# Patient Record
Sex: MALE | Race: WHITE | NOT HISPANIC OR LATINO | Employment: OTHER | URBAN - METROPOLITAN AREA
[De-identification: names, ages, dates, MRNs, and addresses within clinical notes are randomized per-mention and may not be internally consistent; named-entity substitution may affect disease eponyms.]

---

## 2017-01-04 ENCOUNTER — MYC MEDICAL ADVICE (OUTPATIENT)
Dept: FAMILY MEDICINE | Facility: CLINIC | Age: 67
End: 2017-01-04

## 2017-01-04 ENCOUNTER — OFFICE VISIT (OUTPATIENT)
Dept: SLEEP MEDICINE | Facility: CLINIC | Age: 67
End: 2017-01-04
Attending: PSYCHIATRY & NEUROLOGY
Payer: MEDICARE

## 2017-01-04 VITALS
SYSTOLIC BLOOD PRESSURE: 132 MMHG | DIASTOLIC BLOOD PRESSURE: 93 MMHG | OXYGEN SATURATION: 94 % | HEART RATE: 93 BPM | HEIGHT: 68 IN | WEIGHT: 161 LBS | BODY MASS INDEX: 24.4 KG/M2 | TEMPERATURE: 98 F

## 2017-01-04 DIAGNOSIS — R27.0 ATAXIA: ICD-10-CM

## 2017-01-04 PROCEDURE — 99215 OFFICE O/P EST HI 40 MIN: CPT | Performed by: PSYCHIATRY & NEUROLOGY

## 2017-01-04 NOTE — PROGRESS NOTES
The patient is insightful and understands the importance of never driving if tired or sleepy.  I will defer to other medical team about competency driving a motor vehicle besides in the ways related to sleep.

## 2017-01-04 NOTE — PROGRESS NOTES
SLEEP MEDICINE NEW PATIENT VISIT NOTE       Mr. Arnold Lozada is 66 years old.  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  We had a long discussion today regarding the nature and history of his sleepiness and fatigue, its treatment as well as the progression of his motor impairment.      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 as well as by Dr. Tacos Pena at the [facility] here at the [facility].  As far as the patient is aware, at this point there is no clear underlying etiology.  There is not a family history of a cerebellar disease and he has been checked for the common genetic mutations of the autosomal dominant cerebellar ataxias which has apparently come back negative.  I will defer that the ultimate cerebellar diagnosis and its prognosis to Dr. Pena.      In regards to his sleepiness and fatigue, which has progressed in parallel along with his motor impairment, this is commonly seen and some cerebellar nuclei, in particular the vestigial nuclei, are associated with wake promoting neurotransmitters and the reticular activating system and thus it is not surprising that the initial treatment of his sleep disordered breathing first with CPAP and subsequently with a dental appliance for moderate obstructive sleep apnea did not resolve his daytime sleepiness.  He was subsequently placed on methylphenidate and now Adderall, which he takes 30 mg first thing in the morning and has significantly helped out his alertness and wakefulness.      At this point, we discussed other possible interventions or investigations.  After our discussion, it became fairly clear that no further investigation is necessary at this time.  I do not see the relevance of multiple sleep latency test since the patient is already on stimulant medications and he has a  condition which is likely to contribute to sleepiness and fatigue, but also the patient is now aware of the risks of Adderall including cardiac arrhythmias, worsening of tremor, anxiety and insomnia, but overall he states that his alertness and energy levels are substantially improved now that he has been on Adderall for the last 1-2 months.  He does have some degree of breakthrough insomnia which is not surprising.  We discussed strategies of cognitive behavioral therapy, in particular getting out of the bedroom after 15-20 minutes if he is not sleeping and not sleeping outside of the bedroom.      After extensive consideration, it was decided the patient should just go ahead and stay on the current regimen of Adderall and using his dental appliance as is and we will only look at other options if he has troubles.  He is aware of the risks of Adderall and is willing to continue.      The patient will continue to follow up with Dr. Pena for further exploration of his cerebellar diagnosis and prognosis.  I have asked him to think of us as a resource and he can come back and see me at any time, but it sounds like his sleep is under good control.      It is a great privilege being asked to participate in his care.      Forty-five minutes were spent with the patient today, greater than 50% of the time in counseling and coordination of care.        ADDENDUM:  The patient's wife did ask me my opinion as to whether or not hyperbaric oxygen may plausibly be an appropriate therapy in his case.  My understanding of the utility of hyperbaric oxygen is quite limited, but I would be extremely skeptical and I would discuss with Dr. Pena and I also pointed out that since he does have a degenerative neurological condition, he is at risk for explantation and hyperbaric oxygen therapies are often employed for frivolously and people need to pay out of pocket for that, so overall I am quite skeptical but I asked him to talk to   Jean about it.      It is a great prick privilege being asked to participate in his care.         SYLVIA LUX MD             D: 2017 08:52   T: 2017 09:50   MT: MARGARET      Name:     EARL KONG   MRN:      [id number]        Account:      [id number]   :      1950           Visit Date:   2017      Document: [id number]

## 2017-01-05 NOTE — TELEPHONE ENCOUNTER
Routing to Dr. Carpenter to please review [facility] messages and BP readings below    Silverio Paz RN

## 2017-01-08 ENCOUNTER — MYC MEDICAL ADVICE (OUTPATIENT)
Dept: FAMILY MEDICINE | Facility: CLINIC | Age: 67
End: 2017-01-08

## 2017-01-09 ENCOUNTER — OFFICE VISIT (OUTPATIENT)
Dept: BEHAVIORAL HEALTH | Facility: CLINIC | Age: 67
End: 2017-01-09
Payer: MEDICARE

## 2017-01-09 DIAGNOSIS — F43.23 ADJUSTMENT DISORDER WITH MIXED ANXIETY AND DEPRESSED MOOD: Primary | ICD-10-CM

## 2017-01-09 PROCEDURE — 90834 PSYTX W PT 45 MINUTES: CPT | Performed by: SOCIAL WORKER

## 2017-01-09 NOTE — PROGRESS NOTES
"                                           Progress Note    Client Name: Arnold Lozada             Date:   12/15/2016                                           Service Type:Individual                           Session start time:1030                   Session End Time:   1115                            Session Length:        50                Session #:      9                Attendees:     Client     Treatment Plan Last Reviewed: 12/8/2016   PHQ-9 / BRETT-7 :                 DATA                            Progress Since Last Session (Related to Symptoms / Goals / Homework):              Symptoms: Stable    Homework: n/a                            Episode of Care Goals: Satisfactory progress - ACTION (Actively working towards change); Intervened by reinforcing change plan / affirming steps taken                Current / Ongoing Stressors and Concerns:                 Client notes that he has been \"mentally have been doing better.\"  Sounds like walking and coordination are worse.  Leaves for [place] tomorrow and he has already shipped a box of books.  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  Thinks that his mood is improved (\"meds have been tweaked\").  Will be down in [place] for 6 weeks, looking forward to getting out of the cold weather.  Processed some anxiety today about flying in his physical condition.  Client has a service animal that he will be taking with him.         Discussed cognitive restructuring and behavioral activation.  Explored the connection between thoughts, feelings, and actions by using examples relative to client's presenting concerns.  Explained major domains of symptoms (cognitive, emotional, somatic, behavioral, interpersonal) and importance of targeted and specific interventions to reduce symptoms of anxiety and depression.  Discussed role of symptom monitoring in cognitive behavioral treatment.                            Treatment " Objective(s) Addressed in This Session:          Client will use identified behavioral and cognitive skills to challenge negative self talk 90% of the time.                    Intervention:               Cognitive Behavioral Therapy:   Discussed cognitive restructuring and behavioral activation.  Explored the connection between thoughts, feelings, and actions by using examples relative to client's presenting concerns.  Explained major  domains of symptoms (cognitive, emotional, somatic, behavioral, interpersonal) and importance of targeted and specific interventions to reduce symptoms of anxiety and depression.  Discussed role of  symptom monitoring in cognitive behavioral treatment.                                ASSESSMENT: Current Emotional / Mental Status  of significant symptoms):              Risk status (Self / Other harm or suicidal ideation)              Client denies current fears or concerns for personal safety.              Client denies current or recent suicidal ideation or behaviors.              Client denies current or recent homicidal ideation or behaviors.              Client denies current or recent self injurious behavior or ideation.              Client denies other safety concerns.              A safety and risk management plan has not been developed at this time, however client was given the after-hours number / 911 should there be a change in any of these risk factors.                Appearance:                           Appropriate               Eye Contact:                           Good               Psychomotor Behavior:          Normal               Attitude:                                   Cooperative               Orientation:                             All              Speech                          Rate / Production:       Normal                           Volume:                       Normal                Mood:                                      Normal              Affect:                                      Appropriate               Thought Content:                    Clear               Thought Form:                        Coherent  Goal Directed  Logical               Insight:                                    Good                 Medication Review:              No current psychiatric medications prescribed                Medication Compliance:              NA                Changes in Health Issues:              Yes: feels like he notices a decline each week                Chemical Use Review:              Substance Use: Chemical use reviewed, no active concerns identified                 Tobacco Use: No current tobacco use.                  Collateral Reports Completed:              Communicated with: n/a    PLAN: (Client Tasks / Therapist Tasks / Other)  1.  Client will call to schedule next appt  2.  CBT at next session                                                               ________________________________________________________________________    Treatment Plan    Client's Name: Arnold Lozada                      YOB: 1950    Date: 8-31-16    DSM5 Diagnoses: (Sustained by DSM5 Criteria Listed Above)  Diagnoses: F43.23 adjustment disorder with anxiety and depression  Psychosocial & Contextual Factors: health problems decreased functioning  WHODAS 2.0 (12 item)27    Referral / Collaboration:  suggested pursing support group and getting connected with organizations.    Anticipated number of session or this episode of care: 8-12    MeasurableTreatment Goal(s) related to diagnosis / functional impairment(s)  Goal-Depression: Client will decrease depressed mood    I will know I've met my goal when I am less depressed.      Objective #A (Client Action)    Status: New - Date: 12/8/2016    Client will use identified behavioral and cognitive skills to challenge negative  self talk 90% of the time.    Intervention(s)  Therapist will provide psychoeducation, behavioral activation, and cognitive restructuring.      Objective #B  Client will complete at least 10 minutes of self-regulation practice (e.g.: yoga, meditation, relaxation breathing, etc.) per day.    Status: New - Date: 12/8/2016    Intervention(s)  Therapist will provide psychoeducation, behavioral activation, and cognitive restructuring.      Objective #C  Client will exercise 30 minutes 36 times in the next 12 weeks.  Status: New - Date: 12/8/2016    Intervention(s)  Therapist will provide psychoeducation, behavioral activation, and cognitive restructuring.                Goal- Anxiety: Client will decrease anxiety    I will know I've met my goal when I am less anxious.      Objective #A (Client Action)    Status: New - Date: 12/8/2016    Client will use cognitive strategies identified in therapy to challenge anxious thoughts.    Intervention(s)  Therapist will provide psychoeducation, behavioral activation, and cognitive restructuring.    Objective #B  Client will use at least 3 coping skills for anxiety management in the next 12 weeks.    Status: New - Date: 12/8/2016    Intervention(s)  Therapist will provide psychoeducation, behavioral activation, and cognitive restructuring.      Objective #C  Client will identify three distraction and diversion activities and use those activities to decrease level of anxiety.  Status: New - Date: 12/8/2016    Intervention(s)  Therapist will provide psychoeducation, behavioral activation, and cognitive restructuring.                Client has reviewed and agreed to the above plan.      REYES Goodson [facility]               12/8/2016

## 2017-01-15 ENCOUNTER — MYC MEDICAL ADVICE (OUTPATIENT)
Dept: FAMILY MEDICINE | Facility: CLINIC | Age: 67
End: 2017-01-15

## 2017-01-16 NOTE — TELEPHONE ENCOUNTER
I don't know where you would go for tests and this may be difficult to arrange.   Could you     1. List again the tests you need  2. List a facility that would accept laboratory orders from an out of state MD   3. It's not entirely clear to me if these tests would be acceptable to your health insurance , perhaps waiting till you get back would be better ?    Nikhil Carpenter MD

## 2017-02-07 ENCOUNTER — TELEPHONE (OUTPATIENT)
Dept: NEUROLOGY | Facility: CLINIC | Age: 67
End: 2017-02-07

## 2017-02-07 NOTE — TELEPHONE ENCOUNTER
Pt called and left message to be called back. He is out of his adderall and needs to have a new prescription since he is in [place]. He was told by the pharmacist that he needs a hard copy of the prescription. He called his insurance company and was given the name of a neurologist in the area he is in in [place]. He asked that this writer called the doctor's office in [place] and ask him to write a prescription for him. This writer told him that the physician in [place] needs to see him before he can write a prescription, that it would not be ethical for a prescription to be written simply because a nurse from [place] called in regard to the patient. He was told Dr. Pena could write a prescription on Friday and it be mailed to him but would not arrive until the middle of next week. Pt states he will try and get into a primary care doctor today and let this writer no if he needs assistance in having a prescription sent to him from [place].

## 2017-02-23 ENCOUNTER — OFFICE VISIT (OUTPATIENT)
Dept: AUDIOLOGY | Facility: CLINIC | Age: 67
End: 2017-02-23
Payer: MEDICARE

## 2017-02-23 DIAGNOSIS — H90.3 SENSORINEURAL HEARING LOSS, BILATERAL: Primary | ICD-10-CM

## 2017-02-23 PROCEDURE — 99207 ZZC NO CHARGE LOS: CPT | Performed by: AUDIOLOGIST

## 2017-02-23 PROCEDURE — 92557 COMPREHENSIVE HEARING TEST: CPT | Performed by: AUDIOLOGIST

## 2017-02-23 PROCEDURE — 92567 TYMPANOMETRY: CPT | Performed by: AUDIOLOGIST

## 2017-02-23 NOTE — MR AVS SNAPSHOT
After Visit Summary   2/23/2017    Arnold Lozada    MRN: [id number]           Patient Information     Date Of Birth          1950        Visit Information        Provider Department      2/23/2017 10:30 AM Josué Davis AuD [facility]        Today's Diagnoses     Sensorineural hearing loss, bilateral    -  1       Follow-ups after your visit        Who to contact     If you have questions or need follow up information about today's clinic visit or your schedule please contact [facility] directly at [phone].  Normal or non-critical lab and imaging results will be communicated to you by [employer], letter or phone within 4 business days after the clinic has received the results. If you do not hear from us within 7 days, please contact the clinic through [employer] or phone. If you have a critical or abnormal lab result, we will notify you by phone as soon as possible.  Submit refill requests through [employer] or call your pharmacy and they will forward the refill request to us. Please allow 3 business days for your refill to be completed.          Additional Information About Your Visit        MyChart Information     [employer] gives you secure access to your electronic health record. If you see a primary care provider, you can also send messages to your care team and make appointments. If you have questions, please call your primary care clinic.  If you do not have a primary care provider, please call [phone] and they will assist you.        Care EveryWhere ID     This is your Care EveryWhere ID. This could be used by other organizations to access your [facility] medical records  [id number]         Blood Pressure from Last 3 Encounters:   01/04/17 (!) 132/93   12/19/16 144/70   12/13/16 (!) 156/107    Weight from Last 3 Encounters:   01/04/17 161 lb (73 kg)   12/13/16 163 lb 3.2 oz (74 kg)   11/26/16 157 lb (71.2 kg)              We Performed the Following      AUDIOGRAM/TYMPANOGRAM - INTERFACE     COMPREHENSIVE HEARING TEST     TYMPANOMETRY        Primary Care Provider Office Phone # Fax #    Nikhil Carpenter [phone]       [facility] [address] [address]        Thank you!     Thank you for choosing [facility]  for your care. Our goal is always to provide you with excellent care. Hearing back from our patients is one way we can continue to improve our services. Please take a few minutes to complete the written survey that you may receive in the mail after your visit with us. Thank you!             Your Updated Medication List - Protect others around you: Learn how to safely use, store and throw away your medicines at www.disposemymeds.org.          This list is accurate as of: 17 11:25 AM.  Always use your most recent med list.                   Brand Name Dispense Instructions for use    5-HTP MAXIMUM STRENGTH 200 MG Caps   Generic dru-Hydroxytryptophan      Take by mouth daily       * amphetamine-dextroamphetamine 30 MG per 24 hr capsule    ADDERALL XR    30 capsule    Take 1 capsule (30 mg) by mouth daily       * amphetamine-dextroamphetamine 30 MG per 24 hr capsule    ADDERALL XR    30 capsule    Take 1 capsule (30 mg) by mouth daily       ascorbic acid 1000 MG Tabs    vitamin C     Take 1,000 mg by mouth daily       aspirin 325 MG tablet      Take 325 mg by mouth daily.       Caprylic Acid Liqd      2,000 mg       cholecalciferol 2000 UNITS tablet      Take 1 tablet by mouth daily Vitamin D3       COQ-10 PO          folic acid 800 MCG Tabs      Take 1 tablet by mouth daily       hydrochlorothiazide 25 MG tablet    HYDRODIURIL    90 tablet    Take 1 tablet (25 mg) by mouth daily       lisinopril 20 MG tablet    PRINIVIL/ZESTRIL    90 tablet    Take 1 tablet (20 mg) by mouth daily       Multi-vitamin Tabs tablet   Generic drug:  multivitamin, therapeutic with minerals      1 TABLET DAILY        order for DME      Respironics REMSTAR 60 Series Auto CPAP 5-18cm H2O, Nuance pillow nasal mask large       * order for DME     1 Device    Equipment being ordered: quadcane       * order for DME     1 Device    Equipment being ordered: shower bench [ long ]       POTASSIUM PO      Take 595 mcg by mouth daily       sertraline 50 MG tablet    ZOLOFT    90 tablet    Take 1 tablet (50 mg) by mouth daily       simvastatin 20 MG tablet    ZOCOR    90 tablet    ONE TABLET DAILY IN THE EVENING       sulfaSALAzine 500 MG tablet    AZULFIDINE     Take 500 mg by mouth 4 times daily.       traZODone 50 MG tablet    DESYREL    90 tablet    Take 1 tablet (50 mg) by mouth nightly as needed for sleep       * Notice:  This list has 4 medication(s) that are the same as other medications prescribed for you. Read the directions carefully, and ask your doctor or other care provider to review them with you.

## 2017-02-23 NOTE — PROGRESS NOTES
AUDIOLOGY REPORT    SUBJECTIVE:  Arnold Lozada is a 66 year old male who was seen in the Audiology Clinic at [place] for audiologic evaluation, referred by Self.  The patient has been seen previously in this clinic on 12/5/2015 for assessment and results indicated a high frequency sensorineural hearing loss bilaterally. The patient reports a noticeable decline in hearing.  They were accompanied today by their wife, Willa.    OBJECTIVE:    Otoscopic exam indicates partial obstruction with cerumen bilaterally     Pure Tone Thresholds assessed using conventional audiometry with good  reliability from 250-8000 Hz bilaterally using insert earphones     RIGHT:  mild and profound sensorineural hearing loss    LEFT:    mild and profound sensorineural hearing loss    Tympanogram:    RIGHT: negative pressure     LEFT:   negative pressure     Speech Reception Threshold:    RIGHT: 30 dB HL    LEFT:   30 dB HL    Word Recognition Score:     RIGHT: 100% at 70 dB HL using NU-6 recorded word list.    LEFT:   100% at 70 dB HL using NU-6 recorded word list.      ASSESSMENT:   Sensorineural hearing loss      Compared to patient's previous audiogram dated 12/5/2015, hearing has declined 5-10 dB across most frequencies above 1000 Hz bilaterally. Today we updated his current [employer] hearing aids to this most recent audiogram. Today s results were discussed with the patient and wife in detail. Today we discussed the possibility of upgrading to custom micromolds to improve hearing aid response.     PLAN:  Patient was counseled regarding hearing loss and impact on communication.  It is recommended that the patient return as needed for hearing aid related concerns.  Please call this clinic with questions regarding these results or recommendations.      Josué Stout CCC-A  Licensed Audiologist #[id number]  2/23/2017

## 2017-02-24 DIAGNOSIS — G47.10 EXCESSIVE SLEEPINESS: ICD-10-CM

## 2017-02-24 RX ORDER — DEXTROAMPHETAMINE SACCHARATE, AMPHETAMINE ASPARTATE MONOHYDRATE, DEXTROAMPHETAMINE SULFATE AND AMPHETAMINE SULFATE 7.5; 7.5; 7.5; 7.5 MG/1; MG/1; MG/1; MG/1
30 CAPSULE, EXTENDED RELEASE ORAL DAILY
Qty: 30 CAPSULE | Refills: 0 | Status: SHIPPED | OUTPATIENT
Start: 2017-03-03 | End: 2017-02-24

## 2017-02-24 RX ORDER — DEXTROAMPHETAMINE SACCHARATE, AMPHETAMINE ASPARTATE MONOHYDRATE, DEXTROAMPHETAMINE SULFATE AND AMPHETAMINE SULFATE 7.5; 7.5; 7.5; 7.5 MG/1; MG/1; MG/1; MG/1
30 CAPSULE, EXTENDED RELEASE ORAL DAILY
Qty: 30 CAPSULE | Refills: 0 | Status: SHIPPED | OUTPATIENT
Start: 2017-04-03 | End: 2017-05-09

## 2017-02-24 RX ORDER — DEXTROAMPHETAMINE SACCHARATE, AMPHETAMINE ASPARTATE MONOHYDRATE, DEXTROAMPHETAMINE SULFATE AND AMPHETAMINE SULFATE 7.5; 7.5; 7.5; 7.5 MG/1; MG/1; MG/1; MG/1
30 CAPSULE, EXTENDED RELEASE ORAL DAILY
Qty: 30 CAPSULE | Refills: 0 | Status: SHIPPED | OUTPATIENT
Start: 2017-05-03 | End: 2017-06-09

## 2017-03-01 ENCOUNTER — MYC MEDICAL ADVICE (OUTPATIENT)
Dept: FAMILY MEDICINE | Facility: CLINIC | Age: 67
End: 2017-03-01

## 2017-03-01 DIAGNOSIS — I10 ESSENTIAL HYPERTENSION WITH GOAL BLOOD PRESSURE LESS THAN 140/90: Primary | ICD-10-CM

## 2017-03-02 ENCOUNTER — MYC MEDICAL ADVICE (OUTPATIENT)
Dept: FAMILY MEDICINE | Facility: CLINIC | Age: 67
End: 2017-03-02

## 2017-03-02 DIAGNOSIS — I10 ESSENTIAL HYPERTENSION WITH GOAL BLOOD PRESSURE LESS THAN 140/90: Primary | ICD-10-CM

## 2017-03-02 RX ORDER — METOPROLOL SUCCINATE 25 MG/1
25 TABLET, EXTENDED RELEASE ORAL DAILY
Qty: 90 TABLET | Refills: 1 | Status: CANCELLED | OUTPATIENT
Start: 2017-03-02

## 2017-03-02 RX ORDER — METOPROLOL SUCCINATE 25 MG/1
25 TABLET, EXTENDED RELEASE ORAL DAILY
Qty: 30 TABLET | Refills: 1 | Status: SHIPPED | OUTPATIENT
Start: 2017-03-02 | End: 2017-04-26 | Stop reason: DRUGHIGH

## 2017-03-07 ENCOUNTER — TELEPHONE (OUTPATIENT)
Dept: AUDIOLOGY | Facility: CLINIC | Age: 67
End: 2017-03-07

## 2017-03-09 ENCOUNTER — OFFICE VISIT (OUTPATIENT)
Dept: OTOLARYNGOLOGY | Facility: CLINIC | Age: 67
End: 2017-03-09
Payer: COMMERCIAL

## 2017-03-09 VITALS — RESPIRATION RATE: 16 BRPM

## 2017-03-09 DIAGNOSIS — H90.5 SNHL (SENSORINEURAL HEARING LOSS): ICD-10-CM

## 2017-03-09 DIAGNOSIS — H61.23 BILATERAL IMPACTED CERUMEN: Primary | ICD-10-CM

## 2017-03-09 PROCEDURE — 99212 OFFICE O/P EST SF 10 MIN: CPT | Mod: 25 | Performed by: OTOLARYNGOLOGY

## 2017-03-09 PROCEDURE — 69210 REMOVE IMPACTED EAR WAX UNI: CPT | Performed by: OTOLARYNGOLOGY

## 2017-03-09 ASSESSMENT — PAIN SCALES - GENERAL: PAINLEVEL: NO PAIN (0)

## 2017-03-09 NOTE — MR AVS SNAPSHOT
After Visit Summary   3/9/2017    Arnold Lozada    MRN: [id number]           Patient Information     Date Of Birth          1950        Visit Information        Provider Department      3/9/2017 10:15 AM Braden Mason MD [facility]        Today's Diagnoses     Bilateral impacted cerumen    -  1    SNHL (sensorineural hearing loss)          Care Instructions    General Scheduling Information  To schedule your CT/MRI scan, please contact [facility] at [phone] [address] [place] [address]    To schedule your Surgery, please contact our Specialty Schedulers at [phone]    ENT Clinic Locations Clinic Hours Telephone Number     [facility]  [address]  [place] [address]   Tuesday:       8:00am -- 4:00pm    Wednesday:  8:00am - 4:00pm   To schedule an appointment with   Dr. Mason,   please contact our   Specialty Scheduling Department at:     [phone]       [facility]  [address] [place] [address]   Friday:          8:00am - 4:00pm         Urgent Care Locations Clinic Hours Telephone Numbers     [facility]  [address] [place] [address]     Monday-Friday:     11:00pm - 9:00pm    Saturday-Sunday:  9:00am - 5:00pm   [phone]     [facility]  [address] [place] [address]     Monday-Friday:      5:00pm - 9:00pm     Saturday-Sunday:  9:00am - 5:00pm   [phone]             Follow-ups after your visit        Who to contact     If you have questions or need follow up information about today's clinic visit or your schedule please contact [facility] directly at [phone].  Normal or non-critical lab and imaging results will be communicated to you by MyChart, letter or phone within 4 business days after the clinic has received the results. If you do not hear from us within 7 days, please contact the clinic through MyChart or phone. If you have a critical  or abnormal lab result, we will notify you by phone as soon as possible.  Submit refill requests through Local[employer]iding or call your pharmacy and they will forward the refill request to us. Please allow 3 business days for your refill to be completed.          Additional Information About Your Visit        C[facility]hart Information     Local[employer]iding gives you secure access to your electronic health record. If you see a primary care provider, you can also send messages to your care team and make appointments. If you have questions, please call your primary care clinic.  If you do not have a primary care provider, please call [phone] and they will assist you.        Care EveryWhere ID     This is your Care EveryWhere ID. This could be used by other organizations to access your [place] medical records  [id number]        Your Vitals Were     Respirations                   16            Blood Pressure from Last 3 Encounters:   01/04/17 (!) 132/93   12/19/16 144/70   12/13/16 (!) 156/107    Weight from Last 3 Encounters:   01/04/17 73 kg (161 lb)   12/13/16 74 kg (163 lb 3.2 oz)   11/26/16 71.2 kg (157 lb)              We Performed the Following     Remove [facility]        Primary Care Provider Office Phone # Fax #    Nikhil Carpenter [phone]       [address] [address]        Thank you!     Thank you for choosing [facility]  for your care. Our goal is always to provide you with excellent care. Hearing back from our patients is one way we can continue to improve our services. Please take a few minutes to complete the written survey that you may receive in the mail after your visit with us. Thank you!             Your Updated Medication List - Protect others around you: Learn how to safely use, store and throw away your medicines at www.disposemymeds.org.          This list is accurate as of: 3/9/17 11:16 AM.  Always use your most recent med list.                    Brand Name Dispense Instructions for use    5-HTP MAXIMUM STRENGTH 200 MG Caps   Generic dru-Hydroxytryptophan      Take by mouth daily       * amphetamine-dextroamphetamine 30 MG per 24 hr capsule   Start taking on:  4/3/2017    ADDERALL XR    30 capsule    Take 1 capsule (30 mg) by mouth daily       * amphetamine-dextroamphetamine 30 MG per 24 hr capsule   Start taking on:  5/3/2017    ADDERALL XR    30 capsule    Take 1 capsule (30 mg) by mouth daily       ascorbic acid 1000 MG Tabs    vitamin C     Take 1,000 mg by mouth daily       aspirin 325 MG tablet      Take 325 mg by mouth daily.       Caprylic Acid Liqd      2,000 mg       cholecalciferol 2000 UNITS tablet      Take 1 tablet by mouth daily Vitamin D3       COQ-10 PO          folic acid 800 MCG Tabs      Take 1 tablet by mouth daily       hydrochlorothiazide 25 MG tablet    HYDRODIURIL    90 tablet    Take 1 tablet (25 mg) by mouth daily       lisinopril 20 MG tablet    PRINIVIL/ZESTRIL    90 tablet    Take 1 tablet (20 mg) by mouth daily       metoprolol 25 MG 24 hr tablet    TOPROL-XL    30 tablet    Take 1 tablet (25 mg) by mouth daily       Multi-vitamin Tabs tablet   Generic drug:  multivitamin, therapeutic with minerals      1 TABLET DAILY       order for DME      Respironics REMSTAR 60 Series Auto CPAP 5-18cm H2O, Nuance pillow nasal mask large       * order for DME     1 Device    Equipment being ordered: quadcane       * order for DME     1 Device    Equipment being ordered: shower bench [ long ]       POTASSIUM PO      Take 595 mcg by mouth daily       sertraline 50 MG tablet    ZOLOFT    90 tablet    Take 1 tablet (50 mg) by mouth daily       simvastatin 20 MG tablet    ZOCOR    90 tablet    ONE TABLET DAILY IN THE EVENING       sulfaSALAzine 500 MG tablet    AZULFIDINE     Take 500 mg by mouth 4 times daily.       traZODone 50 MG tablet    DESYREL    90 tablet    Take 1 tablet (50 mg) by mouth nightly as needed for sleep       *  Notice:  This list has 4 medication(s) that are the same as other medications prescribed for you. Read the directions carefully, and ask your doctor or other care provider to review them with you.

## 2017-03-09 NOTE — NURSING NOTE
"Chief Complaint   Patient presents with     Cerumen Impaction     Ear Cleaning       Initial Resp 16 Estimated body mass index is 24.48 kg/(m^2) as calculated from the following:    Height as of 1/4/17: 1.727 m (5' 8\").    Weight as of 1/4/17: 73 kg (161 lb).  Medication Reconciliation: complete     No vitals were taken today. Patient brought straight back to exam room by wheelchair.    Arcelia Singh MA    "

## 2017-03-09 NOTE — PATIENT INSTRUCTIONS
General Scheduling Information  To schedule your CT/MRI scan, please contact [facility] at [phone]   [address] [place] [address]    To schedule your Surgery, please contact our Specialty Schedulers at [phone]    ENT Clinic Locations Clinic Hours Telephone Number     [facility]  [address] [place] [address]   Tuesday:       8:00am -- 4:00pm    Wednesday:  8:00am - 4:00pm   To schedule an appointment with   Dr. Mason,   please contact our   Specialty Scheduling Department at:     [phone]       [facility]  [address] [place] [address]   Friday:          8:00am - 4:00pm         Urgent Care Locations Clinic Hours Telephone Numbers     [facility]  [address] [place] [address]     Monday-Friday:     11:00pm - 9:00pm    Saturday-Sunday:  9:00am - 5:00pm   [phone]     [facility]  [address] [place] [address]     Monday-Friday:      5:00pm - 9:00pm     Saturday-Sunday:  9:00am - 5:00pm   [phone]

## 2017-03-10 ENCOUNTER — MYC MEDICAL ADVICE (OUTPATIENT)
Dept: FAMILY MEDICINE | Facility: CLINIC | Age: 67
End: 2017-03-10

## 2017-03-10 ENCOUNTER — OFFICE VISIT (OUTPATIENT)
Dept: URGENT CARE | Facility: URGENT CARE | Age: 67
End: 2017-03-10
Payer: MEDICARE

## 2017-03-10 ENCOUNTER — RADIANT APPOINTMENT (OUTPATIENT)
Dept: GENERAL RADIOLOGY | Facility: CLINIC | Age: 67
End: 2017-03-10
Attending: NURSE PRACTITIONER
Payer: MEDICARE

## 2017-03-10 VITALS
SYSTOLIC BLOOD PRESSURE: 128 MMHG | WEIGHT: 160 LBS | DIASTOLIC BLOOD PRESSURE: 76 MMHG | BODY MASS INDEX: 24.33 KG/M2 | OXYGEN SATURATION: 95 % | HEART RATE: 97 BPM | TEMPERATURE: 97.1 F

## 2017-03-10 DIAGNOSIS — M25.551 HIP PAIN, RIGHT: Primary | ICD-10-CM

## 2017-03-10 PROCEDURE — 99213 OFFICE O/P EST LOW 20 MIN: CPT | Performed by: NURSE PRACTITIONER

## 2017-03-10 PROCEDURE — 72170 X-RAY EXAM OF PELVIS: CPT

## 2017-03-10 RX ORDER — HYDROCODONE BITARTRATE AND ACETAMINOPHEN 5; 325 MG/1; MG/1
1 TABLET ORAL EVERY 6 HOURS PRN
Qty: 12 TABLET | Refills: 0 | Status: SHIPPED | OUTPATIENT
Start: 2017-03-10 | End: 2017-03-13

## 2017-03-10 NOTE — PROGRESS NOTES
SUBJECTIVE:                                                    Arnold Lozada is a 66 year old male who presents to clinic today for the following health issues:    Musculoskeletal problem/pain      Duration: one week- progressively got worse, especially yesterday and today.     Description  Location: right buttock, right hip    Intensity:  Came on slowly    Accompanying signs and symptoms: pain with walking, difficulty putting pressure on the right leg    History  Previous similar problem: YES- has had sciatica in past- seems similar.   Previous evaluation:  none    Precipitating or alleviating factors:  Trauma or overuse: no   Aggravating factors include: putting pressure on the leg    Therapies tried and outcome: baclofen 10mg (3 doses)- no relief. Pt also has tried stretching and exercises to release pain, but no relief.            No Known Allergies    Past Medical History   Diagnosis Date     Abnormal gait 10/5/2014     Acute renal failure (H) 10/5/2014     Elevated troponin I level 10/5/2014     Family history of colon cancer 12/3/2013     History of colonoscopy nov, 2008     Low back pain      Nocturia 10/5/2014     Temporary cerebral vascular dysfunction 10/5/2014     Vasovagal syncope 10/5/2014         Current Outpatient Prescriptions on File Prior to Visit:  metoprolol (TOPROL-XL) 25 MG 24 hr tablet Take 1 tablet (25 mg) by mouth daily   [START ON 4/3/2017] amphetamine-dextroamphetamine (ADDERALL XR) 30 MG per 24 hr capsule Take 1 capsule (30 mg) by mouth daily   [START ON 5/3/2017] amphetamine-dextroamphetamine (ADDERALL XR) 30 MG per 24 hr capsule Take 1 capsule (30 mg) by mouth daily   order for DME Equipment being ordered: shower bench [ long ]   lisinopril (PRINIVIL/ZESTRIL) 20 MG tablet Take 1 tablet (20 mg) by mouth daily   sertraline (ZOLOFT) 50 MG tablet Take 1 tablet (50 mg) by mouth daily   hydrochlorothiazide (HYDRODIURIL) 25 MG tablet Take 1 tablet (25 mg) by mouth daily   simvastatin  (ZOCOR) 20 MG tablet ONE TABLET DAILY IN THE EVENING   order for DME Equipment being ordered: quadcane   traZODone (DESYREL) 50 MG tablet Take 1 tablet (50 mg) by mouth nightly as needed for sleep   Caprylic Acid LIQD 2,000 mg   Coenzyme Q10 (COQ-10 PO)    5-Hydroxytryptophan (5-HTP MAXIMUM STRENGTH) 200 MG CAPS Take by mouth daily   ORDER FOR DME Respironics REMSTAR 60 Series Auto CPAP 5-18cm H2O, Nuance pillow nasal mask large   ascorbic acid (VITAMIN C) 1000 MG TABS Take 1,000 mg by mouth daily   cholecalciferol 2000 UNITS tablet Take 1 tablet by mouth daily Vitamin D3   folic acid 800 MCG TABS Take 1 tablet by mouth daily   POTASSIUM PO Take 595 mcg by mouth daily   sulfaSALAzine (AZULFIDINE) 500 MG tablet Take 500 mg by mouth 4 times daily.   aspirin 325 MG tablet Take 325 mg by mouth daily.   MULTI-VITAMIN PO TABS 1 TABLET DAILY     No current facility-administered medications on file prior to visit.     Social History   Substance Use Topics     Smoking status: Never Smoker     Smokeless tobacco: Never Used     Alcohol use Yes      Comment: 3 times per week       ROS:  GEN no fevers  SKIN as above  Musculoskel: + as above    OBJECTIVE:  /76 (BP Location: Left arm, Patient Position: Chair, Cuff Size: Adult Regular)  Pulse 97  Temp 97.1  F (36.2  C) (Oral)  Wt 160 lb (72.6 kg)  SpO2 95%  BMI 24.33 kg/m2   General:   awake, alert, and cooperative.  NAD.   Head: Normocephalic, atraumatic.  Eyes: Conjunctiva clear,   MS:  Anterior right hip tenderness, unable to perform straight leg raises with out eliciting pain. Using a wheelchair, unable to bear full weight on the right leg. Pulses and sensation on the right extremity intact.  Neuro: Alert and oriented - normal speech.  Results for orders placed or performed in visit on 03/10/17 (from the past 24 hour(s))   XR Pelvis 1/2 Views    Narrative    PELVIS ONE TO TWO VIEWS  3/10/2017 11:29 AM     HISTORY: Pain in right hip.    COMPARISON: None.      Impression     IMPRESSION: Rotated positioning. No fracture or osseous lesion is  identified on this single view. If there is suspicion for a hip  fracture, an orthogonal view would be recommended.    DAVIN TORREZ MD       ASSESSMENT:    ICD-10-CM    1. Hip pain, right M25.551 XR Pelvis 1/2 Views     HYDROcodone-acetaminophen (NORCO) 5-325 MG per tablet             PLAN:   I discussed xray results with the patient.     Similar right hip pain in November 2016, but was able to walk then. Advised patient to follow up with ortho. Appointment is made for Monday at [facility]. Advised about symptoms which might herald more serious problems.    Kyara Alanis  FNP-BC  Family Nurse Practitoner

## 2017-03-10 NOTE — MR AVS SNAPSHOT
After Visit Summary   3/10/2017    Arnold Lozada    MRN: [id number]           Patient Information     Date Of Birth          1950        Visit Information        Provider Department      3/10/2017 11:00 AM Kyara Alanis NP [facility]        Today's Diagnoses     Hip pain, right    -  1       Follow-ups after your visit        Your next 10 appointments already scheduled     Mar 13, 2017  9:00 AM CDT   New Visit with Jesus Franklin MD   [facility] ([facility]    [address] [facility] [id number] [phone]              Who to contact     If you have questions or need follow up information about today's clinic visit or your schedule please contact [facility] directly at [phone].  Normal or non-critical lab and imaging results will be communicated to you by MyChart, letter or phone within 4 business days after the clinic has received the results. If you do not hear from us within 7 days, please contact the clinic through MyChart or phone. If you have a critical or abnormal lab result, we will notify you by phone as soon as possible.  Submit refill requests through [employer]ely or call your pharmacy and they will forward the refill request to us. Please allow 3 business days for your refill to be completed.          Additional Information About Your Visit        MyChart Information     [employer]osely gives you secure access to your electronic health record. If you see a primary care provider, you can also send messages to your care team and make appointments. If you have questions, please call your primary care clinic.  If you do not have a primary care provider, please call [phone] and they will assist you.        Care EveryWhere ID     This is your Care EveryWhere ID. This could be used by other organizations to access your [place] medical records  [id number]        Your Vitals Were     Pulse Temperature  Pulse Oximetry BMI (Body Mass Index)          97 97.1  F (36.2  C) (Oral) 95% 24.33 kg/m2         Blood Pressure from Last 3 Encounters:   03/10/17 128/76   01/04/17 (!) 132/93   12/19/16 144/70    Weight from Last 3 Encounters:   03/10/17 160 lb (72.6 kg)   01/04/17 161 lb (73 kg)   12/13/16 163 lb 3.2 oz (74 kg)              We Performed the Following     XR Pelvis 1/2 Views          Today's Medication Changes          These changes are accurate as of: 3/10/17 12:16 PM.  If you have any questions, ask your nurse or doctor.               Start taking these medicines.        Dose/Directions    HYDROcodone-acetaminophen 5-325 MG per tablet   Commonly known as:  NORCO   Used for:  Hip pain, right   Started by:  Kyara Alanis NP        Dose:  1 tablet   Take 1 tablet by mouth every 6 hours as needed for moderate to severe pain maximum 4 tablet(s) per day   Quantity:  12 tablet   Refills:  0            Where to get your medicines      Some of these will need a paper prescription and others can be bought over the counter.  Ask your nurse if you have questions.     Bring a paper prescription for each of these medications     HYDROcodone-acetaminophen 5-325 MG per tablet                Primary Care Provider Office Phone # Fax #    Nikhil Carpenter [phone]       [address] [address]        Thank you!     Thank you for choosing [facility]  for your care. Our goal is always to provide you with excellent care. Hearing back from our patients is one way we can continue to improve our services. Please take a few minutes to complete the written survey that you may receive in the mail after your visit with us. Thank you!             Your Updated Medication List - Protect others around you: Learn how to safely use, store and throw away your medicines at www.disposemymeds.org.          This list is accurate as of: 3/10/17 12:16 PM.  Always use your most  recent med list.                   Brand Name Dispense Instructions for use    5-HTP MAXIMUM STRENGTH 200 MG Caps   Generic dru-Hydroxytryptophan      Take by mouth daily       * amphetamine-dextroamphetamine 30 MG per 24 hr capsule   Start taking on:  4/3/2017    ADDERALL XR    30 capsule    Take 1 capsule (30 mg) by mouth daily       * amphetamine-dextroamphetamine 30 MG per 24 hr capsule   Start taking on:  5/3/2017    ADDERALL XR    30 capsule    Take 1 capsule (30 mg) by mouth daily       ascorbic acid 1000 MG Tabs    vitamin C     Take 1,000 mg by mouth daily       aspirin 325 MG tablet      Take 325 mg by mouth daily.       Caprylic Acid Liqd      2,000 mg       cholecalciferol 2000 UNITS tablet      Take 1 tablet by mouth daily Vitamin D3       COQ-10 PO          folic acid 800 MCG Tabs      Take 1 tablet by mouth daily       hydrochlorothiazide 25 MG tablet    HYDRODIURIL    90 tablet    Take 1 tablet (25 mg) by mouth daily       HYDROcodone-acetaminophen 5-325 MG per tablet    NORCO    12 tablet    Take 1 tablet by mouth every 6 hours as needed for moderate to severe pain maximum 4 tablet(s) per day       lisinopril 20 MG tablet    PRINIVIL/ZESTRIL    90 tablet    Take 1 tablet (20 mg) by mouth daily       metoprolol 25 MG 24 hr tablet    TOPROL-XL    30 tablet    Take 1 tablet (25 mg) by mouth daily       Multi-vitamin Tabs tablet   Generic drug:  multivitamin, therapeutic with minerals      1 TABLET DAILY       order for DME      Respironics REMSTAR 60 Series Auto CPAP 5-18cm H2O, Nuance pillow nasal mask large       * order for DME     1 Device    Equipment being ordered: quadcane       * order for DME     1 Device    Equipment being ordered: shower bench [ long ]       POTASSIUM PO      Take 595 mcg by mouth daily       sertraline 50 MG tablet    ZOLOFT    90 tablet    Take 1 tablet (50 mg) by mouth daily       simvastatin 20 MG tablet    ZOCOR    90 tablet    ONE TABLET DAILY IN THE EVENING        sulfaSALAzine 500 MG tablet    AZULFIDINE     Take 500 mg by mouth 4 times daily.       traZODone 50 MG tablet    DESYREL    90 tablet    Take 1 tablet (50 mg) by mouth nightly as needed for sleep       * Notice:  This list has 4 medication(s) that are the same as other medications prescribed for you. Read the directions carefully, and ask your doctor or other care provider to review them with you.

## 2017-03-10 NOTE — NURSING NOTE
"Chief Complaint   Patient presents with     Musculoskeletal Problem     Pt c/o right leg, hip, and buttock pain for one week.        Initial /76 (BP Location: Left arm, Patient Position: Chair, Cuff Size: Adult Regular)  Pulse 97  Temp 97.1  F (36.2  C) (Oral)  Wt 160 lb (72.6 kg)  SpO2 95%  BMI 24.33 kg/m2 Estimated body mass index is 24.33 kg/(m^2) as calculated from the following:    Height as of 1/4/17: 5' 8\" (1.727 m).    Weight as of this encounter: 160 lb (72.6 kg).  Medication Reconciliation: complete     Hillary Ortega CMA (AAMA)      "

## 2017-03-13 NOTE — TELEPHONE ENCOUNTER
Noted that patient cancelled appointment with ortho and has an appointment with Dr. Mcnair on 3/15/17.    Routing to Dr. Carpenter: please see [facility] message below    Silverio Paz RN

## 2017-03-14 NOTE — PROGRESS NOTES
SUBJECTIVE:                                                    Arnold Lozada is a 66 year old male who presents to clinic today for the following health issues:          Hospital Follow-up Visit:    Hospital/Nursing Home/ Rehab Facility: [facility]  Date of Admission: 3/10/17  Date of Discharge: 3/11/17  Reason(s) for Admission: Sciatica pain              Problems taking medications regularly:  None       Medication changes since discharge: None       Problems adhering to non-medication therapy:  None    Summary of hospitalization:  CareEverywhere information obtained and reviewed  Diagnostic Tests/Treatments reviewed.  Follow up needed: none  Other Healthcare Providers Involved in Patient s Care:         None  Update since discharge: improved.     Post Discharge Medication Reconciliation: discharge medications reconciled and changed, per note/orders (see AVS).  Plan of care communicated with patient     Coding guidelines for this visit:  Type of Medical   Decision Making Face-to-Face Visit       within 7 Days of discharge Face-to-Face Visit        within 14 days of discharge   Moderate Complexity 19108 [id number]   High Complexity 18726 60025                 Problem list and histories reviewed & adjusted, as indicated.  Additional history: as documented    Patient Active Problem List   Diagnosis     Hyperlipidemia LDL goal <130     Crohn's disease of colon (H)     Adenomatous colon polyp     Low back strain     GUILLERMINA (obstructive sleep apnea)- Mild/moderate ( AHI 16.2)     Family history of colon cancer     Advanced directives, counseling/discussion     Essential hypertension with goal blood pressure less than 140/90     Adjustment disorder with mixed anxiety and depressed mood     Cerebral ataxia (H)     Past Surgical History   Procedure Laterality Date     Hc tooth extraction w/forcep       Colonoscopy  Fall 2014     OK       Social History   Substance Use Topics     Smoking status: Never Smoker     Smokeless  tobacco: Never Used     Alcohol use Yes      Comment: 3 times per week     Family History   Problem Relation Age of Onset     CANCER Mother      brain tumor, age 79     Other Cancer Mother      Brain tumor     Depression Mother      Asthma Mother      CANCER Father      colon     Pneumonia Father       age 90 from pneumonia and sepsis     C.A.D. Father      Age 80 have MI and triple bypass     Coronary Artery Disease Father      Heart Attack     Hypertension Father      ???     Hyperlipidemia Father      ???     Colon Cancer Father      Cured     DIABETES Maternal Grandmother          BP Readings from Last 3 Encounters:   03/15/17 128/64   03/10/17 128/76   17 (!) 132/93    Wt Readings from Last 3 Encounters:   03/15/17 167 lb (75.8 kg)   03/10/17 160 lb (72.6 kg)   17 161 lb (73 kg)                  Labs reviewed in EPIC    Reviewed and updated as needed this visit by clinical staff       Reviewed and updated as needed this visit by Provider         ROS:  This 66 year old male is here today with his wife. He is retired and walks with a cane due to ataxia. He has had some intermittent low back pains off and on and usually get work them out with some flexion exercises that I have suggested. However, on March 10, 2017, his right low back pain got very severe and radiated down the back of his right leg and around to his outer right lower thigh and lower calf. He could hardly walk. He went to [place] [facility] because he could not walk due to the pain. MRI showed degenerative changes, some spondylosis, and some nerve impingement at L5-S1. He was kept overnight and sent home on Percocet. He was advised to start physical therapy. He has an appointment here today for that. He is out of the percocet and wonders what he can do. He can now walk more comfortably, but can't sit very long. No bowel or bladder problems. All other review of systems are negative  Personal, family, and social history reviewed with patient  "and revised.         OBJECTIVE:                                                    /64 (BP Location: Right arm, Patient Position: Chair, Cuff Size: Adult Regular)  Pulse 90  Temp 97.3  F (36.3  C)  Ht 5' 8\" (1.727 m)  Wt 167 lb (75.8 kg)  SpO2 97%  BMI 25.39 kg/m2  Body mass index is 25.39 kg/(m^2).   patient walks slowly with his cane.   He is able to flex his right leg quite well, even while sitting in a chair here today  He was able to sit throughout the entire visit quite comfortably   No foot drop when he walks    Diagnostic Test Results:  none      ASSESSMENT/PLAN:                                                             1. Acute right-sided low back pain with right-sided sciatica  As above, I reviewed his MRI report in detail   - predniSONE (DELTASONE) 20 MG tablet; Take 1 tablet (20 mg) by mouth 2 times daily  Dispense: 10 tablet; Refill: 0  Continue physical therapy as long as it helps. If physical therapy stops helping, he should see Dr. Carpenter for referral for epidural steroid injection     2. Cerebral ataxia (H)  This is chronic, but puts him at risk for alls       Return to clinic if no improvement     FERNANDEZ AYERS MD  [facility]  "

## 2017-03-15 ENCOUNTER — THERAPY VISIT (OUTPATIENT)
Dept: PHYSICAL THERAPY | Facility: CLINIC | Age: 67
End: 2017-03-15
Payer: MEDICARE

## 2017-03-15 ENCOUNTER — OFFICE VISIT (OUTPATIENT)
Dept: FAMILY MEDICINE | Facility: CLINIC | Age: 67
End: 2017-03-15
Payer: MEDICARE

## 2017-03-15 VITALS
WEIGHT: 167 LBS | TEMPERATURE: 97.3 F | HEIGHT: 68 IN | SYSTOLIC BLOOD PRESSURE: 128 MMHG | HEART RATE: 90 BPM | OXYGEN SATURATION: 97 % | DIASTOLIC BLOOD PRESSURE: 64 MMHG | BODY MASS INDEX: 25.31 KG/M2

## 2017-03-15 DIAGNOSIS — M54.41 BILATERAL LOW BACK PAIN WITH RIGHT-SIDED SCIATICA: Primary | ICD-10-CM

## 2017-03-15 DIAGNOSIS — G11.9 CEREBRAL ATAXIA (H): ICD-10-CM

## 2017-03-15 DIAGNOSIS — M54.41 ACUTE RIGHT-SIDED LOW BACK PAIN WITH RIGHT-SIDED SCIATICA: Primary | ICD-10-CM

## 2017-03-15 PROCEDURE — G8981 BODY POS CURRENT STATUS: HCPCS | Mod: GP | Performed by: PHYSICAL THERAPIST

## 2017-03-15 PROCEDURE — 97161 PT EVAL LOW COMPLEX 20 MIN: CPT | Mod: GP | Performed by: PHYSICAL THERAPIST

## 2017-03-15 PROCEDURE — 97110 THERAPEUTIC EXERCISES: CPT | Mod: GP | Performed by: PHYSICAL THERAPIST

## 2017-03-15 PROCEDURE — G8982 BODY POS GOAL STATUS: HCPCS | Mod: GP | Performed by: PHYSICAL THERAPIST

## 2017-03-15 PROCEDURE — 99213 OFFICE O/P EST LOW 20 MIN: CPT | Performed by: FAMILY MEDICINE

## 2017-03-15 RX ORDER — PREDNISONE 20 MG/1
20 TABLET ORAL 2 TIMES DAILY
Qty: 10 TABLET | Refills: 0 | Status: SHIPPED | OUTPATIENT
Start: 2017-03-15 | End: 2017-06-09

## 2017-03-15 RX ORDER — OXYCODONE AND ACETAMINOPHEN 5; 325 MG/1; MG/1
1 TABLET ORAL EVERY 4 HOURS PRN
COMMUNITY
End: 2017-06-09

## 2017-03-15 NOTE — MR AVS SNAPSHOT
After Visit Summary   3/15/2017    Arnold Lozada    MRN: [id number]           Patient Information     Date Of Birth          1950        Visit Information        Provider Department      3/15/2017 3:50 PM Josué Pettit, PT [facility]        Today's Diagnoses     Bilateral low back pain with right-sided sciatica    -  1       Follow-ups after your visit        Who to contact     If you have questions or need follow up information about today's clinic visit or your schedule please contact [facility] directly at [phone].  Normal or non-critical lab and imaging results will be communicated to you by [employer], letter or phone within 4 business days after the clinic has received the results. If you do not hear from us within 7 days, please contact the clinic through [employer] or phone. If you have a critical or abnormal lab result, we will notify you by phone as soon as possible.  Submit refill requests through [employer] or call your pharmacy and they will forward the refill request to us. Please allow 3 business days for your refill to be completed.          Additional Information About Your Visit        MyChart Information     [employer] gives you secure access to your electronic health record. If you see a primary care provider, you can also send messages to your care team and make appointments. If you have questions, please call your primary care clinic.  If you do not have a primary care provider, please call [phone] and they will assist you.        Care EveryWhere ID     This is your Care EveryWhere ID. This could be used by other organizations to access your [place] medical records  [id number]         Blood Pressure from Last 3 Encounters:   03/15/17 128/64   03/10/17 128/76   01/04/17 (!) 132/93    Weight from Last 3 Encounters:   03/15/17 75.8 kg (167 lb)   03/10/17 72.6 kg (160 lb)   01/04/17 73 kg (161 lb)              We Performed the Following     [facility] REPORT      PT Eval, Low Complexity (69943)     Therapeutic Exercises          Today's Medication Changes          These changes are accurate as of: 3/15/17  6:00 PM.  If you have any questions, ask your nurse or doctor.               Start taking these medicines.        Dose/Directions    predniSONE 20 MG tablet   Commonly known as:  DELTASONE   Used for:  Acute right-sided low back pain with right-sided sciatica   Started by:  Camille Mcnair MD        Dose:  20 mg   Take 1 tablet (20 mg) by mouth 2 times daily   Quantity:  10 tablet   Refills:  0            Where to get your medicines      These medications were sent to [id number]  [facility] - [address]  [address] [place] [address]     Phone:  [phone]     predniSONE 20 MG tablet                Primary Care Provider Office Phone # Fax #    Nikhil Carpenter [phone]       [facility] [address] [facility]  [place] [address]        Thank you!     Thank you for choosing [facility]  for your care. Our goal is always to provide you with excellent care. Hearing back from our patients is one way we can continue to improve our services. Please take a few minutes to complete the written survey that you may receive in the mail after your visit with us. Thank you!             Your Updated Medication List - Protect others around you: Learn how to safely use, store and throw away your medicines at www.disposemymeds.org.          This list is accurate as of: 3/15/17  6:00 PM.  Always use your most recent med list.                   Brand Name Dispense Instructions for use    * amphetamine-dextroamphetamine 30 MG per 24 hr capsule   Start taking on:  4/3/2017    ADDERALL XR    30 capsule    Take 1 capsule (30 mg) by mouth daily       * amphetamine-dextroamphetamine 30 MG per 24 hr capsule   Start taking on:  5/3/2017    ADDERALL XR    30 capsule    Take 1 capsule (30 mg) by mouth daily        aspirin 325 MG tablet      Take 325 mg by mouth daily.       COQ-10 PO          CYCLOBENZAPRINE HCL PO      Take 5 mg by mouth 3 times daily       folic acid 800 MCG Tabs      Take 1 tablet by mouth daily       hydrochlorothiazide 25 MG tablet    HYDRODIURIL    90 tablet    Take 1 tablet (25 mg) by mouth daily       lisinopril 20 MG tablet    PRINIVIL/ZESTRIL    90 tablet    Take 1 tablet (20 mg) by mouth daily       metoprolol 25 MG 24 hr tablet    TOPROL-XL    30 tablet    Take 1 tablet (25 mg) by mouth daily       Multi-vitamin Tabs tablet   Generic drug:  multivitamin, therapeutic with minerals      1 TABLET DAILY       order for DME      Respironics REMSTAR 60 Series Auto CPAP 5-18cm H2O, Nuance pillow nasal mask large       * order for DME     1 Device    Equipment being ordered: quadcane       * order for DME     1 Device    Equipment being ordered: shower bench [ long ]       oxyCODONE-acetaminophen 5-325 MG per tablet    PERCOCET     Take 1 tablet by mouth every 4 hours as needed for moderate to severe pain       POTASSIUM PO      Take 595 mcg by mouth daily       predniSONE 20 MG tablet    DELTASONE    10 tablet    Take 1 tablet (20 mg) by mouth 2 times daily       sertraline 50 MG tablet    ZOLOFT    90 tablet    Take 1 tablet (50 mg) by mouth daily       simvastatin 20 MG tablet    ZOCOR    90 tablet    ONE TABLET DAILY IN THE EVENING       sulfaSALAzine 500 MG tablet    AZULFIDINE     Take 500 mg by mouth 4 times daily.       traZODone 50 MG tablet    DESYREL    90 tablet    Take 1 tablet (50 mg) by mouth nightly as needed for sleep       * Notice:  This list has 4 medication(s) that are the same as other medications prescribed for you. Read the directions carefully, and ask your doctor or other care provider to review them with you.

## 2017-03-15 NOTE — LETTER
DEPARTMENT OF HEALTH AND HUMAN SERVICES  CENTERS FOR MEDICARE & MEDICAID SERVICES    PLAN/UPDATED PLAN OF PROGRESS FOR OUTPATIENT REHABILITATION    PATIENTS NAME:  Arnold Lozada   : 1950  PR[facility] NUMBER:    [id number]  [facility]  [id number]  PROVIDER NAME: [facility]  MEDICAL RECORD NUMBER: [id number]     START OF CARE DATE:  SOC Date: 03/15/17   TYPE:  PT    PRIMARY/TREATMENT DIAGNOSIS: (Pertinent Medical Diagnosis)  Bilateral low back pain with right-sided sciatica    VISITS FROM START OF CARE:  Rxs Used: 1     Subjective:  Arnold Lozada is a 66 year old male with a lumbar condition.  Condition occurred with:  Insidious onset.  Condition occurred: for unknown reasons.  This is a new condition  Patient reports onset of right glut and posterior thigh pain in 2017. Patient was in the hospital overnight for 1 night until his pain was controlled. .    Patient reports pain:  Lumbar spine right.  Radiates to:  Gluteals right, knee right and lower leg right.  Pain is described as stabbing  and reported as 6/10.  Associated symptoms:  Loss of motion/stiffness. Pain is worse during the day.  Symptoms are exacerbated by bending and sitting and relieved by muscle relaxants (standing).  Since onset symptoms are unchanged.  Special tests:  MRI and x-ray (see epic).      General health as reported by patient is good. MD order 3/14/2017.                 Red flags:  None as reported by the patient.    Objective:  Standing Alignment:    Shoulder/UE:  Rounded shoulders  Lumbar:  Lateral shift R    Gait:  Ataxic       Lumbar/SI Evaluation  ROM:    AROM Lumbar:   Flexion:            Hands to floor, painful  Ext:                    WNL,painfree   Side Bend:        Left:  Hand to knee    Right:  Hand to knee  Rotation:           Left:  WNL    Right:  WNL  Side Glide:        Left:     Right:    Lumbar Myotomes:  Lumbar myotomes: grossly 4+/5.  Neural Tension/Mobility:    Right side:   Slump and SLR  positive.    PATIENTS NAME:  Arnold Lozada   : 1950    Lumbar Palpation:    Tenderness present at Right: Piriformis and Gluteus Medius  Lumbar Provocation:  normal  Spinal Segmental Conclusions:   Level: Hypo noted at L4, L5 and L3  SI joint/Sacrum:    unremarkable    Assessment/Plan:    Patient is a 66 year old male with lumbar complaints.    Patient has the following significant findings with corresponding treatment plan.                Diagnosis 1:  Low back pain  Pain -  hot/cold therapy, manual therapy, self management, education, directional preference exercise and home program  Decreased joint mobility - manual therapy, therapeutic exercise, therapeutic activity and home program  Decreased strength - therapeutic exercise, therapeutic activities and home program  Impaired gait - home program  Decreased function - therapeutic activities and home program  Impaired posture - neuro re-education, therapeutic activities and home program    Therapy Evaluation Codes:   1) History comprised of:   Personal factors that impact the plan of care:      None.    Comorbidity factors that impact the plan of care are:      cerebellar ataxia.     Medications impacting care: None.  2) Examination of Body Systems comprised of:   Body structures and functions that impact the plan of care:      Lumbar spine.   Activity limitations that impact the plan of care are:      Bathing, Bending, Cooking, Dressing, Lifting, Reading/Computer work, Sitting, Squatting/kneeling and Stairs.  3) Clinical presentation characteristics are:   Stable/Uncomplicated.  4) Decision-Making    Low complexity using standardized patient assessment instrument and/or measureable assessment of functional outcome.  Cumulative Therapy Evaluation is: Low complexity.    Previous and current functional limitations:  (See Goal Flow Sheet for this information)    Short term and Long term goals: (See Goal Flow Sheet for this information)     Communication  "ability:  Patient appears to be able to clearly communicate and understand verbal and written communication and follow directions correctly.  Treatment Explanation - The following has been discussed with the patient:   RX ordered/plan of care  Anticipated outcomes  PATIENTS NAME:  Arnold Lozada   : 1950    Possible risks and side effects  This patient would benefit from PT intervention to resume normal activities.   Rehab potential is good.    Frequency:  1 X week, once daily  Duration:  for 4 weeks  Discharge Plan:  Achieve all LTG.  Independent in home treatment program.  Reach maximal therapeutic benefit.    Please refer to the daily flowsheet for treatment today, total treatment time and time spent performing 1:1 timed codes.             Caregiver Signature/Credentials _____________________________ Date ________       Treating Provider: Josué Pettit DPT   I have reviewed and certified the need for these services and plan of treatment while under my care.        PHYSICIAN'S SIGNATURE:   _________________________________________  Date___________   Nikhil Carpenter    Certification period:  Beginning of Cert date period: 03/15/17 to  End of Cert period date: 17     Functional Level Progress Report: Please see attached \"Goal Flow sheet for Functional level.\"    ____X____ Continue Services or       ________ DC Services                Service dates: From  SOC Date: 03/15/17 date to present                         "

## 2017-03-15 NOTE — MR AVS SNAPSHOT
After Visit Summary   3/15/2017    Arnold Lozada    MRN: [id number]           Patient Information     Date Of Birth          1950        Visit Information        Provider Department      3/15/2017 2:00 PM Camille Mcnair MD [facility]        Today's Diagnoses     Acute right-sided low back pain with right-sided sciatica    -  1      Care Instructions    [facility]    If you have any questions regarding to your visit please contact your care team:       Team Purple:   Clinic Hours Telephone Number   HIRAM Martins Dr., Dr.   7am-7pm  Monday - Thursday   7am-5pm  Fridays  [phone]  (Appointment scheduling available 24/7)    Questions about your Visit?   Team Line:  [phone]   Urgent Care - [place] and [facility] - 11am-9pm Monday-Friday Saturday-Sunday- 9am-5pm   [place] - 5pm-9pm Monday-Friday Saturday-Sunday- 9am-5pm  [phone] - [facility]  [phone] - [place]       What options do I have for visits at the clinic other than the traditional office visit?  To expand how we care for you, many of our providers are utilizing electronic visits (e-visits) and telephone visits, when medically appropriate, for interactions with their patients rather than a visit in the clinic.   We also offer nurse visits for many medical concerns. Just like any other service, we will bill your insurance company for this type of visit based on time spent on the phone with your provider. Not all insurance companies cover these visits. Please check with your medical insurance if this type of visit is covered. You will be responsible for any charges that are not paid by your insurance.      E-visits via [address]:  generally incur a $35.00 fee.  Telephone visits:  Time spent on the phone: *charged based on time that is spent on the phone in increments of 10 minutes. Estimated cost:   5-10 mins $30.00    11-20 mins. $59.00   21-30 mins. $85.00     Use Confabbhart (secure email communication and access to your chart) to send your primary care provider a message or make an appointment. Ask someone on your Team how to sign up for [employer].  For a Price Quote for your services, please call our Consumer Price Line at [phone].  As always, Thank you for trusting us with your health care needs!            Follow-ups after your visit        Your next 10 appointments already scheduled     Mar 15, 2017  3:50 PM CDT   CHETAN Spine with Josué Pettit, PT   CHETAN MOROCHO PT (CHETAN Morocho)    [address] [address]  [address]  [facility] [address] [phone]              Who to contact     If you have questions or need follow up information about today's clinic visit or your schedule please contact [facility] directly at [phone].  Normal or non-critical lab and imaging results will be communicated to you by Confabbhart, letter or phone within 4 business days after the clinic has received the results. If you do not hear from us within 7 days, please contact the clinic through [employer] or phone. If you have a critical or abnormal lab result, we will notify you by phone as soon as possible.  Submit refill requests through [employer] or call your pharmacy and they will forward the refill request to us. Please allow 3 business days for your refill to be completed.          Additional Information About Your Visit        Confabbhart Information     [employer] gives you secure access to your electronic health record. If you see a primary care provider, you can also send messages to your care team and make appointments. If you have questions, please call your primary care clinic.  If you do not have a primary care provider, please call [phone] and they will assist you.        Care EveryWhere ID     This is your Care EveryWhere ID. This could be used by other organizations to access your [facility]  "records  [id number]        Your Vitals Were     Pulse Temperature Height Pulse Oximetry BMI (Body Mass Index)       90 97.3  F (36.3  C) 5' 8\" (1.727 m) 97% 25.39 kg/m2        Blood Pressure from Last 3 Encounters:   03/15/17 128/64   03/10/17 128/76   01/04/17 (!) 132/93    Weight from Last 3 Encounters:   03/15/17 167 lb (75.8 kg)   03/10/17 160 lb (72.6 kg)   01/04/17 161 lb (73 kg)              Today, you had the following     No orders found for display         Today's Medication Changes          These changes are accurate as of: 3/15/17  3:02 PM.  If you have any questions, ask your nurse or doctor.               Start taking these medicines.        Dose/Directions    predniSONE 20 MG tablet   Commonly known as:  DELTASONE   Used for:  Acute right-sided low back pain with right-sided sciatica   Started by:  Camille Mcnair MD        Dose:  20 mg   Take 1 tablet (20 mg) by mouth 2 times daily   Quantity:  10 tablet   Refills:  0            Where to get your medicines      These medications were sent to [facility] [address] [place], [place] [address], [place] - [address] [place] [address], [place]  [address] [place] [address], [place], [place] [address]     Phone:  [phone]     predniSONE 20 MG tablet                Primary Care Provider Office Phone # Fax #    Nikhil Carpenter [phone]       [facility] [address] [place], [place] [address] [address]        Thank you!     Thank you for choosing [facility]  for your care. Our goal is always to provide you with excellent care. Hearing back from our patients is one way we can continue to improve our services. Please take a few minutes to complete the written survey that you may receive in the mail after your visit with us. Thank you!             Your Updated Medication List - Protect others around you: Learn how to safely use, store and throw away your medicines at www.disposemymeds.org.          This list is accurate as of: 3/15/17  3:02 PM.  Always use " your most recent med list.                   Brand Name Dispense Instructions for use    * amphetamine-dextroamphetamine 30 MG per 24 hr capsule   Start taking on:  4/3/2017    ADDERALL XR    30 capsule    Take 1 capsule (30 mg) by mouth daily       * amphetamine-dextroamphetamine 30 MG per 24 hr capsule   Start taking on:  5/3/2017    ADDERALL XR    30 capsule    Take 1 capsule (30 mg) by mouth daily       aspirin 325 MG tablet      Take 325 mg by mouth daily.       COQ-10 PO          CYCLOBENZAPRINE HCL PO      Take 5 mg by mouth 3 times daily       folic acid 800 MCG Tabs      Take 1 tablet by mouth daily       hydrochlorothiazide 25 MG tablet    HYDRODIURIL    90 tablet    Take 1 tablet (25 mg) by mouth daily       lisinopril 20 MG tablet    PRINIVIL/ZESTRIL    90 tablet    Take 1 tablet (20 mg) by mouth daily       metoprolol 25 MG 24 hr tablet    TOPROL-XL    30 tablet    Take 1 tablet (25 mg) by mouth daily       Multi-vitamin Tabs tablet   Generic drug:  multivitamin, therapeutic with minerals      1 TABLET DAILY       order for DME      Respironics REMSTAR 60 Series Auto CPAP 5-18cm H2O, Nuance pillow nasal mask large       * order for DME     1 Device    Equipment being ordered: quadcane       * order for DME     1 Device    Equipment being ordered: shower bench [ long ]       oxyCODONE-acetaminophen 5-325 MG per tablet    PERCOCET     Take 1 tablet by mouth every 4 hours as needed for moderate to severe pain       POTASSIUM PO      Take 595 mcg by mouth daily       predniSONE 20 MG tablet    DELTASONE    10 tablet    Take 1 tablet (20 mg) by mouth 2 times daily       sertraline 50 MG tablet    ZOLOFT    90 tablet    Take 1 tablet (50 mg) by mouth daily       simvastatin 20 MG tablet    ZOCOR    90 tablet    ONE TABLET DAILY IN THE EVENING       sulfaSALAzine 500 MG tablet    AZULFIDINE     Take 500 mg by mouth 4 times daily.       traZODone 50 MG tablet    DESYREL    90 tablet    Take 1 tablet (50 mg) by  mouth nightly as needed for sleep       * Notice:  This list has 4 medication(s) that are the same as other medications prescribed for you. Read the directions carefully, and ask your doctor or other care provider to review them with you.

## 2017-03-15 NOTE — NURSING NOTE
"Chief Complaint   Patient presents with     Hospital F/U       Initial /64 (BP Location: Right arm, Patient Position: Chair, Cuff Size: Adult Regular)  Pulse 90  Temp 97.3  F (36.3  C)  Ht 5' 8\" (1.727 m)  Wt 167 lb (75.8 kg)  SpO2 97%  BMI 25.39 kg/m2 Estimated body mass index is 25.39 kg/(m^2) as calculated from the following:    Height as of this encounter: 5' 8\" (1.727 m).    Weight as of this encounter: 167 lb (75.8 kg).  Medication Reconciliation: complete   Kassi Frazier MA      "

## 2017-03-15 NOTE — PATIENT INSTRUCTIONS
[facility]    If you have any questions regarding to your visit please contact your care team:       Team Purple:   Clinic Hours Telephone Number   HIRAM Martins Dr., Dr.   7am-7pm  Monday - Thursday   7am-5pm  Fridays  [phone]  (Appointment scheduling available 24/7)    Questions about your Visit?   Team Line:  [phone]   Urgent Care - [place] and [facility] - 11am-9pm Monday-Friday Saturday-Sunday- 9am-5pm   [place] - 5pm-9pm Monday-Friday Saturday-Sunday- 9am-5pm  [phone] - [facility]  [phone] - [place]       What options do I have for visits at the clinic other than the traditional office visit?  To expand how we care for you, many of our providers are utilizing electronic visits (e-visits) and telephone visits, when medically appropriate, for interactions with their patients rather than a visit in the clinic.   We also offer nurse visits for many medical concerns. Just like any other service, we will bill your insurance company for this type of visit based on time spent on the phone with your provider. Not all insurance companies cover these visits. Please check with your medical insurance if this type of visit is covered. You will be responsible for any charges that are not paid by your insurance.      E-visits via [employer]:  generally incur a $35.00 fee.  Telephone visits:  Time spent on the phone: *charged based on time that is spent on the phone in increments of 10 minutes. Estimated cost:   5-10 mins $30.00   11-20 mins. $59.00   21-30 mins. $85.00     Use [email or web address] (secure email communication and access to your chart) to send your primary care provider a message or make an appointment. Ask someone on your Team how to sign up for [employer].  For a Price Quote for your services, please call our Consumer Price Line at [phone].  As always, Thank you for trusting us with your health care needs!

## 2017-03-16 NOTE — PROGRESS NOTES
Subjective:                                       Pertinent medical history includes:  High blood pressure, depression, sleep disorder/apnea and other.  Medical allergies: no.  Other surgeries include:  None reported.  Current medications:  Pain medication, cardiac medication, muscle relaxants, anti-depressants and high blood pressure medication.  Current occupation is retired.                                  Objective:    System    Physical Exam    General     ROS    Assessment/Plan:

## 2017-03-21 ENCOUNTER — THERAPY VISIT (OUTPATIENT)
Dept: PHYSICAL THERAPY | Facility: CLINIC | Age: 67
End: 2017-03-21
Payer: MEDICARE

## 2017-03-21 DIAGNOSIS — M54.41 BILATERAL LOW BACK PAIN WITH RIGHT-SIDED SCIATICA: Primary | ICD-10-CM

## 2017-03-21 PROCEDURE — 97110 THERAPEUTIC EXERCISES: CPT | Mod: GP

## 2017-03-21 PROCEDURE — 97112 NEUROMUSCULAR REEDUCATION: CPT | Mod: GP

## 2017-03-23 ENCOUNTER — OFFICE VISIT (OUTPATIENT)
Dept: BEHAVIORAL HEALTH | Facility: CLINIC | Age: 67
End: 2017-03-23
Payer: MEDICARE

## 2017-03-23 DIAGNOSIS — F43.23 ADJUSTMENT DISORDER WITH MIXED ANXIETY AND DEPRESSED MOOD: Primary | ICD-10-CM

## 2017-03-23 PROCEDURE — 90834 PSYTX W PT 45 MINUTES: CPT | Performed by: SOCIAL WORKER

## 2017-03-23 NOTE — MR AVS SNAPSHOT
MRN:[id number]                      After Visit Summary   3/23/2017    Arnold Lozada    MRN: [id number]           Visit Information        Provider Department      3/23/2017 1:30 PM Shad Portillo, [facility] Generic      Your next 10 appointments already scheduled     Mar 27, 2017 10:10 AM CDT   CHETAN Extremity with Claudia Kristian, PTA   [facility]TAN [facility] PT (CHETAN [place])    [address] [facility] [address] [facility]  [address]  [facility] [address]   [phone]            Mar 29, 2017 12:40 PM CDT   CHETAN Extremity with Josué Pettit, PT   CHETAN FRIDLEY PT (CHETAN [place])    [address] [facility]  [address]  [facility] [address]   [phone]            Apr 04, 2017 10:30 AM CDT   Return Visit with Shad Portillo [facility]    [address] [place] [address] [phone]              MyChart Information     [employer] gives you secure access to your electronic health record. If you see a primary care provider, you can also send messages to your care team and make appointments. If you have questions, please call your primary care clinic.  If you do not have a primary care provider, please call [phone] and they will assist you.        Care EveryWhere ID     This is your Care EveryWhere ID. This could be used by other organizations to access your [place] medical records  [id number]

## 2017-03-23 NOTE — PROGRESS NOTES
"                                           Progress Note    Client Name: Arnold Lozada             Date:   3/23/2017                                           Service Type:Individual                           Session start time:130                   Session End Time:   215                            Session Length:        50                Session #:      10                Attendees:     Client     Treatment Plan Last Reviewed: 12/8/2016   PHQ-9 / BRETT-7 :                 DATA                            Progress Since Last Session (Related to Symptoms / Goals / Homework):              Symptoms: Stable    Homework: n/a                            Episode of Care Goals: Satisfactory progress - ACTION (Actively working towards change); Intervened by reinforcing change plan / affirming steps taken                Current / Ongoing Stressors and Concerns:                 Client notes that he has been \"good.  [place] was super.\"  Had nice weather and was able to get out and do some walking.  [place] and enjoyed watching people and birdwatching.  Some trouble with sciatic nerve and was told by pt that he needs to dupport his back everytime he sits down.  Sounds like it has been very painful and he wound up going to the hospital for this.  Also still dealing with the cerebellar ataxia.  Long discussion today about frustration he feels at not being able to do as much around the house.  Client ventilated feelings of \"guilt\" about things like not being able to do the dishes after his wife cooks a nice meal.                            Treatment Objective(s) Addressed in This Session:          Client will use identified behavioral and cognitive skills to challenge negative self talk 90% of the time.                    Intervention:              Cognitive Behavioral Therapy:   Discussed cognitive restructuring and behavioral activation.  Explored the connection between thoughts, feelings, and actions by " using examples relative to client's presenting concerns.  Explained major  domains of symptoms (cognitive, emotional, somatic, behavioral, interpersonal) and importance of targeted and specific interventions to reduce symptoms of anxiety and depression.  Discussed role of  symptom monitoring in cognitive behavioral treatment.                                ASSESSMENT: Current Emotional / Mental Status  of significant symptoms):              Risk status (Self / Other harm or suicidal ideation)              Client denies current fears or concerns for personal safety.              Client denies current or recent suicidal ideation or behaviors.              Client denies current or recent homicidal ideation or behaviors.              Client denies current or recent self injurious behavior or ideation.              Client denies other safety concerns.              A safety and risk management plan has not been developed at this time, however client was given the after-hours number / 911 should there be a change in any of these risk factors.                Appearance:                           Appropriate               Eye Contact:                           Good               Psychomotor Behavior:          Normal               Attitude:                                   Cooperative               Orientation:                             All              Speech                          Rate / Production:       Normal                           Volume:                       Normal               Mood:                                      Normal              Affect:                                      Appropriate               Thought Content:                    Clear               Thought Form:                        Coherent  Goal Directed  Logical               Insight:                                    Good                 Medication Review:              No current psychiatric medications  prescribed                Medication Compliance:              NA                Changes in Health Issues:              Yes: feels like he notices a decline each week                Chemical Use Review:              Substance Use: Chemical use reviewed, no active concerns identified                 Tobacco Use: No current tobacco use.                  Collateral Reports Completed:              Communicated with: n/a    PLAN: (Client Tasks / Therapist Tasks / Other)  1.  Client will call to schedule next appt  2.  CBT at next session                                                               ________________________________________________________________________    Treatment Plan    Client's Name: Arnold Lozada                      YOB: 1950    Date: 8-31-16    DSM5 Diagnoses: (Sustained by DSM5 Criteria Listed Above)  Diagnoses: F43.23 adjustment disorder with anxiety and depression  Psychosocial & Contextual Factors: health problems decreased functioning  WHODAS 2.0 (12 item)27    Referral / Collaboration:  suggested pursing support group and getting connected with organizations.    Anticipated number of session or this episode of care: 8-12    MeasurableTreatment Goal(s) related to diagnosis / functional impairment(s)  Goal-Depression: Client will decrease depressed mood    I will know I've met my goal when I am less depressed.      Objective #A (Client Action)    Status: New - Date: 12/8/2016    Client will use identified behavioral and cognitive skills to challenge negative self talk 90% of the time.    Intervention(s)  Therapist will provide psychoeducation, behavioral activation, and cognitive restructuring.      Objective #B  Client will complete at least 10 minutes of self-regulation practice (e.g.: yoga, meditation, relaxation breathing, etc.) per day.    Status: New - Date: 12/8/2016    Intervention(s)  Therapist will provide psychoeducation, behavioral activation, and cognitive  restructuring.      Objective #C  Client will exercise 30 minutes 36 times in the next 12 weeks.  Status: New - Date: 12/8/2016    Intervention(s)  Therapist will provide psychoeducation, behavioral activation, and cognitive restructuring.                Goal- Anxiety: Client will decrease anxiety    I will know I've met my goal when I am less anxious.      Objective #A (Client Action)    Status: continued- Date: 3/23/2017     Client will use cognitive strategies identified in therapy to challenge anxious thoughts.    Intervention(s)  Therapist will provide psychoeducation, behavioral activation, and cognitive restructuring.    Objective #B  Client will use at least 3 coping skills for anxiety management in the next 12 weeks.    Status: continued- Date: 3/23/2017     Intervention(s)  Therapist will provide psychoeducation, behavioral activation, and cognitive restructuring.      Objective #C  Client will identify three distraction and diversion activities and use those activities to decrease level of anxiety.  Status: continued- Date: 3/23/2017     Intervention(s)  Therapist will provide psychoeducation, behavioral activation, and cognitive restructuring.                Client has reviewed and agreed to the above plan.      REYES Goodson [facility]               12/8/2016

## 2017-03-29 ENCOUNTER — THERAPY VISIT (OUTPATIENT)
Dept: PHYSICAL THERAPY | Facility: CLINIC | Age: 67
End: 2017-03-29
Payer: MEDICARE

## 2017-03-29 DIAGNOSIS — M54.41 BILATERAL LOW BACK PAIN WITH RIGHT-SIDED SCIATICA: ICD-10-CM

## 2017-03-29 PROCEDURE — 97110 THERAPEUTIC EXERCISES: CPT | Mod: GP | Performed by: PHYSICAL THERAPIST

## 2017-04-04 ENCOUNTER — OFFICE VISIT (OUTPATIENT)
Dept: BEHAVIORAL HEALTH | Facility: CLINIC | Age: 67
End: 2017-04-04
Payer: MEDICARE

## 2017-04-04 DIAGNOSIS — F43.23 ADJUSTMENT DISORDER WITH MIXED ANXIETY AND DEPRESSED MOOD: Primary | ICD-10-CM

## 2017-04-04 PROCEDURE — 90834 PSYTX W PT 45 MINUTES: CPT | Performed by: SOCIAL WORKER

## 2017-04-04 NOTE — MR AVS SNAPSHOT
MRN:[id number]                      After Visit Summary   4/4/2017    Arnold Lozada    MRN: [id number]           Visit Information        Provider Department      4/4/2017 10:30 AM Shad Portillo, [facility] Generic      Your next 10 appointments already scheduled     Apr 07, 2017 11:40 AM CDT   CHETAN Extremity with Josué Pettit, PT   CHETAN [place] PT (CHETAN [place])    [address] [facility] [facility] [address] [facility]  [address]  [facility] [id number] [phone]            Aug 01, 2017 12:30 PM CDT   Return Visit with Shad Potrillo [facility]    [address] [place] [address] [phone]              MyChart Information     [employer] gives you secure access to your electronic health record. If you see a primary care provider, you can also send messages to your care team and make appointments. If you have questions, please call your primary care clinic.  If you do not have a primary care provider, please call [phone] and they will assist you.        Care EveryWhere ID     This is your Care EveryWhere ID. This could be used by other organizations to access your [place] medical records  [id number]

## 2017-04-04 NOTE — PROGRESS NOTES
"                                           Progress Note    Client Name: Arnold Lozada             Date:   4/4/2017                                           Service Type:Individual                           Session start time:1030                   Session End Time:   1115                            Session Length:        50                Session #:      11                Attendees:     Client     Treatment Plan Last Reviewed: 4/4/2017   PHQ-9 / BRETT-7 :                 DATA                            Progress Since Last Session (Related to Symptoms / Goals / Homework):              Symptoms: Stable    Homework: n/a                            Episode of Care Goals: Satisfactory progress - ACTION (Actively working towards change); Intervened by reinforcing change plan / affirming steps taken                Current / Ongoing Stressors and Concerns:                 Client notes that he has been \"good as far as the ataxia goes.\"  Still dealing with some sciatic nerve pain and has seen pt for this.  Sounds like this is not getting better very fast.  The pain makes it hard for him to go about his fairly activities.  Reviewed behavioral activation for depression and encouraged client to engage in his ACEs daily.           Client feels that he is doing well at this point and wants to meet next in August.                    Treatment Objective(s) Addressed in This Session:          Client will use identified behavioral and cognitive skills to challenge negative self talk 90% of the time.    -reviewed ACEs in session today.                  Intervention:              Cognitive Behavioral Therapy:   Discussed cognitive restructuring and behavioral activation.  Explored the connection between thoughts, feelings, and actions by using examples relative to client's presenting concerns.  Explained major  domains of symptoms (cognitive, emotional, somatic, behavioral, interpersonal) and importance of targeted and specific " interventions to reduce symptoms of anxiety and depression.  Discussed role of  symptom monitoring in cognitive behavioral treatment.                                ASSESSMENT: Current Emotional / Mental Status  of significant symptoms):              Risk status (Self / Other harm or suicidal ideation)              Client denies current fears or concerns for personal safety.              Client denies current or recent suicidal ideation or behaviors.              Client denies current or recent homicidal ideation or behaviors.              Client denies current or recent self injurious behavior or ideation.              Client denies other safety concerns.              A safety and risk management plan has not been developed at this time, however client was given the after-hours number / 911 should there be a change in any of these risk factors.                Appearance:                           Appropriate               Eye Contact:                           Good               Psychomotor Behavior:          Normal               Attitude:                                   Cooperative               Orientation:                             All              Speech                          Rate / Production:       Normal                           Volume:                       Normal               Mood:                                      Normal              Affect:                                      Appropriate               Thought Content:                    Clear               Thought Form:                        Coherent  Goal Directed  Logical               Insight:                                    Good                 Medication Review:              No current psychiatric medications prescribed                Medication Compliance:              NA                Changes in Health Issues:              Yes: feels like he notices a decline each week                Chemical Use Review:              Substance  Use: Chemical use reviewed, no active concerns identified                 Tobacco Use: No current tobacco use.                  Collateral Reports Completed:              Communicated with: n/a    PLAN: (Client Tasks / Therapist Tasks / Other)  1.  Meet in August or sooner if needed  2.  CBT at next session                                                               ________________________________________________________________________    Treatment Plan    Client's Name: Arnold Lozada                      YOB: 1950    Date: 8-31-16    DSM5 Diagnoses: (Sustained by DSM5 Criteria Listed Above)  Diagnoses: F43.23 adjustment disorder with anxiety and depression  Psychosocial & Contextual Factors: health problems decreased functioning  WHODAS 2.0 (12 item)27    Referral / Collaboration:  suggested pursing support group and getting connected with organizations.    Anticipated number of session or this episode of care: 8-12    MeasurableTreatment Goal(s) related to diagnosis / functional impairment(s)  Goal-Depression: Client will decrease depressed mood    I will know I've met my goal when I am less depressed.      Objective #A (Client Action)    Status: Continued- Date: 4/4/2017     Client will use identified behavioral and cognitive skills to challenge negative self talk 90% of the time.    Intervention(s)  Therapist will provide psychoeducation, behavioral activation, and cognitive restructuring.      Objective #B  Client will complete at least 10 minutes of self-regulation practice (e.g.: yoga, meditation, relaxation breathing, etc.) per day.    Status: Continued- Date: 4/4/2017     Intervention(s)  Therapist will provide psychoeducation, behavioral activation, and cognitive restructuring.      Objective #C  Client will exercise 30 minutes 36 times in the next 12 weeks.  Status: Continued- Date: 4/4/2017     Intervention(s)  Therapist will provide psychoeducation, behavioral activation, and  cognitive restructuring.                Goal- Anxiety: Client will decrease anxiety    I will know I've met my goal when I am less anxious.      Objective #A (Client Action)    Status: Continued- Date: 4/4/2017     Client will use cognitive strategies identified in therapy to challenge anxious thoughts.    Intervention(s)  Therapist will provide psychoeducation, behavioral activation, and cognitive restructuring.    Objective #B  Client will use at least 3 coping skills for anxiety management in the next 12 weeks.    Status: Continued- Date: 4/4/2017     Intervention(s)  Therapist will provide psychoeducation, behavioral activation, and cognitive restructuring.      Objective #C  Client will identify three distraction and diversion activities and use those activities to decrease level of anxiety.  Status: Continued- Date: 4/4/2017     Intervention(s)  Therapist will provide psychoeducation, behavioral activation, and cognitive restructuring.                Client has reviewed and agreed to the above plan.      REYES Goodson [facility]               12/8/2016

## 2017-04-07 ENCOUNTER — THERAPY VISIT (OUTPATIENT)
Dept: PHYSICAL THERAPY | Facility: CLINIC | Age: 67
End: 2017-04-07
Payer: MEDICARE

## 2017-04-07 DIAGNOSIS — M54.41 BILATERAL LOW BACK PAIN WITH RIGHT-SIDED SCIATICA: ICD-10-CM

## 2017-04-07 PROCEDURE — 97110 THERAPEUTIC EXERCISES: CPT | Mod: GP | Performed by: PHYSICAL THERAPIST

## 2017-04-17 ENCOUNTER — TELEPHONE (OUTPATIENT)
Dept: FAMILY MEDICINE | Facility: CLINIC | Age: 67
End: 2017-04-17

## 2017-04-17 NOTE — TELEPHONE ENCOUNTER
Patient is requesting more of the medium open domes for his Phonak hearing aids. I informed the patient that they would be ready for him to  tomorrow morning.     -Josué Stout CCC-A

## 2017-04-17 NOTE — TELEPHONE ENCOUNTER
Reason for call: request  Patient called regarding (reason for call):20 plastic inserts for the hearing aids that are free that was discussed  Additional comments:He would like to know if he can pick them up tomorrow at  [place] after 3pm? Please call patient to discuss further    Phone Number Pt can be reached at:[phone]   Best Time: anytime  Can we leave a detailed message on this number? YES

## 2017-04-18 ENCOUNTER — THERAPY VISIT (OUTPATIENT)
Dept: PHYSICAL THERAPY | Facility: CLINIC | Age: 67
End: 2017-04-18
Payer: MEDICARE

## 2017-04-18 DIAGNOSIS — M54.41 BILATERAL LOW BACK PAIN WITH RIGHT-SIDED SCIATICA: ICD-10-CM

## 2017-04-18 PROCEDURE — 97110 THERAPEUTIC EXERCISES: CPT | Mod: GP | Performed by: PHYSICAL THERAPIST

## 2017-04-18 NOTE — PROGRESS NOTES
Subjective:    HPI                    Objective:    System    Physical Exam    General     ROS    Assessment/Plan:      PROGRESS  REPORT    Progress reporting period is from 3/15/2017 to 4/18/2017.       SUBJECTIVE  Subjective changes noted by patient:   Subjective: Patient reports feeling much better now that he is doing his exercises correctly and more often. Patient reports his back pain is rare and he is able to resolve the pain with his exercises    Current pain level is  Current Pain level: 0/10.     Previous pain level was  6/10  .   Changes in function:  Yes (See Goal flowsheet attached for changes in current functional level)  Adverse reaction to treatment or activity: None    OBJECTIVE  Changes noted in objective findings:  Yes,   Objective: Patient is managing his condition well independantly     ASSESSMENT/PLAN  Updated problem list and treatment plan: Diagnosis 1:  Low back pain    STG/LTGs have been met or progress has been made towards goals:  Yes (See Goal flow sheet completed today.)  Assessment of Progress: The patient's condition is improving.  The patient has met all of their long term goals.  Self Management Plans:  Patient is independent in a home treatment program.  Patient is independent in self management of symptoms.  I have re-evaluated this patient and find that the nature, scope, duration and intensity of the therapy is appropriate for the medical condition of the patient.  Arnold continues to require the following intervention to meet STG and LTG's:  PT intervention is no longer required to meet STG/LTG.    Recommendations:  This patient is ready to be discharged from therapy and continue their home treatment program.    Please refer to the daily flowsheet for treatment today, total treatment time and time spent performing 1:1 timed codes.

## 2017-04-18 NOTE — MR AVS SNAPSHOT
After Visit Summary   4/18/2017    Arnold Lozada    MRN: [id number]           Patient Information     Date Of Birth          1950        Visit Information        Provider Department      4/18/2017 2:10 PM Josué Pettit, PT [facility]        Today's Diagnoses     Bilateral low back pain with right-sided sciatica           Follow-ups after your visit        Your next 10 appointments already scheduled     Aug 01, 2017 12:30 PM CDT   Return Visit with Shad Portillo [facility] ([facility])    [address] [place] [id number] [phone]              Who to contact     If you have questions or need follow up information about today's clinic visit or your schedule please contact [facility] directly at [phone].  Normal or non-critical lab and imaging results will be communicated to you by MyChart, letter or phone within 4 business days after the clinic has received the results. If you do not hear from us within 7 days, please contact the clinic through MyChart or phone. If you have a critical or abnormal lab result, we will notify you by phone as soon as possible.  Submit refill requests through [employer] or call your pharmacy and they will forward the refill request to us. Please allow 3 business days for your refill to be completed.          Additional Information About Your Visit        MyChart Information     [employer] gives you secure access to your electronic health record. If you see a primary care provider, you can also send messages to your care team and make appointments. If you have questions, please call your primary care clinic.  If you do not have a primary care provider, please call [phone] and they will assist you.        Care EveryWhere ID     This is your Care EveryWhere ID. This could be used by other organizations to access your [facility] medical records  [id number]         Blood  Pressure from Last 3 Encounters:   03/15/17 128/64   03/10/17 128/76   01/04/17 (!) 132/93    Weight from Last 3 Encounters:   03/15/17 75.8 kg (167 lb)   03/10/17 72.6 kg (160 lb)   01/04/17 73 kg (161 lb)              We Performed the Following     Therapeutic Exercises        Primary Care Provider Office Phone # Fax #    Nikhil Carpenter [phone]       [address] [address]        Thank you!     Thank you for choosing [facility]  for your care. Our goal is always to provide you with excellent care. Hearing back from our patients is one way we can continue to improve our services. Please take a few minutes to complete the written survey that you may receive in the mail after your visit with us. Thank you!             Your Updated Medication List - Protect others around you: Learn how to safely use, store and throw away your medicines at www.disposemymeds.org.          This list is accurate as of: 4/18/17  2:53 PM.  Always use your most recent med list.                   Brand Name Dispense Instructions for use    * amphetamine-dextroamphetamine 30 MG per 24 hr capsule    ADDERALL XR    30 capsule    Take 1 capsule (30 mg) by mouth daily       * amphetamine-dextroamphetamine 30 MG per 24 hr capsule   Start taking on:  5/3/2017    ADDERALL XR    30 capsule    Take 1 capsule (30 mg) by mouth daily       aspirin 325 MG tablet      Take 325 mg by mouth daily.       COQ-10 PO          CYCLOBENZAPRINE HCL PO      Take 5 mg by mouth 3 times daily       folic acid 800 MCG Tabs      Take 1 tablet by mouth daily       hydrochlorothiazide 25 MG tablet    HYDRODIURIL    90 tablet    Take 1 tablet (25 mg) by mouth daily       lisinopril 20 MG tablet    PRINIVIL/ZESTRIL    90 tablet    Take 1 tablet (20 mg) by mouth daily       metoprolol 25 MG 24 hr tablet    TOPROL-XL    30 tablet    Take 1 tablet (25 mg) by mouth daily       Multi-vitamin Tabs tablet   Generic  drug:  multivitamin, therapeutic with minerals      1 TABLET DAILY       order for DME      Respironics REMSTAR 60 Series Auto CPAP 5-18cm H2O, Nuance pillow nasal mask large       * order for DME     1 Device    Equipment being ordered: quadcane       * order for DME     1 Device    Equipment being ordered: shower bench [ long ]       oxyCODONE-acetaminophen 5-325 MG per tablet    PERCOCET     Take 1 tablet by mouth every 4 hours as needed for moderate to severe pain       POTASSIUM PO      Take 595 mcg by mouth daily       predniSONE 20 MG tablet    DELTASONE    10 tablet    Take 1 tablet (20 mg) by mouth 2 times daily       sertraline 50 MG tablet    ZOLOFT    90 tablet    Take 1 tablet (50 mg) by mouth daily       simvastatin 20 MG tablet    ZOCOR    90 tablet    ONE TABLET DAILY IN THE EVENING       sulfaSALAzine 500 MG tablet    AZULFIDINE     Take 500 mg by mouth 4 times daily.       traZODone 50 MG tablet    DESYREL    90 tablet    Take 1 tablet (50 mg) by mouth nightly as needed for sleep       * Notice:  This list has 4 medication(s) that are the same as other medications prescribed for you. Read the directions carefully, and ask your doctor or other care provider to review them with you.

## 2017-04-24 ENCOUNTER — THERAPY VISIT (OUTPATIENT)
Dept: PHYSICAL THERAPY | Facility: CLINIC | Age: 67
End: 2017-04-24
Payer: MEDICARE

## 2017-04-24 DIAGNOSIS — M54.41 BILATERAL LOW BACK PAIN WITH RIGHT-SIDED SCIATICA: ICD-10-CM

## 2017-04-24 PROCEDURE — 97110 THERAPEUTIC EXERCISES: CPT | Mod: GP | Performed by: PHYSICAL THERAPIST

## 2017-04-25 ENCOUNTER — MYC MEDICAL ADVICE (OUTPATIENT)
Dept: FAMILY MEDICINE | Facility: CLINIC | Age: 67
End: 2017-04-25

## 2017-04-25 DIAGNOSIS — I10 ESSENTIAL HYPERTENSION WITH GOAL BLOOD PRESSURE LESS THAN 140/90: Primary | ICD-10-CM

## 2017-04-26 RX ORDER — METOPROLOL SUCCINATE 50 MG/1
50 TABLET, EXTENDED RELEASE ORAL DAILY
Qty: 90 TABLET | Refills: 1 | Status: SHIPPED | OUTPATIENT
Start: 2017-04-26 | End: 2017-10-25

## 2017-04-26 NOTE — TELEPHONE ENCOUNTER
Patient called.  BP check scheduled with the ancillary nurse for Wednesday, May 10th at 2:30 p.m. Amanda Lakhani,

## 2017-05-01 ENCOUNTER — THERAPY VISIT (OUTPATIENT)
Dept: PHYSICAL THERAPY | Facility: CLINIC | Age: 67
End: 2017-05-01
Payer: MEDICARE

## 2017-05-01 DIAGNOSIS — M54.41 BILATERAL LOW BACK PAIN WITH RIGHT-SIDED SCIATICA: ICD-10-CM

## 2017-05-01 PROCEDURE — 97110 THERAPEUTIC EXERCISES: CPT | Mod: GP

## 2017-05-05 ENCOUNTER — TELEPHONE (OUTPATIENT)
Dept: AUDIOLOGY | Facility: CLINIC | Age: 67
End: 2017-05-05

## 2017-05-05 NOTE — TELEPHONE ENCOUNTER
Patient was looking to obtain information on custom earmolds for his hearing aids. I provided information to answer all his questions and provided the number to schedule an appointment for earmold impressions. Patient will schedule the 30 minute appointment at his convenience.     -Josué Stout CCC-A

## 2017-05-09 DIAGNOSIS — G47.10 EXCESSIVE SLEEPINESS: ICD-10-CM

## 2017-05-09 RX ORDER — DEXTROAMPHETAMINE SACCHARATE, AMPHETAMINE ASPARTATE MONOHYDRATE, DEXTROAMPHETAMINE SULFATE AND AMPHETAMINE SULFATE 7.5; 7.5; 7.5; 7.5 MG/1; MG/1; MG/1; MG/1
30 CAPSULE, EXTENDED RELEASE ORAL DAILY
Qty: 90 CAPSULE | Refills: 0 | Status: SHIPPED | OUTPATIENT
Start: 2017-06-03 | End: 2017-06-09

## 2017-05-10 ENCOUNTER — ALLIED HEALTH/NURSE VISIT (OUTPATIENT)
Dept: NURSING | Facility: CLINIC | Age: 67
End: 2017-05-10

## 2017-05-10 ENCOUNTER — THERAPY VISIT (OUTPATIENT)
Dept: PHYSICAL THERAPY | Facility: CLINIC | Age: 67
End: 2017-05-10
Payer: MEDICARE

## 2017-05-10 VITALS — HEART RATE: 92 BPM | SYSTOLIC BLOOD PRESSURE: 138 MMHG | DIASTOLIC BLOOD PRESSURE: 84 MMHG

## 2017-05-10 DIAGNOSIS — I10 ESSENTIAL HYPERTENSION WITH GOAL BLOOD PRESSURE LESS THAN 140/90: Primary | ICD-10-CM

## 2017-05-10 DIAGNOSIS — M54.41 BILATERAL LOW BACK PAIN WITH RIGHT-SIDED SCIATICA: ICD-10-CM

## 2017-05-10 PROCEDURE — 97012 MECHANICAL TRACTION THERAPY: CPT | Mod: GP | Performed by: PHYSICAL THERAPIST

## 2017-05-10 PROCEDURE — 97110 THERAPEUTIC EXERCISES: CPT | Mod: GP | Performed by: PHYSICAL THERAPIST

## 2017-05-10 NOTE — MR AVS SNAPSHOT
After Visit Summary   5/10/2017    Arnold Lozada    MRN: [id number]           Patient Information     Date Of Birth          1950        Visit Information        Provider Department      5/10/2017 2:30 PM [facility]         Follow-ups after your visit        Your next 10 appointments already scheduled     May 15, 2017 10:50 AM CDT   Office Visit with Nikhil Carpenter MD   [facility]    [address] [facility] [address]   [phone]           Bring a current list of meds and any records pertaining to this visit.  For Physicals, please bring immunization records and any forms needing to be filled out.  Please arrive 10 minutes early to complete paperwork.            May 25, 2017 12:00 PM CDT   Return Visit with Narciso Way   [facility] ([facility]    [address] [facility] [address]   [phone]            Aug 01, 2017 12:30 PM CDT   Return Visit with Shad Portillo [facility] ([facility])    [address] [place] [address] [phone]              Who to contact     If you have questions or need follow up information about today's clinic visit or your schedule please contact [facility] directly at [phone].  Normal or non-critical lab and imaging results will be communicated to you by MyChart, letter or phone within 4 business days after the clinic has received the results. If you do not hear from us within 7 days, please contact the clinic through MyChart or phone. If you have a critical or abnormal lab result, we will notify you by phone as soon as possible.  Submit refill requests through [employer] or call your pharmacy and they will forward the refill request to us. Please allow 3 business days for your refill to be completed.          Additional Information About  Your Visit        [email or web address] Information     [employer] gives you secure access to your electronic health record. If you see a primary care provider, you can also send messages to your care team and make appointments. If you have questions, please call your primary care clinic.  If you do not have a primary care provider, please call [phone] and they will assist you.        Care EveryWhere ID     This is your Care EveryWhere ID. This could be used by other organizations to access your [facility] medical records  [id number]        Your Vitals Were     Pulse                   92            Blood Pressure from Last 3 Encounters:   05/10/17 138/84   03/15/17 128/64   03/10/17 128/76    Weight from Last 3 Encounters:   03/15/17 167 lb (75.8 kg)   03/10/17 160 lb (72.6 kg)   01/04/17 161 lb (73 kg)              Today, you had the following     No orders found for display       Primary Care Provider Office Phone # Fax #    Nikhil Carpenter [phone]       [address] [address]        Thank you!     Thank you for choosing [facility]  for your care. Our goal is always to provide you with excellent care. Hearing back from our patients is one way we can continue to improve our services. Please take a few minutes to complete the written survey that you may receive in the mail after your visit with us. Thank you!             Your Updated Medication List - Protect others around you: Learn how to safely use, store and throw away your medicines at www.disposemymeds.org.          This list is accurate as of: 5/10/17  3:12 PM.  Always use your most recent med list.                   Brand Name Dispense Instructions for use    * amphetamine-dextroamphetamine 30 MG per 24 hr capsule    ADDERALL XR    30 capsule    Take 1 capsule (30 mg) by mouth daily       * amphetamine-dextroamphetamine 30 MG per 24 hr capsule   Start taking on:  6/3/2017    ADDERALL XR    90  capsule    Take 1 capsule (30 mg) by mouth daily       aspirin 325 MG tablet      Take 325 mg by mouth daily.       COQ-10 PO      Reported on 5/10/2017       CYCLOBENZAPRINE HCL PO      Take 5 mg by mouth 3 times daily Reported on 5/10/2017       folic acid 800 MCG Tabs      Take 1 tablet by mouth daily Reported on 5/10/2017       hydrochlorothiazide 25 MG tablet    HYDRODIURIL    90 tablet    Take 1 tablet (25 mg) by mouth daily       lisinopril 20 MG tablet    PRINIVIL/ZESTRIL    90 tablet    Take 1 tablet (20 mg) by mouth daily       metoprolol 50 MG 24 hr tablet    TOPROL-XL    90 tablet    Take 1 tablet (50 mg) by mouth daily       Multi-vitamin Tabs tablet   Generic drug:  multivitamin, therapeutic with minerals      1 TABLET DAILY       order for DME      Respironics REMSTAR 60 Series Auto CPAP 5-18cm H2O, Nuance pillow nasal mask large       * order for DME     1 Device    Equipment being ordered: quadcane       * order for DME     1 Device    Equipment being ordered: shower bench [ long ]       oxyCODONE-acetaminophen 5-325 MG per tablet    PERCOCET     Take 1 tablet by mouth every 4 hours as needed for moderate to severe pain Reported on 5/10/2017       POTASSIUM PO      Take 595 mcg by mouth daily       predniSONE 20 MG tablet    DELTASONE    10 tablet    Take 1 tablet (20 mg) by mouth 2 times daily       sertraline 50 MG tablet    ZOLOFT    90 tablet    Take 1 tablet (50 mg) by mouth daily       simvastatin 20 MG tablet    ZOCOR    90 tablet    ONE TABLET DAILY IN THE EVENING       sulfaSALAzine 500 MG tablet    AZULFIDINE     Take 500 mg by mouth 4 times daily.       traZODone 50 MG tablet    DESYREL    90 tablet    Take 1 tablet (50 mg) by mouth nightly as needed for sleep       * Notice:  This list has 4 medication(s) that are the same as other medications prescribed for you. Read the directions carefully, and ask your doctor or other care provider to review them with you.

## 2017-05-10 NOTE — PROGRESS NOTES
Arnold Lozada is a 66 year old male who comes in today for a Blood Pressure check because of medication change.  Vitals as recorded, a regular cuff was used.    Patient is taking medication as prescribed  Patient is tolerating medications well.  Patient is monitoring Blood Pressure at home.  Average readings if yes are 120's-170's/80's-110's    Current complaints: none    Disposition: results routed to MD/AP

## 2017-05-11 ENCOUNTER — TELEPHONE (OUTPATIENT)
Dept: AUDIOLOGY | Facility: CLINIC | Age: 67
End: 2017-05-11

## 2017-05-11 NOTE — TELEPHONE ENCOUNTER
Reason for Call:  Other call back    Detailed comments: patient stating that his right hearing aid is not working and would like to have it looked at. Patient will be out of town tomorrow until 4 or 4:30 and will not be able to drop them off until afterwards. Patient would like to know if this would be a possible or what are his other options.    Phone Number Patient can be reached at: Cell number on file:    Telephone Information:   Mobile [phone]       Best Time: any time    Can we leave a detailed message on this number? YES    Call taken on 5/11/2017 at 5:07 PM by Wen Juan

## 2017-05-11 NOTE — PROGRESS NOTES
SUBJECTIVE:                                                    Arnold Lozada is a 66 year old male who presents to clinic today with his wife for the following health issues:       Acute right-sided low back pain with right-sided sciatica  Essential hypertension with goal blood pressure less than 140/90  Flatus  Need for vaccination     Note 5/15/2017 --  Patient presents with:  Hypertension  Back Pain    Right Back/Leg Pain -- After he returned from [place] he had severe leg pain and back pain. On 3/10 he went to the ED at [facility] , described below. He saw Dr. Mcnair in March. From that visit he used Prednisone and physical therapy. Three weeks ago pain returned to his right leg. He states his pain is aggravated with sitting. He notes he spent a lot of time laying because his ataxia caused him pain while walking and he could not sit because of his lower back pain/sciatica. He reports after extraneous exercise at , he had left sided back pain as well. His imaging from 3/10 showed degenerative lumbar spondylosis.    Excessive Gas -- Patient complains of feeling gassy recently. He notes he has not changed his diet. Patient denies abdominal pain, diarrhea, and weight change. From the gastroenterologic perspective otherwise, he feels fine without nausea or vomiting or diarrhea or significant weight loss     Hypertension -- Patient notes his Metoprolol was increased to 50 MG at the last visit.  BP Readings from Last 3 Encounters:   05/15/17 126/72   05/10/17 138/84   03/15/17 128/64     Additional Notes -- He had the Prevnar-13 shot in 2015. He is willing to get the Pneumovax-23.    ED Note 3/10/2017 --  HISTORY OF PRESENT ILLNESS:  Mr. Arnold Lozada is a 66-year-old male with past medical history of cerebellar ataxia, essential hypertension, and dyslipidemia, who presented to the emergency department with complaint of back pain. The patient states that for the last one week he has been having  right-sided back pain over his right hip. The pain has increased gradually, and over the last 24 hours the pain became intolerable, sharp, 10/10, located over the right hip and shooting down his right lower extremity. The pain increases whenever he tried to walk. Because of severity of the pain, the patient went to urgent care this morning. X-ray was done and he was told there was no hip fracture and he was directed to follow up with his primary care physician. His primary care physician was not available so the patient came to the emergency department for further management. The patient describes numbness in both hands and feet, but he states that this is chronic because of his cerebellar ataxia. He denies having any chest pain, shortness of breath or palpitations. There is no fever, chills, cough, or wheezing. His bowel movements are usually regular. Appetite is normal. There is no dysuria, no abnormal bleeding or easy bruising. He denies having any headache or extremity weakness.   Assessment:  1. Right sciatica.   2. Essential hypertension.   3. Dyslipidemia.   4. Cerebellar ataxia.   This 66-year-old male presented to the emergency department with right hip pain shooting down his right lower extremity. MR of lumbosacral spine showed multilevel degenerative lumbar spondylosis. Most marked at L2 and L3 through L5 to S1 level which is most probably the underlying etiology for his complaint. His blood pressure is moderately elevated, most probably because of pain.       Problem list and histories reviewed & adjusted, as indicated.  Additional history: as documented    Patient Active Problem List   Diagnosis     Hyperlipidemia LDL goal <130     Crohn's disease of colon (H)     Adenomatous colon polyp     Low back strain     GUILLERMINA (obstructive sleep apnea)- Mild/moderate ( AHI 16.2)     Family history of colon cancer     Advanced directives, counseling/discussion     Essential hypertension with goal blood pressure less  than 140/90     Adjustment disorder with mixed anxiety and depressed mood     Cerebral ataxia (H)     Bilateral low back pain with right-sided sciatica     Past Surgical History:   Procedure Laterality Date     COLONOSCOPY  2014    OK     HC TOOTH EXTRACTION W/FORCEP         Social History   Substance Use Topics     Smoking status: Never Smoker     Smokeless tobacco: Never Used     Alcohol use Yes      Comment: 3 times per week     Family History   Problem Relation Age of Onset     CANCER Mother      brain tumor, age 79     Other Cancer Mother      Brain tumor     Depression Mother      Asthma Mother      CANCER Father      colon     Pneumonia Father       age 90 from pneumonia and sepsis     C.A.D. Father      Age 80 have MI and triple bypass     Coronary Artery Disease Father      Heart Attack     Hypertension Father      ???     Hyperlipidemia Father      ???     Colon Cancer Father      Cured     DIABETES Maternal Grandmother          Current Outpatient Prescriptions   Medication Sig Dispense Refill     [START ON 6/3/2017] amphetamine-dextroamphetamine (ADDERALL XR) 30 MG per 24 hr capsule Take 1 capsule (30 mg) by mouth daily 90 capsule 0     metoprolol (TOPROL-XL) 50 MG 24 hr tablet Take 1 tablet (50 mg) by mouth daily 90 tablet 1     oxyCODONE-acetaminophen (PERCOCET) 5-325 MG per tablet Take 1 tablet by mouth every 4 hours as needed for moderate to severe pain Reported on 5/10/2017       CYCLOBENZAPRINE HCL PO Take 5 mg by mouth 3 times daily Reported on 5/10/2017       predniSONE (DELTASONE) 20 MG tablet Take 1 tablet (20 mg) by mouth 2 times daily 10 tablet 0     amphetamine-dextroamphetamine (ADDERALL XR) 30 MG per 24 hr capsule Take 1 capsule (30 mg) by mouth daily 30 capsule 0     order for DME Equipment being ordered: shower bench [ long ] 1 Device 0     lisinopril (PRINIVIL/ZESTRIL) 20 MG tablet Take 1 tablet (20 mg) by mouth daily 90 tablet 0     sertraline (ZOLOFT) 50 MG tablet Take 1  "tablet (50 mg) by mouth daily 90 tablet 1     hydrochlorothiazide (HYDRODIURIL) 25 MG tablet Take 1 tablet (25 mg) by mouth daily 90 tablet 1     simvastatin (ZOCOR) 20 MG tablet ONE TABLET DAILY IN THE EVENING 90 tablet 3     order for DME Equipment being ordered: quadcane 1 Device 0     traZODone (DESYREL) 50 MG tablet Take 1 tablet (50 mg) by mouth nightly as needed for sleep 90 tablet 1     Coenzyme Q10 (COQ-10 PO) Reported on 5/10/2017       ORDER FOR DME Respironics REMSTAR 60 Series Auto CPAP 5-18cm H2O, Nuance pillow nasal mask large       folic acid 800 MCG TABS Take 1 tablet by mouth daily Reported on 5/10/2017       POTASSIUM PO Take 595 mcg by mouth daily       sulfaSALAzine (AZULFIDINE) 500 MG tablet Take 500 mg by mouth 4 times daily.       aspirin 325 MG tablet Take 325 mg by mouth daily.       MULTI-VITAMIN PO TABS 1 TABLET DAILY       Labs reviewed in EPIC    Reviewed and updated as needed this visit by clinical staff  Tobacco  Allergies  Meds  Med Hx  Surg Hx  Fam Hx  Soc Hx      Reviewed and updated as needed this visit by Provider         ROS:  Constitutional, HEENT, cardiovascular, pulmonary, GI, , musculoskeletal, neuro, skin, endocrine and psych systems are negative, except as otherwise noted.    This document serves as a record of the services and decisions personally performed and made by Nikhil Carpenter MD. It was created on their behalf by Tommie Parham, a trained medical scribe. The creation of this document is based the provider's statements to the medical scribe.  Tommie Parham May 15, 2017 11:07 AM    OBJECTIVE:                                                    /72 (BP Location: Right arm, Patient Position: Chair, Cuff Size: Adult Regular)  Pulse 101  Temp 97.1  F (36.2  C) (Oral)  Ht 1.727 m (5' 8\")  Wt 78.5 kg (173 lb)  SpO2 96%  BMI 26.3 kg/m2  Body mass index is 26.3 kg/(m^2).  GENERAL: healthy, alert and no distress  EYES: Eyes grossly normal to inspection, " PERRL and conjunctivae and sclerae normal  RESP: lungs clear to auscultation - no rales, rhonchi or wheezes  CV: regular rate and rhythm, normal S1 S2, no S3 or S4, no murmur, click or rub, no peripheral edema and peripheral pulses strong  MS: no gross musculoskeletal defects noted, no edema, negative straight leg raising, mild tenderness to palpation at the lumbar paraspinous muscles , right sided  SKIN: no suspicious lesions or rashes to visible skin  NEURO: mentation intact and speech normal  PSYCH: mentation appears normal, affect normal/bright    Diagnostic Test Results:  Results for orders placed or performed in visit on 03/10/17   XR Pelvis 1/2 Views    Narrative    PELVIS ONE TO TWO VIEWS  3/10/2017 11:29 AM     HISTORY: Pain in right hip.    COMPARISON: None.      Impression    IMPRESSION: Rotated positioning. No fracture or osseous lesion is  identified on this single view. If there is suspicion for a hip  fracture, an orthogonal view would be recommended.    DAVIN TORREZ MD        ASSESSMENT/PLAN:                                                      (M54.41) Acute right-sided low back pain with right-sided sciatica  (primary encounter diagnosis)  Comment: we reviewed in detail his diagnosis and MRI findings . He has significant lumbar degenerative disc disease and lumbar degenerative joint disease with spondylolysis . He is interested in trying some chiropractic care and I think this is a fine idea  Plan: [facility] PT, HAND, AND CHIROPRACTIC REFERRAL            (I10) Essential hypertension with goal blood pressure less than 140/90  Comment: controlled within acceptable limits at this point   Plan: continue current plan of care       (R14.3) Flatus  Comment: this is an an unrelated matter   Plan: no evidence of a pathological process . Diet recommendations     (Z23) Need for vaccination  Comment: due for the pneumococcal vaccine 23-valent to complete his pneumococcal vaccination series  Plan: 1st   Administration  [24512], Pneumococcal         vaccine 23 valent PPSV23  (Pneumovax) [41003]          25 minutes was spent with the patient with greater than 50% in face-to-face discussion of disease process, treatment options and medicine management.        Patient Instructions     - Follow up with a Chiropractor.  [facility]    If you have any questions regarding to your visit please contact your care team:     Team Pink:   Clinic Hours Telephone Number   Internal Medicine:  Dr. Lola Bauer, NP       7am-7pm  Monday - Thursday   7am-5pm  Fridays  [phone]  (Appointment scheduling available 24/7)    Questions about your visit?  Team Line  [phone]   Urgent Care - [place] and [facility] - 11am-9pm Monday-Friday Saturday-Sunday- 9am-5pm   [place] - 5pm-9pm Monday-Friday Saturday-Sunday- 9am-5pm  [phone] - [place]   [phone] - [place]       What options do I have for visits at the clinic other than the traditional office visit?  To expand how we care for you, many of our providers are utilizing electronic visits (e-visits) and telephone visits, when medically appropriate, for interactions with their patients rather than a visit in the clinic.   We also offer nurse visits for many medical concerns. Just like any other service, we will bill your insurance company for this type of visit based on time spent on the phone with your provider. Not all insurance companies cover these visits. Please check with your medical insurance if this type of visit is covered. You will be responsible for any charges that are not paid by your insurance.      E-visits via [employer]:  generally incur a $35.00 fee.  Telephone visits:  Time spent on the phone: *charged based on time that is spent on the phone in increments of 10 minutes. Estimated cost:   5-10 mins $30.00   11-20 mins. $59.00   21-30 mins. $85.00   Use [employer] (secure email communication  and access to your chart) to send your primary care provider a message or make an appointment. Ask someone on your Team how to sign up for [employer].    For a Price Quote for your services, please call our Consumer Price Line at [phone].    As always, Thank you for trusting us with your health care needs!    RIKI/MA      The information in this document, created by the medical scribe for me, accurately reflects the services I personally performed and the decisions made by me. I have reviewed and approved this document for accuracy.   MD Nikhil Shaw MD  [facility]

## 2017-05-12 ENCOUNTER — THERAPY VISIT (OUTPATIENT)
Dept: PHYSICAL THERAPY | Facility: CLINIC | Age: 67
End: 2017-05-12
Payer: MEDICARE

## 2017-05-12 ENCOUNTER — TELEPHONE (OUTPATIENT)
Dept: NEUROLOGY | Facility: CLINIC | Age: 67
End: 2017-05-12

## 2017-05-12 DIAGNOSIS — M54.41 BILATERAL LOW BACK PAIN WITH RIGHT-SIDED SCIATICA: ICD-10-CM

## 2017-05-12 PROCEDURE — 97012 MECHANICAL TRACTION THERAPY: CPT | Mod: GP | Performed by: PHYSICAL THERAPIST

## 2017-05-12 NOTE — TELEPHONE ENCOUNTER
Patient informs me that the right ear hearing aid was not working yesterday but today it is working fine. I asked that if the hearing aid should stop working again that he drop the hearing aid off for us to look at. The patient understands and agrees with the plan.     -Josué Stout CCC-A

## 2017-05-12 NOTE — TELEPHONE ENCOUNTER
Pt wanted to make an appointment to see Dr. Pena, appointment was made for June 9 at 11:30am.  Pt wondered if he should have a repeat MRI, his last was April 2016, Dr. Pena was  Notified and he did not believe pt needed MRI.  Pt's adderall prescription was written for a 3 month supply and mailed to pt today per his request.

## 2017-05-15 ENCOUNTER — OFFICE VISIT (OUTPATIENT)
Dept: FAMILY MEDICINE | Facility: CLINIC | Age: 67
End: 2017-05-15
Payer: MEDICARE

## 2017-05-15 ENCOUNTER — TELEPHONE (OUTPATIENT)
Dept: AUDIOLOGY | Facility: CLINIC | Age: 67
End: 2017-05-15

## 2017-05-15 ENCOUNTER — THERAPY VISIT (OUTPATIENT)
Dept: PHYSICAL THERAPY | Facility: CLINIC | Age: 67
End: 2017-05-15
Payer: MEDICARE

## 2017-05-15 VITALS
DIASTOLIC BLOOD PRESSURE: 72 MMHG | SYSTOLIC BLOOD PRESSURE: 126 MMHG | WEIGHT: 173 LBS | HEIGHT: 68 IN | OXYGEN SATURATION: 96 % | BODY MASS INDEX: 26.22 KG/M2 | HEART RATE: 101 BPM | TEMPERATURE: 97.1 F

## 2017-05-15 DIAGNOSIS — I10 ESSENTIAL HYPERTENSION WITH GOAL BLOOD PRESSURE LESS THAN 140/90: ICD-10-CM

## 2017-05-15 DIAGNOSIS — Z23 NEED FOR VACCINATION: ICD-10-CM

## 2017-05-15 DIAGNOSIS — M54.41 BILATERAL LOW BACK PAIN WITH RIGHT-SIDED SCIATICA: ICD-10-CM

## 2017-05-15 DIAGNOSIS — R14.3 FLATUS: ICD-10-CM

## 2017-05-15 DIAGNOSIS — M54.41 ACUTE RIGHT-SIDED LOW BACK PAIN WITH RIGHT-SIDED SCIATICA: Primary | ICD-10-CM

## 2017-05-15 PROCEDURE — G0009 ADMIN PNEUMOCOCCAL VACCINE: HCPCS | Performed by: INTERNAL MEDICINE

## 2017-05-15 PROCEDURE — 97012 MECHANICAL TRACTION THERAPY: CPT | Mod: GP

## 2017-05-15 PROCEDURE — 99214 OFFICE O/P EST MOD 30 MIN: CPT | Mod: 25 | Performed by: INTERNAL MEDICINE

## 2017-05-15 PROCEDURE — 90732 PPSV23 VACC 2 YRS+ SUBQ/IM: CPT | Performed by: INTERNAL MEDICINE

## 2017-05-15 NOTE — PATIENT INSTRUCTIONS
- Follow up with a Chiropractor.  [facility]    If you have any questions regarding to your visit please contact your care team:     Team Pink:   Clinic Hours Telephone Number   Internal Medicine:  Dr. Lola Buaer, NP       7am-7pm  Monday - Thursday   7am-5pm  Fridays  [phone]  (Appointment scheduling available 24/7)    Questions about your visit?  Team Line  [phone]   Urgent Care - [place] and [facility] - 11am-9pm Monday-Friday Saturday-Sunday- 9am-5pm   [place] - 5pm-9pm Monday-Friday Saturday-Sunday- 9am-5pm  [phone] - [place]   [phone] - [place]       What options do I have for visits at the clinic other than the traditional office visit?  To expand how we care for you, many of our providers are utilizing electronic visits (e-visits) and telephone visits, when medically appropriate, for interactions with their patients rather than a visit in the clinic.   We also offer nurse visits for many medical concerns. Just like any other service, we will bill your insurance company for this type of visit based on time spent on the phone with your provider. Not all insurance companies cover these visits. Please check with your medical insurance if this type of visit is covered. You will be responsible for any charges that are not paid by your insurance.      E-visits via [employer]:  generally incur a $35.00 fee.  Telephone visits:  Time spent on the phone: *charged based on time that is spent on the phone in increments of 10 minutes. Estimated cost:   5-10 mins $30.00   11-20 mins. $59.00   21-30 mins. $85.00   Use [employer] (secure email communication and access to your chart) to send your primary care provider a message or make an appointment. Ask someone on your Team how to sign up for [employer].    For a Price Quote for your services, please call our Consumer Price Line at [phone].    As always, Thank you for trusting us  with your health care needs!    RIKI/MA

## 2017-05-15 NOTE — NURSING NOTE
Screening Questionnaire for Adult Immunization    Are you sick today?   No   Do you have allergies to medications, food, a vaccine component or latex?   No   Have you ever had a serious reaction after receiving a vaccination?   No   Do you have a long-term health problem with heart disease, lung disease, asthma, kidney disease, metabolic disease (e.g. diabetes), anemia, or other blood disorder?   No   Do you have cancer, leukemia, HIV/AIDS, or any other immune system problem?   No   In the past 3 months, have you taken medications that affect  your immune system, such as prednisone, other steroids, or anticancer drugs; drugs for the treatment of rheumatoid arthritis, Crohn s disease, or psoriasis; or have you had radiation treatments?   Yes   Have you had a seizure, or a brain or other nervous system problem?   Yes   During the past year, have you received a transfusion of blood or blood     products, or been given immune (gamma) globulin or antiviral drug?   No   For women: Are you pregnant or is there a chance you could become        pregnant during the next month?   No   Have you received any vaccinations in the past 4 weeks?   No     Immunization questionnaire was positive for at least one answer.  Notified MD.      MNVFC doesn't apply on this patient    Per orders of Dr. Casas, injection of Pneumococcal 23 given by Shauna Valero. Patient instructed to remain in clinic for 20 minutes afterwards, and to report any adverse reaction to me immediately.       Screening performed by Shauna Valero on 5/15/2017 at 11:41 AM.

## 2017-05-15 NOTE — MR AVS SNAPSHOT
After Visit Summary   5/15/2017    Arnold Lozada    MRN: [id number]           Patient Information     Date Of Birth          1950        Visit Information        Provider Department      5/15/2017 10:50 AM Nikhil Carpenter MD [facility]        Today's Diagnoses     Acute right-sided low back pain with right-sided sciatica    -  1    Essential hypertension with goal blood pressure less than 140/90        Fl[place]        Need for vaccination          Care Instructions    - Follow up with a Chiropractor.  [facility]    If you have any questions regarding to your visit please contact your care team:     Team Pink:   Clinic Hours Telephone Number   Internal Medicine:  Dr. Lola Bauer NP       7am-7pm  Monday - Thursday   7am-5pm  Fridays  [phone]  (Appointment scheduling available 24/7)    Questions about your visit?  Team Line  [phone]   Urgent Care - [place] - 11am-9pm Monday-Friday Saturday-Sunday- 9am-5pm   [place] - 5pm-9pm Monday-Friday Saturday-Sunday- 9am-5pm  [phone] - [place]   [phone] - [place]       What options do I have for visits at the clinic other than the traditional office visit?  To expand how we care for you, many of our providers are utilizing electronic visits (e-visits) and telephone visits, when medically appropriate, for interactions with their patients rather than a visit in the clinic.   We also offer nurse visits for many medical concerns. Just like any other service, we will bill your insurance company for this type of visit based on time spent on the phone with your provider. Not all insurance companies cover these visits. Please check with your medical insurance if this type of visit is covered. You will be responsible for any charges that are not paid by your insurance.      E-visits via Mile[employer]:  generally incur a $35.00 fee.  Telephone  visits:  Time spent on the phone: *charged based on time that is spent on the phone in increments of 10 minutes. Estimated cost:   5-10 mins $30.00   11-20 mins. $59.00   21-30 mins. $85.00   Use [employer] (secure email communication and access to your chart) to send your primary care provider a message or make an appointment. Ask someone on your Team how to sign up for [employer].    For a Price Quote for your services, please call our mySkin Price Line at [phone].    As always, Thank you for trusting us with your health care needs!    RIKI/MA          Follow-ups after your visit        Additional Services     [facility] PT, HAND, AND CHIROPRACTIC REFERRAL       **This order will print in the [facility] Scheduling Office**    Physical Therapy, Hand Therapy and Chiropractic Care are available through:    *[facility]  *[facility]  *[facility]    Call one number to schedule at any of the above locations: [phone].    Your provider has referred you to: Chiropractic at [facility] or [facility]    Indication/Reason for Referral: Low Back Pain  Onset of Illness: with sciatica    Therapy Orders: Evaluate and Treat  Special Programs: None  Special Request: None    Ang Fry      Additional Comments for the Therapist or Chiropractor:     Please be aware that coverage of these services is subject to the terms and limitations of your health insurance plan.  Call member services at your health plan with any benefit or coverage questions.      Please bring the following to your appointment:    *Your personal calendar for scheduling future appointments  *Comfortable clothing                  Your next 10 appointments already scheduled     May 25, 2017 12:00 PM CDT   Return Visit with Narciso Way   [facility]    [address] [place] [address]   [phone]            Jun 09, 2017 11:30 AM CDT   (Arrive by 11:15  "AM)   Return Ataxia with Giorgio Pena MD   [facility] Neurology ([facility])    [address]  3rd Floor  [facility] [address] [phone]            Aug 01, 2017 12:30 PM CDT   Return Visit with Shad [facility] ([facility])    [address] [address] [phone]              Who to contact     If you have questions or need follow up information about today's clinic visit or your schedule please contact [facility] SANT[facility] directly at [phone].  Normal or non-critical lab and imaging results will be communicated to you by MyChart, letter or phone within 4 business days after the clinic has received the results. If you do not hear from us within 7 days, please contact the clinic through Above [employer] or phone. If you have a critical or abnormal lab result, we will notify you by phone as soon as possible.  Submit refill requests through Resermap or call your pharmacy and they will forward the refill request to us. Please allow 3 business days for your refill to be completed.          Additional Information About Your Visit        [employer] Information     Resermap gives you secure access to your electronic health record. If you see a primary care provider, you can also send messages to your care team and make appointments. If you have questions, please call your primary care clinic.  If you do not have a primary care provider, please call [phone] and they will assist you.        Care EveryWhere ID     This is your Care EveryWhere ID. This could be used by other organizations to access your [facility] medical records  [id number]        Your Vitals Were     Pulse Temperature Height Pulse Oximetry BMI (Body Mass Index)       101 97.1  F (36.2  C) (Oral) 5' 8\" (1.727 m) 96% 26.3 kg/m2        Blood Pressure from Last 3 Encounters:   05/15/17 126/72   05/10/17 138/84   03/15/17 " 128/64    Weight from Last 3 Encounters:   05/15/17 173 lb (78.5 kg)   03/15/17 167 lb (75.8 kg)   03/10/17 160 lb (72.6 kg)              We Performed the Following     1st  Administration  [36405]     [facility] PT, HAND, AND CHIROPRACTIC REFERRAL     Pneumococcal vaccine 23 valent PPSV23  (Pneumovax) [14735]        Primary Care Provider Office Phone # Fax #    Nikhil Carpenter [phone]       [address] [address]        Thank you!     Thank you for choosing [facility]  for your care. Our goal is always to provide you with excellent care. Hearing back from our patients is one way we can continue to improve our services. Please take a few minutes to complete the written survey that you may receive in the mail after your visit with us. Thank you!             Your Updated Medication List - Protect others around you: Learn how to safely use, store and throw away your medicines at www.disposemymeds.org.          This list is accurate as of: 5/15/17 11:37 AM.  Always use your most recent med list.                   Brand Name Dispense Instructions for use    * amphetamine-dextroamphetamine 30 MG per 24 hr capsule    ADDERALL XR    30 capsule    Take 1 capsule (30 mg) by mouth daily       * amphetamine-dextroamphetamine 30 MG per 24 hr capsule   Start taking on:  6/3/2017    ADDERALL XR    90 capsule    Take 1 capsule (30 mg) by mouth daily       aspirin 325 MG tablet      Take 325 mg by mouth daily.       COQ-10 PO      Reported on 5/10/2017       CYCLOBENZAPRINE HCL PO      Take 5 mg by mouth 3 times daily Reported on 5/10/2017       folic acid 800 MCG Tabs      Take 1 tablet by mouth daily Reported on 5/10/2017       hydrochlorothiazide 25 MG tablet    HYDRODIURIL    90 tablet    Take 1 tablet (25 mg) by mouth daily       lisinopril 20 MG tablet    PRINIVIL/ZESTRIL    90 tablet    Take 1 tablet (20 mg) by mouth daily       metoprolol 50 MG 24 hr tablet     TOPROL-XL    90 tablet    Take 1 tablet (50 mg) by mouth daily       Multi-vitamin Tabs tablet   Generic drug:  multivitamin, therapeutic with minerals      1 TABLET DAILY       order for DME      Respironics REMSTAR 60 Series Auto CPAP 5-18cm H2O, Nuance pillow nasal mask large       * order for DME     1 Device    Equipment being ordered: quadcane       * order for DME     1 Device    Equipment being ordered: shower bench [ long ]       oxyCODONE-acetaminophen 5-325 MG per tablet    PERCOCET     Take 1 tablet by mouth every 4 hours as needed for moderate to severe pain Reported on 5/10/2017       POTASSIUM PO      Take 595 mcg by mouth daily       predniSONE 20 MG tablet    DELTASONE    10 tablet    Take 1 tablet (20 mg) by mouth 2 times daily       sertraline 50 MG tablet    ZOLOFT    90 tablet    Take 1 tablet (50 mg) by mouth daily       simvastatin 20 MG tablet    ZOCOR    90 tablet    ONE TABLET DAILY IN THE EVENING       sulfaSALAzine 500 MG tablet    AZULFIDINE     Take 500 mg by mouth 4 times daily.       traZODone 50 MG tablet    DESYREL    90 tablet    Take 1 tablet (50 mg) by mouth nightly as needed for sleep       * Notice:  This list has 4 medication(s) that are the same as other medications prescribed for you. Read the directions carefully, and ask your doctor or other care provider to review them with you.

## 2017-05-15 NOTE — TELEPHONE ENCOUNTER
Called patient to inform him that his repaired hearing aid is ready to go. Patient dropped off his hearing aid this morning with complaints that the hearing aid was turning on/off. The wax trap was clogged, this was changed the hearing aid was vacuumed. Patient reports that he typically changes the wax trap when he changes the battery; he forgot to change the wax trap the last time he changed the battery. Patient reports that he will  he hearing aid tomorrow.       Narciso Lozoya, F-AAA   Clinical Audiologist, [place] #[id number]   5/15/2017

## 2017-05-15 NOTE — NURSING NOTE
"Chief Complaint   Patient presents with     Hypertension     Back Pain       Initial /72 (BP Location: Right arm, Patient Position: Chair, Cuff Size: Adult Regular)  Pulse 101  Temp 97.1  F (36.2  C) (Oral)  Ht 5' 8\" (1.727 m)  Wt 173 lb (78.5 kg)  SpO2 96%  BMI 26.3 kg/m2 Estimated body mass index is 26.3 kg/(m^2) as calculated from the following:    Height as of this encounter: 5' 8\" (1.727 m).    Weight as of this encounter: 173 lb (78.5 kg).  Medication Reconciliation: complete   Claribel MEI CMA ([facility])      "

## 2017-05-19 ENCOUNTER — THERAPY VISIT (OUTPATIENT)
Dept: PHYSICAL THERAPY | Facility: CLINIC | Age: 67
End: 2017-05-19
Payer: MEDICARE

## 2017-05-19 DIAGNOSIS — M54.41 BILATERAL LOW BACK PAIN WITH RIGHT-SIDED SCIATICA: ICD-10-CM

## 2017-05-19 PROCEDURE — 97012 MECHANICAL TRACTION THERAPY: CPT | Mod: GP

## 2017-05-19 PROCEDURE — 97110 THERAPEUTIC EXERCISES: CPT | Mod: GP

## 2017-05-24 ENCOUNTER — THERAPY VISIT (OUTPATIENT)
Dept: PHYSICAL THERAPY | Facility: CLINIC | Age: 67
End: 2017-05-24
Payer: MEDICARE

## 2017-05-24 DIAGNOSIS — M54.41 BILATERAL LOW BACK PAIN WITH RIGHT-SIDED SCIATICA: ICD-10-CM

## 2017-05-24 PROCEDURE — 97110 THERAPEUTIC EXERCISES: CPT | Mod: GP

## 2017-05-24 PROCEDURE — 97012 MECHANICAL TRACTION THERAPY: CPT | Mod: GP

## 2017-05-25 ENCOUNTER — OFFICE VISIT (OUTPATIENT)
Dept: AUDIOLOGY | Facility: CLINIC | Age: 67
End: 2017-05-25
Payer: MEDICARE

## 2017-05-25 DIAGNOSIS — H90.3 SENSORINEURAL HEARING LOSS, BILATERAL: Primary | ICD-10-CM

## 2017-05-25 PROCEDURE — V5299 HEARING SERVICE: HCPCS | Performed by: AUDIOLOGIST

## 2017-05-25 NOTE — PROGRESS NOTES
"HEARING AID RECHECK    Patient Name:  Arnold Lozada    Patient Age:   66 year old    :  1950    Background:   Patient is here today, accompanied by his wife, to take impressions for the purpose of obtaining custom earmolds for his [employer] hearing aids. Patient is having great difficulty working with the small open domes in order to change waxguards.     Procedures:   Bilateral earmold impressions were taken without incident. Impressions will be sent to [employer] to make the custom micromolds with canal locks.     Plan:   Once earmolds are in the patient will be called to set up an \"add on\" appointment to fit the new earmolds.     NO CHARGE VISIT    Josué Stout CCC-A  Licensed Audiologist  2017      "

## 2017-05-25 NOTE — MR AVS SNAPSHOT
After Visit Summary   5/25/2017    Arnold Lozada    MRN: [id number]           Patient Information     Date Of Birth          1950        Visit Information        Provider Department      5/25/2017 12:00 PM Josué Davis AuD [facility]        Today's Diagnoses     Sensorineural hearing loss, bilateral    -  1       Follow-ups after your visit        Your next 10 appointments already scheduled     May 31, 2017 11:00 AM CDT   [facility] Chiropractor with Onofre Geiger DC   [facility] [place] [facility] ([facility] [place])    [address]  [place] [address]   [phone]            May 31, 2017 12:10 PM CDT   CHETAN Extremity with ISAAC Galindo PT ([facility] [place])    [address]  [address]  [place] [address]   [phone]            Jun 09, 2017 11:30 AM CDT   (Arrive by 11:15 AM)   Return Ataxia with Giorgio Pena MD   [facility] ([facility])    [address] [place] [address] [phone]            Aug 01, 2017 12:30 PM CDT   [facility]   [facility])    [address] [place] [address] [phone]              Who to contact     If you have questions or need follow up information about today's clinic visit or your schedule please contact [facility] directly at [phone].  Normal or non-critical lab and imaging results will be communicated to you by MyChart, letter or phone within 4 business days after the clinic has received the results. If you do not hear from us within 7 days, please contact the clinic through MyChart or phone. If you have a critical or abnormal lab result, we will notify you by phone as soon as possible.  Submit refill requests through DotBlu or call your pharmacy and they will forward the refill request to us. Please  allow 3 business days for your refill to be completed.          Additional Information About Your Visit        [employer] Information     [employer] gives you secure access to your electronic health record. If you see a primary care provider, you can also send messages to your care team and make appointments. If you have questions, please call your primary care clinic.  If you do not have a primary care provider, please call [phone] and they will assist you.        Care EveryWhere ID     This is your Care EveryWhere ID. This could be used by other organizations to access your [facility] medical records  [id number]         Blood Pressure from Last 3 Encounters:   05/15/17 126/72   05/10/17 138/84   03/15/17 128/64    Weight from Last 3 Encounters:   05/15/17 173 lb (78.5 kg)   03/15/17 167 lb (75.8 kg)   03/10/17 160 lb (72.6 kg)              We Performed the Following     HEARING AID CHECK/NO CHARGE        Primary Care Provider Office Phone # Fax #    Nikhil Carpenter [phone]       [address] [address]        Thank you!     Thank you for choosing [facility]  for your care. Our goal is always to provide you with excellent care. Hearing back from our patients is one way we can continue to improve our services. Please take a few minutes to complete the written survey that you may receive in the mail after your visit with us. Thank you!             Your Updated Medication List - Protect others around you: Learn how to safely use, store and throw away your medicines at www.disposemymeds.org.          This list is accurate as of: 5/25/17 12:20 PM.  Always use your most recent med list.                   Brand Name Dispense Instructions for use    * amphetamine-dextroamphetamine 30 MG per 24 hr capsule    ADDERALL XR    30 capsule    Take 1 capsule (30 mg) by mouth daily       * amphetamine-dextroamphetamine 30 MG per 24 hr capsule   Start taking  on:  6/3/2017    ADDERALL XR    90 capsule    Take 1 capsule (30 mg) by mouth daily       aspirin 325 MG tablet      Take 325 mg by mouth daily.       COQ-10 PO      Reported on 5/10/2017       CYCLOBENZAPRINE HCL PO      Take 5 mg by mouth 3 times daily Reported on 5/10/2017       folic acid 800 MCG Tabs      Take 1 tablet by mouth daily Reported on 5/10/2017       hydrochlorothiazide 25 MG tablet    HYDRODIURIL    90 tablet    Take 1 tablet (25 mg) by mouth daily       lisinopril 20 MG tablet    PRINIVIL/ZESTRIL    90 tablet    Take 1 tablet (20 mg) by mouth daily       metoprolol 50 MG 24 hr tablet    TOPROL-XL    90 tablet    Take 1 tablet (50 mg) by mouth daily       Multi-vitamin Tabs tablet   Generic drug:  multivitamin, therapeutic with minerals      1 TABLET DAILY       order for DME      Respironics REMSTAR 60 Series Auto CPAP 5-18cm H2O, Nuance pillow nasal mask large       * order for DME     1 Device    Equipment being ordered: quadcane       * order for DME     1 Device    Equipment being ordered: shower bench [ long ]       oxyCODONE-acetaminophen 5-325 MG per tablet    PERCOCET     Take 1 tablet by mouth every 4 hours as needed for moderate to severe pain Reported on 5/10/2017       POTASSIUM PO      Take 595 mcg by mouth daily       predniSONE 20 MG tablet    DELTASONE    10 tablet    Take 1 tablet (20 mg) by mouth 2 times daily       sertraline 50 MG tablet    ZOLOFT    90 tablet    Take 1 tablet (50 mg) by mouth daily       simvastatin 20 MG tablet    ZOCOR    90 tablet    ONE TABLET DAILY IN THE EVENING       sulfaSALAzine 500 MG tablet    AZULFIDINE     Take 500 mg by mouth 4 times daily.       traZODone 50 MG tablet    DESYREL    90 tablet    Take 1 tablet (50 mg) by mouth nightly as needed for sleep       * Notice:  This list has 4 medication(s) that are the same as other medications prescribed for you. Read the directions carefully, and ask your doctor or other care provider to review them  with you.

## 2017-05-31 ENCOUNTER — TELEPHONE (OUTPATIENT)
Dept: FAMILY MEDICINE | Facility: CLINIC | Age: 67
End: 2017-05-31

## 2017-05-31 ENCOUNTER — THERAPY VISIT (OUTPATIENT)
Dept: CHIROPRACTIC MEDICINE | Facility: CLINIC | Age: 67
End: 2017-05-31

## 2017-05-31 DIAGNOSIS — M54.50 LUMBAGO: Primary | ICD-10-CM

## 2017-05-31 ASSESSMENT — ENCOUNTER SYMPTOMS
MUSCLE WEAKNESS: 1
WEAKNESS: 1
PARALYSIS: 0
SEIZURES: 0
HEADACHES: 0
WEIGHT LOSS: 0
INCREASED ENERGY: 1
STIFFNESS: 0
MEMORY LOSS: 0
DIZZINESS: 1
JOINT SWELLING: 0
NIGHT SWEATS: 0
DECREASED APPETITE: 0
DISTURBANCES IN COORDINATION: 1
FATIGUE: 0
BACK PAIN: 0
FEVER: 0
MYALGIAS: 0
DIFFICULTY URINATING: 1
SPEECH CHANGE: 1
HALLUCINATIONS: 0
LOSS OF CONSCIOUSNESS: 0
POLYPHAGIA: 0
HEMATURIA: 0
CHILLS: 0
TREMORS: 1
DYSURIA: 1
NECK PAIN: 0
POLYDIPSIA: 0
NUMBNESS: 1
FLANK PAIN: 0
WEIGHT GAIN: 0
ARTHRALGIAS: 0
TINGLING: 1
MUSCLE CRAMPS: 0
ALTERED TEMPERATURE REGULATION: 0

## 2017-05-31 NOTE — TELEPHONE ENCOUNTER
Pt. saw Dr. Geiger today regarding pain and bladder issues. Pt. Was referred back to PCP also was encouraged to see neurologist as soon as possible has appointment on 6/9/17.  Pt. Continues to have an increase in urinary frequency, urgency, and loss of control.  Appt. Set up for Friday at 12:10.  Chary Carpenter RN

## 2017-05-31 NOTE — TELEPHONE ENCOUNTER
Reason for Call:  Other call back    Detailed comments: Patient is requesting a return call regarding his bladder issues, Patient would like to be seen as soon as possible if possible or would also like an appointment with Dr. Carpenter    Phone Number Patient can be reached at: Cell number on file:    Telephone Information:   Mobile [phone]       Best Time: asap    Can we leave a detailed message on this number? YES    Call taken on 5/31/2017 at 12:16 PM by EMILY JONAS

## 2017-05-31 NOTE — PROGRESS NOTES
Arnold came in to the clinic today with the chief complaint of low back pain with associated radiating symptoms down right leg.  As this was being discussed, he mentioned that the pain is reducing however over the last week and a half he has been having bladder control issues which is increasing.  Due to this he was referred back to his primary care physician.  He agreed to go to his office after leaving this clinic.  No exam or treatment was performed  No charge for visit.

## 2017-06-01 NOTE — TELEPHONE ENCOUNTER
Will review and evaluate entire situation at appointment. We really do need face to face encounter for this    Nikhil Carpenter MD

## 2017-06-01 NOTE — PROGRESS NOTES
"  SUBJECTIVE:                                                    Arnold Lozada is a 66 year old male who presents to clinic today for the following health issues:      Genitourinary - Male     Onset: ***    Description:   Dysuria (painful urination): { :106907}  Hematuria (blood in urine): { :621621}  Frequency: { :723166}  Are you urinating at night : { :444357}  Hesitancy (delay in urine): { :071441}  Retention (unable to empty): { :797314}  Decrease in urinary flow: { :164149}  Incontinence: { :396117}    Progression of Symptoms:  {.:415472}    Accompanying Signs & Symptoms:  Fever: { :098322}  Back/Flank pain: { :501546}  Urethral discharge: { :377427}  Testicle lumps/masses/pain: { :134943}  Nausea and/or vomiting: { :266959}  Abdominal pain: { :554466}   History:   History of frequent UTI's: { :231283}  History of kidney stones: { :367065}  History of hernias: { :484519}  Personal or Family history of Prostate problems: {.:179154::\"no\"}  Sexually active: { :995634}    Precipitating factors:   ***    Alleviating factors:  ***         Therapies Tried and outcome: {.:331099::\"***\"}; Outcome - ***      {additional problems for provider to add:388453}    Problem list and histories reviewed & adjusted, as indicated.  Additional history: {NONE - AS DOCUMENTED:273848::\"as documented\"}    {HIST REVIEW/ LINKS 2:125423}    Reviewed and updated as needed this visit by clinical staff       Reviewed and updated as needed this visit by Provider         {PROVIDER CHARTING PREFERENCE:701844}    "

## 2017-06-02 ENCOUNTER — OFFICE VISIT (OUTPATIENT)
Dept: FAMILY MEDICINE | Facility: CLINIC | Age: 67
End: 2017-06-02
Payer: MEDICARE

## 2017-06-02 VITALS
WEIGHT: 174 LBS | OXYGEN SATURATION: 98 % | BODY MASS INDEX: 26.46 KG/M2 | DIASTOLIC BLOOD PRESSURE: 70 MMHG | TEMPERATURE: 97.2 F | SYSTOLIC BLOOD PRESSURE: 130 MMHG | HEART RATE: 100 BPM

## 2017-06-02 DIAGNOSIS — R82.81 PYURIA: ICD-10-CM

## 2017-06-02 DIAGNOSIS — N39.0 UTI (URINARY TRACT INFECTION): ICD-10-CM

## 2017-06-02 DIAGNOSIS — R39.15 URINARY URGENCY: Primary | ICD-10-CM

## 2017-06-02 LAB
ALBUMIN UR-MCNC: NEGATIVE MG/DL
APPEARANCE UR: CLEAR
BILIRUB UR QL STRIP: NEGATIVE
COLOR UR AUTO: YELLOW
GLUCOSE UR STRIP-MCNC: NEGATIVE MG/DL
HGB UR QL STRIP: NEGATIVE
KETONES UR STRIP-MCNC: NEGATIVE MG/DL
LEUKOCYTE ESTERASE UR QL STRIP: ABNORMAL
NITRATE UR QL: NEGATIVE
PH UR STRIP: 6 PH (ref 5–7)
RBC #/AREA URNS AUTO: ABNORMAL /HPF (ref 0–2)
SP GR UR STRIP: 1.02 (ref 1–1.03)
URN SPEC COLLECT METH UR: ABNORMAL
UROBILINOGEN UR STRIP-ACNC: 0.2 EU/DL (ref 0.2–1)
WBC #/AREA URNS AUTO: ABNORMAL /HPF (ref 0–2)

## 2017-06-02 PROCEDURE — 87186 SC STD MICRODIL/AGAR DIL: CPT | Performed by: INTERNAL MEDICINE

## 2017-06-02 PROCEDURE — 87086 URINE CULTURE/COLONY COUNT: CPT | Performed by: INTERNAL MEDICINE

## 2017-06-02 PROCEDURE — 87088 URINE BACTERIA CULTURE: CPT | Performed by: INTERNAL MEDICINE

## 2017-06-02 PROCEDURE — 99214 OFFICE O/P EST MOD 30 MIN: CPT | Performed by: INTERNAL MEDICINE

## 2017-06-02 PROCEDURE — 81001 URINALYSIS AUTO W/SCOPE: CPT | Performed by: INTERNAL MEDICINE

## 2017-06-02 RX ORDER — TOLTERODINE 2 MG/1
2 CAPSULE, EXTENDED RELEASE ORAL DAILY
Qty: 30 CAPSULE | Refills: 1 | Status: SHIPPED | OUTPATIENT
Start: 2017-06-02 | End: 2017-12-14

## 2017-06-02 ASSESSMENT — PAIN SCALES - GENERAL: PAINLEVEL: NO PAIN (0)

## 2017-06-02 NOTE — NURSING NOTE
"Chief Complaint   Patient presents with     Bladder Issue     Frequency,Pain with Urination and lost control of bladder once       Initial /70  Pulse 100  Temp 97.2  F (36.2  C) (Oral)  Wt 174 lb (78.9 kg)  SpO2 98%  BMI 26.46 kg/m2 Estimated body mass index is 26.46 kg/(m^2) as calculated from the following:    Height as of 5/15/17: 5' 8\" (1.727 m).    Weight as of this encounter: 174 lb (78.9 kg).  Medication Reconciliation: complete   RIKI/MA      "

## 2017-06-02 NOTE — PROGRESS NOTES
SUBJECTIVE:                                                    Arnold Lozada is a 66-year-old male accompanied by his wife who presents to clinic today for the following health issues:    Chief Complaint   Patient presents with     Bladder Issue     Frequency,Pain with Urination and lost control of bladder once     RIKI/MA    The patient was encouraged to see his PCP by his chiropracter because he has urinary problems since about May 15th. He reports increased urinary frequency at nighttime that is unusual over the last few weeks. Associated symptoms are leaking at nighttime and urgency. He had an episode of pain while urinating recently. The urine is expelled in small quantities at night despite feeling an urgency to use the bathroom. Denies a slow stream. Reports urinating 3x last night. Denies burning with urination, or the urine having an unpleasant smell. He has been drinking cranberry juice in case of infection. Endorses occasionally feeling like he has not emptied his bladder completely, but feels he has no suprapubic distention.     Reviewed medications with the patient.     Problem list and histories reviewed & adjusted, as indicated.  Additional history: as documented    Patient Active Problem List   Diagnosis     Hyperlipidemia LDL goal <130     Crohn's disease of colon (H)     Adenomatous colon polyp     Low back strain     GUILLERMINA (obstructive sleep apnea)- Mild/moderate ( AHI 16.2)     Family history of colon cancer     Advanced directives, counseling/discussion     Essential hypertension with goal blood pressure less than 140/90     Adjustment disorder with mixed anxiety and depressed mood     Cerebral ataxia (H)     Bilateral low back pain with right-sided sciatica     Past Surgical History:   Procedure Laterality Date     COLONOSCOPY  Fall 2014    OK     HC TOOTH EXTRACTION W/FORCEP         Social History   Substance Use Topics     Smoking status: Never Smoker     Smokeless tobacco:  Never Used     Alcohol use Yes      Comment: 3 times per week     Family History   Problem Relation Age of Onset     CANCER Mother      brain tumor, age 79     Other Cancer Mother      Brain tumor     Depression Mother      Asthma Mother      CANCER Father      colon     Pneumonia Father       age 90 from pneumonia and sepsis     C.A.D. Father      Age 80 have MI and triple bypass     Coronary Artery Disease Father      Heart Attack     Hypertension Father      ???     Hyperlipidemia Father      ???     Colon Cancer Father      Cured     DIABETES Maternal Grandmother          Current Outpatient Prescriptions   Medication Sig Dispense Refill     tolterodine (DETROL LA) 2 MG 24 hr capsule Take 1 capsule (2 mg) by mouth daily 30 capsule 1     [START ON 6/3/2017] amphetamine-dextroamphetamine (ADDERALL XR) 30 MG per 24 hr capsule Take 1 capsule (30 mg) by mouth daily 90 capsule 0     metoprolol (TOPROL-XL) 50 MG 24 hr tablet Take 1 tablet (50 mg) by mouth daily 90 tablet 1     oxyCODONE-acetaminophen (PERCOCET) 5-325 MG per tablet Take 1 tablet by mouth every 4 hours as needed for moderate to severe pain Reported on 5/10/2017       CYCLOBENZAPRINE HCL PO Take 5 mg by mouth 3 times daily Reported on 5/10/2017       predniSONE (DELTASONE) 20 MG tablet Take 1 tablet (20 mg) by mouth 2 times daily 10 tablet 0     amphetamine-dextroamphetamine (ADDERALL XR) 30 MG per 24 hr capsule Take 1 capsule (30 mg) by mouth daily 30 capsule 0     order for DME Equipment being ordered: shower bench [ long ] 1 Device 0     lisinopril (PRINIVIL/ZESTRIL) 20 MG tablet Take 1 tablet (20 mg) by mouth daily 90 tablet 0     sertraline (ZOLOFT) 50 MG tablet Take 1 tablet (50 mg) by mouth daily 90 tablet 1     hydrochlorothiazide (HYDRODIURIL) 25 MG tablet Take 1 tablet (25 mg) by mouth daily 90 tablet 1     simvastatin (ZOCOR) 20 MG tablet ONE TABLET DAILY IN THE EVENING 90 tablet 3     order for DME Equipment being ordered: quadcane 1  Device 0     traZODone (DESYREL) 50 MG tablet Take 1 tablet (50 mg) by mouth nightly as needed for sleep 90 tablet 1     Coenzyme Q10 (COQ-10 PO) Reported on 5/10/2017       ORDER FOR [facility] Respironics REMSTAR 60 Series Auto CPAP 5-18cm H2O, Nuance pillow nasal mask large       folic acid 800 MCG TABS Take 1 tablet by mouth daily Reported on 5/10/2017       POTASSIUM PO Take 595 mcg by mouth daily       sulfaSALAzine (AZULFIDINE) 500 MG tablet Take 500 mg by mouth 4 times daily.       aspirin 325 MG tablet Take 325 mg by mouth daily.       MULTI-VITAMIN PO TABS 1 TABLET DAILY       Labs reviewed in EPIC    Reviewed and updated as needed this visit by clinical staff  Tobacco  Allergies  Meds  Med Hx  Surg Hx  Fam Hx  Soc Hx      Reviewed and updated as needed this visit by Provider    ROS:  Constitutional, HEENT, cardiovascular, pulmonary, GI, , musculoskeletal, neuro, skin, endocrine and psych systems are negative, except as otherwise noted.    This document serves as a record of the services and decisions personally performed and made by Nikhil Carpenter MD. It was created on their behalf by Clovis Jara, a trained medical scribe. The creation of this document is based the provider's statements to the medical scribe.  Clovis Jara June 2, 2017 12:22 PM      OBJECTIVE:                                                    /70  Pulse 100  Temp 97.2  F (36.2  C) (Oral)  Wt 78.9 kg (174 lb)  SpO2 98%  BMI 26.46 kg/m2  Body mass index is 26.46 kg/(m^2).  GENERAL: healthy, alert and no distress  EYES: Eyes grossly normal to inspection, PERRL and conjunctivae and sclerae normal  SKIN: no visible suspicious lesions or rashes  NEURO: mentation intact and speech normal  PSYCH: mentation appears normal, affect normal/bright  abdomen : negative for  costo-vertebral angle tenderness to percussion or suprapubic pain / no evidence of bladder distension     Diagnostic Test Results:  No results found for this or any  previous visit (from the past 24 hour(s)).     ASSESSMENT/PLAN:                                                        ICD-10-CM    1. Urinary urgency R39.15 UA with Microscopic reflex to Culture     tolterodine (DETROL LA) 2 MG 24 hr capsule   Comment:  Arnold waited for urinary results that came back with nonspecific findings. No evidence of Cauda equina syndrome. It's not impossible that this is an unusual type of autonomic nervous system manifestation of his cerebellar ataxia although that seems unlikely. There's otherwise no bowel function problems and no saddle anasthesia / pelvic numbness. He may resume chiropractic care as the chiropractor sees appropriate. I feel the symptoms are suggesting an over-active bladder condition although I have no good understanding of why this should start right now, right out of the blue. Considered urological consultation but we agreed to give the Detrol (Tolterodine Tartrate) maybe 2-4 weeks to assess progress. I was slightly surprised that there were some marginal abnormalities with the urine [ trace blood and trace leucocyte esterase ]. I did check with the laboratory for a direct visual on this urine and it's certainly bland with no discernable abnormalities. I want to nevertheless do a culture and follow up as indicated with patient.      Nikhil Carpenter MD  [facility] [place]    The information in this document, created by the medical scribe for me, accurately reflects the services I personally performed and the decisions made by me. I have reviewed and approved this document for accuracy.   Nikhil Carpenter MD     Time in: 12:25 PM  Time out: 12:39 PM      Chart note addendum ; over the weekend the urine analysis came back surprisingly greater then 100,000 COLONIES/ML of gram negative Staphylococcus with no further identification yet. I did call patient and arranged to send in a prescription for doxycycline [ Vibramycin ] 2 times a day for 7 days pending return of  cultures and sensitivities.

## 2017-06-02 NOTE — PATIENT INSTRUCTIONS
[facility]    If you have any questions regarding to your visit please contact your care team:     Team Pink:   Clinic Hours Telephone Number   Internal Medicine:  Dr. Lola Bauer NP       7am-7pm  Monday - Thursday   7am-5pm  Fridays  [phone]  (Appointment scheduling available 24/7)    Questions about your visit?  Team Line  [phone]   Urgent [facility] - [place] [facility] - 11am-9pm Monday-Friday Saturday-Sunday- 9am-5pm   [place] - 5pm-9pm Monday-Friday Saturday-Sunday- 9am-5pm  [phone] - [place]   [phone] - [place]       What options do I have for visits at the clinic other than the traditional office visit?  To expand how we care for you, many of our providers are utilizing electronic visits (e-visits) and telephone visits, when medically appropriate, for interactions with their patients rather than a visit in the clinic.   We also offer nurse visits for many medical concerns. Just like any other service, we will bill your insurance company for this type of visit based on time spent on the phone with your provider. Not all insurance companies cover these visits. Please check with your medical insurance if this type of visit is covered. You will be responsible for any charges that are not paid by your insurance.      E-visits via [employer]brain:  generally incur a $35.00 fee.  Telephone visits:  Time spent on the phone: *charged based on time that is spent on the phone in increments of 10 minutes. Estimated cost:   5-10 mins $30.00   11-20 mins. $59.00   21-30 mins. $85.00   Use Ko[employer]t (secure email communication and access to your chart) to send your primary care provider a message or make an appointment. Ask someone on your Team how to sign up for Ybrain.    For a Price Quote for your services, please call our Consumer Price Line at [phone].    As always, Thank you for trusting us with your health care  needs!    SHELLI

## 2017-06-04 RX ORDER — DOXYCYCLINE 100 MG/1
100 CAPSULE ORAL 2 TIMES DAILY
Qty: 14 CAPSULE | Refills: 0 | Status: SHIPPED | OUTPATIENT
Start: 2017-06-04 | End: 2017-06-11

## 2017-06-05 LAB
BACTERIA SPEC CULT: ABNORMAL
MICRO REPORT STATUS: ABNORMAL
MICROORGANISM SPEC CULT: ABNORMAL
SPECIMEN SOURCE: ABNORMAL

## 2017-06-07 PROBLEM — M54.41 BILATERAL LOW BACK PAIN WITH RIGHT-SIDED SCIATICA: Status: RESOLVED | Noted: 2017-03-21 | Resolved: 2017-06-07

## 2017-06-07 NOTE — PROGRESS NOTES
Subjective:    HPI                    Objective:    System    Physical Exam    General     ROS    Assessment/Plan:      DISCHARGE REPORT    Patient did not return to physical therapy since his appointment on 5/24/2017. See the note below for the most recent subjective and objective findings. Patient will be discharged from physical therapy at this time.        Progress reporting period is from 4/18/2017 to 5/24/2017.       SUBJECTIVE  Subjective changes noted by patient:  Subjective: Pt states today he is feeling the best that he has felt. Sitting is more tolerable and not painful today. Reports over past 4 days doing 5 sets of press ups throughout the day and today denies having any back, R hip or R thigh pain. States he feels a small amount of discomfort in R glut and R lat calf.    Current pain level is  Current Pain level: 1/10.     Previous pain level was  6/10  .     OBJECTIVE  Changes noted in objective findings:    Objective: R glut and R lat calf discomfort resolved after 10 press ups w/ therapist OP.      ASSESSMENT/PLAN  Updated problem list and treatment plan: Diagnosis 1:  Low back pain    STG/LTGs have been met or progress has been made towards goals:    Assessment of Progress: The patient has not returned to therapy. Current status is unknown.  Self Management Plans:  Patient has been instructed in a home treatment program.  Patient  has been instructed in self management of symptoms.    Recommendations:  This patient is to be discharged from therapy and continue their home treatment program.    Please refer to the daily flowsheet for treatment today, total treatment time and time spent performing 1:1 timed codes.

## 2017-06-09 ENCOUNTER — OFFICE VISIT (OUTPATIENT)
Dept: NEUROLOGY | Facility: CLINIC | Age: 67
End: 2017-06-09

## 2017-06-09 VITALS
DIASTOLIC BLOOD PRESSURE: 90 MMHG | HEART RATE: 87 BPM | SYSTOLIC BLOOD PRESSURE: 139 MMHG | WEIGHT: 172.7 LBS | BODY MASS INDEX: 26.18 KG/M2 | HEIGHT: 68 IN

## 2017-06-09 DIAGNOSIS — R82.81 PYURIA: ICD-10-CM

## 2017-06-09 DIAGNOSIS — R39.15 URINARY URGENCY: ICD-10-CM

## 2017-06-09 DIAGNOSIS — R25.1 TREMOR: Primary | ICD-10-CM

## 2017-06-09 DIAGNOSIS — G47.10 EXCESSIVE SLEEPINESS: ICD-10-CM

## 2017-06-09 RX ORDER — DEXTROAMPHETAMINE SACCHARATE, AMPHETAMINE ASPARTATE MONOHYDRATE, DEXTROAMPHETAMINE SULFATE AND AMPHETAMINE SULFATE 7.5; 7.5; 7.5; 7.5 MG/1; MG/1; MG/1; MG/1
30 CAPSULE, EXTENDED RELEASE ORAL DAILY
Qty: 30 CAPSULE | Refills: 0 | Status: SHIPPED | OUTPATIENT
Start: 2017-09-03 | End: 2017-09-15

## 2017-06-09 RX ORDER — DEXTROAMPHETAMINE SACCHARATE, AMPHETAMINE ASPARTATE MONOHYDRATE, DEXTROAMPHETAMINE SULFATE AND AMPHETAMINE SULFATE 7.5; 7.5; 7.5; 7.5 MG/1; MG/1; MG/1; MG/1
30 CAPSULE, EXTENDED RELEASE ORAL DAILY
Qty: 30 CAPSULE | Refills: 0 | Status: SHIPPED | OUTPATIENT
Start: 2017-08-03 | End: 2017-06-09

## 2017-06-09 RX ORDER — PRIMIDONE 50 MG/1
50 TABLET ORAL 3 TIMES DAILY
Qty: 30 TABLET | Refills: 3 | Status: SHIPPED | OUTPATIENT
Start: 2017-06-09 | End: 2017-06-27

## 2017-06-09 RX ORDER — DEXTROAMPHETAMINE SACCHARATE, AMPHETAMINE ASPARTATE MONOHYDRATE, DEXTROAMPHETAMINE SULFATE AND AMPHETAMINE SULFATE 7.5; 7.5; 7.5; 7.5 MG/1; MG/1; MG/1; MG/1
30 CAPSULE, EXTENDED RELEASE ORAL DAILY
Qty: 30 CAPSULE | Refills: 0 | Status: SHIPPED | OUTPATIENT
Start: 2017-07-03 | End: 2017-06-09

## 2017-06-09 ASSESSMENT — PAIN SCALES - GENERAL: PAINLEVEL: NO PAIN (0)

## 2017-06-09 NOTE — NURSING NOTE
Pt is here for an ataxia check up. He is here using his cane and accompanied by his wife. Pt states he does not have headaches. He said he does not have double or blurred vision. He said he does not choke when he eats or drinks. He said his arm strength is the same as a year go, the dexterity in his hands is less. He said he is taking a pill for a bladder infection right now. He wants to see a urologist when he is done taking his antibiotic. Pt states he last fell about a week ago in the bedroom, on average he falls about once per month. He stumbles more and catches himself before he falls. When he falls he can get himself up. He wears a mouth saurabh when he sleeps. He believes he is more rested than he was before he had the sleep study.

## 2017-06-09 NOTE — MR AVS SNAPSHOT
After Visit Summary   6/9/2017    Arnold Lozada    MRN: [id number]           Patient Information     Date Of Birth          1950        Visit Information        Provider Department      6/9/2017 11:30 AM Giorgio Pena MD [facility] Neurology        Today's Diagnoses     Tremor    -  1    Pyuria        Urinary urgency        Excessive sleepiness           Follow-ups after your visit        Your next 10 appointments already scheduled     Jun 13, 2017 12:15 PM CDT   [facility]ractor with Onofre Geiger DC   [facility] [place] [facility] [place]    [address]  [place] [address]   [phone]            Aug 01, 2017 12:30 PM CDT   Return Visit with Shad Portillo [facility] ([facility])    [address] [place] [id number] [phone]              Who to contact     Please call your clinic at [phone] to:    Ask questions about your health    Make or cancel appointments    Discuss your medicines    Learn about your test results    Speak to your doctor   If you have compliments or concerns about an experience at your clinic, or if you wish to file a complaint, please contact [facility] Patient Relations at [phone] or email us at [email or web address]         Additional Information About Your Visit        MyChart Information     [employer] gives you secure access to your electronic health record. If you see a primary care provider, you can also send messages to your care team and make appointments. If you have questions, please call your primary care clinic.  If you do not have a primary care provider, please call [phone] and they will assist you.      Pop.it is an electronic gateway that provides easy, online access to your medical records. With [employer].it, you can request a clinic appointment, read your test results, renew a  "prescription or communicate with your care team.     To access your existing account, please contact your [facility] or call [phone] for assistance.        Care EveryWhere ID     This is your Care EveryWhere ID. This could be used by other organizations to access your [place] medical records  [id number]        Your Vitals Were     Pulse Height                87 1.727 m (5' 8\")           Blood Pressure from Last 3 Encounters:   06/09/17 139/90   06/02/17 130/70   05/15/17 126/72    Weight from Last 3 Encounters:   06/02/17 78.9 kg (174 lb)   05/15/17 78.5 kg (173 lb)   03/15/17 75.8 kg (167 lb)              Today, you had the following     No orders found for display         Today's Medication Changes          These changes are accurate as of: 6/9/17 12:30 PM.  If you have any questions, ask your nurse or doctor.               Start taking these medicines.        Dose/Directions    amphetamine-dextroamphetamine 30 MG per 24 hr capsule   Commonly known as:  ADDERALL XR   Used for:  Excessive sleepiness   Started by:  Giorgio Pena MD        Dose:  30 mg   Start taking on:  9/3/2017   Take 1 capsule (30 mg) by mouth daily   Quantity:  30 capsule   Refills:  0       primidone 50 MG tablet   Commonly known as:  MYSOLINE   Used for:  Tremor   Started by:  Giorgio Pena MD        Dose:  50 mg   Take 1 tablet (50 mg) by mouth 3 times daily   Quantity:  30 tablet   Refills:  3         Stop taking these medicines if you haven't already. Please contact your care team if you have questions.     CYCLOBENZAPRINE HCL PO   Stopped by:  Giorgio Pena MD                Where to get your medicines      These medications were sent to [id number] - [place] - [address]  [address] [address]     Phone:  [phone]     primidone 50 MG tablet         Some of these will need a paper prescription and others can " be bought over the counter.  Ask your nurse if you have questions.     Bring a paper prescription for each of these medications     amphetamine-dextroamphetamine 30 MG per 24 hr capsule                Primary Care Provider Office Phone # Fax #    Nikhil Carpenter [phone]       [facility] [address]  [place] [address]        Thank you!     Thank you for choosing [facility]UROLOG[facility]  for your care. Our goal is always to provide you with excellent care. Hearing back from our patients is one way we can continue to improve our services. Please take a few minutes to complete the written survey that you may receive in the mail after your visit with us. Thank you!             Your Updated Medication List - Protect others around you: Learn how to safely use, store and throw away your medicines at www.disposemymeds.org.          This list is accurate as of: 6/9/17 12:30 PM.  Always use your most recent med list.                   Brand Name Dispense Instructions for use    amphetamine-dextroamphetamine 30 MG per 24 hr capsule   Start taking on:  9/3/2017    ADDERALL XR    30 capsule    Take 1 capsule (30 mg) by mouth daily       aspirin 325 MG tablet      Take 325 mg by mouth daily.       COQ-10 PO      Reported on 5/10/2017       doxycycline Monohydrate 100 MG Caps     14 capsule    Take 1 capsule (100 mg) by mouth 2 times daily for 7 days       folic acid 800 MCG Tabs      Take 1 tablet by mouth daily Reported on 5/10/2017       hydrochlorothiazide 25 MG tablet    HYDRODIURIL    90 tablet    Take 1 tablet (25 mg) by mouth daily       lisinopril 20 MG tablet    PRINIVIL/ZESTRIL    90 tablet    Take 1 tablet (20 mg) by mouth daily       metoprolol 50 MG 24 hr tablet    TOPROL-XL    90 tablet    Take 1 tablet (50 mg) by mouth daily       Multi-vitamin Tabs tablet   Generic drug:  multivitamin, therapeutic with minerals      1 TABLET DAILY       order for [facility]      Respironics REMSTAR 60  Series Auto CPAP 5-18cm H2O, Nuance pillow nasal mask large       * order for DME     1 Device    Equipment being ordered: quadcane       * order for DME     1 Device    Equipment being ordered: shower bench [ long ]       POTASSIUM PO      Take 595 mcg by mouth daily       primidone 50 MG tablet    MYSOLINE    30 tablet    Take 1 tablet (50 mg) by mouth 3 times daily       sertraline 50 MG tablet    ZOLOFT    90 tablet    Take 1 tablet (50 mg) by mouth daily       simvastatin 20 MG tablet    ZOCOR    90 tablet    ONE TABLET DAILY IN THE EVENING       sulfaSALAzine 500 MG tablet    AZULFIDINE     Take 500 mg by mouth 4 times daily.       tolterodine 2 MG 24 hr capsule    DETROL LA    30 capsule    Take 1 capsule (2 mg) by mouth daily       traZODone 50 MG tablet    DESYREL    90 tablet    Take 1 tablet (50 mg) by mouth nightly as needed for sleep       * Notice:  This list has 2 medication(s) that are the same as other medications prescribed for you. Read the directions carefully, and ask your doctor or other care provider to review them with you.

## 2017-06-09 NOTE — LETTER
2017       RE: Arnold Kong  [address] [address]     Dear Colleague,    Thank you for referring your patient, Arnold Kong, to the [facility] NEUROLOGY at [facility]. Please see a copy of my visit note below.    INTERVAL HISTORY:   Mr. Kong is a 66-year-old gentleman with progressive ataxia and palatal tremor.  He used to have tremor in the abdominal muscles which for some reason disappeared.  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  We have tried in the past caprylic acid; however, that did not help the tremor and he stopped the medication.  He does not recall whether he had side effects from it or not.  He specifically reports that his condition has deteriorated over the last year.  I did advise him to take primidone as will be mentioned later.      PHYSICAL EXAMINATION:  He could stand with the feet together for more than 10 seconds; however, could not  tandem.  No nystagmus.  Continues to have a rather fast palatal tremor, perhaps 3-4 Hz or so.        I will continue primidone if no side effects and I did tell him that the theory is that the primidone will prevent the activity of the inferior olive and thus cerebellar excitotoxicity.  I will see him back in the clinic as planned.  I did renew his Ritalin.        He did have a sleep study which showed very mild sleep apnea, now treated with a mouth guard.  He was seen by Dr. Ortiz who wondered whether 30 mg of Ritalin LA is too much; however, with this dose he is doing quite well and will continue at the same dose.      MD AILEEN Sun MD             D: 2017 12:38   T: 2017 19:05   MT: MAX      Name:     ARNOLD KONG   MRN:      [id number]        Account:      [id number]   :      1950           Service Date: 2017      Document: [id number]        Again, thank you for allowing me to participate  in the care of your patient.      Sincerely,    Giorgio Pena MD

## 2017-06-10 NOTE — PROGRESS NOTES
INTERVAL HISTORY:   Mr. Kong is a 66-year-old gentleman with progressive ataxia and palatal tremor.  He used to have tremor in the abdominal muscles which for some reason disappeared.  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  We have tried in the past caprylic acid; however, that did not help the tremor and he stopped the medication.  He does not recall whether he had side effects from it or not.  He specifically reports that his condition has deteriorated over the last year.  I did advise him to take primidone as will be mentioned later.      PHYSICAL EXAMINATION:  He could stand with the feet together for more than 10 seconds; however, could not  tandem.  No nystagmus.  Continues to have a rather fast palatal tremor, perhaps 3-4 Hz or so.        I will continue primidone if no side effects and I did tell him that the theory is that the primidone will prevent the activity of the inferior olive and thus cerebellar excitotoxicity.  I will see him back in the clinic as planned.  I did renew his Ritalin.        He did have a sleep study which showed very mild sleep apnea, now treated with a mouth guard.  He was seen by Dr. Ortiz who wondered whether 30 mg of Ritalin LA is too much; however, with this dose he is doing quite well and will continue at the same dose.      MD AILEEN Sun MD             D: 2017 12:38   T: 2017 19:05   MT: MAX      Name:     EARL KONG   MRN:      [id number]        Account:      [id number]   :      1950           Service Date: 2017      Document: [id number]

## 2017-06-14 ENCOUNTER — OFFICE VISIT (OUTPATIENT)
Dept: AUDIOLOGY | Facility: CLINIC | Age: 67
End: 2017-06-14

## 2017-06-14 DIAGNOSIS — H90.3 SENSORINEURAL HEARING LOSS, BILATERAL: Primary | ICD-10-CM

## 2017-06-14 PROCEDURE — V5264 EAR MOLD/INSERT: HCPCS | Performed by: AUDIOLOGIST

## 2017-06-14 NOTE — MR AVS SNAPSHOT
After Visit Summary   6/14/2017    Arnold Lozada    MRN: [id number]           Patient Information     Date Of Birth          1950        Visit Information        Provider Department      6/14/2017 9:30 AM Josué Davis AuD [facility]        Today's Diagnoses     Sensorineural hearing loss, bilateral    -  1       Follow-ups after your visit        Your next 10 appointments already scheduled     Jun 26, 2017 10:45 AM CDT   LAB with [facility]   [facility] ([facility])    [address]  [place] [address]   [phone]           Patient must bring picture ID.  Patient should be prepared to give a urine specimen  Please do not eat 10-12 hours before your appointment if you are coming in fasting for labs on lipids, cholesterol, or glucose (sugar).  Pregnant women should follow their Care Team instructions. Water with medications is okay. Do not drink coffee or other fluids.   If you have concerns about taking  your medications, please ask at office or if scheduling via [employer], send a message by clicking on Secure Messaging, Message Your Care Team.            Jun 27, 2017 10:45 AM CDT   New Visit with Tommie Winters MD   [facility] ([facility]    [address]  [place] [address] [phone]            Aug 01, 2017 12:30 PM CDT   Return Visit with Shad Portillo [facility] ([facility])    [address] [place] [address] [phone]              Who to contact     If you have questions or need follow up information about today's clinic visit or your schedule please contact [facility] directly at [phone].  Normal or non-critical lab and imaging results will be communicated to you by MyChart, letter or phone within 4 business days after the clinic has received the results. If you do not hear from us  within 7 days, please contact the clinic through Metail or phone. If you have a critical or abnormal lab result, we will notify you by phone as soon as possible.  Submit refill requests through Metail or call your pharmacy and they will forward the refill request to us. Please allow 3 business days for your refill to be completed.          Additional Information About Your Visit        [employer] Information     Metail gives you secure access to your electronic health record. If you see a primary care provider, you can also send messages to your care team and make appointments. If you have questions, please call your primary care clinic.  If you do not have a primary care provider, please call [phone] and they will assist you.        Care EveryWhere ID     This is your Care EveryWhere ID. This could be used by other organizations to access your [place] medical records  [id number]         Blood Pressure from Last 3 Encounters:   06/09/17 139/90   06/02/17 130/70   05/15/17 126/72    Weight from Last 3 Encounters:   06/09/17 172 lb 11.2 oz (78.3 kg)   06/02/17 174 lb (78.9 kg)   05/15/17 173 lb (78.5 kg)              We Performed the Following     EAR MOLD/INSERT, NONDISPOSABLE, ANY TYPE        Primary Care Provider Office Phone # Fax #    Nikhil Carpenter [phone]       [address] [address]        Thank you!     Thank you for choosing [facility]  for your care. Our goal is always to provide you with excellent care. Hearing back from our patients is one way we can continue to improve our services. Please take a few minutes to complete the written survey that you may receive in the mail after your visit with us. Thank you!             Your Updated Medication List - Protect others around you: Learn how to safely use, store and throw away your medicines at www.disposemymeds.org.          This list is accurate as of: 6/14/17 12:01 PM.   Always use your most recent med list.                   Brand Name Dispense Instructions for use    amphetamine-dextroamphetamine 30 MG per 24 hr capsule   Start taking on:  9/3/2017    ADDERALL XR    30 capsule    Take 1 capsule (30 mg) by mouth daily       aspirin 325 MG tablet      Take 325 mg by mouth daily.       COQ-10 PO      Reported on 5/10/2017       folic acid 800 MCG Tabs      Take 1 tablet by mouth daily Reported on 5/10/2017       hydrochlorothiazide 25 MG tablet    HYDRODIURIL    90 tablet    Take 1 tablet (25 mg) by mouth daily       lisinopril 20 MG tablet    PRINIVIL/ZESTRIL    90 tablet    Take 1 tablet (20 mg) by mouth daily       metoprolol 50 MG 24 hr tablet    TOPROL-XL    90 tablet    Take 1 tablet (50 mg) by mouth daily       Multi-vitamin Tabs tablet   Generic drug:  multivitamin, therapeutic with minerals      1 TABLET DAILY       order for DME      Respironics REMSTAR 60 Series Auto CPAP 5-18cm H2O, Nuance pillow nasal mask large       * order for DME     1 Device    Equipment being ordered: quadcane       * order for DME     1 Device    Equipment being ordered: shower bench [ long ]       POTASSIUM PO      Take 595 mcg by mouth daily       primidone 50 MG tablet    MYSOLINE    30 tablet    Take 1 tablet (50 mg) by mouth 3 times daily       sertraline 50 MG tablet    ZOLOFT    90 tablet    Take 1 tablet (50 mg) by mouth daily       simvastatin 20 MG tablet    ZOCOR    90 tablet    ONE TABLET DAILY IN THE EVENING       sulfaSALAzine 500 MG tablet    AZULFIDINE     Take 500 mg by mouth 4 times daily.       tolterodine 2 MG 24 hr capsule    DETROL LA    30 capsule    Take 1 capsule (2 mg) by mouth daily       traZODone 50 MG tablet    DESYREL    90 tablet    Take 1 tablet (50 mg) by mouth nightly as needed for sleep       * Notice:  This list has 2 medication(s) that are the same as other medications prescribed for you. Read the directions carefully, and ask your doctor or other care  provider to review them with you.

## 2017-06-14 NOTE — PROGRESS NOTES
HEARING AID RECHECK    Patient Name:  Arnold Lozada    Patient Age:   66 year old    :  1950    Background:   Patient is here with his wife to be fit with new custom micromolds for his Phonak Audeo RITE hearing aid.     Procedures:   The custom micromolds were fit to the patient's hearing aids and the hearing aids were reprogrammed to the new acoustic parameters. Feedback management was re-ran. Patient worked on insertion and removal of the new earmolds, which he performed with little difficulty.     Plan:   Patient will evaluate the new earmolds and report back of there is any discomfort or pain. There is a 90 day remake warranty on the new earmolds.      CHARGES:    x2 Earmolds ($80). Total: $160 bill to patient.     Josué Stout CCC-A  Licensed Audiologist  2017

## 2017-06-27 ENCOUNTER — OFFICE VISIT (OUTPATIENT)
Dept: UROLOGY | Facility: CLINIC | Age: 67
End: 2017-06-27
Payer: MEDICARE

## 2017-06-27 ENCOUNTER — TELEPHONE (OUTPATIENT)
Dept: AUDIOLOGY | Facility: CLINIC | Age: 67
End: 2017-06-27

## 2017-06-27 VITALS — RESPIRATION RATE: 16 BRPM | HEART RATE: 80 BPM | DIASTOLIC BLOOD PRESSURE: 78 MMHG | SYSTOLIC BLOOD PRESSURE: 126 MMHG

## 2017-06-27 DIAGNOSIS — R35.0 BENIGN PROSTATIC HYPERPLASIA WITH URINARY FREQUENCY: Primary | ICD-10-CM

## 2017-06-27 DIAGNOSIS — N40.1 BENIGN PROSTATIC HYPERPLASIA WITH URINARY FREQUENCY: Primary | ICD-10-CM

## 2017-06-27 DIAGNOSIS — Z87.440 PERSONAL HISTORY OF URINARY TRACT INFECTION: ICD-10-CM

## 2017-06-27 DIAGNOSIS — N39.0 UTI (URINARY TRACT INFECTION): ICD-10-CM

## 2017-06-27 LAB
ALBUMIN UR-MCNC: NEGATIVE MG/DL
APPEARANCE UR: CLEAR
BILIRUB UR QL STRIP: NEGATIVE
COLOR UR AUTO: YELLOW
GLUCOSE UR STRIP-MCNC: NEGATIVE MG/DL
HGB UR QL STRIP: NEGATIVE
KETONES UR STRIP-MCNC: NEGATIVE MG/DL
LEUKOCYTE ESTERASE UR QL STRIP: NEGATIVE
NITRATE UR QL: NEGATIVE
PH UR STRIP: 6 PH (ref 5–7)
RBC #/AREA URNS AUTO: NORMAL /HPF (ref 0–2)
SP GR UR STRIP: 1.02 (ref 1–1.03)
URN SPEC COLLECT METH UR: NORMAL
UROBILINOGEN UR STRIP-ACNC: 0.2 EU/DL (ref 0.2–1)
WBC #/AREA URNS AUTO: NORMAL /HPF (ref 0–2)

## 2017-06-27 PROCEDURE — 81001 URINALYSIS AUTO W/SCOPE: CPT | Performed by: INTERNAL MEDICINE

## 2017-06-27 PROCEDURE — 99204 OFFICE O/P NEW MOD 45 MIN: CPT | Mod: 25 | Performed by: UROLOGY

## 2017-06-27 PROCEDURE — 51798 US URINE CAPACITY MEASURE: CPT | Performed by: UROLOGY

## 2017-06-27 RX ORDER — TAMSULOSIN HYDROCHLORIDE 0.4 MG/1
0.4 CAPSULE ORAL AT BEDTIME
Qty: 30 CAPSULE | Refills: 1 | Status: SHIPPED | OUTPATIENT
Start: 2017-06-27 | End: 2017-08-11

## 2017-06-27 NOTE — MR AVS SNAPSHOT
After Visit Summary   6/27/2017    Arnold Lozada    MRN: [id number]           Patient Information     Date Of Birth          1950        Visit Information        Provider Department      6/27/2017 10:45 AM Tommie Winters MD [facility]        Today's Diagnoses     Benign prostatic hyperplasia with urinary frequency    -  1    Personal history of urinary tract infection           Follow-ups after your visit        Your next 10 appointments already scheduled     Aug 01, 2017 12:30 PM CDT   Return Visit with Shad Portillo [facility] ([facility])    [address] [place] [address] [phone]              Who to contact     If you have questions or need follow up information about today's clinic visit or your schedule please contact [facility] directly at [phone].  Normal or non-critical lab and imaging results will be communicated to you by MyChart, letter or phone within 4 business days after the clinic has received the results. If you do not hear from us within 7 days, please contact the clinic through Av[employer] or phone. If you have a critical or abnormal lab result, we will notify you by phone as soon as possible.  Submit refill requests through Skylines or call your pharmacy and they will forward the refill request to us. Please allow 3 business days for your refill to be completed.          Additional Information About Your Visit        MyChart Information     Skylines gives you secure access to your electronic health record. If you see a primary care provider, you can also send messages to your care team and make appointments. If you have questions, please call your primary care clinic.  If you do not have a primary care provider, please call [phone] and they will assist you.        Care EveryWhere ID     This is your Care EveryWhere ID. This could be used by other  organizations to access your [place] medical records  [id number]        Your Vitals Were     Pulse Respirations                80 16           Blood Pressure from Last 3 Encounters:   06/27/17 126/78   06/09/17 139/90   06/02/17 130/70    Weight from Last 3 Encounters:   06/09/17 78.3 kg (172 lb 11.2 oz)   06/02/17 78.9 kg (174 lb)   05/15/17 78.5 kg (173 lb)              We Performed the Following     MEASURE POST-VOID RESIDUAL URINE/BLADDER CAPACITY, US NON-IMAGING          Today's Medication Changes          These changes are accurate as of: 6/27/17 10:51 AM.  If you have any questions, ask your nurse or doctor.               Start taking these medicines.        Dose/Directions    tamsulosin 0.4 MG capsule   Commonly known as:  FLOMAX   Used for:  Benign prostatic hyperplasia with urinary frequency   Started by:  Tommie Winters MD        Dose:  0.4 mg   Take 1 capsule (0.4 mg) by mouth At Bedtime   Quantity:  30 capsule   Refills:  1         Stop taking these medicines if you haven't already. Please contact your care team if you have questions.     COQ-10 PO   Stopped by:  Tommie Winters MD           primidone 50 MG tablet   Commonly known as:  MYSOLINE   Stopped by:  Tommie Winters MD                Where to get your medicines      These medications were sent to [facility] [address] [place], [place] [address] - [address] [place] [address], [address]  [address] [facility] [address]     Phone:  [phone]     tamsulosin 0.4 MG capsule                Primary Care Provider Office Phone # Fax #    Nikhil Carpenter [phone]       [facility] [address] [address]        Equal Access to Services     [facility] AH: Hadii aad ku hadasho Soaxelali, waaxda luqadaha, qaybta kaalmada adeegyada, bernice menan puneet monsalve. So [facility] [phone].    ATENCIÓN: Si habla español, tiene a gaytan disposición servicios gratuitos de asistencia lingüística. Llame al  [phone].    We comply with applicable federal civil rights laws and [place] laws. We do not discriminate on the basis of race, color, national origin, age, disability sex, sexual orientation or gender identity.            Thank you!     Thank you for choosing [facility]  for your care. Our goal is always to provide you with excellent care. Hearing back from our patients is one way we can continue to improve our services. Please take a few minutes to complete the written survey that you may receive in the mail after your visit with us. Thank you!             Your Updated Medication List - Protect others around you: Learn how to safely use, store and throw away your medicines at www.disposemymeds.org.          This list is accurate as of: 6/27/17 10:51 AM.  Always use your most recent med list.                   Brand Name Dispense Instructions for use Diagnosis    amphetamine-dextroamphetamine 30 MG per 24 hr capsule   Start taking on:  9/3/2017    ADDERALL XR    30 capsule    Take 1 capsule (30 mg) by mouth daily    Excessive sleepiness       aspirin 325 MG tablet      Take 325 mg by mouth daily.        folic acid 800 MCG Tabs      Take 1 tablet by mouth daily Reported on 5/10/2017        hydrochlorothiazide 25 MG tablet    HYDRODIURIL    90 tablet    Take 1 tablet (25 mg) by mouth daily    Essential hypertension with goal blood pressure less than 140/90       lisinopril 20 MG tablet    PRINIVIL/ZESTRIL    90 tablet    Take 1 tablet (20 mg) by mouth daily    Essential hypertension with goal blood pressure less than 140/90       metoprolol 50 MG 24 hr tablet    TOPROL-XL    90 tablet    Take 1 tablet (50 mg) by mouth daily    Essential hypertension with goal blood pressure less than 140/90       Multi-vitamin Tabs tablet   Generic drug:  multivitamin, therapeutic with minerals      1 TABLET DAILY        order for [facility]      Respironics REMSTAR 60 Series Auto CPAP 5-18cm H2O, Nuance pillow nasal  mask large        * order for DME     1 Device    Equipment being ordered: quadcane    Cerebellar ataxia (H)       * order for DME     1 Device    Equipment being ordered: shower bench [ long ]    Cerebral ataxia (H)       POTASSIUM PO      Take 595 mcg by mouth daily        sertraline 50 MG tablet    ZOLOFT    90 tablet    Take 1 tablet (50 mg) by mouth daily    Adjustment disorder with mixed anxiety and depressed mood       simvastatin 20 MG tablet    ZOCOR    90 tablet    ONE TABLET DAILY IN THE EVENING    Hyperlipidemia LDL goal <130       sulfaSALAzine 500 MG tablet    AZULFIDINE     Take 500 mg by mouth 4 times daily.    Crohn's disease of colon (H)       tamsulosin 0.4 MG capsule    FLOMAX    30 capsule    Take 1 capsule (0.4 mg) by mouth At Bedtime    Benign prostatic hyperplasia with urinary frequency       tolterodine 2 MG 24 hr capsule    DETROL LA    30 capsule    Take 1 capsule (2 mg) by mouth daily    Urinary urgency       traZODone 50 MG tablet    DESYREL    90 tablet    Take 1 tablet (50 mg) by mouth nightly as needed for sleep    Adjustment insomnia       * Notice:  This list has 2 medication(s) that are the same as other medications prescribed for you. Read the directions carefully, and ask your doctor or other care provider to review them with you.

## 2017-06-27 NOTE — TELEPHONE ENCOUNTER
Patient walks in accompanied with his wife and requests to have his left ear hearing aid looked at as he has pulled the  out of the new earmold. I have reinserted the  and returned the hearing aid to the patient and worked on proper removal of the hearing aid and earmold by not pulling on the . Patient will make a greater effort to not remove the hearing aid by pulling on the  wire.     -Josué Stout CCC-A

## 2017-06-27 NOTE — NURSING NOTE
"Chief Complaint   Patient presents with     Consult     uti Dr Carpenter       Initial /78 (BP Location: Right arm, Patient Position: Chair, Cuff Size: Adult Regular)  Pulse 80  Resp 16 Estimated body mass index is 26.26 kg/(m^2) as calculated from the following:    Height as of 6/9/17: 1.727 m (5' 8\").    Weight as of 6/9/17: 78.3 kg (172 lb 11.2 oz).  Medication Reconciliation: complete   Lissette Sifuentes, ЕЛЕНА      "

## 2017-06-30 DIAGNOSIS — I10 ESSENTIAL HYPERTENSION WITH GOAL BLOOD PRESSURE LESS THAN 140/90: ICD-10-CM

## 2017-06-30 RX ORDER — LISINOPRIL 20 MG/1
TABLET ORAL
Qty: 90 TABLET | Refills: 0 | Status: SHIPPED | OUTPATIENT
Start: 2017-06-30 | End: 2017-10-03

## 2017-06-30 NOTE — TELEPHONE ENCOUNTER
Prescription approved per [facility] Refill Protocol.  Sierra Farias, RN - BC

## 2017-06-30 NOTE — TELEPHONE ENCOUNTER
Lisinopril      Last Written Prescription Date: 12/20/16  Last Fill Quantity: 90, # refills: 0  Last Office Visit with [facility], [facility] or [facility] prescribing provider: 6/2/17  Next 5 appointments (look out 90 days)     Aug 01, 2017 12:30 PM CDT   Return Visit with Shad Portillo [facility] ([facility])    [address] [place] [address] [phone]                   Potassium   Date Value Ref Range Status   10/28/2016 4.4 3.4 - 5.3 mmol/L Final     Creatinine   Date Value Ref Range Status   10/28/2016 0.89 0.66 - 1.25 mg/dL Final     BP Readings from Last 3 Encounters:   06/27/17 126/78   06/09/17 139/90   06/02/17 130/70

## 2017-07-11 ENCOUNTER — TELEPHONE (OUTPATIENT)
Dept: FAMILY MEDICINE | Facility: CLINIC | Age: 67
End: 2017-07-11

## 2017-07-11 NOTE — TELEPHONE ENCOUNTER
I am skeptical this represents any serious new or sinister dangerous diagnoses. We could see him for a physical exam / laboratory studies / chest xray etc, but more to the point, we can try recommendations to treat this with leg elevation , increased diuretic therapy with furosemide [ Lasix ] 20 milligrams q am and consider using support stockings, knee high     Nikhil Carpenter MD

## 2017-07-11 NOTE — TELEPHONE ENCOUNTER
Patient called stating he is having bilateral feet swelling again. Patient is looking for any advice on this or wondering if he needs to be seen.    Mayelin Witt, CMA

## 2017-07-11 NOTE — TELEPHONE ENCOUNTER
Left message for patient to return call to the RN hotline number at [phone].  Chary Carpenter RN

## 2017-07-12 NOTE — TELEPHONE ENCOUNTER
Per patient, he has increased ana feet swelling up to the ankles for the past couple of days.  He just came back from a trip and was unable to elevate his leg as much as he should have while on the airplane.  He is taking HCTZ as prescribed  Advised him to elevate his BLE as much as possible and try compression stockings during the day.  Patient verbalized understanding and agreement.  He will call if this does not work and if interested in zia Paz RN

## 2017-07-25 ENCOUNTER — TELEPHONE (OUTPATIENT)
Dept: FAMILY MEDICINE | Facility: CLINIC | Age: 67
End: 2017-07-25

## 2017-07-25 DIAGNOSIS — R60.0 BILATERAL LEG EDEMA: Primary | ICD-10-CM

## 2017-07-25 NOTE — TELEPHONE ENCOUNTER
Per patient, he got compression stockings as recommended. He continues on HCTZ and wanted to try compression stockings before making any further med changes.  The compression stockings he got did not fit and he was unable to put them on.  His wife attempted as well and they did not have any luck.  He would like a prescription for compression stockings.  He will go to a medical supply store and they will help dispense the correct supply    Please sign the prescription with the appropriate amount of compression needed    Team to please fax prescription to [facility] ([facility] Fax [phone]  Then call patient back with updates    See 7/11/17 phone encounter for more info-  Nikhil Carpenter MD       1:22 PM   Note      I am skeptical this represents any serious new or sinister dangerous diagnoses. We could see him for a physical exam / laboratory studies / chest xray etc, but more to the point, we can try recommendations to treat this with leg elevation , increased diuretic therapy with furosemide [ Lasix ] 20 milligrams q am and consider using support stockings, knee high      Nikhil Paz, RN

## 2017-07-25 NOTE — TELEPHONE ENCOUNTER
Message reviewed , orders signed . For now we are not going all the way  To lymphedema garment , just support stockings ? 15-20 millimeter of mercury pressure, see prescription for durable medical equipment or supplies     Nikhil Carpenter MD

## 2017-07-26 NOTE — TELEPHONE ENCOUNTER
DME for compression stockings faxed to [facility] at [phone].  Patient called and informed. Amanda Lakhani,

## 2017-07-31 NOTE — PROGRESS NOTES
[place] [id number]  Provider:  _____ (Not dictated)  Patient:  Arnold Lozada  MRN:  [id number]  :  1950  Work type:  Consultation     The patient is a pleasant 66-year-old  male was requested to be seen by Dr. Nikhil Carpenter for consultation with regard to his urination issue.  The patient has cerebellar ataxia.  He has had some increasing urinary problems over this past year.  He has frequency every 2 hours, nocturia 2-4 times at night.  Rarely urgency incontinence.  His flow is a little weaker.  His AUA symptom score today is 15 with a Quality of Life Symptom Score of 3.  He denies any dysuria or hematuria.  Recent urinalysis was negative, however, urine cultures still showed evidence of staph.  He was placed on antibiotics for 7 days.  Today's urine test is unremarkable.  He is on Detrol right now but has only been on it a short period of time, so has not noticed any changes with regard to his urination issue.      Bladder scan today showed residual urine of 0 cc.      ASSESSMENT:  Pleasant 66-year-old gentleman with cerebellar ataxia, now with increasing urinary problems.      RECOMMENDATIONS:  I had a long discussion with the patient and his wife today.  First of all, I do not think the patient has recent infection.  I think the urine culture is probably a contamination.  His urination issues can be hard to diagnose given history of cerebellar ataxia.  He does have a slightly enlarged prostate.  Again, this could cause all these problems along with his cerebellar issues.  I am going to add on to Detrol, Flomax 0.4 mg p.o. at bedtime.  The patient will come back to see me in one month from now for re-examination.  Side effects of this medication were discussed at length with the patient.     Thank you for this consultation.      Cc:   Nikhil Carpenter MD

## 2017-08-01 ENCOUNTER — OFFICE VISIT (OUTPATIENT)
Dept: BEHAVIORAL HEALTH | Facility: CLINIC | Age: 67
End: 2017-08-01
Payer: MEDICARE

## 2017-08-01 DIAGNOSIS — F43.23 ADJUSTMENT DISORDER WITH MIXED ANXIETY AND DEPRESSED MOOD: Primary | ICD-10-CM

## 2017-08-01 PROCEDURE — 90834 PSYTX W PT 45 MINUTES: CPT | Performed by: SOCIAL WORKER

## 2017-08-01 NOTE — PROGRESS NOTES
"                                           Progress Note    Client Name: Arnold Lozada             Date:   8/1/2017                                           Service Type:Individual                           Session start time:1030                   Session End Time:   1115                            Session Length:        50                Session #:      12                Attendees:     Client     Treatment Plan Last Reviewed: 8/1/2017   PHQ-9 / BRETT-7 :                 DATA                            Progress Since Last Session (Related to Symptoms / Goals / Homework):              Symptoms: Stable    Homework: n/a                            Episode of Care Goals: Satisfactory progress - ACTION (Actively working towards change); Intervened by reinforcing change plan / affirming steps taken                Current / Ongoing Stressors and Concerns:                 Client notes that he has been \"my attitude is good.\"  Does note that it is getting harder to walk and client presents today in a wheelchair which he is getting acccustomed to using.  Some trouble with speech too due to the ataxia.  \"Mentally I feel good.\"  Sees this as a positive.  Long discussion today regarding his oldest daughter \"taking space\" in the relationship with client and his wife.  Discussed physical, mental, and emotional boundaries and limit-setting with others. Explored management of boundaries through direct communication and limiting contact and communication with others.  Discussed barriers to using boundary management skills including strong emotions and physical proximity.  Client given handout on boundaries today in session.                          Treatment Objective(s) Addressed in This Session:          Client will use identified behavioral and cognitive skills to challenge negative self talk 90% of the time.    -reviewed ACEs in session today.                  Intervention:              Cognitive Behavioral " Therapy:   Discussed cognitive restructuring and behavioral activation.  Explored the connection between thoughts, feelings, and actions by using examples relative to client's presenting concerns.  Explained major  domains of symptoms (cognitive, emotional, somatic, behavioral, interpersonal) and importance of targeted and specific interventions to reduce symptoms of anxiety and depression.  Discussed role of  symptom monitoring in cognitive behavioral treatment.                                ASSESSMENT: Current Emotional / Mental Status  of significant symptoms):              Risk status (Self / Other harm or suicidal ideation)              Client denies current fears or concerns for personal safety.              Client denies current or recent suicidal ideation or behaviors.              Client denies current or recent homicidal ideation or behaviors.              Client denies current or recent self injurious behavior or ideation.              Client denies other safety concerns.              A safety and risk management plan has not been developed at this time, however client was given the after-hours number / 911 should there be a change in any of these risk factors.                Appearance:                           Appropriate               Eye Contact:                           Good               Psychomotor Behavior:          Normal               Attitude:                                   Cooperative               Orientation:                             All              Speech                          Rate / Production:       Normal                           Volume:                       Normal               Mood:                                      Normal              Affect:                                      Appropriate               Thought Content:                    Clear               Thought Form:                        Coherent  Goal Directed  Logical                Insight:                                    Good                 Medication Review:              No current psychiatric medications prescribed                Medication Compliance:              NA                Changes in Health Issues:              Yes: feels like he notices a decline each week                Chemical Use Review:              Substance Use: Chemical use reviewed, no active concerns identified                 Tobacco Use: No current tobacco use.                  Collateral Reports Completed:              Communicated with: n/a    PLAN: (Client Tasks / Therapist Tasks / Other)  1.  Meet in August or sooner if needed  2.  CBT at next session                                                               ________________________________________________________________________    Treatment Plan    Client's Name: Arnold Lozada                      YOB: 1950    Date: 8-31-16    DSM5 Diagnoses: (Sustained by DSM5 Criteria Listed Above)  Diagnoses: F43.23 adjustment disorder with anxiety and depression  Psychosocial & Contextual Factors: health problems decreased functioning  WHODAS 2.0 (12 item)27    Referral / Collaboration:  suggested pursing support group and getting connected with organizations.    Anticipated number of session or this episode of care: 8-12    MeasurableTreatment Goal(s) related to diagnosis / functional impairment(s)  Goal-Depression: Client will decrease depressed mood    I will know I've met my goal when I am less depressed.      Objective #A (Client Action)    Status: Continued- Date: 8/1/2017     Client will use identified behavioral and cognitive skills to challenge negative self talk 90% of the time.    Intervention(s)  Therapist will provide psychoeducation, behavioral activation, and cognitive restructuring.      Objective #B  Client will complete at least 10 minutes of self-regulation practice (e.g.: yoga, meditation, relaxation breathing,  etc.) per day.    Status: Continued- Date: 8/1/2017     Intervention(s)  Therapist will provide psychoeducation, behavioral activation, and cognitive restructuring.      Objective #C  Client will exercise 30 minutes 36 times in the next 12 weeks.  Status: Continued- Date: 8/1/2017     Intervention(s)  Therapist will provide psychoeducation, behavioral activation, and cognitive restructuring.                Goal- Anxiety: Client will decrease anxiety    I will know I've met my goal when I am less anxious.      Objective #A (Client Action)    Status: Continued- Date: 8/1/2017     Client will use cognitive strategies identified in therapy to challenge anxious thoughts.    Intervention(s)  Therapist will provide psychoeducation, behavioral activation, and cognitive restructuring.    Objective #B  Client will use at least 3 coping skills for anxiety management in the next 12 weeks.    Status: Continued- Date: 8/1/2017      Intervention(s)  Therapist will provide psychoeducation, behavioral activation, and cognitive restructuring.      Objective #C  Client will identify three distraction and diversion activities and use those activities to decrease level of anxiety.  Status: Continued- Date: 8/1/2017     Intervention(s)  Therapist will provide psychoeducation, behavioral activation, and cognitive restructuring.                Client has reviewed and agreed to the above plan.      REYES Goodson [facility]               12/8/2016

## 2017-08-01 NOTE — MR AVS SNAPSHOT
MRN:[id number]                      After Visit Summary   8/1/2017    Arnold Lozada    MRN: [id number]           Visit Information        Provider Department      8/1/2017 12:30 PM Shad Portillo, RODRIGUEZ [facility] Generic      Your next 10 appointments already scheduled     Sep 05, 2017 12:30 PM CDT   Return Visit with RODRIGUEZ Whiteside   [facility] ([facility])    [address] [place] [id number] [phone]              MyChart Information     [employer] gives you secure access to your electronic health record. If you see a primary care provider, you can also send messages to your care team and make appointments. If you have questions, please call your primary care clinic.  If you do not have a primary care provider, please call [phone] and they will assist you.        Care EveryWhere ID     This is your Care EveryWhere ID. This could be used by other organizations to access your [facility] medical records  [id number]        Equal Access to Services     KASHIF BOUCHER : Hadii aad ku hadasho Soreji, waaxda luqadaha, qaybta kaalmada adeegyaedin, bernice monsalve. So [facility] [phone].    ATENCIÓN: Si habla español, tiene a gaytan disposición servicios gratuitos de asistencia lingüística. [facility] [phone].    We comply with applicable federal civil rights laws and [place] laws. We do not discriminate on the basis of race, color, national origin, age, disability sex, sexual orientation or gender identity.

## 2017-08-11 ENCOUNTER — MYC MEDICAL ADVICE (OUTPATIENT)
Dept: UROLOGY | Facility: CLINIC | Age: 67
End: 2017-08-11

## 2017-08-11 ENCOUNTER — MYC MEDICAL ADVICE (OUTPATIENT)
Dept: INTERNAL MEDICINE | Facility: CLINIC | Age: 67
End: 2017-08-11

## 2017-08-11 DIAGNOSIS — N40.1 BENIGN PROSTATIC HYPERPLASIA WITH URINARY FREQUENCY: ICD-10-CM

## 2017-08-11 DIAGNOSIS — R39.15 URINARY URGENCY: ICD-10-CM

## 2017-08-11 DIAGNOSIS — R35.0 BENIGN PROSTATIC HYPERPLASIA WITH URINARY FREQUENCY: ICD-10-CM

## 2017-08-11 RX ORDER — TOLTERODINE 2 MG/1
2 CAPSULE, EXTENDED RELEASE ORAL DAILY
Status: CANCELLED | OUTPATIENT
Start: 2017-08-11

## 2017-08-11 RX ORDER — TAMSULOSIN HYDROCHLORIDE 0.4 MG/1
0.4 CAPSULE ORAL AT BEDTIME
Qty: 90 CAPSULE | Refills: 1 | Status: SHIPPED | OUTPATIENT
Start: 2017-08-11 | End: 2017-12-14

## 2017-08-23 ENCOUNTER — MYC MEDICAL ADVICE (OUTPATIENT)
Dept: UROLOGY | Facility: CLINIC | Age: 67
End: 2017-08-23

## 2017-08-27 ENCOUNTER — MYC MEDICAL ADVICE (OUTPATIENT)
Dept: INTERNAL MEDICINE | Facility: CLINIC | Age: 67
End: 2017-08-27

## 2017-08-27 DIAGNOSIS — I10 ESSENTIAL HYPERTENSION WITH GOAL BLOOD PRESSURE LESS THAN 140/90: ICD-10-CM

## 2017-08-28 ENCOUNTER — TRANSFERRED RECORDS (OUTPATIENT)
Dept: HEALTH INFORMATION MANAGEMENT | Facility: CLINIC | Age: 67
End: 2017-08-28

## 2017-08-28 RX ORDER — HYDROCHLOROTHIAZIDE 25 MG/1
25 TABLET ORAL DAILY
Qty: 90 TABLET | Refills: 0 | Status: SHIPPED | OUTPATIENT
Start: 2017-08-28 | End: 2017-12-14

## 2017-08-28 NOTE — TELEPHONE ENCOUNTER
Routing refill request to provider for review/approval because:  Patient will be due for labs in October should patient schedule visit with PCP or lab only visit?  Please advise   Megha Doherty RN

## 2017-08-28 NOTE — TELEPHONE ENCOUNTER
hydrochlorothiazide (HYDRODIURIL) 25 MG tablet     Last Written Prescription Date: 12-13-16  Last Fill Quantity: 90, # refills: 1  Last Office Visit with [facility], [facility] or [facility] prescribing provider: 6-2-17  Next 5 appointments (look out 90 days)     Sep 05, 2017 12:30 PM CDT   Return Visit with Shad Portillo [facility] [place] [place]    [address] [place] [id number] [phone]                   Potassium   Date Value Ref Range Status   10/28/2016 4.4 3.4 - 5.3 mmol/L Final     Creatinine   Date Value Ref Range Status   10/28/2016 0.89 0.66 - 1.25 mg/dL Final     BP Readings from Last 3 Encounters:   06/27/17 126/78   06/09/17 139/90   06/02/17 130/70

## 2017-09-05 ENCOUNTER — OFFICE VISIT (OUTPATIENT)
Dept: BEHAVIORAL HEALTH | Facility: CLINIC | Age: 67
End: 2017-09-05
Payer: MEDICARE

## 2017-09-05 DIAGNOSIS — F43.23 ADJUSTMENT DISORDER WITH MIXED ANXIETY AND DEPRESSED MOOD: Primary | ICD-10-CM

## 2017-09-05 PROCEDURE — 90834 PSYTX W PT 45 MINUTES: CPT | Performed by: SOCIAL WORKER

## 2017-09-05 NOTE — MR AVS SNAPSHOT
MRN:[id number]                      After Visit Summary   9/5/2017    Arnold Lozada    MRN: [id number]           Visit Information        Provider Department      9/5/2017 12:30 PM Shad Portillo, RODRIGUEZ [facility] Generic      Your next 10 appointments already scheduled     Dec 05, 2017  2:30 PM CST   Return Visit with RODRIGUEZ Whiteside   [facility] ([facility])    [address] [place] [id number] [phone]              MyChart Information     [employer] gives you secure access to your electronic health record. If you see a primary care provider, you can also send messages to your care team and make appointments. If you have questions, please call your primary care clinic.  If you do not have a primary care provider, please call [phone] and they will assist you.        Care EveryWhere ID     This is your Care EveryWhere ID. This could be used by other organizations to access your [facility] medical records  [id number]        Equal Access to Services     KASHIF BOUCHER : Hadii aad ku hadasho Soreji, waaxda luqadaha, qaybta kaalmada adeegyaedin, bernice monsalve. So [facility] [phone].    ATENCIÓN: Si habla español, tiene a gaytan disposición servicios gratuitos de asistencia lingüística. Llame al [phone].    We comply with applicable federal civil rights laws and [place] laws. We do not discriminate on the basis of race, color, national origin, age, disability sex, sexual orientation or gender identity.

## 2017-09-05 NOTE — PROGRESS NOTES
"                                           Progress Note    Client Name: Arnold Lozada             Date:   9/5/2017                                           Service Type:Individual                           Session start time:1030                   Session End Time:   1115                            Session Length:        50                Session #:      13                Attendees:     Client     Treatment Plan Last Reviewed: 8/1/2017   PHQ-9 / BRETT-7 :                 DATA                            Progress Since Last Session (Related to Symptoms / Goals / Homework):              Symptoms: Stable    Homework: n/a                            Episode of Care Goals: Satisfactory progress - ACTION (Actively working towards change); Intervened by reinforcing change plan / affirming steps taken                Current / Ongoing Stressors and Concerns:                 Client notes that he has been \"hard to walk physically, and is also getting harder to talk, mentally things have been pretty good.\"  Some new challenges, but most of these have been physical, the biggest of these are not walking and having trouble articulating himself.  Stress with daughter Carolee continues.  Client wrote her a letter and brings this to session for us to review.  He also sent her a birthday card last month but has not heard back.  Long discussion today about loss.        Reviewed 5 Stages of Grief (Grisel Gutierrez MD) with client today in session:  1. Denial: This can t be happening to me.   2. Anger:  Why is this happening to me?   3. Bargaining:  Please make this not happen and in return I will do anything.   4. Depression/Mourning:  I am so sad that I cannot change what has happened.   5. Acceptance:  I have made peace both with what has happened as well as my inability to change it.   Reviewed these as phases that may not always follow a linear pattern (i.e. client may feel depressed one day and angry the next).  Identified " strategies for managing grief and loss including journaling, exercise, and investing time and energy into healthy interpersonal relationships.                            Treatment Objective(s) Addressed in This Session:          Client will use identified behavioral and cognitive skills to challenge negative self talk 90% of the time.    -reviewed ACEs in session today.                  Intervention:              Cognitive Behavioral Therapy:   Discussed cognitive restructuring and behavioral activation.  Explored the connection between thoughts, feelings, and actions by using examples relative to client's presenting concerns.  Explained major  domains of symptoms (cognitive, emotional, somatic, behavioral, interpersonal) and importance of targeted and specific interventions to reduce symptoms of anxiety and depression.  Discussed role of  symptom monitoring in cognitive behavioral treatment.                                ASSESSMENT: Current Emotional / Mental Status  of significant symptoms):              Risk status (Self / Other harm or suicidal ideation)              Client denies current fears or concerns for personal safety.              Client denies current or recent suicidal ideation or behaviors.              Client denies current or recent homicidal ideation or behaviors.              Client denies current or recent self injurious behavior or ideation.              Client denies other safety concerns.              A safety and risk management plan has not been developed at this time, however client was given the after-hours number / 911 should there be a change in any of these risk factors.                Appearance:                           Appropriate               Eye Contact:                           Good               Psychomotor Behavior:          Normal               Attitude:                                   Cooperative                Orientation:                             All              Speech                          Rate / Production:       Normal                           Volume:                       Normal               Mood:                                      Normal              Affect:                                      Appropriate               Thought Content:                    Clear               Thought Form:                        Coherent  Goal Directed  Logical               Insight:                                    Good                 Medication Review:              No current psychiatric medications prescribed                Medication Compliance:              NA                Changes in Health Issues:              Yes: feels like he notices a decline each week                Chemical Use Review:              Substance Use: Chemical use reviewed, no active concerns identified                 Tobacco Use: No current tobacco use.                  Collateral Reports Completed:              Communicated with: n/a    PLAN: (Client Tasks / Therapist Tasks / Other)  1.  Meet in December or sooner if needed  2.  CBT at next session                                                               ________________________________________________________________________    Treatment Plan    Client's Name: Arnold Lozada                      YOB: 1950    Date: 8-31-16    DSM5 Diagnoses: (Sustained by DSM5 Criteria Listed Above)  Diagnoses: F43.23 adjustment disorder with anxiety and depression  Psychosocial & Contextual Factors: health problems decreased functioning  WHODAS 2.0 (12 item)27    Referral / Collaboration:  suggested pursing support group and getting connected with organizations.    Anticipated number of session or this episode of care: 8-12    MeasurableTreatment Goal(s) related to diagnosis / functional impairment(s)  Goal-Depression: Client will decrease depressed mood    I will know  I've met my goal when I am less depressed.      Objective #A (Client Action)    Status: Continued- Date: 8/1/2017     Client will use identified behavioral and cognitive skills to challenge negative self talk 90% of the time.    Intervention(s)  Therapist will provide psychoeducation, behavioral activation, and cognitive restructuring.      Objective #B  Client will complete at least 10 minutes of self-regulation practice (e.g.: yoga, meditation, relaxation breathing, etc.) per day.    Status: Continued- Date: 8/1/2017     Intervention(s)  Therapist will provide psychoeducation, behavioral activation, and cognitive restructuring.      Objective #C  Client will exercise 30 minutes 36 times in the next 12 weeks.  Status: Continued- Date: 8/1/2017     Intervention(s)  Therapist will provide psychoeducation, behavioral activation, and cognitive restructuring.                Goal- Anxiety: Client will decrease anxiety    I will know I've met my goal when I am less anxious.      Objective #A (Client Action)    Status: Continued- Date: 8/1/2017     Client will use cognitive strategies identified in therapy to challenge anxious thoughts.    Intervention(s)  Therapist will provide psychoeducation, behavioral activation, and cognitive restructuring.    Objective #B  Client will use at least 3 coping skills for anxiety management in the next 12 weeks.    Status: Continued- Date: 8/1/2017      Intervention(s)  Therapist will provide psychoeducation, behavioral activation, and cognitive restructuring.      Objective #C  Client will identify three distraction and diversion activities and use those activities to decrease level of anxiety.  Status: Continued- Date: 8/1/2017     Intervention(s)  Therapist will provide psychoeducation, behavioral activation, and cognitive restructuring.                Client has reviewed and agreed to the above plan.      REYES Goodson [facility]               12/8/2016

## 2017-09-15 DIAGNOSIS — G47.10 EXCESSIVE SLEEPINESS: ICD-10-CM

## 2017-09-15 RX ORDER — DEXTROAMPHETAMINE SACCHARATE, AMPHETAMINE ASPARTATE MONOHYDRATE, DEXTROAMPHETAMINE SULFATE AND AMPHETAMINE SULFATE 7.5; 7.5; 7.5; 7.5 MG/1; MG/1; MG/1; MG/1
30 CAPSULE, EXTENDED RELEASE ORAL DAILY
Qty: 30 CAPSULE | Refills: 0 | Status: SHIPPED | OUTPATIENT
Start: 2018-01-03 | End: 2018-03-27

## 2017-09-15 RX ORDER — DEXTROAMPHETAMINE SACCHARATE, AMPHETAMINE ASPARTATE MONOHYDRATE, DEXTROAMPHETAMINE SULFATE AND AMPHETAMINE SULFATE 7.5; 7.5; 7.5; 7.5 MG/1; MG/1; MG/1; MG/1
30 CAPSULE, EXTENDED RELEASE ORAL DAILY
Qty: 30 CAPSULE | Refills: 0 | Status: SHIPPED | OUTPATIENT
Start: 2017-10-03 | End: 2017-09-15

## 2017-09-15 RX ORDER — DEXTROAMPHETAMINE SACCHARATE, AMPHETAMINE ASPARTATE MONOHYDRATE, DEXTROAMPHETAMINE SULFATE AND AMPHETAMINE SULFATE 7.5; 7.5; 7.5; 7.5 MG/1; MG/1; MG/1; MG/1
30 CAPSULE, EXTENDED RELEASE ORAL DAILY
Qty: 30 CAPSULE | Refills: 0 | Status: SHIPPED | OUTPATIENT
Start: 2018-11-03 | End: 2017-09-15

## 2017-09-15 RX ORDER — DEXTROAMPHETAMINE SACCHARATE, AMPHETAMINE ASPARTATE MONOHYDRATE, DEXTROAMPHETAMINE SULFATE AND AMPHETAMINE SULFATE 7.5; 7.5; 7.5; 7.5 MG/1; MG/1; MG/1; MG/1
30 CAPSULE, EXTENDED RELEASE ORAL DAILY
Qty: 30 CAPSULE | Refills: 0 | Status: SHIPPED | OUTPATIENT
Start: 2017-11-03 | End: 2017-09-15

## 2017-09-15 RX ORDER — DEXTROAMPHETAMINE SACCHARATE, AMPHETAMINE ASPARTATE MONOHYDRATE, DEXTROAMPHETAMINE SULFATE AND AMPHETAMINE SULFATE 7.5; 7.5; 7.5; 7.5 MG/1; MG/1; MG/1; MG/1
30 CAPSULE, EXTENDED RELEASE ORAL DAILY
Qty: 30 CAPSULE | Refills: 0 | Status: SHIPPED | OUTPATIENT
Start: 2017-12-03 | End: 2017-09-15

## 2017-09-15 RX ORDER — DEXTROAMPHETAMINE SACCHARATE, AMPHETAMINE ASPARTATE MONOHYDRATE, DEXTROAMPHETAMINE SULFATE AND AMPHETAMINE SULFATE 7.5; 7.5; 7.5; 7.5 MG/1; MG/1; MG/1; MG/1
30 CAPSULE, EXTENDED RELEASE ORAL DAILY
Qty: 30 CAPSULE | Refills: 0 | Status: SHIPPED | OUTPATIENT
Start: 2018-12-03 | End: 2017-09-15

## 2017-09-15 NOTE — TELEPHONE ENCOUNTER
This writer wrote prescriptions for Dr. Pena to sign for Adderall, two were written incorrectly for the dates Nov, 3, 2018 and Dec. 3 2018.   They were destroyed, witnessed by Shelley Corbett RN

## 2017-09-28 ENCOUNTER — OFFICE VISIT (OUTPATIENT)
Dept: OTOLARYNGOLOGY | Facility: CLINIC | Age: 67
End: 2017-09-28
Payer: MEDICARE

## 2017-09-28 ENCOUNTER — ALLIED HEALTH/NURSE VISIT (OUTPATIENT)
Dept: NURSING | Facility: CLINIC | Age: 67
End: 2017-09-28
Payer: MEDICARE

## 2017-09-28 VITALS — RESPIRATION RATE: 16 BRPM | HEIGHT: 68 IN | BODY MASS INDEX: 27.49 KG/M2 | WEIGHT: 181.4 LBS

## 2017-09-28 DIAGNOSIS — H90.3 SENSORINEURAL HEARING LOSS (SNHL) OF BOTH EARS: Primary | ICD-10-CM

## 2017-09-28 DIAGNOSIS — Z23 NEED FOR PROPHYLACTIC VACCINATION AND INOCULATION AGAINST INFLUENZA: Primary | ICD-10-CM

## 2017-09-28 PROCEDURE — G0008 ADMIN INFLUENZA VIRUS VAC: HCPCS

## 2017-09-28 PROCEDURE — 99207 ZZC NO CHARGE NURSE ONLY: CPT

## 2017-09-28 PROCEDURE — 90662 IIV NO PRSV INCREASED AG IM: CPT

## 2017-09-28 PROCEDURE — 99212 OFFICE O/P EST SF 10 MIN: CPT | Performed by: OTOLARYNGOLOGY

## 2017-09-28 NOTE — MR AVS SNAPSHOT
After Visit Summary   9/28/2017    Arnold Lozada    MRN: [id number]           Patient Information     Date Of Birth          1950        Visit Information        Provider Department      9/28/2017 9:15 AM Br[facility] Mason MD [facility]        Today's Diagnoses     Sensorineural hearing loss (SNHL) of both ears    -  1      Care Instructions    General Scheduling Information  To schedule your CT/MRI scan, please contact [facility] at [phone]   [address] [place] [address]    To schedule your Surgery, please contact our Specialty Schedulers at [phone]    ENT Clinic Locations Clinic Hours Telephone Number     [facility]  [address]  [place] [address]   Tuesday:       8:00am -- 4:00pm    Wednesday:  8:00am - 4:00pm   To schedule an appointment with   Dr. Mason,   please contact our   Specialty Scheduling Department at:     [phone]       [facility]  [address] [place] [address]   Friday:          8:00am - 4:00pm         Urgent Care Locations Clinic Hours Telephone Numbers     [facility]  [address] [place] [address]     Monday-Friday:     11:00pm - 9:00pm    Saturday-Sunday:  9:00am - 5:00pm   [phone]     [facility]  [address] [place] [address]     Monday-Friday:      5:00pm - 9:00pm     Saturday-Sunday:  9:00am - 5:00pm   [phone]                 Follow-ups after your visit        Your next 10 appointments already scheduled     Dec 05, 2017  2:30 PM CST   Return Visit with Shad Portillo [facility] ([facility])    [address] [place] [address] [phone]              Who to contact     If you have questions or need follow up information about today's clinic visit or your schedule please contact [facility] directly at [phone].  Normal or non-critical lab and  "imaging results will be communicated to you by MyChart, letter or phone within 4 business days after the clinic has received the results. If you do not hear from us within 7 days, please contact the clinic through [employer] or phone. If you have a critical or abnormal lab result, we will notify you by phone as soon as possible.  Submit refill requests through [employer] or call your pharmacy and they will forward the refill request to us. Please allow 3 business days for your refill to be completed.          Additional Information About Your Visit        [employer] Information     [employer] gives you secure access to your electronic health record. If you see a primary care provider, you can also send messages to your care team and make appointments. If you have questions, please call your primary care clinic.  If you do not have a primary care provider, please call [phone] and they will assist you.        Care EveryWhere ID     This is your Care EveryWhere ID. This could be used by other organizations to access your [facility] medical records  [id number]        Your Vitals Were     Respirations Height BMI (Body Mass Index)             16 1.727 m (5' 8\") 27.58 kg/m2          Blood Pressure from Last 3 Encounters:   06/27/17 126/78   06/09/17 139/90   06/02/17 130/70    Weight from Last 3 Encounters:   09/28/17 82.3 kg (181 lb 6.4 oz)   06/09/17 78.3 kg (172 lb 11.2 oz)   06/02/17 78.9 kg (174 lb)              Today, you had the following     No orders found for display       Primary Care Provider Office Phone # Fax #    Nikhil Carpenter [phone]       [address] [facility]  [place] [address]        Equal Access to Services     [facility]: Hadii johanna steinberg hadjacek Soreji, waaxda luqadaha, qaybta kaalmada adegiannada, bernice monsalve. So [facility] [phone].    ATENCIÓN: Si habla español, tiene a gaytan disposición servicios gratuitos de asistencia lingüística. Llame al " [phone].    We comply with applicable federal civil rights laws and [place] laws. We do not discriminate on the basis of race, color, national origin, age, disability sex, sexual orientation or gender identity.            Thank you!     Thank you for choosing [facility]  for your care. Our goal is always to provide you with excellent care. Hearing back from our patients is one way we can continue to improve our services. Please take a few minutes to complete the written survey that you may receive in the mail after your visit with us. Thank you!             Your Updated Medication List - Protect others around you: Learn how to safely use, store and throw away your medicines at www.disposemymeds.org.          This list is accurate as of: 9/28/17 12:35 PM.  Always use your most recent med list.                   Brand Name Dispense Instructions for use Diagnosis    amphetamine-dextroamphetamine 30 MG per 24 hr capsule   Start taking on:  1/3/2018    ADDERALL XR    30 capsule    Take 1 capsule (30 mg) by mouth daily    Excessive sleepiness       aspirin 325 MG tablet      Take 325 mg by mouth daily.        folic acid 800 MCG Tabs      Take 1 tablet by mouth daily Reported on 5/10/2017        hydrochlorothiazide 25 MG tablet    HYDRODIURIL    90 tablet    Take 1 tablet (25 mg) by mouth daily    Essential hypertension with goal blood pressure less than 140/90       lisinopril 20 MG tablet    PRINIVIL/ZESTRIL    90 tablet    TAKE ONE TABLET BY MOUTH ONCE DAILY    Essential hypertension with goal blood pressure less than 140/90       metoprolol 50 MG 24 hr tablet    TOPROL-XL    90 tablet    Take 1 tablet (50 mg) by mouth daily    Essential hypertension with goal blood pressure less than 140/90       Multi-vitamin Tabs tablet   Generic drug:  multivitamin, therapeutic with minerals      1 TABLET DAILY        order for [facility]      Respironics REMSTAR 60 Series Auto CPAP 5-18cm H2O, Nuance pillow nasal mask  large        * order for DME     1 Device    Equipment being ordered: quadcane    Cerebellar ataxia (H)       * order for DME     1 Device    Equipment being ordered: shower bench [ long ]    Cerebral ataxia (H)       * order for DME     1 each    Knee high compression stockings (15-20 mmHg)    Bilateral leg edema       POTASSIUM PO      Take 595 mcg by mouth daily        sertraline 50 MG tablet    ZOLOFT    90 tablet    TAKE ONE TABLET BY MOUTH ONCE DAILY    Adjustment disorder with mixed anxiety and depressed mood       simvastatin 20 MG tablet    ZOCOR    90 tablet    ONE TABLET DAILY IN THE EVENING    Hyperlipidemia LDL goal <130       sulfaSALAzine 500 MG tablet    AZULFIDINE     Take 500 mg by mouth 4 times daily.    Crohn's disease of colon (H)       tamsulosin 0.4 MG capsule    FLOMAX    90 capsule    Take 1 capsule (0.4 mg) by mouth At Bedtime    Benign prostatic hyperplasia with urinary frequency       tolterodine 2 MG 24 hr capsule    DETROL LA    30 capsule    Take 1 capsule (2 mg) by mouth daily    Urinary urgency       traZODone 50 MG tablet    DESYREL    90 tablet    Take 1 tablet (50 mg) by mouth nightly as needed for sleep    Adjustment insomnia       * Notice:  This list has 3 medication(s) that are the same as other medications prescribed for you. Read the directions carefully, and ask your doctor or other care provider to review them with you.

## 2017-09-28 NOTE — PATIENT INSTRUCTIONS
General Scheduling Information  To schedule your CT/MRI scan, please contact [facility] at [phone]   [address] [place] [address]    To schedule your Surgery, please contact our Specialty Schedulers at [phone]    ENT Clinic Locations Clinic Hours Telephone Number     [facility]  [address] [place] [address]   Tuesday:       8:00am -- 4:00pm    Wednesday:  8:00am - 4:00pm   To schedule an appointment with   Dr. Mason,   please contact our   Specialty Scheduling Department at:     [phone]       [facility]  [address] [place] [address]   Friday:          8:00am - 4:00pm         Urgent Care Locations Clinic Hours Telephone Numbers     [facility]  [address] [place] [address]     Monday-Friday:     11:00pm - 9:00pm    Saturday-Sunday:  9:00am - 5:00pm   [phone]     [facility]  [address] [place] [address]     Monday-Friday:      5:00pm - 9:00pm     Saturday-Sunday:  9:00am - 5:00pm   [phone]

## 2017-09-28 NOTE — PROGRESS NOTES
Chief Complaint - ear wax removal    History of Present Illness - Arnodl Lozada is a 66 year old male who presents to me today for ear wax removal in both ears. He has noted right ear plugging or hearing aid not working well. No history of ear surgery or chronic ear disease.     Past Medical History -   Patient Active Problem List   Diagnosis     Hyperlipidemia LDL goal <130     Crohn's disease of colon (H)     Adenomatous colon polyp     Low back strain     GUILLERMINA (obstructive sleep apnea)- Mild/moderate ( AHI 16.2)     Family history of colon cancer     Advanced directives, counseling/discussion     Essential hypertension with goal blood pressure less than 140/90     Adjustment disorder with mixed anxiety and depressed mood     Cerebral ataxia (H)       Current Medications -   Current Outpatient Prescriptions:      [START ON 1/3/2018] amphetamine-dextroamphetamine (ADDERALL XR) 30 MG per 24 hr capsule, Take 1 capsule (30 mg) by mouth daily, Disp: 30 capsule, Rfl: 0     hydrochlorothiazide (HYDRODIURIL) 25 MG tablet, Take 1 tablet (25 mg) by mouth daily, Disp: 90 tablet, Rfl: 0     tamsulosin (FLOMAX) 0.4 MG capsule, Take 1 capsule (0.4 mg) by mouth At Bedtime, Disp: 90 capsule, Rfl: 1     order for DME, Knee high compression stockings (15-20 mmHg), Disp: 1 each, Rfl: 1     sertraline (ZOLOFT) 50 MG tablet, TAKE ONE TABLET BY MOUTH ONCE DAILY, Disp: 90 tablet, Rfl: 1     lisinopril (PRINIVIL/ZESTRIL) 20 MG tablet, TAKE ONE TABLET BY MOUTH ONCE DAILY, Disp: 90 tablet, Rfl: 0     tolterodine (DETROL LA) 2 MG 24 hr capsule, Take 1 capsule (2 mg) by mouth daily, Disp: 30 capsule, Rfl: 1     metoprolol (TOPROL-XL) 50 MG 24 hr tablet, Take 1 tablet (50 mg) by mouth daily, Disp: 90 tablet, Rfl: 1     order for DME, Equipment being ordered: shower bench [ long ], Disp: 1 Device, Rfl: 0     simvastatin (ZOCOR) 20 MG tablet, ONE TABLET DAILY IN THE EVENING, Disp: 90 tablet, Rfl: 3     order for DME, Equipment being  "ordered: quadcane, Disp: 1 Device, Rfl: 0     traZODone (DESYREL) 50 MG tablet, Take 1 tablet (50 mg) by mouth nightly as needed for sleep, Disp: 90 tablet, Rfl: 1     ORDER FOR DME, Respironics REMSTAR 60 Series Auto CPAP 5-18cm H2O, Nuance pillow nasal mask large, Disp: , Rfl:      folic acid 800 MCG TABS, Take 1 tablet by mouth daily Reported on 5/10/2017, Disp: , Rfl:      POTASSIUM PO, Take 595 mcg by mouth daily, Disp: , Rfl:      sulfaSALAzine (AZULFIDINE) 500 MG tablet, Take 500 mg by mouth 4 times daily., Disp: , Rfl:      aspirin 325 MG tablet, Take 325 mg by mouth daily., Disp: , Rfl:      MULTI-VITAMIN PO TABS, 1 TABLET DAILY, Disp: , Rfl:     Allergies - No Known Allergies    Social History -   Social History     Social History     Marital status:      Spouse name: N/A     Number of children: N/A     Years of education: N/A     Social History Main Topics     Smoking status: Never Smoker     Smokeless tobacco: Never Used     Alcohol use Yes      Comment: 3 times per week     Drug use: No     Sexual activity: Yes     Partners: Female     Birth control/ protection: Other     Other Topics Concern     Parent/Sibling W/ Cabg, Mi Or Angioplasty Before 65f 55m? No     Social History Narrative       Family History -   Family History   Problem Relation Age of Onset     CANCER Mother      brain tumor, age 79     Other Cancer Mother      Brain tumor     Depression Mother      Asthma Mother      CANCER Father      colon     Pneumonia Father       age 90 from pneumonia and sepsis     C.A.D. Father      Age 80 have MI and triple bypass     Coronary Artery Disease Father      Heart Attack     Hypertension Father      ???     Hyperlipidemia Father      ???     Colon Cancer Father      Cured     DIABETES Maternal Grandmother        Review of Systems - As per HPI and PMHx, otherwise 7 system review of the head and neck negative.    Physical Exam  Resp 16  Ht 1.727 m (5' 8\")  Wt 82.3 kg (181 lb 6.4 oz)  " BMI 27.58 kg/m2  General - The patient is in no distress.  Alert and oriented to person and place, answers questions and cooperates with examination appropriately.   Voice and Breathing - The patient was breathing comfortably without the use of accessory muscles. There was no wheezing, stridor, or stertor.  The patients voice was clear and strong.  Ears - The auricles are normal in appearance, no erythema. Both ears had minimal cerumen. I removed this with handheld. This isn't likely causing his symptoms.  Eyes - Extraocular movements intact. Sclera were not icteric or injected.  Neck - Palpation of the occipital, submental, submandibular, internal jugular chain, and supraclavicular nodes did not demonstrate any abnormal lymph nodes or masses. Parotid glands were without masses.  The trachea was mobile and midline.  Neurological - Cranial nerves 2 through 12 were grossly intact. House-Brackmann grade 1 out of 6 bilaterally.     Assessment and Plan - Arnold Lozada is a 66 year old male who returns to me today with right hearing aid not working. He didn't bring them as he thought it was wax. No significant cerumen impaction. He should check aids with audiology.     Braden Mason MD  Otolaryngology  [facility]

## 2017-09-28 NOTE — MR AVS SNAPSHOT
After Visit Summary   9/28/2017    Arnold Lozada    MRN: [id number]           Patient Information     Date Of Birth          1950        Visit Information        Provider Department      9/28/2017 10:30 AM [facility]CI[facility]        Today's Diagnoses     Need for prophylactic vaccination and inoculation against influenza    -  1       Follow-ups after your visit        Your next 10 appointments already scheduled     Sep 28, 2017 10:30 AM CDT   Nurse Only with [facility] ANCILLARY   [facility] ([facility]    [address] [facility] [address] [phone]            Dec 05, 2017  2:30 PM CST   Return Visit with Shad Portillo [facility] ([facility])    [address] [place] [address] [phone]              Who to contact     If you have questions or need follow up information about today's clinic visit or your schedule please contact [facility] directly at [phone].  Normal or non-critical lab and imaging results will be communicated to you by MyChart, letter or phone within 4 business days after the clinic has received the results. If you do not hear from us within 7 days, please contact the clinic through [employer] or phone. If you have a critical or abnormal lab result, we will notify you by phone as soon as possible.  Submit refill requests through [employer] or call your pharmacy and they will forward the refill request to us. Please allow 3 business days for your refill to be completed.          Additional Information About Your Visit        MyChart Information     [employer] gives you secure access to your electronic health record. If you see a primary care provider, you can also send messages to your care team and make appointments. If you have questions, please call your primary care clinic.  If you do not have a primary care provider, please call  [phone] and they will assist you.        Care EveryWhere ID     This is your Care EveryWhere ID. This could be used by other organizations to access your [facility] [place] medical records  [id number]         Blood Pressure from Last 3 Encounters:   06/27/17 126/78   06/09/17 139/90   06/02/17 130/70    Weight from Last 3 Encounters:   09/28/17 181 lb 6.4 oz (82.3 kg)   06/09/17 172 lb 11.2 oz (78.3 kg)   06/02/17 174 lb (78.9 kg)              We Performed the Following     ADMIN INFLUENZA (For MEDICARE Patients ONLY) []     FLU VACCINE, INCREASED ANTIGEN, PRESV FREE, AGE 65+ [[address]]        Primary Care Provider Office Phone # Fax #    Nikhil Carpenter [phone] [address] [facility] [address]        Equal Access to Services     [facility] : Hadii aad ku hadasho Soomaali, waaxda luqadaha, qaybta kaalmada adeegyada, waxay nicolein hayjorgen puneet cornejo . So [facility] [phone].    ATENCIÓN: Si habla español, tiene a gaytan disposición servicios gratuitos de asistencia lingüística. Llame al [phone].    We comply with applicable federal civil rights laws and [place] laws. We do not discriminate on the basis of race, color, national origin, age, disability sex, sexual orientation or gender identity.            Thank you!     Thank you for choosing [facility]  for your care. Our goal is always to provide you with excellent care. Hearing back from our patients is one way we can continue to improve our services. Please take a few minutes to complete the written survey that you may receive in the mail after your visit with us. Thank you!             Your Updated Medication List - Protect others around you: Learn how to safely use, store and throw away your medicines at www.disposemymeds.org.          This list is accurate as of: 9/28/17 10:21 AM.  Always use your most recent med list.                   Brand Name Dispense Instructions for use Diagnosis     amphetamine-dextroamphetamine 30 MG per 24 hr capsule   Start taking on:  1/3/2018    ADDERALL XR    30 capsule    Take 1 capsule (30 mg) by mouth daily    Excessive sleepiness       aspirin 325 MG tablet      Take 325 mg by mouth daily.        folic acid 800 MCG Tabs      Take 1 tablet by mouth daily Reported on 5/10/2017        hydrochlorothiazide 25 MG tablet    HYDRODIURIL    90 tablet    Take 1 tablet (25 mg) by mouth daily    Essential hypertension with goal blood pressure less than 140/90       lisinopril 20 MG tablet    PRINIVIL/ZESTRIL    90 tablet    TAKE ONE TABLET BY MOUTH ONCE DAILY    Essential hypertension with goal blood pressure less than 140/90       metoprolol 50 MG 24 hr tablet    TOPROL-XL    90 tablet    Take 1 tablet (50 mg) by mouth daily    Essential hypertension with goal blood pressure less than 140/90       Multi-vitamin Tabs tablet   Generic drug:  multivitamin, therapeutic with minerals      1 TABLET DAILY        order for DME      Respironics REMSTAR 60 Series Auto CPAP 5-18cm H2O, Nuance pillow nasal mask large        * order for DME     1 Device    Equipment being ordered: quadcane    Cerebellar ataxia (H)       * order for DME     1 Device    Equipment being ordered: shower bench [ long ]    Cerebral ataxia (H)       * order for DME     1 each    Knee high compression stockings (15-20 mmHg)    Bilateral leg edema       POTASSIUM PO      Take 595 mcg by mouth daily        sertraline 50 MG tablet    ZOLOFT    90 tablet    TAKE ONE TABLET BY MOUTH ONCE DAILY    Adjustment disorder with mixed anxiety and depressed mood       simvastatin 20 MG tablet    ZOCOR    90 tablet    ONE TABLET DAILY IN THE EVENING    Hyperlipidemia LDL goal <130       sulfaSALAzine 500 MG tablet    AZULFIDINE     Take 500 mg by mouth 4 times daily.    Crohn's disease of colon (H)       tamsulosin 0.4 MG capsule    FLOMAX    90 capsule    Take 1 capsule (0.4 mg) by mouth At Bedtime    Benign prostatic hyperplasia  with urinary frequency       tolterodine 2 MG 24 hr capsule    DETROL LA    30 capsule    Take 1 capsule (2 mg) by mouth daily    Urinary urgency       traZODone 50 MG tablet    DESYREL    90 tablet    Take 1 tablet (50 mg) by mouth nightly as needed for sleep    Adjustment insomnia       * Notice:  This list has 3 medication(s) that are the same as other medications prescribed for you. Read the directions carefully, and ask your doctor or other care provider to review them with you.

## 2017-09-28 NOTE — NURSING NOTE
"Chief Complaint   Patient presents with     Cerumen Impaction     ear cleaning       Initial Resp 16  Ht 1.727 m (5' 8\")  Wt 82.3 kg (181 lb 6.4 oz)  BMI 27.58 kg/m2 Estimated body mass index is 27.58 kg/(m^2) as calculated from the following:    Height as of this encounter: 1.727 m (5' 8\").    Weight as of this encounter: 82.3 kg (181 lb 6.4 oz).  Medication Reconciliation: complete     Robyn Son CMA      "

## 2017-09-28 NOTE — NURSING NOTE
Prior to injection verified patient identity using patient's name and date of birth.  Lissette PEDERSEN CMA ([facility])

## 2017-09-30 ENCOUNTER — MYC MEDICAL ADVICE (OUTPATIENT)
Dept: INTERNAL MEDICINE | Facility: CLINIC | Age: 67
End: 2017-09-30

## 2017-10-03 DIAGNOSIS — I10 ESSENTIAL HYPERTENSION WITH GOAL BLOOD PRESSURE LESS THAN 140/90: ICD-10-CM

## 2017-10-03 RX ORDER — LISINOPRIL 20 MG/1
TABLET ORAL
Qty: 90 TABLET | Refills: 0 | Status: SHIPPED | OUTPATIENT
Start: 2017-10-03 | End: 2017-12-14

## 2017-10-03 NOTE — TELEPHONE ENCOUNTER
Medication is being filled for 1 time refill only due to:  Patient needs labs K, Cr .     Signed Prescriptions:                        Disp   Refills    lisinopril (PRINIVIL/ZESTRIL) 20 MG tablet 90 tab*0        Sig: TAKE ONE TABLET BY MOUTH ONCE DAILY  Authorizing Provider: ROMARIO ROSALES  Ordering User: NARGIS LEE RN

## 2017-10-03 NOTE — TELEPHONE ENCOUNTER
lisinopril (PRINIVIL/ZESTRIL) 20 MG       Last Written Prescription Date: 06/30/17  Last Fill Quantity: 90, # refills: 0  Last Office Visit with [facility], [facility] or [facility] prescribing provider: 06/02/17  Next 5 appointments (look out 90 days)     Dec 05, 2017  2:30 PM CST   Return Visit with Shad Portillo [address] [address] [phone]                   Potassium   Date Value Ref Range Status   10/28/2016 4.4 3.4 - 5.3 mmol/L Final     Creatinine   Date Value Ref Range Status   10/28/2016 0.89 0.66 - 1.25 mg/dL Final     BP Readings from Last 3 Encounters:   06/27/17 126/78   06/09/17 139/90   06/02/17 130/70

## 2017-10-11 ENCOUNTER — TELEPHONE (OUTPATIENT)
Dept: AUDIOLOGY | Facility: CLINIC | Age: 67
End: 2017-10-11

## 2017-10-11 NOTE — TELEPHONE ENCOUNTER
I informed the patient that his Phonak hearing aids were back from repair and ready to be picked up at the 2nd floor .     -Josué Stout CCC-A

## 2017-11-18 NOTE — TELEPHONE ENCOUNTER
I informed the patient that the hearing aid he dropped off has been repaired under warranty with a new replacement  (size 2 xS). I informed the patient that the hearing aid would be at the 2nd floor  for him to .     -Josué Stout CCC-A    
WDL

## 2017-12-03 ENCOUNTER — MYC MEDICAL ADVICE (OUTPATIENT)
Dept: INTERNAL MEDICINE | Facility: CLINIC | Age: 67
End: 2017-12-03

## 2017-12-03 DIAGNOSIS — R39.15 URINARY URGENCY: ICD-10-CM

## 2017-12-04 RX ORDER — TOLTERODINE 2 MG/1
2 CAPSULE, EXTENDED RELEASE ORAL DAILY
Qty: 30 CAPSULE | Refills: 1 | Status: CANCELLED | OUTPATIENT
Start: 2017-12-04

## 2017-12-04 NOTE — TELEPHONE ENCOUNTER
Dr. Carpenter,  Please see Add2paper message below and advise. Medication he is referring to is Detrol. Previous order pended and pharmacy cued.Thanks.    Lissette Vieira RN  [facility]

## 2017-12-05 ENCOUNTER — OFFICE VISIT (OUTPATIENT)
Dept: AUDIOLOGY | Facility: CLINIC | Age: 67
End: 2017-12-05
Payer: MEDICARE

## 2017-12-05 DIAGNOSIS — H90.3 SENSORINEURAL HEARING LOSS, BILATERAL: Primary | ICD-10-CM

## 2017-12-05 PROCEDURE — V5299 HEARING SERVICE: HCPCS | Performed by: AUDIOLOGIST

## 2017-12-05 PROCEDURE — 99207 ZZC NO CHARGE LOS: CPT | Performed by: AUDIOLOGIST

## 2017-12-05 NOTE — PROGRESS NOTES
"HEARING AID RECHECK    Patient Name:  Arnold Lozada    Patient Age:   67 year old    :  1950    Background:   Patient is here, accompanied by his wife, with reports that his right ear hearing aid is \"sounding funny\". The start up jingle is quiet and he is hearing his chewing and similar head noises more than ever before.     Procedures:   Visual inspection finds the vent on the right ear hearing aid completely clogged. Once the hearing aids are cleaned they sound much better, per patient report. The start up tones do seem a bit diminished but the hearing aids are responding within the  specifications. I suggest to the patient to see if he feels a diminished response with them at home and if he does we will send to the  for repair under warranty. Today we spent time discussing why waiting to insert the battery after the tab is removed is beneficial to battery longevity. We also provide the patient with a longer tool for clearing the vent ports to reduce the occurrence of the head noises.     Plan:   Patient will return as needed for hearing aid concerns.     NO CHARGE VISIT    Josué Stout CCC-A  Licensed Audiologist  2017      "

## 2017-12-05 NOTE — MR AVS SNAPSHOT
After Visit Summary   12/5/2017    Arnold Lozada    MRN: [id number]           Patient Information     Date Of Birth          1950        Visit Information        Provider Department      12/5/2017 12:30 PM Josué Davis AuD [facility]        Today's Diagnoses     Sensorineural hearing loss, bilateral    -  1       Follow-ups after your visit        Your next 10 appointments already scheduled     Dec 07, 2017  2:30 PM CST   Return Visit with RODRIGUEZ Whiteside   [facility] ([facility])    [address] [place] [id number] [phone]            Dec 14, 2017 10:50 AM CST   PHYSICAL with Nikhil Carpenter MD   [facility] ([facility]    [address] [facility] [address] [phone]              Who to contact     If you have questions or need follow up information about today's clinic visit or your schedule please contact [facility] directly at [phone].  Normal or non-critical lab and imaging results will be communicated to you by [employer], letter or phone within 4 business days after the clinic has received the results. If you do not hear from us within 7 days, please contact the clinic through [employer] or phone. If you have a critical or abnormal lab result, we will notify you by phone as soon as possible.  Submit refill requests through [employer] or call your pharmacy and they will forward the refill request to us. Please allow 3 business days for your refill to be completed.          Additional Information About Your Visit        MyChart Information     [employer] gives you secure access to your electronic health record. If you see a primary care provider, you can also send messages to your care team and make appointments. If you have questions, please call your primary care clinic.  If you do not have a primary care provider, please call [phone] and  they will assist you.        Care EveryWhere ID     This is your Care EveryWhere ID. This could be used by other organizations to access your [place] medical records  [id number]         Blood Pressure from Last 3 Encounters:   06/27/17 126/78   06/09/17 139/90   06/02/17 130/70    Weight from Last 3 Encounters:   09/28/17 181 lb 6.4 oz (82.3 kg)   06/09/17 172 lb 11.2 oz (78.3 kg)   06/02/17 174 lb (78.9 kg)              We Performed the Following     HEARING AID CHECK/NO CHARGE        Primary Care Provider Office Phone # Fax #    Nikhil Carpenter [phone] [address] [facility] [address]        Equal Access to Services     KASHIF BOUCHER : Hadii aad ku hadasho Soomaali, waaxda luqadaha, qaybta kaalmada adeegyada, waxmarii jules haymiol cornejo . So [facility] [phone].    ATENCIÓN: Si habla español, tiene a gaytan disposición servicios gratuitos de asistencia lingüística. Llame al [phone].    We comply with applicable federal civil rights laws and [place] laws. We do not discriminate on the basis of race, color, national origin, age, disability, sex, sexual orientation, or gender identity.            Thank you!     Thank you for choosing [facility]  for your care. Our goal is always to provide you with excellent care. Hearing back from our patients is one way we can continue to improve our services. Please take a few minutes to complete the written survey that you may receive in the mail after your visit with us. Thank you!             Your Updated Medication List - Protect others around you: Learn how to safely use, store and throw away your medicines at www.disposemymeds.org.          This list is accurate as of: 12/5/17  1:20 PM.  Always use your most recent med list.                   Brand Name Dispense Instructions for use Diagnosis    amphetamine-dextroamphetamine 30 MG per 24 hr capsule   Start taking on:  1/3/2018    ADDERALL XR    30 capsule    Take  1 capsule (30 mg) by mouth daily    Excessive sleepiness       aspirin 325 MG tablet      Take 325 mg by mouth daily.        folic acid 800 MCG Tabs      Take 1 tablet by mouth daily Reported on 5/10/2017        hydrochlorothiazide 25 MG tablet    HYDRODIURIL    90 tablet    Take 1 tablet (25 mg) by mouth daily    Essential hypertension with goal blood pressure less than 140/90       lisinopril 20 MG tablet    PRINIVIL/ZESTRIL    90 tablet    TAKE ONE TABLET BY MOUTH ONCE DAILY    Essential hypertension with goal blood pressure less than 140/90       metoprolol 50 MG 24 hr tablet    TOPROL-XL    90 tablet    TAKE ONE TABLET BY MOUTH ONCE DAILY    Essential hypertension with goal blood pressure less than 140/90       Multi-vitamin Tabs tablet   Generic drug:  multivitamin, therapeutic with minerals      1 TABLET DAILY        order for DME      Respironics REMSTAR 60 Series Auto CPAP 5-18cm H2O, Nuance pillow nasal mask large        * order for DME     1 Device    Equipment being ordered: quadcane    Cerebellar ataxia (H)       * order for DME     1 Device    Equipment being ordered: shower bench [ long ]    Cerebral ataxia (H)       * order for DME     1 each    Knee high compression stockings (15-20 mmHg)    Bilateral leg edema       POTASSIUM PO      Take 595 mcg by mouth daily        sertraline 50 MG tablet    ZOLOFT    90 tablet    TAKE ONE TABLET BY MOUTH ONCE DAILY    Adjustment disorder with mixed anxiety and depressed mood       simvastatin 20 MG tablet    ZOCOR    90 tablet    ONE TABLET DAILY IN THE EVENING    Hyperlipidemia LDL goal <130       sulfaSALAzine 500 MG tablet    AZULFIDINE     Take 500 mg by mouth 4 times daily.    Crohn's disease of colon (H)       tamsulosin 0.4 MG capsule    FLOMAX    90 capsule    Take 1 capsule (0.4 mg) by mouth At Bedtime    Benign prostatic hyperplasia with urinary frequency       tolterodine 2 MG 24 hr capsule    DETROL LA    30 capsule    Take 1 capsule (2 mg) by mouth  daily    Urinary urgency       traZODone 50 MG tablet    DESYREL    90 tablet    Take 1 tablet (50 mg) by mouth nightly as needed for sleep    Adjustment insomnia       * Notice:  This list has 3 medication(s) that are the same as other medications prescribed for you. Read the directions carefully, and ask your doctor or other care provider to review them with you.

## 2017-12-07 ENCOUNTER — OFFICE VISIT (OUTPATIENT)
Dept: BEHAVIORAL HEALTH | Facility: CLINIC | Age: 67
End: 2017-12-07
Payer: MEDICARE

## 2017-12-07 DIAGNOSIS — F43.23 ADJUSTMENT DISORDER WITH MIXED ANXIETY AND DEPRESSED MOOD: Primary | ICD-10-CM

## 2017-12-07 PROCEDURE — 90834 PSYTX W PT 45 MINUTES: CPT | Performed by: SOCIAL WORKER

## 2017-12-07 NOTE — MR AVS SNAPSHOT
MRN:[id number]                      After Visit Summary   12/7/2017    Arnold Lozada    MRN: [id number]           Visit Information        Provider Department      12/7/2017 2:30 PM Shad Portillo, [facility]      Your next 10 appointments already scheduled     Dec 14, 2017 10:50 AM CST   PHYSICAL with Nikhil Carpenter MD   [facility] ([address] [address]   [phone]              MyChart Information     [employer] gives you secure access to your electronic health record. If you see a primary care provider, you can also send messages to your care team and make appointments. If you have questions, please call your primary care clinic.  If you do not have a primary care provider, please call [phone] and they will assist you.        Care EveryWhere ID     This is your Care EveryWhere ID. This could be used by other organizations to access your [place] medical records  [id number]        Equal Access to Services     KASHIF BOUCHER : Hadii aad ku hadasho Soaxelali, waaxda luqadaha, qaybta kaalmada adeegyaedin, bernice cornejo . So [facility] [phone].    ATENCIÓN: Si habla español, tiene a gaytan disposición servicios gratuitos de asistencia lingüística. Llame al [phone].    We comply with applicable federal civil rights laws and [place] laws. We do not discriminate on the basis of race, color, national origin, age, disability, sex, sexual orientation, or gender identity.

## 2017-12-07 NOTE — PROGRESS NOTES
"                                           Progress Note    Client Name: Arnold Lozada             Date:   12/7/2017                                           Service Type:Individual                           Session start time: 2                   Session End Time:   245                            Session Length:        50                Session #:      14                Attendees:     Client     Treatment Plan Last Reviewed: 12/7/2017   PHQ-9 / BRETT-7 :                 DATA                            Progress Since Last Session (Related to Symptoms / Goals / Homework):              Symptoms: Stable    Homework: n/a                            Episode of Care Goals: Satisfactory progress - ACTION (Actively working towards change); Intervened by reinforcing change plan / affirming steps taken                Current / Ongoing Stressors and Concerns:                 Client notes that he has been \"physically hard to walk and to talk, my mind is still sharp it is just hard to get around.\"  Presents with a walker today and seems to be needing this more and more lately.  Uses the walked around the house and this seems to work pretty well, can't walk unaided at this point.  Feels like his mind is still sharp, still working on his book about baseball bats.  Will be going to [place] next month.  Doing OT, PT, and speech therapy.  Also joined [employer] and enjoys meeting with this group on Tuesday evenings.  His mood is better when he gets out and stays busy.  Wrote letter for client in session today for  animal (see attached) per his request.                       Treatment Objective(s) Addressed in This Session:          Client will use identified behavioral and cognitive skills to challenge negative self talk 90% of the time.    -reviewed ACEs in session today.                  Intervention:              Cognitive Behavioral Therapy:   Discussed cognitive restructuring and behavioral activation.  Explored the " connection between thoughts, feelings, and actions by using examples relative to client's presenting concerns.  Explained major  domains of symptoms (cognitive, emotional, somatic, behavioral, interpersonal) and importance of targeted and specific interventions to reduce symptoms of anxiety and depression.  Discussed role of  symptom monitoring in cognitive behavioral treatment.                                ASSESSMENT: Current Emotional / Mental Status  of significant symptoms):              Risk status (Self / Other harm or suicidal ideation)              Client denies current fears or concerns for personal safety.              Client denies current or recent suicidal ideation or behaviors.              Client denies current or recent homicidal ideation or behaviors.              Client denies current or recent self injurious behavior or ideation.              Client denies other safety concerns.              A safety and risk management plan has not been developed at this time, however client was given the after-hours number / 911 should there be a change in any of these risk factors.                Appearance:                           Appropriate               Eye Contact:                           Good               Psychomotor Behavior:          Normal               Attitude:                                   Cooperative               Orientation:                             All              Speech                          Rate / Production:       Normal                           Volume:                       Normal               Mood:                                      Normal              Affect:                                      Appropriate               Thought Content:                    Clear               Thought Form:                        Coherent  Goal Directed  Logical               Insight:                                    Good                 Medication Review:              No current  psychiatric medications prescribed                Medication Compliance:              NA                Changes in Health Issues:              Yes: feels like he notices a decline each week                Chemical Use Review:              Substance Use: Chemical use reviewed, no active concerns identified                 Tobacco Use: No current tobacco use.                  Collateral Reports Completed:              Communicated with: n/a    PLAN: (Client Tasks / Therapist Tasks / Other)  1.  Client will call to make next appointment  2.  CBT at next session                                                               ________________________________________________________________________    Treatment Plan    Client's Name: Arnold Lozada                      YOB: 1950    Date: 8-31-16    DSM5 Diagnoses: (Sustained by DSM5 Criteria Listed Above)  Diagnoses: F43.23 adjustment disorder with anxiety and depression  Psychosocial & Contextual Factors: health problems decreased functioning  WHODAS 2.0 (12 item)27    Referral / Collaboration:  suggested pursing support group and getting connected with organizations.    Anticipated number of session or this episode of care: 8-12    MeasurableTreatment Goal(s) related to diagnosis / functional impairment(s)  Goal-Depression: Client will decrease depressed mood    I will know I've met my goal when I am less depressed.      Objective #A (Client Action)    Status: Continued- Date: 12/7/2017     Client will use identified behavioral and cognitive skills to challenge negative self talk 90% of the time.    Intervention(s)  Therapist will provide psychoeducation, behavioral activation, and cognitive restructuring.      Objective #B  Client will complete at least 10 minutes of self-regulation practice (e.g.: yoga, meditation, relaxation breathing, etc.) per day.    Status: Continued- Date: 12/7/2017     Intervention(s)  Therapist will provide psychoeducation,  behavioral activation, and cognitive restructuring.      Objective #C  Client will exercise 30 minutes 36 times in the next 12 weeks.  Status: Continued- Date: 12/7/2017     Intervention(s)  Therapist will provide psychoeducation, behavioral activation, and cognitive restructuring.                Goal- Anxiety: Client will decrease anxiety    I will know I've met my goal when I am less anxious.      Objective #A (Client Action)    Status: Continued- Date: 12/7/2017     Client will use cognitive strategies identified in therapy to challenge anxious thoughts.    Intervention(s)  Therapist will provide psychoeducation, behavioral activation, and cognitive restructuring.    Objective #B  Client will use at least 3 coping skills for anxiety management in the next 12 weeks.    Status: Continued- Date: 12/7/2017     Intervention(s)  Therapist will provide psychoeducation, behavioral activation, and cognitive restructuring.      Objective #C  Client will identify three distraction and diversion activities and use those activities to decrease level of anxiety.  Status: Continued- Date: 12/7/2017     Intervention(s)  Therapist will provide psychoeducation, behavioral activation, and cognitive restructuring.                Client has reviewed and agreed to the above plan.      REYES Goodson [facility]               12/8/2016

## 2017-12-07 NOTE — LETTER
2017      Re: Arnold ANTHONY Kierra (: 1950)      To whom it may concern,    The purpose of this letter is to confirm that the above-named patient is in my care for the treatment of depression and anxiety, and to document the clinical need for this patient to have access to an emotional support while traveling by air and other modes.    I would need a signed release of information from the patient to discuss his care further     Sincerely,            REYES Palacios, LICSW  [id number]    Outpatient Clinic Therapist  [facility]

## 2017-12-11 ENCOUNTER — MYC MEDICAL ADVICE (OUTPATIENT)
Dept: INTERNAL MEDICINE | Facility: CLINIC | Age: 67
End: 2017-12-11

## 2017-12-13 ENCOUNTER — TRANSFERRED RECORDS (OUTPATIENT)
Dept: HEALTH INFORMATION MANAGEMENT | Facility: CLINIC | Age: 67
End: 2017-12-13

## 2017-12-14 ENCOUNTER — OFFICE VISIT (OUTPATIENT)
Dept: INTERNAL MEDICINE | Facility: CLINIC | Age: 67
End: 2017-12-14
Payer: MEDICARE

## 2017-12-14 VITALS
OXYGEN SATURATION: 97 % | WEIGHT: 182 LBS | DIASTOLIC BLOOD PRESSURE: 88 MMHG | HEIGHT: 68 IN | BODY MASS INDEX: 27.58 KG/M2 | HEART RATE: 113 BPM | TEMPERATURE: 98 F | SYSTOLIC BLOOD PRESSURE: 128 MMHG

## 2017-12-14 DIAGNOSIS — R35.1 NOCTURIA: ICD-10-CM

## 2017-12-14 DIAGNOSIS — F43.23 ADJUSTMENT DISORDER WITH MIXED ANXIETY AND DEPRESSED MOOD: ICD-10-CM

## 2017-12-14 DIAGNOSIS — N40.1 BENIGN PROSTATIC HYPERPLASIA WITH URINARY FREQUENCY: ICD-10-CM

## 2017-12-14 DIAGNOSIS — R73.09 ELEVATED GLUCOSE: ICD-10-CM

## 2017-12-14 DIAGNOSIS — R35.0 BENIGN PROSTATIC HYPERPLASIA WITH URINARY FREQUENCY: ICD-10-CM

## 2017-12-14 DIAGNOSIS — E78.5 HYPERLIPIDEMIA LDL GOAL <130: ICD-10-CM

## 2017-12-14 DIAGNOSIS — Z00.00 ROUTINE GENERAL MEDICAL EXAMINATION AT A HEALTH CARE FACILITY: Primary | ICD-10-CM

## 2017-12-14 DIAGNOSIS — L30.9 ECZEMA, UNSPECIFIED TYPE: ICD-10-CM

## 2017-12-14 DIAGNOSIS — I10 ESSENTIAL HYPERTENSION WITH GOAL BLOOD PRESSURE LESS THAN 140/90: ICD-10-CM

## 2017-12-14 DIAGNOSIS — G11.9 CEREBRAL ATAXIA (H): ICD-10-CM

## 2017-12-14 LAB
ANION GAP SERPL CALCULATED.3IONS-SCNC: 6 MMOL/L (ref 3–14)
BASOPHILS # BLD AUTO: 0 10E9/L (ref 0–0.2)
BASOPHILS NFR BLD AUTO: 0.2 %
BUN SERPL-MCNC: 17 MG/DL (ref 7–30)
CALCIUM SERPL-MCNC: 9.5 MG/DL (ref 8.5–10.1)
CHLORIDE SERPL-SCNC: 97 MMOL/L (ref 94–109)
CHOLEST SERPL-MCNC: 184 MG/DL
CO2 SERPL-SCNC: 30 MMOL/L (ref 20–32)
CREAT SERPL-MCNC: 1.02 MG/DL (ref 0.66–1.25)
DIFFERENTIAL METHOD BLD: NORMAL
EOSINOPHIL # BLD AUTO: 0.3 10E9/L (ref 0–0.7)
EOSINOPHIL NFR BLD AUTO: 6.5 %
ERYTHROCYTE [DISTWIDTH] IN BLOOD BY AUTOMATED COUNT: 12.7 % (ref 10–15)
GFR SERPL CREATININE-BSD FRML MDRD: 73 ML/MIN/1.7M2
GLUCOSE SERPL-MCNC: 81 MG/DL (ref 70–99)
HBA1C MFR BLD: 5.2 % (ref 4.3–6)
HCT VFR BLD AUTO: 41.1 % (ref 40–53)
HDLC SERPL-MCNC: 57 MG/DL
HGB BLD-MCNC: 14.5 G/DL (ref 13.3–17.7)
LDLC SERPL CALC-MCNC: 94 MG/DL
LYMPHOCYTES # BLD AUTO: 1.1 10E9/L (ref 0.8–5.3)
LYMPHOCYTES NFR BLD AUTO: 24 %
MCH RBC QN AUTO: 32.7 PG (ref 26.5–33)
MCHC RBC AUTO-ENTMCNC: 35.3 G/DL (ref 31.5–36.5)
MCV RBC AUTO: 93 FL (ref 78–100)
MONOCYTES # BLD AUTO: 0.7 10E9/L (ref 0–1.3)
MONOCYTES NFR BLD AUTO: 14.5 %
NEUTROPHILS # BLD AUTO: 2.6 10E9/L (ref 1.6–8.3)
NEUTROPHILS NFR BLD AUTO: 54.8 %
NONHDLC SERPL-MCNC: 127 MG/DL
PLATELET # BLD AUTO: 198 10E9/L (ref 150–450)
POTASSIUM SERPL-SCNC: 4.6 MMOL/L (ref 3.4–5.3)
RBC # BLD AUTO: 4.43 10E12/L (ref 4.4–5.9)
SODIUM SERPL-SCNC: 133 MMOL/L (ref 133–144)
TRIGL SERPL-MCNC: 163 MG/DL
WBC # BLD AUTO: 4.8 10E9/L (ref 4–11)

## 2017-12-14 PROCEDURE — 83036 HEMOGLOBIN GLYCOSYLATED A1C: CPT | Performed by: INTERNAL MEDICINE

## 2017-12-14 PROCEDURE — 80061 LIPID PANEL: CPT | Performed by: INTERNAL MEDICINE

## 2017-12-14 PROCEDURE — 80048 BASIC METABOLIC PNL TOTAL CA: CPT | Performed by: INTERNAL MEDICINE

## 2017-12-14 PROCEDURE — 85025 COMPLETE CBC W/AUTO DIFF WBC: CPT | Performed by: INTERNAL MEDICINE

## 2017-12-14 PROCEDURE — G0439 PPPS, SUBSEQ VISIT: HCPCS | Performed by: INTERNAL MEDICINE

## 2017-12-14 PROCEDURE — 36415 COLL VENOUS BLD VENIPUNCTURE: CPT | Performed by: INTERNAL MEDICINE

## 2017-12-14 RX ORDER — HYDROCHLOROTHIAZIDE 25 MG/1
25 TABLET ORAL DAILY
Qty: 90 TABLET | Refills: 0 | Status: SHIPPED | OUTPATIENT
Start: 2017-12-14 | End: 2018-05-29

## 2017-12-14 RX ORDER — SIMVASTATIN 20 MG
TABLET ORAL
Qty: 90 TABLET | Refills: 3 | Status: SHIPPED | OUTPATIENT
Start: 2017-12-14 | End: 2018-12-18

## 2017-12-14 RX ORDER — LISINOPRIL 20 MG/1
20 TABLET ORAL DAILY
Qty: 90 TABLET | Refills: 0 | Status: SHIPPED | OUTPATIENT
Start: 2017-12-14 | End: 2018-04-19

## 2017-12-14 RX ORDER — TAMSULOSIN HYDROCHLORIDE 0.4 MG/1
0.4 CAPSULE ORAL AT BEDTIME
Qty: 90 CAPSULE | Refills: 1 | Status: SHIPPED | OUTPATIENT
Start: 2017-12-14 | End: 2018-06-08

## 2017-12-14 RX ORDER — BETAMETHASONE DIPROPIONATE 0.5 MG/G
CREAM TOPICAL
Qty: 15 G | Refills: 3 | Status: SHIPPED | OUTPATIENT
Start: 2017-12-14 | End: 2018-06-08

## 2017-12-14 ASSESSMENT — ANXIETY QUESTIONNAIRES
3. WORRYING TOO MUCH ABOUT DIFFERENT THINGS: NOT AT ALL
2. NOT BEING ABLE TO STOP OR CONTROL WORRYING: NOT AT ALL
6. BECOMING EASILY ANNOYED OR IRRITABLE: NOT AT ALL
1. FEELING NERVOUS, ANXIOUS, OR ON EDGE: SEVERAL DAYS
IF YOU CHECKED OFF ANY PROBLEMS ON THIS QUESTIONNAIRE, HOW DIFFICULT HAVE THESE PROBLEMS MADE IT FOR YOU TO DO YOUR WORK, TAKE CARE OF THINGS AT HOME, OR GET ALONG WITH OTHER PEOPLE: SOMEWHAT DIFFICULT
7. FEELING AFRAID AS IF SOMETHING AWFUL MIGHT HAPPEN: NOT AT ALL
GAD7 TOTAL SCORE: 1
5. BEING SO RESTLESS THAT IT IS HARD TO SIT STILL: NOT AT ALL

## 2017-12-14 ASSESSMENT — PATIENT HEALTH QUESTIONNAIRE - PHQ9
5. POOR APPETITE OR OVEREATING: NOT AT ALL
SUM OF ALL RESPONSES TO PHQ QUESTIONS 1-9: 1

## 2017-12-14 ASSESSMENT — PAIN SCALES - GENERAL: PAINLEVEL: NO PAIN (0)

## 2017-12-14 NOTE — NURSING NOTE
"Chief Complaint   Patient presents with     Physical     PHQ9 and DAP. Disucss Flomax and skin issues under fingernails. Requesting refills for all meds 90 days.       Initial /88 (BP Location: Left arm, Cuff Size: Adult Regular)  Pulse 113  Temp 98  F (36.7  C) (Oral)  Ht 5' 8\" (1.727 m)  Wt 182 lb (82.6 kg)  SpO2 97%  BMI 27.67 kg/m2 Estimated body mass index is 27.67 kg/(m^2) as calculated from the following:    Height as of this encounter: 5' 8\" (1.727 m).    Weight as of this encounter: 182 lb (82.6 kg).  Medication Reconciliation: complete       Mayelin Witt CMA    "

## 2017-12-14 NOTE — PATIENT INSTRUCTIONS
- I will follow up with you about lab results.     - For Swellin. Elevate legs above horizontal for 20 minutes, 2-3 times a day.    2. Wear Compression Stockings (Nikhil Hose or Support Hose).   3. Diuretic medications such as Lasix [Furosemide].    - Winter itch / eczema     1. Avoid all soaps , no washing [ unless essential ]   2. if you have to use soap only use non-drying non irritating soap like dove or basis is recommended [ or cetaphil ]   3. Generous Moisturizer use is encouraged - eucerin,or lubriderm, or lanolin   4. I am prescribing  Some topical steroid cream for the worst spots. Triamcinolone cream can be used to affected areas until cleared ; twice a day for about a week. Then a few times a week afterward.      Preventive Health Recommendations:     Male Ages 65 and over    Yearly exam:             See your health care provider every year in order to  o   Review health changes.   o   Discuss preventive care.    o   Review your medicines if your doctor has prescribed any.    Talk with your health care provider about whether you should have a test to screen for prostate cancer (PSA).    Every 3 years, have a diabetes test (fasting glucose). If you are at risk for diabetes, you should have this test more often.    Every 5 years, have a cholesterol test. Have this test more often if you are at risk for high cholesterol or heart disease.     Every 10 years, have a colonoscopy. Or, have a yearly FIT test (stool test). These exams will check for colon cancer.    Talk to with your health care provider about screening for Abdominal Aortic Aneurysm if you have a family history of AAA or have a history of smoking.  Shots:     Get a flu shot each year.     Get a tetanus shot every 10 years.     Talk to your doctor about your pneumonia vaccines. There are now two you should receive - Pneumovax (PPSV 23) and Prevnar (PCV 13).    Talk to your doctor about a shingles vaccine.     Talk to your doctor about the  hepatitis B vaccine.  Nutrition:     Eat at least 5 servings of fruits and vegetables each day.     Eat whole-grain bread, whole-wheat pasta and brown rice instead of white grains and rice.     Talk to your doctor about Calcium and Vitamin D.   Lifestyle    Exercise for at least 150 minutes a week (30 minutes a day, 5 days a week). This will help you control your weight and prevent disease.     Limit alcohol to one drink per day.     No smoking.     Wear sunscreen to prevent skin cancer.     See your dentist every six months for an exam and cleaning.     See your eye doctor every 1 to 2 years to screen for conditions such as glaucoma, macular degeneration and cataracts.    [facility]    If you have any questions regarding to your visit please contact your care team:     Team Pink:   Clinic Hours Telephone Number   Internal Medicine:  Dr. Lola Bauer, NP       7am-7pm  Monday - Thursday   7am-5pm  Fridays  [phone]  (Appointment scheduling available 24/7)    Questions about your visit?  Team Line  [phone]   Urgent Care - [place] - 11am-9pm Monday-Friday Saturday-Sunday- 9am-5pm   [place] - 5pm-9pm Monday-Friday Saturday-Sunday- 9am-5pm  [phone] - [place]   [phone] - [place]       What options do I have for visits at the clinic other than the traditional office visit?  To expand how we care for you, many of our providers are utilizing electronic visits (e-visits) and telephone visits, when medically appropriate, for interactions with their patients rather than a visit in the clinic.   We also offer nurse visits for many medical concerns. Just like any other service, we will bill your insurance company for this type of visit based on time spent on the phone with your provider. Not all insurance companies cover these visits. Please check with your medical insurance if this type of visit is covered. You will be  responsible for any charges that are not paid by your insurance.      E-visits via [employer]:  generally incur a $35.00 fee.  Telephone visits:  Time spent on the phone: *charged based on time that is spent on the phone in increments of 10 minutes. Estimated cost:   5-10 mins $30.00   11-20 mins. $59.00   21-30 mins. $85.00   Use [employer] (secure email communication and access to your chart) to send your primary care provider a message or make an appointment. Ask someone on your Team how to sign up for [employer].    For a Price Quote for your services, please call our Consumer Price Line at [phone].    As always, Thank you for trusting us with your health care needs!    Mayelin Witt, CMA

## 2017-12-14 NOTE — PROGRESS NOTES
SUBJECTIVE:   Arnold Lozada is a 67 year old male who presents for Preventive Visit.    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 He is worried because he has many other things to do and no time to write the book. He is also seeing a therapist. Also has difficulty typing on the computer. The Adderall has been helping him stay awake, prescribed by his neurologist.    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 He's got significant word finding difficulty now from mechanical reasons / speech difficulties . He notes walking and talking have been more difficult for him. However, otherwise feels generally well.  He has been gaining some weight recently. He has been using a stationary bicycle to exercise with.    BPH -- Patient reports he has doubled his Flomax [Tamsulosin] from 0.4 MG to 0.8 MG. It has seemed to improve his nocturia. They are curious about Tolterodine.  We discontinued this today as he's not actually taking this    Nails -- Patient notes the skin near his fingernails and under fingernails has been intermittently strange looking. They are curious if it is onychomycosis. See exam section of this progress note     Sleep Apnea -- He has been wearing a mouthguard to help with sleep apnea. It has been working well for him although his spouse says he's still snoring    Hearing Loss -- Patient has a hearing aid that needs adjustment. He has difficulty hearing high frequency sounds. He did not  Wear it today     Right Leg Edema -- He states he has some edema in his right leg. It was worse this summer.     Hypertension -- He is checking his blood pressures occasionally.   BP Readings from Last 3 Encounters:   12/14/17 128/88   06/27/17 126/78   06/09/17 139/90     Are you in the first 12 months of your Medicare Part B coverage?   No    Healthy Habits:    Do you get at least three servings of calcium containing foods daily (dairy, green leafy vegetables, etc.)? yes    Amount of exercise or daily activities, outside of work: 7 day(s) per week    Problems taking medications regularly No    Medication side effects: No    Have you had an eye exam in the past two years? yes    Do you see a dentist twice per year? yes    Do you have sleep apnea, excessive snoring or daytime drowsiness?yes, sleep apnea      Ability to successfully perform activities of daily living: Yes, no assistance needed    Home safety:  none identified     Hearing impairment: Yes, wears hearing aids in both ears - hears fine with them in    Fall risk:  Fallen 2 or more times in the past year?: Yes  Any fall with injury in the past year?: No    COGNITIVE SCREEN  1) Repeat 3 items (Banana, Sunrise, Chair)   2) Clock draw: NORMAL  3) 3 item recall: Recalls 3 objects  Results: 3 items recalled: COGNITIVE IMPAIRMENT LESS LIKELY    Mini-CogTM Copyright MONSERRAT Griffiths. Licensed by the author for use in [facility]; reprinted with permission (nikos@[facility]). All rights reserved.      Chief Complaint   Patient presents with     Physical     PHQ9 and DAP. Disucss Flomax and skin issues under fingernails. Requesting refills for all meds 90 days.       Reviewed and updated as needed this visit by clinical staffTobacco  Allergies  Meds  Med Hx  Surg Hx  Fam Hx  Soc Hx        Reviewed and updated as needed this visit by Provider        Social History   Substance Use Topics     Smoking status: Never Smoker     Smokeless tobacco: Never Used     Alcohol use Yes      Comment: 3 times per week     If you drink alcohol do you typically have >3 drinks per day or >7 drinks per week? No                        Today's PHQ-2 Score:   PHQ-2 ( 1999 Pf[employer]) 12/14/2017 6/2/2017   Q1: Little interest or pleasure in doing things 0 0   Q2: Feeling down, depressed or hopeless 0 0   PHQ-2 Score 0 0  "  Q1: Little interest or pleasure in doing things - -   Q2: Feeling down, depressed or hopeless - -   PHQ-2 Score - -     Do you feel safe in your environment - Yes    Do you have a Health Care Directive?: Yes: Advance Directive has been received and scanned.    Current providers sharing in care for this patient include: Patient Care Team:  Nikhil Carpenter MD as PCP - Giorgio Powell MD as MD (Neurology)    The following health maintenance items are reviewed in Epic and correct as of today:  Health Maintenance   Topic Date Due     DEPRESSION ACTION PLAN Q1 YR  10/02/1969     PHQ-9 Q6 MONTHS  12/30/2017     FALL RISK ASSESSMENT  06/02/2018     COLONOSCOPY Q3 YR  12/07/2019     ADVANCE DIRECTIVE PLANNING Q5 YRS  12/18/2020     TETANUS IMMUNIZATION (SYSTEM ASSIGNED)  11/11/2021     LIPID SCREEN Q5 YR MALE (SYSTEM ASSIGNED)  12/19/2021     INFLUENZA VACCINE (SYSTEM ASSIGNED)  Completed     PNEUMOCOCCAL  Completed     AORTIC ANEURYSM SCREENING (SYSTEM ASSIGNED)  Completed     HEPATITIS C SCREENING  Completed     Labs reviewed in EPIC    ROS:  Constitutional, HEENT, cardiovascular, pulmonary, GI, , musculoskeletal, neuro, skin, endocrine and psych systems are negative, except as otherwise noted.    This document serves as a record of the services and decisions personally performed and made by Nikhil Carpenter MD. It was created on their behalf by Tommie Parham, a trained medical scribe. The creation of this document is based the provider's statements to the medical scribe.  Tommie Parham December 14, 2017 11:28 AM    OBJECTIVE:   /88 (BP Location: Left arm, Cuff Size: Adult Regular)  Pulse 113  Temp 98  F (36.7  C) (Oral)  Ht 1.727 m (5' 8\")  Wt 82.6 kg (182 lb)  SpO2 97%  BMI 27.67 kg/m2 Estimated body mass index is 27.67 kg/(m^2) as calculated from the following:    Height as of this encounter: 1.727 m (5' 8\").    Weight as of this encounter: 82.6 kg (182 lb).  EXAM:   GENERAL: healthy, " alert and no distress  EYES: Eyes grossly normal to inspection, EOMI, conjunctivae and sclerae normal  HENT: ear canals and TM's normal, nose and mouth without ulcers or lesions  NECK: no adenopathy, no asymmetry, masses, or scars and thyroid normal to palpation  RESP: lungs clear to auscultation - no rales, rhonchi or wheezes  CV: regular rate and rhythm, normal S1 S2, no S3 or S4, no murmur, click or rub, peripheral pulses strong  ABDOMEN: soft, nontender, no hepatosplenomegaly, no masses and bowel sounds normal  MS: trace-1+ bilateral lower extremity edema   SKIN: cracked, dry, hyperkeratotic skin under and around fingernails suggestive of eczema  NEURO: mentation intact, some slurred speech consistent with cerebral ataxia, abnormal gait and showing general slowing down  PSYCH: mentation appears normal, affect normal/bright    ASSESSMENT / PLAN:   (Z00.00) Routine general medical examination at a health care facility  (primary encounter diagnosis)  Comment: routine screening issues   Plan: Basic metabolic panel, CBC with platelets         differential            (I10) Essential hypertension with goal blood pressure less than 140/90  Comment: controlled within acceptable limits   Plan: hydrochlorothiazide (HYDRODIURIL) 25 MG tablet,        lisinopril (PRINIVIL/ZESTRIL) 20 MG tablet,         Basic metabolic panel, CBC with platelets         differential            (L30.9) Eczema, unspecified type  Comment: treatment with topical steroid cream   Plan: augmented betamethasone dipropionate         (DIPROLENE-AF) 0.05 % cream        Further follow up depending on how things go     (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)  Comment: follow up with neurologist, a tragic situation and very difficult to treat   Plan: Basic metabolic panel, CBC with platelets         differential            (R35.1) Nocturia  Comment: continue current plan of care  With Flomax [ tamsulosin ]   Plan: as above     (N40.1,  R35.0) Benign prostatic  "hyperplasia with urinary frequency  Comment: as above   Plan: tamsulosin (FLOMAX) 0.4 MG capsule            (F43.23) Adjustment disorder with mixed anxiety and depressed mood  Comment: continue current plan of care     Plan: sertraline (ZOLOFT) 50 MG tablet            (E78.5) Hyperlipidemia LDL goal <130  Comment: continue current plan of care     Plan: simvastatin (ZOCOR) 20 MG tablet, Lipid panel         reflex to direct LDL Non-fasting        Follow up as indicated on results     (R73.09) Elevated glucose  Comment: doubt clinical significance but warrants hemoglobin a1c  [ diabetes test ]   Plan: hemoglobin a1c  [ diabetes test ]     End of Life Planning:  Patient currently has an advanced directive: Yes.  Practitioner is supportive of decision.    COUNSELING:  Reviewed preventive health counseling, as reflected in patient instructions       Regular exercise       Healthy diet/nutrition       Vision screening       Hearing screening       Dental care       Colon cancer screening       Prostate cancer screening    Estimated body mass index is 27.67 kg/(m^2) as calculated from the following:    Height as of this encounter: 1.727 m (5' 8\").    Weight as of this encounter: 82.6 kg (182 lb).     reports that he has never smoked. He has never used smokeless tobacco.    Appropriate preventive services were discussed with this patient, including applicable screening as appropriate for cardiovascular disease, diabetes, osteopenia/osteoporosis, and glaucoma.  As appropriate for age/gender, discussed screening for colorectal cancer, prostate cancer, breast cancer, and cervical cancer. Checklist reviewing preventive services available has been given to the patient.    Reviewed patients plan of care and provided an AVS. The Complex Care Plan (for patients with higher acuity and needing more deliberate coordination of services) for Arnold meets the Care Plan requirement. This Care Plan has been established and reviewed with " the Patient and spouse.    Counseling Resources:  ATP IV Guidelines  Pooled Cohorts Equation Calculator  Breast Cancer Risk Calculator  FRAX Risk Assessment  ICSI Preventive Guidelines  Dietary Guidelines for Americans, 2010  USDA's MyPlate  ASA Prophylaxis  Lung CA Screening    The information in this document, created by the medical scribe for me, accurately reflects the services I personally performed and the decisions made by me. I have reviewed and approved this document for accuracy.   MD Nikhil Shaw MD  [facility]

## 2017-12-14 NOTE — MR AVS SNAPSHOT
After Visit Summary   2017    Arnold Lozada    MRN: [id number]           Patient Information     Date Of Birth          1950        Visit Information        Provider Department      2017 10:50 AM Nikhil Carpenter MD [facility]        Today's Diagnoses     Routine general medical examination at a health care facility    -  1    Essential hypertension with goal blood pressure less than 140/90        Eczema, unspecified type        Cerebral ataxia (H)        Nocturia        Benign prostatic hyperplasia with urinary frequency        Adjustment disorder with mixed anxiety and depressed mood        Hyperlipidemia LDL goal <130        Elevated glucose          Care Instructions    - I will follow up with you about lab results.     - For Swellin. Elevate legs above horizontal for 20 minutes, 2-3 times a day.    2. Wear Compression Stockings (Nikhil Hose or Support Hose).   3. Diuretic medications such as Lasix [Furosemide].    - Winter itch / eczema     1. Avoid all soaps , no washing [ unless essential ]   2. if you have to use soap only use non-drying non irritating soap like dove or basis is recommended [ or cetaphil ]   3. Generous Moisturizer use is encouraged - eucerin,or lubriderm, or lanolin   4. I am prescribing  Some topical steroid cream for the worst spots. Triamcinolone cream can be used to affected areas until cleared ; twice a day for about a week. Then a few times a week afterward.      Preventive Health Recommendations:     Male Ages 65 and over    Yearly exam:             See your health care provider every year in order to  o   Review health changes.   o   Discuss preventive care.    o   Review your medicines if your doctor has prescribed any.    Talk with your health care provider about whether you should have a test to screen for prostate cancer (PSA).    Every 3 years, have a diabetes test (fasting glucose). If you are at risk for diabetes, you  should have this test more often.    Every 5 years, have a cholesterol test. Have this test more often if you are at risk for high cholesterol or heart disease.     Every 10 years, have a colonoscopy. Or, have a yearly FIT test (stool test). These exams will check for colon cancer.    Talk to with your health care provider about screening for Abdominal Aortic Aneurysm if you have a family history of AAA or have a history of smoking.  Shots:     Get a flu shot each year.     Get a tetanus shot every 10 years.     Talk to your doctor about your pneumonia vaccines. There are now two you should receive - Pneumovax (PPSV 23) and Prevnar (PCV 13).    Talk to your doctor about a shingles vaccine.     Talk to your doctor about the hepatitis B vaccine.  Nutrition:     Eat at least 5 servings of fruits and vegetables each day.     Eat whole-grain bread, whole-wheat pasta and brown rice instead of white grains and rice.     Talk to your doctor about Calcium and Vitamin D.   Lifestyle    Exercise for at least 150 minutes a week (30 minutes a day, 5 days a week). This will help you control your weight and prevent disease.     Limit alcohol to one drink per day.     No smoking.     Wear sunscreen to prevent skin cancer.     See your dentist every six months for an exam and cleaning.     See your eye doctor every 1 to 2 years to screen for conditions such as glaucoma, macular degeneration and cataracts.    [facility] (formerly [facility])    If you have any questions regarding to your visit please contact your care team:     Team Pink:   Clinic Hours Telephone Number   Internal Medicine:  Dr. Lola Bauer NP       7am-7pm  Monday - Thursday   7am-5pm  Fridays  [phone]  (Appointment scheduling available 24/7)    Questions about your visit?  Team Line  [phone]   Urgent Care - [place] [facility] [place] - 11am-9pm Monday-Friday Saturday-Sunday- 9am-5pm   [place] - 5pm-9pm  Monday-Friday Saturday-Sunday- 9am-5pm  [phone] - [place]   [phone] - [place]       What options do I have for visits at the clinic other than the traditional office visit?  To expand how we care for you, many of our providers are utilizing electronic visits (e-visits) and telephone visits, when medically appropriate, for interactions with their patients rather than a visit in the clinic.   We also offer nurse visits for many medical concerns. Just like any other service, we will bill your insurance company for this type of visit based on time spent on the phone with your provider. Not all insurance companies cover these visits. Please check with your medical insurance if this type of visit is covered. You will be responsible for any charges that are not paid by your insurance.      E-visits via [employer]:  generally incur a $35.00 fee.  Telephone visits:  Time spent on the phone: *charged based on time that is spent on the phone in increments of 10 minutes. Estimated cost:   5-10 mins $30.00   11-20 mins. $59.00   21-30 mins. $85.00   Use [employer] (secure email communication and access to your chart) to send your primary care provider a message or make an appointment. Ask someone on your Team how to sign up for [employer].    For a Price Quote for your services, please call our Consumer Price Line at [phone].    As always, Thank you for trusting us with your health care needs!    Mayelin Witt, CMA          Follow-ups after your visit        Who to contact     If you have questions or need follow up information about today's clinic visit or your schedule please contact [facility] directly at [phone].  Normal or non-critical lab and imaging results will be communicated to you by MyChart, letter or phone within 4 business days after the clinic has received the results. If you do not hear from us within 7 days, please contact the clinic through [employer] or phone. If you have a critical or  "abnormal lab result, we will notify you by phone as soon as possible.  Submit refill requests through [employer] or call your pharmacy and they will forward the refill request to us. Please allow 3 business days for your refill to be completed.          Additional Information About Your Visit        [employer] Information     [employer] gives you secure access to your electronic health record. If you see a primary care provider, you can also send messages to your care team and make appointments. If you have questions, please call your primary care clinic.  If you do not have a primary care provider, please call [phone] and they will assist you.        Care EveryWhere ID     This is your Care EveryWhere ID. This could be used by other organizations to access your [place] medical records  [id number]        Your Vitals Were     Pulse Temperature Height Pulse Oximetry BMI (Body Mass Index)       113 98  F (36.7  C) (Oral) 5' 8\" (1.727 m) 97% 27.67 kg/m2        Blood Pressure from Last 3 Encounters:   12/14/17 128/88   06/27/17 126/78   06/09/17 139/90    Weight from Last 3 Encounters:   12/14/17 182 lb (82.6 kg)   09/28/17 181 lb 6.4 oz (82.3 kg)   06/09/17 172 lb 11.2 oz (78.3 kg)              We Performed the Following     Basic metabolic panel     CBC with platelets differential     Lipid panel reflex to direct LDL Non-fasting          Today's Medication Changes          These changes are accurate as of: 12/14/17 11:40 AM.  If you have any questions, ask your nurse or doctor.               Start taking these medicines.        Dose/Directions    augmented betamethasone dipropionate 0.05 % cream   Commonly known as:  DIPROLENE-AF   Used for:  Eczema, unspecified type   Started by:  Nikhil Carpenter MD        Apply sparingly to affected area twice daily as needed.  Do not apply to face.   Quantity:  15 g   Refills:  3         These medicines have changed or have updated prescriptions.        Dose/Directions    lisinopril " 20 MG tablet   Commonly known as:  PRINIVIL/ZESTRIL   This may have changed:  See the new instructions.   Used for:  Essential hypertension with goal blood pressure less than 140/90   Changed by:  Nikhil Carpenter MD        Dose:  20 mg   Take 1 tablet (20 mg) by mouth daily   Quantity:  90 tablet   Refills:  0       sertraline 50 MG tablet   Commonly known as:  ZOLOFT   This may have changed:  See the new instructions.   Used for:  Adjustment disorder with mixed anxiety and depressed mood   Changed by:  Nikhil Carpenter MD        Dose:  50 mg   Take 1 tablet (50 mg) by mouth daily   Quantity:  90 tablet   Refills:  1         Stop taking these medicines if you haven't already. Please contact your care team if you have questions.     tolterodine 2 MG 24 hr capsule   Commonly known as:  DETROL LA   Stopped by:  Nikhil Carpenter MD                Where to get your medicines      These medications were sent to [facility] [address] [place] [address] [address]  [address] [facility] [address]     Phone:  [phone]     augmented betamethasone dipropionate 0.05 % cream    hydrochlorothiazide 25 MG tablet    lisinopril 20 MG tablet    sertraline 50 MG tablet    simvastatin 20 MG tablet    tamsulosin 0.4 MG capsule                Primary Care Provider Office Phone # Fax #    Nikhil Carpenter [phone] [address] [facility] [address]        Equal Access to Services     [facility] AH: Hadii johanna steinberg hadasho Soaxelali, waaxda luqadaha, qaybta kaalmada adeegyada, bernice fleming adealex monsalve. So [facility] [phone].    ATENCIÓN: Si habla español, tiene a gaytan disposición servicios gratuitos de asistencia lingüística. Llame al [phone].    We comply with applicable federal civil rights laws and [place] laws. We do not discriminate on the basis of race, color, national origin, age, disability, sex, sexual orientation, or gender identity.            Thank you!      Thank you for choosing [facility]  for your care. Our goal is always to provide you with excellent care. Hearing back from our patients is one way we can continue to improve our services. Please take a few minutes to complete the written survey that you may receive in the mail after your visit with us. Thank you!             Your Updated Medication List - Protect others around you: Learn how to safely use, store and throw away your medicines at www.disposemymeds.org.          This list is accurate as of: 12/14/17 11:40 AM.  Always use your most recent med list.                   Brand Name Dispense Instructions for use Diagnosis    amphetamine-dextroamphetamine 30 MG per 24 hr capsule   Start taking on:  1/3/2018    ADDERALL XR    30 capsule    Take 1 capsule (30 mg) by mouth daily    Excessive sleepiness       aspirin 325 MG tablet      Take 325 mg by mouth daily.        augmented betamethasone dipropionate 0.05 % cream    DIPROLENE-AF    15 g    Apply sparingly to affected area twice daily as needed.  Do not apply to face.    Eczema, unspecified type       folic acid 800 MCG Tabs      Take 1 tablet by mouth daily Reported on 5/10/2017        hydrochlorothiazide 25 MG tablet    HYDRODIURIL    90 tablet    Take 1 tablet (25 mg) by mouth daily    Essential hypertension with goal blood pressure less than 140/90       lisinopril 20 MG tablet    PRINIVIL/ZESTRIL    90 tablet    Take 1 tablet (20 mg) by mouth daily    Essential hypertension with goal blood pressure less than 140/90       metoprolol 50 MG 24 hr tablet    TOPROL-XL    90 tablet    TAKE ONE TABLET BY MOUTH ONCE DAILY    Essential hypertension with goal blood pressure less than 140/90       Multi-vitamin Tabs tablet   Generic drug:  multivitamin, therapeutic with minerals      1 TABLET DAILY        order for DME      Respironics REMSTAR 60 Series Auto CPAP 5-18cm H2O, Nuance pillow nasal mask large        * order for DME     1 Device     Equipment being ordered: quadcane    Cerebellar ataxia (H)       * order for DME     1 Device    Equipment being ordered: shower bench [ long ]    Cerebral ataxia (H)       * order for DME     1 each    Knee high compression stockings (15-20 mmHg)    Bilateral leg edema       POTASSIUM PO      Take 595 mcg by mouth daily        sertraline 50 MG tablet    ZOLOFT    90 tablet    Take 1 tablet (50 mg) by mouth daily    Adjustment disorder with mixed anxiety and depressed mood       simvastatin 20 MG tablet    ZOCOR    90 tablet    ONE TABLET DAILY IN THE EVENING    Hyperlipidemia LDL goal <130       sulfaSALAzine 500 MG tablet    AZULFIDINE     Take 500 mg by mouth 4 times daily.    Crohn's disease of colon (H)       tamsulosin 0.4 MG capsule    FLOMAX    90 capsule    Take 1 capsule (0.4 mg) by mouth At Bedtime    Benign prostatic hyperplasia with urinary frequency       traZODone 50 MG tablet    DESYREL    90 tablet    Take 1 tablet (50 mg) by mouth nightly as needed for sleep    Adjustment insomnia       * Notice:  This list has 3 medication(s) that are the same as other medications prescribed for you. Read the directions carefully, and ask your doctor or other care provider to review them with you.

## 2017-12-15 ASSESSMENT — ANXIETY QUESTIONNAIRES: GAD7 TOTAL SCORE: 1

## 2017-12-21 ENCOUNTER — MYC MEDICAL ADVICE (OUTPATIENT)
Dept: INTERNAL MEDICINE | Facility: CLINIC | Age: 67
End: 2017-12-21

## 2017-12-21 DIAGNOSIS — K50.10 CROHN'S DISEASE OF COLON WITHOUT COMPLICATION (H): Primary | Chronic | ICD-10-CM

## 2017-12-21 RX ORDER — SULFASALAZINE 500 MG/1
1000 TABLET ORAL 2 TIMES DAILY
Qty: 360 TABLET | Refills: 0 | Status: SHIPPED | OUTPATIENT
Start: 2017-12-21 | End: 2018-12-18

## 2017-12-21 NOTE — TELEPHONE ENCOUNTER
Dr. Carpenter,  Please see [employer] message below and advise.     Pt is requesting Sulfasalazine 500mg tabs takes 2 tabs bid #[phone]. Please advise.    Lissette Vieira RN  [facility]

## 2018-01-05 ENCOUNTER — TELEPHONE (OUTPATIENT)
Dept: NEUROLOGY | Facility: CLINIC | Age: 68
End: 2018-01-05

## 2018-01-05 NOTE — TELEPHONE ENCOUNTER
Pt needed a prior authorization for Adderall.  CAlled her insurance and prior auth has been received from 11/6/2017 to 1/5/2019, contacted [facility] in [place] and left message on pt's cell phone with this info.

## 2018-03-04 ENCOUNTER — MYC MEDICAL ADVICE (OUTPATIENT)
Dept: INTERNAL MEDICINE | Facility: CLINIC | Age: 68
End: 2018-03-04

## 2018-03-04 DIAGNOSIS — N40.1 BENIGN PROSTATIC HYPERPLASIA WITH URINARY FREQUENCY: ICD-10-CM

## 2018-03-04 DIAGNOSIS — R35.0 BENIGN PROSTATIC HYPERPLASIA WITH URINARY FREQUENCY: ICD-10-CM

## 2018-03-05 RX ORDER — TAMSULOSIN HYDROCHLORIDE 0.4 MG/1
0.8 CAPSULE ORAL DAILY
Qty: 180 CAPSULE | Refills: 1 | Status: SHIPPED | OUTPATIENT
Start: 2018-03-05 | End: 2018-09-04

## 2018-03-13 ENCOUNTER — TRANSFERRED RECORDS (OUTPATIENT)
Dept: HEALTH INFORMATION MANAGEMENT | Facility: CLINIC | Age: 68
End: 2018-03-13

## 2018-03-27 DIAGNOSIS — G47.10 EXCESSIVE SLEEPINESS: ICD-10-CM

## 2018-03-27 RX ORDER — DEXTROAMPHETAMINE SACCHARATE, AMPHETAMINE ASPARTATE MONOHYDRATE, DEXTROAMPHETAMINE SULFATE AND AMPHETAMINE SULFATE 7.5; 7.5; 7.5; 7.5 MG/1; MG/1; MG/1; MG/1
30 CAPSULE, EXTENDED RELEASE ORAL DAILY
Qty: 30 CAPSULE | Refills: 0 | Status: SHIPPED | OUTPATIENT
Start: 2018-04-30 | End: 2018-03-27

## 2018-03-27 RX ORDER — DEXTROAMPHETAMINE SACCHARATE, AMPHETAMINE ASPARTATE MONOHYDRATE, DEXTROAMPHETAMINE SULFATE AND AMPHETAMINE SULFATE 7.5; 7.5; 7.5; 7.5 MG/1; MG/1; MG/1; MG/1
30 CAPSULE, EXTENDED RELEASE ORAL DAILY
Qty: 30 CAPSULE | Refills: 0 | Status: SHIPPED | OUTPATIENT
Start: 2018-03-31 | End: 2018-03-27

## 2018-03-27 RX ORDER — DEXTROAMPHETAMINE SACCHARATE, AMPHETAMINE ASPARTATE MONOHYDRATE, DEXTROAMPHETAMINE SULFATE AND AMPHETAMINE SULFATE 7.5; 7.5; 7.5; 7.5 MG/1; MG/1; MG/1; MG/1
30 CAPSULE, EXTENDED RELEASE ORAL DAILY
Qty: 30 CAPSULE | Refills: 0 | Status: SHIPPED | OUTPATIENT
Start: 2018-05-30 | End: 2018-06-08

## 2018-03-30 ENCOUNTER — TELEPHONE (OUTPATIENT)
Dept: NEUROLOGY | Facility: CLINIC | Age: 68
End: 2018-03-30

## 2018-04-19 ENCOUNTER — MYC MEDICAL ADVICE (OUTPATIENT)
Dept: FAMILY MEDICINE | Facility: CLINIC | Age: 68
End: 2018-04-19

## 2018-04-19 DIAGNOSIS — R60.0 BILATERAL LEG EDEMA: ICD-10-CM

## 2018-04-19 DIAGNOSIS — I10 ESSENTIAL HYPERTENSION WITH GOAL BLOOD PRESSURE LESS THAN 140/90: ICD-10-CM

## 2018-04-19 RX ORDER — METOPROLOL SUCCINATE 50 MG/1
50 TABLET, EXTENDED RELEASE ORAL DAILY
Qty: 90 TABLET | Refills: 1 | Status: SHIPPED | OUTPATIENT
Start: 2018-04-19 | End: 2018-05-08

## 2018-04-19 RX ORDER — LISINOPRIL 20 MG/1
20 TABLET ORAL DAILY
Qty: 90 TABLET | Refills: 1 | Status: SHIPPED | OUTPATIENT
Start: 2018-04-19 | End: 2018-11-10

## 2018-05-01 NOTE — TELEPHONE ENCOUNTER
DME faxed to [facility] at [phone].  Records were faxed to [facility] yesterday and medication refills were sent 4-19-18.  Patient called and informed that all of these things have been done. Amanda Lakhani,

## 2018-05-01 NOTE — TELEPHONE ENCOUNTER
Reason for Call:  Other call back    Detailed comments: Patient is checking status of previous My Chart message. Please call.    Phone Number Patient can be reached at: Home number on file [phone] (home)    Best Time: Before 1 pm or after 6 pm    Can we leave a detailed message on this number? YES    Call taken on 5/1/2018 at 10:03 AM by Princess Soto

## 2018-05-01 NOTE — TELEPHONE ENCOUNTER
Refills were approved.    DME order for new compression stockings printed    Requesting records go through medical records dept    Silverio Paz RN

## 2018-05-02 ENCOUNTER — TELEPHONE (OUTPATIENT)
Dept: NEUROLOGY | Facility: CLINIC | Age: 68
End: 2018-05-02

## 2018-05-02 NOTE — TELEPHONE ENCOUNTER
[facility] Call Center    Phone Message    May a detailed message be left on voicemail: yes    Reason for Call: Other: Please fax order for video swallow evaluation to [facility] at F# [phone]     Action Taken: Message routed to:  [facility] ([facility]): Neurology

## 2018-05-04 ENCOUNTER — TELEPHONE (OUTPATIENT)
Dept: NEUROLOGY | Facility: CLINIC | Age: 68
End: 2018-05-04

## 2018-05-04 DIAGNOSIS — R27.0 ATAXIA: Primary | ICD-10-CM

## 2018-05-04 NOTE — TELEPHONE ENCOUNTER
Pt had called and left message that they wanted orders for video swallow sent to [facility], [phone] at [facility]. Called pt's cell, wife's cell and then pt's home phone, spoke to his wife. She said they have been going to the ataxia support meetings and they were encouraged to try other physicians. They saw Dr. Chika Holcomb and she had ordered OT and PT, but the speech therapist suggested that he have a swallow study. Orders for swallow study were in his chart. They were faxed to [facility] as requested by pt.

## 2018-05-05 ENCOUNTER — NURSE TRIAGE (OUTPATIENT)
Dept: NURSING | Facility: CLINIC | Age: 68
End: 2018-05-05

## 2018-05-05 NOTE — TELEPHONE ENCOUNTER
From [phone] Wife and Pt called .   Hypertension  today with intermittent  dizziness for 5 days and last current :  215pm  /93 pulse 98 , 235 pm /103 pulse 99 .  Triage guideline hypertension with disposition of ED and wants to go to [place] and have wife drive him .  .Eileen Multani RN [facility] nurse advisors.    Reason for Disposition    [1] BP  >= 160 / 100 AND [2] cardiac or neurologic symptoms    (e.g., chest pain, difficulty breathing, unsteady gait, blurred vision)    Additional Information    Negative: Severe difficulty breathing (e.g., struggling for each breath, speaks in single words)    Negative: [1] Chest pain lasts > 5 minutes AND [2] history of heart disease  (i.e., heart attack, bypass surgery, angina, angioplasty, CHF)    Negative: [1] Chest pain AND [2] took nitrogylcerin AND [3] pain was not relieved    Negative: Sounds like a life-threatening emergency to the triager    Negative: Symptom is main concern  (e.g., headache, chest pain)    Negative: Low blood pressure is main concern    Commented on: Difficult to awaken or acting confused  (e.g., disoriented, slurred speech)     Underlying slurred speech is normal with Ataxia .    Commented on: [1] Weakness of the face, arm or leg on one side of the body AND [2] new onset     Underlying slurred speech is normal with Ataxia .    Commented on: [1] Numbness (i.e., loss of sensation) of the face, arm or leg on one side of the body AND [2] new onset     Underlying slurred speech is normal with Ataxia .    Protocols used: HIGH BLOOD PRESSURE-ADULT-

## 2018-05-07 ENCOUNTER — TELEPHONE (OUTPATIENT)
Dept: INTERNAL MEDICINE | Facility: CLINIC | Age: 68
End: 2018-05-07

## 2018-05-07 RX ORDER — SULFASALAZINE 500 MG/1
1000 TABLET ORAL 2 TIMES DAILY
Qty: 360 TABLET | Refills: 0 | Status: CANCELLED | OUTPATIENT
Start: 2018-05-07

## 2018-05-07 RX ORDER — TAMSULOSIN HYDROCHLORIDE 0.4 MG/1
0.4 CAPSULE ORAL AT BEDTIME
Qty: 90 CAPSULE | Refills: 1 | Status: CANCELLED | OUTPATIENT
Start: 2018-05-07

## 2018-05-07 RX ORDER — DEXTROAMPHETAMINE SACCHARATE, AMPHETAMINE ASPARTATE MONOHYDRATE, DEXTROAMPHETAMINE SULFATE AND AMPHETAMINE SULFATE 7.5; 7.5; 7.5; 7.5 MG/1; MG/1; MG/1; MG/1
30 CAPSULE, EXTENDED RELEASE ORAL DAILY
Qty: 30 CAPSULE | Refills: 0 | Status: CANCELLED | OUTPATIENT
Start: 2018-05-30

## 2018-05-07 RX ORDER — HYDROCHLOROTHIAZIDE 25 MG/1
25 TABLET ORAL DAILY
Qty: 90 TABLET | Refills: 0 | Status: CANCELLED | OUTPATIENT
Start: 2018-05-07

## 2018-05-07 NOTE — TELEPHONE ENCOUNTER
"Per patient, he called the clinic over the weekend and was triaged by FNA d/t elevated BP and feeling \"foggy and dizzy.\"  He stated that SBP in the AM is usually under 110 so when he saw readings in the 160, he was alarmed  He was advised to go to the ED by FNA  Per patient, he was seen at [facility].  CT scan, EKG, blood tests, urine test were completed, \"everything checked out pretty well and they released me.\"  He stated that BP had gone down by the time they discharged him home  BP yesterday morning was 145/94, rechecked 151/92  BP this morning was 97/70, rechecked 131/84  A little bit of dizziness this morning but better now    ED f/u appointment made for tomorrow with Dr. Carpenter at 1030  Silverio Paz RN          "

## 2018-05-07 NOTE — PROGRESS NOTES
"  SUBJECTIVE:   Arnold Lozada is a 67 year old male who presents to clinic today with his wife for the following health issues:       Essential hypertension with goal blood pressure less than 140/90  Excessive sleepiness  Adjustment disorder with mixed anxiety and depressed mood  Crohn's disease of colon without complication (H)  Benign prostatic hyperplasia with urinary frequency  GUILLERMINA (obstructive sleep apnea)  Cerebral ataxia (H)     ED/ Followup:    Facility:  [facility]  Date of visit: 05/05/2018  Reason for visit: Hypertension/dizziness  Current Status: not feeling dizzy and still a little elevated blood pressure      ED on 5/5  \"Impression and Plan:  Arnold Lozada is a 67 y.o. Male with a history of HTN and cerebellar ataxia who presents with the above complaints. He has recently been experiencing some seasonal allergy symptoms and comlains of some intermittent foggy sensation in head and lightheadedness and took Pseudoephedrine today. Noted lightheaded and thus took blood pressure and noted it was elevated which concerned him as his blood pressure is usually well controlled. On workup here he has no evidence of end organ dysfunction. He is noted to have elevated BP here but without acute symptoms. I feel he is medically stable for close outpatient recheck (Monday) and advised he avoid Pseudoephedrine and may use Zyrtec and Flonase for allergy symptoms if needed. No evidence of AAA, acute intracranial pathology, or acute coronary syndrome or arrhythmia. He did have a slightly low sodium which was discussed with him.     Diagnosis:   ENCOUNTER DIAGNOSES   ICD-10-CM   1. Hypertension I10   2. Dizziness R42\"    Patient went into [facility] 3 days ago for high blood pressure. The highest was 173/111. When he left, it was lower at 159/95. He said he took Sudafed for allergies that morning and they concluded that raised his blood pressure. Last year, he went to  for high blood pressure and they added " lisinopril in addition to metoprolol and HCTZ. His wife said his blood pressures were in the 100s and increased in may. He had about four readings >140 systolic and >90 diastolic. Has fast heart rate. Denies palpitations or skipped beats.    Medical cannabis - Dr. Pena (neurologist) certified him for medical marijuana but he was not certified for chronic pain. Wants to know if it would be helpful for his conditions. He used a [place] dispensary and has tried it. See discussion     Neurology - Patient asked Dr. Pena and another out of town sub-specialist Dr. Rob Medellin the same set of questions regarding progressive ataxia and palatal tremor and he got different answers from them. Wants opinion from PCP on their input. His wife thinks Dr. Pena thinks he has done everything he can. Wants to know if it is worth seeing Dr. Medellin, [place] sub-specialist in neurology.     GUILLERMINA - Wife says he is still snoring and the mouth guard is not working as well. Wonders if he should have another sleep study. His last sleep study was in June 2016.     Additional notes:  Riding recumbent bikes 3 times a week.      Problem list and histories reviewed & adjusted, as indicated.  Additional history: as documented    Patient Active Problem List   Diagnosis     Hyperlipidemia LDL goal <130     Crohn's disease of colon (H)     Adenomatous colon polyp     Low back strain     GUILLERMINA (obstructive sleep apnea)- Mild/moderate ( AHI 16.2)     Family history of colon cancer     Advanced directives, counseling/discussion     Essential hypertension with goal blood pressure less than 140/90     Adjustment disorder with mixed anxiety and depressed mood     Cerebral ataxia (H)     Past Surgical History:   Procedure Laterality Date     COLONOSCOPY  Fall 2014    OK     HC TOOTH EXTRACTION W/FORCEP         Social History   Substance Use Topics     Smoking status: Never Smoker      Smokeless tobacco: Never Used     Alcohol use Yes      Comment: 3 times per week     Family History   Problem Relation Age of Onset     CANCER Mother      brain tumor, age 79     Other Cancer Mother      Brain tumor     Depression Mother      Asthma Mother      CANCER Father      colon     Pneumonia Father       age 90 from pneumonia and sepsis     C.A.D. Father      Age 80 have MI and triple bypass     Coronary Artery Disease Father      Heart Attack     Hypertension Father      ???     Hyperlipidemia Father      ???     Colon Cancer Father      Cured     DIABETES Maternal Grandmother          Current Outpatient Prescriptions   Medication Sig Dispense Refill     [START ON 2018] amphetamine-dextroamphetamine (ADDERALL XR) 30 MG per 24 hr capsule Take 1 capsule (30 mg) by mouth daily 30 capsule 0     aspirin 325 MG tablet Take 325 mg by mouth daily.       augmented betamethasone dipropionate (DIPROLENE-AF) 0.05 % cream Apply sparingly to affected area twice daily as needed.  Do not apply to face. 15 g 3     folic acid 800 MCG TABS Take 1 tablet by mouth daily Reported on 5/10/2017       hydrochlorothiazide (HYDRODIURIL) 25 MG tablet Take 1 tablet (25 mg) by mouth daily 90 tablet 0     lisinopril (PRINIVIL/ZESTRIL) 20 MG tablet Take 1 tablet (20 mg) by mouth daily 90 tablet 1     metoprolol succinate (TOPROL-XL) 50 MG 24 hr tablet Take 1 tablet (50 mg) by mouth daily 90 tablet 1     MULTI-VITAMIN PO TABS 1 TABLET DAILY       ORDER FOR DME Respironics REMSTAR 60 Series Auto CPAP 5-18cm H2O, Nuance pillow nasal mask large       order for DME Equipment being ordered: quadcane 1 Device 0     order for DME Equipment being ordered: shower bench [ long ] 1 Device 0     order for DME Knee high compression stockings (15-20 mmHg) 1 each 1     POTASSIUM PO Take 595 mcg by mouth daily       sertraline (ZOLOFT) 50 MG tablet Take 1 tablet (50 mg) by mouth daily 90 tablet 1     simvastatin (ZOCOR) 20 MG tablet ONE  "TABLET DAILY IN THE EVENING 90 tablet 3     sulfaSALAzine (AZULFIDINE) 500 MG tablet Take 2 tablets (1,000 mg) by mouth 2 times daily 360 tablet 0     tamsulosin (FLOMAX) 0.4 MG capsule Take 1 capsule (0.4 mg) by mouth At Bedtime 90 capsule 1     tamsulosin (FLOMAX) 0.4 MG capsule Take 2 capsules (0.8 mg) by mouth daily 180 capsule 1     traZODone (DESYREL) 50 MG tablet Take 1 tablet (50 mg) by mouth nightly as needed for sleep 90 tablet 1     Labs reviewed in EPIC    Reviewed and updated as needed this visit by clinical staff  Tobacco  Allergies  Meds  Med Hx  Surg Hx  Fam Hx  Soc Hx      Reviewed and updated as needed this visit by Provider         ROS:  Constitutional, HEENT, cardiovascular, pulmonary, GI, , musculoskeletal, neuro, skin, endocrine and psych systems are negative, except as otherwise noted.    This document serves as a record of the services and decisions personally performed and made by Nikhil Carpenter MD. It was created on his/her behalf by Pricila Hernandez, trained medical scribe. The creation of this document is based the provider's statements to the medical scribes.    Tra Hernandez 10:50 AM, May 8, 2018  OBJECTIVE:   /80  Pulse 109  Temp 98.1  F (36.7  C) (Oral)  Resp 18  Ht 1.727 m (5' 8\")  Wt 82.6 kg (182 lb)  SpO2 96%  BMI 27.67 kg/m2  Body mass index is 27.67 kg/(m^2).  GENERAL: healthy, alert and no distress  RESP: lungs clear to auscultation - no rales, rhonchi or wheezes  CV: regular rate and rhythm, normal S1 S2, no S3 or S4, no murmur, click or rub, no peripheral edema and peripheral pulses strong  NEURO: Normal strength and tone, mentation intact and speech is slurred somewhat , this is s stable abnormality secondary to his progressive cerebellar ataxia condition   PSYCH: mentation appears normal, affect normal/bright    Diagnostic Test Results:  Results for orders placed or performed in visit on 12/14/17   Lipid panel reflex to direct LDL Non-fasting   Result Value " Ref Range    Cholesterol 184 <200 mg/dL    Triglycerides 163 (H) <150 mg/dL    HDL Cholesterol 57 >39 mg/dL    LDL Cholesterol Calculated 94 <100 mg/dL    Non HDL Cholesterol 127 <130 mg/dL   Basic metabolic panel   Result Value Ref Range    Sodium 133 133 - 144 mmol/L    Potassium 4.6 3.4 - 5.3 mmol/L    Chloride 97 94 - 109 mmol/L    Carbon Dioxide 30 20 - 32 mmol/L    Anion Gap 6 3 - 14 mmol/L    Glucose 81 70 - 99 mg/dL    Urea Nitrogen 17 7 - 30 mg/dL    Creatinine 1.02 0.66 - 1.25 mg/dL    GFR Estimate 73 >60 mL/min/1.7m2    GFR Estimate If Black 88 >60 mL/min/1.7m2    Calcium 9.5 8.5 - 10.1 mg/dL   CBC with platelets differential   Result Value Ref Range    WBC 4.8 4.0 - 11.0 10e9/L    RBC Count 4.43 4.4 - 5.9 10e12/L    Hemoglobin 14.5 13.3 - 17.7 g/dL    Hematocrit 41.1 40.0 - 53.0 %    MCV 93 78 - 100 fl    MCH 32.7 26.5 - 33.0 pg    MCHC 35.3 31.5 - 36.5 g/dL    RDW 12.7 10.0 - 15.0 %    Platelet Count 198 150 - 450 10e9/L    Diff Method Automated Method     % Neutrophils 54.8 %    % Lymphocytes 24.0 %    % Monocytes 14.5 %    % Eosinophils 6.5 %    % Basophils 0.2 %    Absolute Neutrophil 2.6 1.6 - 8.3 10e9/L    Absolute Lymphocytes 1.1 0.8 - 5.3 10e9/L    Absolute Monocytes 0.7 0.0 - 1.3 10e9/L    Absolute Eosinophils 0.3 0.0 - 0.7 10e9/L    Absolute Basophils 0.0 0.0 - 0.2 10e9/L   Hemoglobin A1c   Result Value Ref Range    Hemoglobin A1C 5.2 4.3 - 6.0 %       ASSESSMENT/PLAN:     (G47.33) GUILLERMINA (obstructive sleep apnea)- Mild/moderate ( AHI 16.2)  (primary encounter diagnosis)  Comment: this patient should have further evaluation and I set him up for further sleep evaluation with sleep disorder specialist   Plan: SLEEP EVALUATION & MANAGEMENT REFERRAL - [facility] - [facility]          [phone] (Age 15 and up)            (I10) Essential hypertension with goal blood pressure less than 140/90  Comment: absolutely his blood pressure is not adequately controlled and we did  a double up on the metoprolol   Plan: metoprolol succinate (TOPROL-XL) 100 MG 24 hr         tablet        Medical assistant blood pressure recheck  In 2-3 weeks    (G47.10) Excessive sleepiness  Comment: as above   Plan: as above     (F43.23) Adjustment disorder with mixed anxiety and depressed mood  Comment: patient is coping with a difficult situation   Plan: no new orders at this point     (K50.10) Crohn's disease of colon without complication (H)  Comment: noted as a point of historical importance   Plan:     (N40.1,  R35.0) Benign prostatic hyperplasia with urinary frequency  Comment: symptoms without significant change   Plan:     (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)  Comment: we talked for a good bit of time about the intricacies of the Practice of Medicine and that it is in the end an art form. The question here at hand is one that doesn't have a right or a wrong answer  Plan: I encouraged patient to bring his questions and concerns directly to his current neurologist Dr. Pena. Patient is asking me if there's any chance he has Multiple system atrophy (MSA), Shy-Drager syndrome     Patient Instructions  Take Zyrtec, Allegra, or Xyzal for allergies instead of Sudafed.    Increase metoprolol to 100mg.    Schedule a free blood pressure recheck with a nurse in 1-2 weeks.    Schedule with a sleep specialist.       The information in this document, created by the medical scribe, Pricila Hernandez, for me, accurately reflects the services I personally performed and the decisions made by me. I have reviewed and approved this document for accuracy prior to leaving the patient care area.    Nikhil Carpenter MD  [facility]

## 2018-05-07 NOTE — TELEPHONE ENCOUNTER
Left messages on both numbers for patient to call back in regards to message below.    Mayelin Witt, CMA

## 2018-05-07 NOTE — TELEPHONE ENCOUNTER
Reason for Call:  Other call back    Detailed comments: Patient would like a call regarding blood pressure. Patient would not go into detail. Patient would like a call today please    Phone Number Patient can be reached at: Home number on file [phone] (home)    Best Time: ASAP    Can we leave a detailed message on this number? YES    Call taken on 5/7/2018 at 11:14 AM by Roseanna Dean

## 2018-05-07 NOTE — TELEPHONE ENCOUNTER
Patient called back stating he is concerned about his BP. He went to urgent care on Saturday and his blood pressure was in the 160's - didn't know the bottom number but they suggested he go to ER. Patient went to [place] and had BP of 176/99 there. Advised to follow-up with PCP. He continued to check his BP at home and the results are as follows;  159/103  145/94  151/92  97/70  131/84  141/86    Patient is wondering if he needs to be seen.    Mayelin Witt, CMA

## 2018-05-08 ENCOUNTER — OFFICE VISIT (OUTPATIENT)
Dept: FAMILY MEDICINE | Facility: CLINIC | Age: 68
End: 2018-05-08
Payer: MEDICARE

## 2018-05-08 VITALS
HEIGHT: 68 IN | BODY MASS INDEX: 27.58 KG/M2 | SYSTOLIC BLOOD PRESSURE: 138 MMHG | TEMPERATURE: 98.1 F | DIASTOLIC BLOOD PRESSURE: 80 MMHG | HEART RATE: 109 BPM | OXYGEN SATURATION: 96 % | WEIGHT: 182 LBS | RESPIRATION RATE: 18 BRPM

## 2018-05-08 DIAGNOSIS — G47.33 OSA (OBSTRUCTIVE SLEEP APNEA): Primary | Chronic | ICD-10-CM

## 2018-05-08 DIAGNOSIS — R35.0 BENIGN PROSTATIC HYPERPLASIA WITH URINARY FREQUENCY: ICD-10-CM

## 2018-05-08 DIAGNOSIS — G47.10 EXCESSIVE SLEEPINESS: ICD-10-CM

## 2018-05-08 DIAGNOSIS — K50.10 CROHN'S DISEASE OF COLON WITHOUT COMPLICATION (H): Chronic | ICD-10-CM

## 2018-05-08 DIAGNOSIS — N40.1 BENIGN PROSTATIC HYPERPLASIA WITH URINARY FREQUENCY: ICD-10-CM

## 2018-05-08 DIAGNOSIS — F43.23 ADJUSTMENT DISORDER WITH MIXED ANXIETY AND DEPRESSED MOOD: ICD-10-CM

## 2018-05-08 DIAGNOSIS — I10 ESSENTIAL HYPERTENSION WITH GOAL BLOOD PRESSURE LESS THAN 140/90: ICD-10-CM

## 2018-05-08 DIAGNOSIS — G11.9 CEREBRAL ATAXIA (H): ICD-10-CM

## 2018-05-08 PROCEDURE — 99214 OFFICE O/P EST MOD 30 MIN: CPT | Performed by: INTERNAL MEDICINE

## 2018-05-08 RX ORDER — METOPROLOL SUCCINATE 100 MG/1
100 TABLET, EXTENDED RELEASE ORAL DAILY
Qty: 90 TABLET | Refills: 1 | Status: SHIPPED | OUTPATIENT
Start: 2018-05-08 | End: 2018-11-10

## 2018-05-08 NOTE — PATIENT INSTRUCTIONS
Take Zyrtec, Allegra, or Xyzal for allergies instead of Sudafed.    Increase metoprolol to 100mg.    Schedule a free blood pressure recheck with a nurse in 1-2 weeks.    Schedule with a sleep specialist.     [facility]    If you have any questions regarding to your visit please contact your care team:     Team Pink:   Clinic Hours Telephone Number   Internal Medicine:  Dr. Lola Bauer, NP       7am-7pm  Monday - Thursday   7am-5pm  Fridays  [phone]  (Appointment scheduling available 24/7)    Questions about your recent visit?  Team Line  [phone]   [facility]gent [facility] - [facility] and [place] [facility] - 11am-9pm Monday-Friday Saturday-Sunday- 9am-5pm   [place] - 5pm-9pm Monday-Friday Saturday-Sunday- 9am-5pm  [phone] - [place]  [phone] - [place]       What options do I have for a visit other than an office visit? We offer electronic visits (e-visits) and telephone visits, when medically appropriate.  Please check with your medical insurance to see if these types of visits are covered, as you will be responsible for any charges that are not paid by your insurance.      You can use [employer] (secure electronic communication) to access to your chart, send your primary care provider a message, or make an appointment. Ask a team member how to get started.     For a price quote for your services, please call our Consumer Price Line at [phone] or our Imaging Cost estimation line at [phone] (for imaging tests).    Mayelin Witt, CMA

## 2018-05-08 NOTE — MR AVS SNAPSHOT
After Visit Summary   5/8/2018    Arnold Lozada    MRN: [id number]           Patient Information     Date Of Birth          1950        Visit Information        Provider Department      5/8/2018 10:30 AM Nikhil Carpenter MD [facility]        Today's Diagnoses     GUILLERMINA (obstructive sleep apnea)- Mild/moderate ( AHI 16.2)    -  1    Essential hypertension with goal blood pressure less than 140/90        Excessive sleepiness        Adjustment disorder with mixed anxiety and depressed mood        Crohn's disease of colon without complication (H)        Benign prostatic hyperplasia with urinary frequency          Care Instructions    Take Zyrtec, Allegra, or Xyzal for allergies instead of Sudafed.    Increase metoprolol to 100mg.    Schedule a free blood pressure recheck with a nurse in 1-2 weeks.    Schedule with a sleep specialist.     [facility]    If you have any questions regarding to your visit please contact your care team:     Team Pink:   Clinic Hours Telephone Number   Internal Medicine:  Dr. Lola Bauer NP       7am-7pm  Monday - Thursday   7am-5pm  Fridays  [phone]  (Appointment scheduling available 24/7)    Questions about your recent visit?  Team Line  [phone]   Urgent Care - [place] - 11am-9pm Monday-Friday Saturday-Sunday- 9am-5pm   [place] - 5pm-9pm Monday-Friday Saturday-Sunday- 9am-5pm  [phone] - Allie Francois  [phone] - [place]       What options do I have for a visit other than an office visit? We offer electronic visits (e-visits) and telephone visits, when medically appropriate.  Please check with your medical insurance to see if these types of visits are covered, as you will be responsible for any charges that are not paid by your insurance.      You can use [employer] (secure electronic communication) to access to your chart, send your primary care  provider a message, or make an appointment. Ask a team member how to get started.     For a price quote for your services, please call our Consumer Price Line at [phone] or our Imaging Cost estimation line at [phone] (for imaging tests).    Mayelin Witt CMA          Follow-ups after your visit        Additional Services     SLEEP EVALUATION & MANAGEMENT REFERRAL - [facility]  [phone] (Age 15 and up)       Please be aware that coverage of these services is subject to the terms and limitations of your health insurance plan.  Call member services at your health plan with any benefit or coverage questions.      Please bring the following to your appointment:    >>   List of current medications   >>   This referral request   >>   Any documents/labs given to you for this referral                      Future tests that were ordered for you today     Open Future Orders        Priority Expected Expires Ordered    SLEEP EVALUATION & MANAGEMENT REFERRAL - [facility]  [phone] (Age 15 and up) Routine  5/8/2019 5/8/2018            Who to contact     If you have questions or need follow up information about today's clinic visit or your schedule please contact [facility] directly at [phone].  Normal or non-critical lab and imaging results will be communicated to you by MyChart, letter or phone within 4 business days after the clinic has received the results. If you do not hear from us within 7 days, please contact the clinic through Twitterhart or phone. If you have a critical or abnormal lab result, we will notify you by phone as soon as possible.  Submit refill requests through [employer] or call your pharmacy and they will forward the refill request to us. Please allow 3 business days for your refill to be completed.          Additional Information About Your Visit        [employer] Information     [employer] gives you secure access  "to your electronic health record. If you see a primary care provider, you can also send messages to your care team and make appointments. If you have questions, please call your primary care clinic.  If you do not have a primary care provider, please call [phone] and they will assist you.        Care EveryWhere ID     This is your Care EveryWhere ID. This could be used by other organizations to access your [facility] medical records  [id number]        Your Vitals Were     Pulse Temperature Respirations Height Pulse Oximetry BMI (Body Mass Index)    109 98.1  F (36.7  C) (Oral) 18 5' 8\" (1.727 m) 96% 27.67 kg/m2       Blood Pressure from Last 3 Encounters:   05/08/18 138/80   12/14/17 128/88   06/27/17 126/78    Weight from Last 3 Encounters:   05/08/18 182 lb (82.6 kg)   12/14/17 182 lb (82.6 kg)   09/28/17 181 lb 6.4 oz (82.3 kg)                 Today's Medication Changes          These changes are accurate as of 5/8/18 11:17 AM.  If you have any questions, ask your nurse or doctor.               These medicines have changed or have updated prescriptions.        Dose/Directions    metoprolol succinate 100 MG 24 hr tablet   Commonly known as:  TOPROL-XL   This may have changed:    - medication strength  - how much to take   Used for:  Essential hypertension with goal blood pressure less than 140/90   Changed by:  Nikhil Carpenter MD        Dose:  100 mg   Take 1 tablet (100 mg) by mouth daily   Quantity:  90 tablet   Refills:  1            Where to get your medicines      These medications were sent to [id number] - [place] - [address]  [address] [place] [address]     Phone:  [phone]     metoprolol succinate 100 MG 24 hr tablet                Primary Care Provider Office Phone # Fax #    Nikhil Carpenter [phone] [address] [facility]  [place] [address]        Equal Access to Services     KASHIF BOUCHER AH: Ying Kirk, " wairishda luqadaha, qaybta kaalluciano daley, bernice reynagatracy ah. So [facility] [phone].    ATENCIÓN: Si bart gonzalez, tiene a gaytan disposición servicios gratuitos de asistencia lingüística. Akilah al [phone].    We comply with applicable federal civil rights laws and [place] laws. We do not discriminate on the basis of race, color, national origin, age, disability, sex, sexual orientation, or gender identity.            Thank you!     Thank you for choosing [facility]  for your care. Our goal is always to provide you with excellent care. Hearing back from our patients is one way we can continue to improve our services. Please take a few minutes to complete the written survey that you may receive in the mail after your visit with us. Thank you!             Your Updated Medication List - Protect others around you: Learn how to safely use, store and throw away your medicines at www.disposemymeds.org.          This list is accurate as of 5/8/18 11:17 AM.  Always use your most recent med list.                   Brand Name Dispense Instructions for use Diagnosis    amphetamine-dextroamphetamine 30 MG per 24 hr capsule   Start taking on:  5/30/2018    ADDERALL XR    30 capsule    Take 1 capsule (30 mg) by mouth daily    Excessive sleepiness       aspirin 325 MG tablet      Take 325 mg by mouth daily.        augmented betamethasone dipropionate 0.05 % cream    DIPROLENE-AF    15 g    Apply sparingly to affected area twice daily as needed.  Do not apply to face.    Eczema, unspecified type       folic acid 800 MCG Tabs      Take 1 tablet by mouth daily Reported on 5/10/2017        hydrochlorothiazide 25 MG tablet    HYDRODIURIL    90 tablet    Take 1 tablet (25 mg) by mouth daily    Essential hypertension with goal blood pressure less than 140/90       lisinopril 20 MG tablet    PRINIVIL/ZESTRIL    90 tablet    Take 1 tablet (20 mg) by mouth daily    Essential hypertension with goal  blood pressure less than 140/90       metoprolol succinate 100 MG 24 hr tablet    TOPROL-XL    90 tablet    Take 1 tablet (100 mg) by mouth daily    Essential hypertension with goal blood pressure less than 140/90       Multi-vitamin Tabs tablet   Generic drug:  multivitamin, therapeutic with minerals      1 TABLET DAILY        order for DME      Respironics REMSTAR 60 Series Auto CPAP 5-18cm H2O, Nuance pillow nasal mask large        * order for DME     1 Device    Equipment being ordered: quadcane    Cerebellar ataxia (H)       * order for DME     1 Device    Equipment being ordered: shower bench [ long ]    Cerebral ataxia (H)       order for DME     1 each    Knee high compression stockings (15-20 mmHg)    Bilateral leg edema       POTASSIUM PO      Take 595 mcg by mouth daily        sertraline 50 MG tablet    ZOLOFT    90 tablet    Take 1 tablet (50 mg) by mouth daily    Adjustment disorder with mixed anxiety and depressed mood       simvastatin 20 MG tablet    ZOCOR    90 tablet    ONE TABLET DAILY IN THE EVENING    Hyperlipidemia LDL goal <130       sulfaSALAzine 500 MG tablet    AZULFIDINE    360 tablet    Take 2 tablets (1,000 mg) by mouth 2 times daily    Crohn's disease of colon without complication (H)       * tamsulosin 0.4 MG capsule    FLOMAX    90 capsule    Take 1 capsule (0.4 mg) by mouth At Bedtime    Benign prostatic hyperplasia with urinary frequency       * tamsulosin 0.4 MG capsule    FLOMAX    180 capsule    Take 2 capsules (0.8 mg) by mouth daily    Benign prostatic hyperplasia with urinary frequency       traZODone 50 MG tablet    DESYREL    90 tablet    Take 1 tablet (50 mg) by mouth nightly as needed for sleep    Adjustment insomnia       * Notice:  This list has 4 medication(s) that are the same as other medications prescribed for you. Read the directions carefully, and ask your doctor or other care provider to review them with you.

## 2018-05-22 ENCOUNTER — ALLIED HEALTH/NURSE VISIT (OUTPATIENT)
Dept: NURSING | Facility: CLINIC | Age: 68
End: 2018-05-22
Payer: MEDICARE

## 2018-05-22 VITALS — RESPIRATION RATE: 20 BRPM | DIASTOLIC BLOOD PRESSURE: 76 MMHG | SYSTOLIC BLOOD PRESSURE: 120 MMHG | HEART RATE: 108 BPM

## 2018-05-22 DIAGNOSIS — I10 ESSENTIAL HYPERTENSION WITH GOAL BLOOD PRESSURE LESS THAN 140/90: Primary | ICD-10-CM

## 2018-05-22 NOTE — MR AVS SNAPSHOT
"              After Visit Summary   5/22/2018    Arnold Lozada    MRN: [id number]           Patient Information     Date Of Birth          1950        Visit Information        Provider Department      5/22/2018 10:30 AM [facility] ANCILLARY [facility]        Today's Diagnoses     Essential hypertension with goal blood pressure less than 140/90    -  1       Follow-ups after your visit        Your next 10 appointments already scheduled     May 24, 2018  1:00 PM CDT   Return Sleep Patient with HIRAM Presley   [facility] ([facility])    [address] [place] [id number] [phone]            Jun 08, 2018 11:00 AM CDT   (Arrive by 10:45 AM)   Return Ataxia with Giorgio Pena MD   [facility] ([facility] [place])    [address] [address] [phone]              Who to contact     If you have questions or need follow up information about today's clinic visit or your schedule please contact [facility] directly at [phone].  Normal or non-critical lab and imaging results will be communicated to you by MyChart, letter or phone within 4 business days after the clinic has received the results. If you do not hear from us within 7 days, please contact the clinic through MyChart or phone. If you have a critical or abnormal lab result, we will notify you by phone as soon as possible.  Submit refill requests through [employer] or call your pharmacy and they will forward the refill request to us. Please allow 3 business days for your refill to be completed.          Additional Information About Your Visit        MyChart Information     [employer] lets you send messages to your doctor, view your test results, renew your prescriptions, schedule appointments and more. To sign up, go to [email or web address] . Click on \"Log in\" on the left side of the " "screen, which will take you to the Welcome page. Then click on \"Sign up Now\" on the right side of the page.     You will be asked to enter the access code listed below, as well as some personal information. Please follow the directions to create your username and password.     Your access code is: [email or web address]  Expires: 2018 10:49 AM     Your access code will  in 90 days. If you need help or a new code, please call your [place] clinic or [phone].        Care EveryWhere ID     This is your Care EveryWhere ID. This could be used by other organizations to access your [place] medical records  [id number]        Your Vitals Were     Pulse Respirations                108 20           Blood Pressure from Last 3 Encounters:   18 120/76   18 138/80   17 128/88    Weight from Last 3 Encounters:   18 182 lb (82.6 kg)   17 182 lb (82.6 kg)   17 181 lb 6.4 oz (82.3 kg)              Today, you had the following     No orders found for display       Primary Care Provider Office Phone # Fax #    Nikhil Carpenter [phone] [address] [address]        Equal Access to Services     [facility]: Hadii aad ku hadasho Soomaali, waaxda luqadaha, qaybta kaalmada adeegyada, bernice jules haymilo cornejo . So [facility] [phone].    ATENCIÓN: Si habla español, tiene a gaytan disposición servicios gratuitos de asistencia lingüística. Llame al [phone].    We comply with applicable federal civil rights laws and [place] laws. We do not discriminate on the basis of race, color, national origin, age, disability, sex, sexual orientation, or gender identity.            Thank you!     Thank you for choosing [facility]  for your care. Our goal is always to provide you with excellent care. Hearing back from our patients is one way we can continue to improve our services. Please take a few minutes to complete the written survey that " you may receive in the mail after your visit with us. Thank you!             Your Updated Medication List - Protect others around you: Learn how to safely use, store and throw away your medicines at www.disposemymeds.org.          This list is accurate as of 5/22/18 10:49 AM.  Always use your most recent med list.                   Brand Name Dispense Instructions for use Diagnosis    amphetamine-dextroamphetamine 30 MG per 24 hr capsule   Start taking on:  5/30/2018    ADDERALL XR    30 capsule    Take 1 capsule (30 mg) by mouth daily    Excessive sleepiness       aspirin 325 MG tablet      Take 325 mg by mouth daily.        augmented betamethasone dipropionate 0.05 % cream    DIPROLENE-AF    15 g    Apply sparingly to affected area twice daily as needed.  Do not apply to face.    Eczema, unspecified type       folic acid 800 MCG Tabs      Take 1 tablet by mouth daily Reported on 5/10/2017        hydrochlorothiazide 25 MG tablet    HYDRODIURIL    90 tablet    Take 1 tablet (25 mg) by mouth daily    Essential hypertension with goal blood pressure less than 140/90       lisinopril 20 MG tablet    PRINIVIL/ZESTRIL    90 tablet    Take 1 tablet (20 mg) by mouth daily    Essential hypertension with goal blood pressure less than 140/90       metoprolol succinate 100 MG 24 hr tablet    TOPROL-XL    90 tablet    Take 1 tablet (100 mg) by mouth daily    Essential hypertension with goal blood pressure less than 140/90       Multi-vitamin Tabs tablet   Generic drug:  multivitamin, therapeutic with minerals      1 TABLET DAILY        order for DME      Respironics REMSTAR 60 Series Auto CPAP 5-18cm H2O, Nuance pillow nasal mask large        * order for DME     1 Device    Equipment being ordered: quadcane    Cerebellar ataxia (H)       * order for DME     1 Device    Equipment being ordered: shower bench [ long ]    Cerebral ataxia (H)       order for DME     1 each    Knee high compression stockings (15-20 mmHg)    Bilateral  leg edema       POTASSIUM PO      Take 595 mcg by mouth daily        sertraline 50 MG tablet    ZOLOFT    90 tablet    Take 1 tablet (50 mg) by mouth daily    Adjustment disorder with mixed anxiety and depressed mood       simvastatin 20 MG tablet    ZOCOR    90 tablet    ONE TABLET DAILY IN THE EVENING    Hyperlipidemia LDL goal <130       sulfaSALAzine 500 MG tablet    AZULFIDINE    360 tablet    Take 2 tablets (1,000 mg) by mouth 2 times daily    Crohn's disease of colon without complication (H)       * tamsulosin 0.4 MG capsule    FLOMAX    90 capsule    Take 1 capsule (0.4 mg) by mouth At Bedtime    Benign prostatic hyperplasia with urinary frequency       * tamsulosin 0.4 MG capsule    FLOMAX    180 capsule    Take 2 capsules (0.8 mg) by mouth daily    Benign prostatic hyperplasia with urinary frequency       traZODone 50 MG tablet    DESYREL    90 tablet    Take 1 tablet (50 mg) by mouth nightly as needed for sleep    Adjustment insomnia       * Notice:  This list has 4 medication(s) that are the same as other medications prescribed for you. Read the directions carefully, and ask your doctor or other care provider to review them with you.

## 2018-05-24 ENCOUNTER — OFFICE VISIT (OUTPATIENT)
Dept: SLEEP MEDICINE | Facility: CLINIC | Age: 68
End: 2018-05-24
Payer: MEDICARE

## 2018-05-24 VITALS
OXYGEN SATURATION: 94 % | SYSTOLIC BLOOD PRESSURE: 136 MMHG | HEART RATE: 98 BPM | WEIGHT: 182 LBS | HEIGHT: 68 IN | DIASTOLIC BLOOD PRESSURE: 80 MMHG | BODY MASS INDEX: 27.58 KG/M2

## 2018-05-24 DIAGNOSIS — G47.33 OSA (OBSTRUCTIVE SLEEP APNEA): Primary | ICD-10-CM

## 2018-05-24 PROCEDURE — 99214 OFFICE O/P EST MOD 30 MIN: CPT | Performed by: PHYSICIAN ASSISTANT

## 2018-05-24 PROCEDURE — G0399 HOME SLEEP TEST/TYPE 3 PORTA: HCPCS | Performed by: INTERNAL MEDICINE

## 2018-05-24 NOTE — MR AVS SNAPSHOT
After Visit Summary   5/24/2018    Arnold Lozada    MRN: [id number]           Patient Information     Date Of Birth          1950        Visit Information        Provider Department      5/24/2018 1:30 PM BK BED 5 [facility]        Today's Diagnoses     GUILLERMINA (obstructive sleep apnea)    -  1       Follow-ups after your visit        Your next 10 appointments already scheduled     May 25, 2018  9:30 AM CDT   HST Drop Off with BK SC DME   [facility] ([facility])    [address] [address] [address]   [phone]            Jun 08, 2018 11:00 AM CDT   (Arrive by 10:45 AM)   Return Ataxia with Giorgio Pena MD   [facility] ([facility])    [address] [place] [id number] [phone]              Future tests that were ordered for you today     Open Future Orders        Priority Expected Expires Ordered    HST-Home Sleep Apnea Test Routine  11/23/2018 5/24/2018            Who to contact     If you have questions or need follow up information about today's clinic visit or your schedule please contact [facility] directly at [phone].  Normal or non-critical lab and imaging results will be communicated to you by Green Biofactoryhart, letter or phone within 4 business days after the clinic has received the results. If you do not hear from us within 7 days, please contact the clinic through [employer] or phone. If you have a critical or abnormal lab result, we will notify you by phone as soon as possible.  Submit refill requests through Misc[employer] or call your pharmacy and they will forward the refill request to us. Please allow 3 business days for your refill to be completed.          Additional Information About Your Visit        [employer] Information     Miscota lets you send messages to your doctor, view your test results, renew your  "prescriptions, schedule appointments and more. To sign up, go to [email or web address] . Click on \"Log in\" on the left side of the screen, which will take you to the Welcome page. Then click on \"Sign up Now\" on the right side of the page.     You will be asked to enter the access code listed below, as well as some personal information. Please follow the directions to create your username and password.     Your access code is: [email or web address]  Expires: 2018 10:49 AM     Your access code will  in 90 days. If you need help or a new code, please call your [place] clinic or [phone].        Care EveryWhere ID     This is your Care EveryWhere ID. This could be used by other organizations to access your [place] medical records  [id number]         Blood Pressure from Last 3 Encounters:   18 142/80   18 120/76   18 138/80    Weight from Last 3 Encounters:   18 82.6 kg (182 lb)   18 82.6 kg (182 lb)   17 82.6 kg (182 lb)              Today, you had the following     No orders found for display       Primary Care Provider Office Phone # Fax #    Nikhil Carpenter [phone] [address] [facility] [address]        Equal Access to Services     [facility]: Hadii aad ku hadasho Soaxelali, waaxda luqadaha, qaybta kaalmada adeegyada, bernice cornejo . So [facility] [phone].    ATENCIÓN: Si habla español, tiene a gaytan disposición servicios gratuitos de asistencia lingüística. Akilah al [phone].    We comply with applicable federal civil rights laws and [place] laws. We do not discriminate on the basis of race, color, national origin, age, disability, sex, sexual orientation, or gender identity.            Thank you!     Thank you for choosing [facility]  for your care. Our goal is always to provide you with excellent care. Hearing back from our patients is one way we can continue to improve our services. " Please take a few minutes to complete the written survey that you may receive in the mail after your visit with us. Thank you!             Your Updated Medication List - Protect others around you: Learn how to safely use, store and throw away your medicines at www.disposemymeds.org.          This list is accurate as of 5/24/18  2:20 PM.  Always use your most recent med list.                   Brand Name Dispense Instructions for use Diagnosis    amphetamine-dextroamphetamine 30 MG per 24 hr capsule   Start taking on:  5/30/2018    ADDERALL XR    30 capsule    Take 1 capsule (30 mg) by mouth daily    Excessive sleepiness       aspirin 325 MG tablet      Take 325 mg by mouth daily.        augmented betamethasone dipropionate 0.05 % cream    DIPROLENE-AF    15 g    Apply sparingly to affected area twice daily as needed.  Do not apply to face.    Eczema, unspecified type       folic acid 800 MCG Tabs      Take 1 tablet by mouth daily Reported on 5/10/2017        hydrochlorothiazide 25 MG tablet    HYDRODIURIL    90 tablet    Take 1 tablet (25 mg) by mouth daily    Essential hypertension with goal blood pressure less than 140/90       lisinopril 20 MG tablet    PRINIVIL/ZESTRIL    90 tablet    Take 1 tablet (20 mg) by mouth daily    Essential hypertension with goal blood pressure less than 140/90       metoprolol succinate 100 MG 24 hr tablet    TOPROL-XL    90 tablet    Take 1 tablet (100 mg) by mouth daily    Essential hypertension with goal blood pressure less than 140/90       Multi-vitamin Tabs tablet   Generic drug:  multivitamin, therapeutic with minerals      1 TABLET DAILY        order for DME      Respironics REMSTAR 60 Series Auto CPAP 5-18cm H2O, Nuance pillow nasal mask large        * order for DME     1 Device    Equipment being ordered: quadcane    Cerebellar ataxia (H)       * order for DME     1 Device    Equipment being ordered: shower bench [ long ]    Cerebral ataxia (H)       order for DME     1 each     Knee high compression stockings (15-20 mmHg)    Bilateral leg edema       POTASSIUM PO      Take 595 mcg by mouth daily        sertraline 50 MG tablet    ZOLOFT    90 tablet    Take 1 tablet (50 mg) by mouth daily    Adjustment disorder with mixed anxiety and depressed mood       simvastatin 20 MG tablet    ZOCOR    90 tablet    ONE TABLET DAILY IN THE EVENING    Hyperlipidemia LDL goal <130       sulfaSALAzine 500 MG tablet    AZULFIDINE    360 tablet    Take 2 tablets (1,000 mg) by mouth 2 times daily    Crohn's disease of colon without complication (H)       * tamsulosin 0.4 MG capsule    FLOMAX    90 capsule    Take 1 capsule (0.4 mg) by mouth At Bedtime    Benign prostatic hyperplasia with urinary frequency       * tamsulosin 0.4 MG capsule    FLOMAX    180 capsule    Take 2 capsules (0.8 mg) by mouth daily    Benign prostatic hyperplasia with urinary frequency       traZODone 50 MG tablet    DESYREL    90 tablet    Take 1 tablet (50 mg) by mouth nightly as needed for sleep    Adjustment insomnia       * Notice:  This list has 4 medication(s) that are the same as other medications prescribed for you. Read the directions carefully, and ask your doctor or other care provider to review them with you.

## 2018-05-24 NOTE — PROGRESS NOTES
Obstructive Sleep Apnea- PAP Follow-Up Visit:    Chief Complaint   Patient presents with     Sleep Problem     discuss dental device       Arnold Lozada comes in today for follow-up of their mild to moderate sleep apnea, managed with mandibular advancement device.     He had a new mandibular advancement device made about a year ago. He was recommended to have a sleep study done to evaluate GUILLERMINA while using mandibular advancement device. He notes snoring but not witnessed apnea. He takes Adderall for daytime sleepiness.  He has no sleep behavior disorder or any other sleep complaints.     He is not interested in PAP therapy.     Pertinent medical history included cerebral ataxia, palatal tremor and HTN.    In review:      A polysomnogram completed on 11/20/2013.  During the first portion of the study night the patient was found to have mild to moderate GUILLERMINA, principally in the supine position (AHI 16.2, RDI 17.6, O2, non-supine AHI 5, supine AHI 53, charisse 88%) and therefore a CPAP titration was attempted during the second portion of the study night.  Patient was placed on CPAP treatment but it was not well-tolerated. No sleep was attained and attempt was terminated.    He initially opted to pursue CPAP.  He reported that his ultimate goal was to use a mandibular advancement device and positional therapy but he was leaving for [place] and could not get in to a dental specialist before. He started CPAP 5-18 cm/H20 on 12/9/2013.  He arrived back from [place] and ultimately returned CPAP and pursued a mandibular advancement device.    Subsequent home sleep test to evaluate effectiveness of oral appliance:  Home Sleep Apnea Testing with oral appliance.- 11/10/16: 161 lbs 0 oz: AHI 1.5/hr. Supine AHI 0.0/hr. Left 0.2. Right 2.8. Oxygen Charisse of 89%.  Baseline 93.5%.  Sp02 =< 88% for 0 minutes    Home sleep test 5/18/2016: With oral appliance. 161 lbs 0 oz: AHI  12.9.  Supine AHI 31.9. Oxygen Charisse of 86% Baseline  92.5%.  Sp02 <88% for 19 minutes      Past medical/surgical history, family history, social history, medications and allergies were reviewed.      Problem List:  Patient Active Problem List    Diagnosis Date Noted     Cerebral ataxia (H) 12/26/2016     Priority: Medium     Adjustment disorder with mixed anxiety and depressed mood 08/24/2016     Priority: Medium     Essential hypertension with goal blood pressure less than 140/90 08/08/2016     Priority: Medium     Family history of colon cancer 12/03/2013     Priority: Medium     GUILLERMINA (obstructive sleep apnea)- Mild/moderate ( AHI 16.2) 11/24/2013     Priority: Medium     Home Sleep Apnea Testing with oral appliance.- 11/10/16: 161 lbs 0 oz: AHI 1.5/hr. Supine AHI 0.0/hr. Left 0.2. Right 2.8. Oxygen Vivek of 89%.  Baseline 93.5%.  Sp02 =< 88% for 0 minutes    Home sleep test 5/18/2016: With oral appliance. 161 lbs 0 oz: AHI  12.9.  Supine AHI 31.9. Oxygen Vivek of 86% Baseline 92.5%.  Sp02 <88% for 19 minutes  Study Date: 11/20/2013- 161.0 lbs.  The lowest oxygen saturation was 88.0%. Apnea/Hypopnea Index was 16.2 events per hour (mostly supine). RDI was 17.6       Adenomatous colon polyp 11/11/2011     Priority: Medium     Due for colonoscopy in November 2012       Crohn's disease of colon (H)      Priority: Medium     sees Dr. Giordano about once a year and had a colonoscopy in 2010       Hyperlipidemia LDL goal <130 10/31/2010     Priority: Medium     Advanced directives, counseling/discussion 12/04/2013     Priority: Low     Advance Care Planning 12/18/2015: Receipt of ACP document:  Received: Health Care Directive which was witnessed or notarized on 12/12/14.  Document not previously scanned.  Validation form completed and sent with document to be scanned.  Code Status reflects choices in most recent ACP document.  Confirmed/documented designated decision maker(s).  Added by Yessenia Hill    Advance Care Planning:   ACP Review and Resources Provided:   "Reviewed chart for advance care plan.  Arnold Lozada has no plan or code status on file. Discussed available resources and provided with information. Confirmed code status reflects current choices pending further ACP discussions.  Confirmed/documented designated decision maker(s). See permanent comments section of demographics in clinical tab. Added by Melissa Behl on 12/4/2013            Low back strain 07/16/2012     Priority: Low        /80  Pulse 98  Ht 1.727 m (5' 8\")  Wt 82.6 kg (182 lb)  SpO2 94%  BMI 27.67 kg/m2    Impression/Plan:  Mild to moderate obstructive sleep apnea, treated with mandibular advancement device-  Home sleep apnea test to evaluate efficacy of his oral appliance.   Will call him with the results.    Twenty-five minutes spent with patient, all of which were spent face-to-face counseling, consulting, coordinating plan of care regarding GUILLERMINA.      Nikita Guzmán PA-C      "

## 2018-05-24 NOTE — NURSING NOTE
"Chief Complaint   Patient presents with     Sleep Problem     discuss dental device       Initial /80  Pulse 98  Ht 1.727 m (5' 8\")  Wt 82.6 kg (182 lb)  SpO2 94%  BMI 27.67 kg/m2 Estimated body mass index is 27.67 kg/(m^2) as calculated from the following:    Height as of this encounter: 1.727 m (5' 8\").    Weight as of this encounter: 82.6 kg (182 lb).    Medication Reconciliation: complete      "

## 2018-05-24 NOTE — PATIENT INSTRUCTIONS

## 2018-05-24 NOTE — Clinical Note
Fairly mild.  Agree with Vero there are a few 3% events (probably still under AHI 15 with 3%). Consider further advancement if able and repeat Home Sleep Apnea Testing if he's interested. Anand Felix

## 2018-05-24 NOTE — MR AVS SNAPSHOT
After Visit Summary   5/24/2018    Arnold Lozada    MRN: [id number]           Patient Information     Date Of Birth          1950        Visit Information        Provider Department      5/24/2018 1:00 PM Nikita Guzmán PA [facility]        Today's Diagnoses     GUILLERMINA (obstructive sleep apnea)    -  1      Care Instructions      Your BMI is Body mass index is 27.67 kg/(m^2).  Weight management is a personal decision.  If you are interested in exploring weight loss strategies, the following discussion covers the approaches that may be successful. Body mass index (BMI) is one way to tell whether you are at a healthy weight, overweight, or obese. It measures your weight in relation to your height.  A BMI of 18.5 to 24.9 is in the healthy range. A person with a BMI of 25 to 29.9 is considered overweight, and someone with a BMI of 30 or greater is considered obese. More than two-thirds of American adults are considered overweight or obese.  Being overweight or obese increases the risk for further weight gain. Excess weight may lead to heart disease and diabetes.  Creating and following plans for healthy eating and physical activity may help you improve your health.  Weight control is part of healthy lifestyle and includes exercise, emotional health, and healthy eating habits. Careful eating habits lifelong are the mainstay of weight control. Though there are significant health benefits from weight loss, long-term weight loss with diet alone may be very difficult to achieve- studies show long-term success with dietary management in less than 10% of people. Attaining a healthy weight may be especially difficult to achieve in those with severe obesity. In some cases, medications, devices and surgical management might be considered.  What can you do?  If you are overweight or obese and are interested in methods for weight loss, you should discuss this with your provider.     Consider  reducing daily calorie intake by 500 calories.     Keep a food journal.     Avoiding skipping meals, consider cutting portions instead.    Diet combined with exercise helps maintain muscle while optimizing fat loss. Strength training is particularly important for building and maintaining muscle mass. Exercise helps reduce stress, increase energy, and improves fitness. Increasing exercise without diet control, however, may not burn enough calories to loose weight.       Start walking three days a week 10-20 minutes at a time    Work towards walking thirty minutes five days a week     Eventually, increase the speed of your walking for 1-2 minutes at time    In addition, we recommend that you review healthy lifestyles and methods for weight loss available through the National Institutes of Health patient information sites:  http://win.niddk.nih.gov/publications/index.htm    And look into health and wellness programs that may be available through your health insurance provider, employer, local community center, or kashmir club.    Weight management plan: Patient was referred to their PCP to discuss a diet and exercise plan.              Follow-ups after your visit        Your next 10 appointments already scheduled     May 25, 2018  9:30 AM CDT   HST Drop Off with ANUPAM SEVERINO   [facility] ([facility])    [address] [place] [id number]   [phone]            Jun 08, 2018 11:00 AM CDT   (Arrive by 10:45 AM)   Return Ataxia with Giorgio Pena MD   [facility] ([facility])    [address] [id number] [phone]              Future tests that were ordered for you today     Open Future Orders        Priority Expected Expires Ordered    HST-Home Sleep Apnea Test Routine  11/23/2018 5/24/2018            Who to contact     If you have questions or need follow up information  "about today's clinic visit or your schedule please contact [facility] directly at [phone].  Normal or non-critical lab and imaging results will be communicated to you by ACE Portalhart, letter or phone within 4 business days after the clinic has received the results. If you do not hear from us within 7 days, please contact the clinic through ACE Portalhart or phone. If you have a critical or abnormal lab result, we will notify you by phone as soon as possible.  Submit refill requests through [employer] or call your pharmacy and they will forward the refill request to us. Please allow 3 business days for your refill to be completed.          Additional Information About Your Visit        [employer] Information     [employer] lets you send messages to your doctor, view your test results, renew your prescriptions, schedule appointments and more. To sign up, go to [email or web address] . Click on \"Log in\" on the left side of the screen, which will take you to the Welcome page. Then click on \"Sign up Now\" on the right side of the page.     You will be asked to enter the access code listed below, as well as some personal information. Please follow the directions to create your username and password.     Your access code is: [email or web address]  Expires: 2018 10:49 AM     Your access code will  in 90 days. If you need help or a new code, please call your [place] clinic or [phone].        Care EveryWhere ID     This is your Care EveryWhere ID. This could be used by other organizations to access your [place] medical records  [id number]        Your Vitals Were     Pulse Height Pulse Oximetry BMI (Body Mass Index)          98 1.727 m (5' 8\") 94% 27.67 kg/m2         Blood Pressure from Last 3 Encounters:   18 142/80   18 120/76   18 138/80    Weight from Last 3 Encounters:   18 82.6 kg (182 lb)   18 82.6 kg (182 lb)   17 82.6 kg (182 lb)               Primary Care Provider Office " Phone # Fax #    Nikhil Carpenter [phone]       [address] [facility]  [place] [address]        Equal Access to Services     SAMSONJORGE CITLALY : Ying johanna steinberg belemtran Soreji, waaxda luqadaha, qaybta kaalmada thuy, bernice fleming briannealex oswald laJasmynmilo monsalve. So [facility] [phone].    ATENCIÓN: Si habla español, tiene a gaytan disposición servicios gratuitos de asistencia lingüística. Llame al [phone].    We comply with applicable federal civil rights laws and [place] laws. We do not discriminate on the basis of race, color, national origin, age, disability, sex, sexual orientation, or gender identity.            Thank you!     Thank you for choosing [facility]  for your care. Our goal is always to provide you with excellent care. Hearing back from our patients is one way we can continue to improve our services. Please take a few minutes to complete the written survey that you may receive in the mail after your visit with us. Thank you!             Your Updated Medication List - Protect others around you: Learn how to safely use, store and throw away your medicines at www.disposemymeds.org.          This list is accurate as of 5/24/18  1:54 PM.  Always use your most recent med list.                   Brand Name Dispense Instructions for use Diagnosis    amphetamine-dextroamphetamine 30 MG per 24 hr capsule   Start taking on:  5/30/2018    ADDERALL XR    30 capsule    Take 1 capsule (30 mg) by mouth daily    Excessive sleepiness       aspirin 325 MG tablet      Take 325 mg by mouth daily.        augmented betamethasone dipropionate 0.05 % cream    DIPROLENE-AF    15 g    Apply sparingly to affected area twice daily as needed.  Do not apply to face.    Eczema, unspecified type       folic acid 800 MCG Tabs      Take 1 tablet by mouth daily Reported on 5/10/2017        hydrochlorothiazide 25 MG tablet    HYDRODIURIL    90 tablet    Take 1 tablet (25 mg) by mouth daily    Essential  hypertension with goal blood pressure less than 140/90       lisinopril 20 MG tablet    PRINIVIL/ZESTRIL    90 tablet    Take 1 tablet (20 mg) by mouth daily    Essential hypertension with goal blood pressure less than 140/90       metoprolol succinate 100 MG 24 hr tablet    TOPROL-XL    90 tablet    Take 1 tablet (100 mg) by mouth daily    Essential hypertension with goal blood pressure less than 140/90       Multi-vitamin Tabs tablet   Generic drug:  multivitamin, therapeutic with minerals      1 TABLET DAILY        order for DME      Respironics REMSTAR 60 Series Auto CPAP 5-18cm H2O, Nuance pillow nasal mask large        * order for DME     1 Device    Equipment being ordered: quadcane    Cerebellar ataxia (H)       * order for DME     1 Device    Equipment being ordered: shower bench [ long ]    Cerebral ataxia (H)       order for DME     1 each    Knee high compression stockings (15-20 mmHg)    Bilateral leg edema       POTASSIUM PO      Take 595 mcg by mouth daily        sertraline 50 MG tablet    ZOLOFT    90 tablet    Take 1 tablet (50 mg) by mouth daily    Adjustment disorder with mixed anxiety and depressed mood       simvastatin 20 MG tablet    ZOCOR    90 tablet    ONE TABLET DAILY IN THE EVENING    Hyperlipidemia LDL goal <130       sulfaSALAzine 500 MG tablet    AZULFIDINE    360 tablet    Take 2 tablets (1,000 mg) by mouth 2 times daily    Crohn's disease of colon without complication (H)       * tamsulosin 0.4 MG capsule    FLOMAX    90 capsule    Take 1 capsule (0.4 mg) by mouth At Bedtime    Benign prostatic hyperplasia with urinary frequency       * tamsulosin 0.4 MG capsule    FLOMAX    180 capsule    Take 2 capsules (0.8 mg) by mouth daily    Benign prostatic hyperplasia with urinary frequency       traZODone 50 MG tablet    DESYREL    90 tablet    Take 1 tablet (50 mg) by mouth nightly as needed for sleep    Adjustment insomnia       * Notice:  This list has 4 medication(s) that are the same  as other medications prescribed for you. Read the directions carefully, and ask your doctor or other care provider to review them with you.

## 2018-05-25 ENCOUNTER — APPOINTMENT (OUTPATIENT)
Dept: SLEEP MEDICINE | Facility: CLINIC | Age: 68
End: 2018-05-25
Payer: MEDICARE

## 2018-05-25 NOTE — PROGRESS NOTES
This HST performed using a Noxturnal T3 device which recorded snore sound, movement activity, body position, nasal pressure, oronasal thermal airflow, pulse, oximetry, both chest and abdominal respiratory effort. Patient was scored using 1B 4% rule.  AHI 6.7  Test with Mandibular advancement device used.

## 2018-05-25 NOTE — PROCEDURES
"HOME SLEEP STUDY INTERPRETATION    Patient: Arnold Lozada  MRN: [id number]  YOB: 1950  Study Date: 5/24/2018  Referring Provider: Nikhil Carpenter;   Ordering Provider: HIRAM Rivera     Indications for Home Study: Arnold Lozada is a 67 year old male with a history of cerebellar ataxia and mild GUILLERMINA who presents with symptoms suggestive of obstructive sleep apnea and titration of Mandibular Advancement Device.    Estimated body mass index is 27.67 kg/(m^2) as calculated from the following:    Height as of an earlier encounter on 5/24/18: 1.727 m (5' 8\").    Weight as of an earlier encounter on 5/24/18: 82.6 kg (182 lb).  Total score - [place]: 2 (5/24/2018  1:00 PM)    Data: A full night home sleep study was performed recording the standard physiologic parameters including body position, movement, sound, nasal pressure, thermal oral airflow, chest and abdominal movements with respiratory inductance plethysmography, and oxygen saturation by pulse oximetry. Pulse rate was estimated by oximetry recording. This study was considered adequate based on > 4 hours of quality oximetry and respiratory recording. As specified by the AASM Manual for the Scoring of Sleep and Associated events, version 2.3, Rule VIII.D 1B, 4% oxygen desaturation scoring for hypopneas is used as a standard of care on all home sleep apnea testing.    Analysis Time:  565.8 minutes    Respiration:   Sleep Associated Hypoxemia: sustained hypoxemia was present. Baseline oxygen saturation was 92.2%.  Time with saturation less than or equal to 88% was 19.8 minutes. The lowest oxygen saturation was 70%.   Snoring: Snoring was present.  Respiratory events: The home study revealed a presence of 14 obstructive apneas and 7 mixed and central apneas. There were 42 hypopneas resulting in a combined apnea/hypopnea index [AHI] of 6.4 events per hour.  AHI was 7.7 per hour supine, - per hour prone, 9.0 per hour on left side, and 2.9 per " hour on right side.   Pattern: Excluding events noted above, respiratory rate and pattern was Normal.    Position: Percent of time spent: supine - 19.9%, prone - 0%, on left - 44.3%, on right - 32.2%.    Heart Rate: By pulse oximetry normal rate was noted.     Assessment:   Mild obstructive sleep apnea despite Mandibular Advancement Device.  Sleep associated hypoxemia was present.    Recommendations:  Consider advancement of oral appliance and retest, alternative therapy, combination therapy or continuation of current therapy plan (based on symptoms).  Suggest optimizing sleep hygiene and avoiding sleep deprivation.  Weight management.    Diagnosis Code(s): Obstructive Sleep Apnea G47.33, Hypoxemia G47.36    Isidro Chaves MD, May 25, 2018   Diplomate, American Board of Internal Medicine, Sleep Medicine

## 2018-05-30 ENCOUNTER — TELEPHONE (OUTPATIENT)
Dept: NEUROLOGY | Facility: CLINIC | Age: 68
End: 2018-05-30

## 2018-05-30 ENCOUNTER — MEDICAL CORRESPONDENCE (OUTPATIENT)
Dept: HEALTH INFORMATION MANAGEMENT | Facility: CLINIC | Age: 68
End: 2018-05-30

## 2018-05-31 NOTE — PROGRESS NOTES
I called the patient and reviewed HST results:    5/24/2018 [facility] Sleep Apnea Testing with oral appliance - AHI 6.7/hr; Supine AHI 7.7/hr; SpO2 <= 88% for 19.8 minutes.     He reports that the oral appliance was not maximally titrated, but has since done so.    Assessment:  Mild GUILLERMINA with sleep related hypoxia.  We reviewed treatment options.  Plan:   Overnight oximetry to evaluate hypoxia.  If that is normal. He will continue on MAD.     Nikita Guzmán PA-C

## 2018-06-01 NOTE — TELEPHONE ENCOUNTER
Called Anny's phone at [phone] and left message that clinic notes from her visit with pt had been received and it is understood that pt needs a face to face exam to receive a jason wheel chair.

## 2018-06-03 ENCOUNTER — MYC MEDICAL ADVICE (OUTPATIENT)
Dept: FAMILY MEDICINE | Facility: CLINIC | Age: 68
End: 2018-06-03

## 2018-06-06 ENCOUNTER — OFFICE VISIT (OUTPATIENT)
Dept: SLEEP MEDICINE | Facility: CLINIC | Age: 68
End: 2018-06-06
Payer: MEDICARE

## 2018-06-06 DIAGNOSIS — G47.33 OSA (OBSTRUCTIVE SLEEP APNEA): Primary | ICD-10-CM

## 2018-06-06 ASSESSMENT — ENCOUNTER SYMPTOMS
FATIGUE: 1
NIGHT SWEATS: 0
SPUTUM PRODUCTION: 0
HEADACHES: 0
SNORES LOUDLY: 1
TREMORS: 0
DISTURBANCES IN COORDINATION: 1
HEMOPTYSIS: 0
POLYPHAGIA: 0
CHILLS: 0
POLYDIPSIA: 0
WHEEZING: 0
PARALYSIS: 0
COUGH: 0
FLANK PAIN: 0
POSTURAL DYSPNEA: 0
SEIZURES: 0
WEIGHT LOSS: 0
WEIGHT GAIN: 0
HALLUCINATIONS: 0
TINGLING: 1
INCREASED ENERGY: 1
DIZZINESS: 1
MEMORY LOSS: 0
DYSURIA: 0
LOSS OF CONSCIOUSNESS: 0
SHORTNESS OF BREATH: 0
DYSPNEA ON EXERTION: 0
FEVER: 0
COUGH DISTURBING SLEEP: 0
WEAKNESS: 1
DECREASED APPETITE: 0
ALTERED TEMPERATURE REGULATION: 0
DIFFICULTY URINATING: 0
NUMBNESS: 1
HEMATURIA: 0
SPEECH CHANGE: 1

## 2018-06-06 NOTE — MR AVS SNAPSHOT
After Visit Summary   6/6/2018    Arnold Lozada    MRN: [id number]           Patient Information     Date Of Birth          1950        Visit Information        Provider Department      6/6/2018 3:00 AM [facility] BED 5 [facility]        Today's Diagnoses     GUILLERMINA (obstructive sleep apnea)    -  1       Follow-ups after your visit        Your next 10 appointments already scheduled     Jun 07, 2018 10:00 AM CDT   Oximetry Drop Off with [facility] DME   [facility] ([facility] [place]    [address] [address] [id number] [phone]            Jun 08, 2018 11:00 AM CDT   (Arrive by 10:45 AM)   Return Ataxia with Giorgio Pena MD   [facility] ([facility])    [address] [address] [phone]              Who to contact     If you have questions or need follow up information about today's clinic visit or your schedule please contact [facility] directly at [phone].  Normal or non-critical lab and imaging results will be communicated to you by MyChart, letter or phone within 4 business days after the clinic has received the results. If you do not hear from us within 7 days, please contact the clinic through [facility] or phone. If you have a critical or abnormal lab result, we will notify you by phone as soon as possible.  Submit refill requests through [facility] or call your pharmacy and they will forward the refill request to us. Please allow 3 business days for your refill to be completed.          Additional Information About Your Visit        MyChart Information     [employer] gives you secure access to your electronic health record. If you see a primary care provider, you can also send messages to your care team and make appointments. If you have questions, please call your primary care clinic.  If you do not have a primary  care provider, please call [phone] and they will assist you.        Care EveryWhere ID     This is your Care EveryWhere ID. This could be used by other organizations to access your [facility] medical records  [id number]         Blood Pressure from Last 3 Encounters:   05/24/18 136/80   05/22/18 120/76   05/08/18 138/80    Weight from Last 3 Encounters:   05/24/18 82.6 kg (182 lb)   05/08/18 82.6 kg (182 lb)   12/14/17 82.6 kg (182 lb)              Today, you had the following     No orders found for display       Primary Care Provider Office Phone # Fax #    Nikhil Carpenter [phone]       [address]  [place] [address]        Equal Access to Services     [facility]: Hadii aad idris hadasho Soomaali, waaxda luqadaha, qaybta kaalmada adeegyada, bernice jules haymilo cornejo . So [facility] [phone].    ATENCIÓN: Si habla español, tiene a gaytan disposición servicios gratuitos de asistencia lingüística. Akilah al [phone].    We comply with applicable federal civil rights laws and [place] laws. We do not discriminate on the basis of race, color, national origin, age, disability, sex, sexual orientation, or gender identity.            Thank you!     Thank you for choosing [facility]  for your care. Our goal is always to provide you with excellent care. Hearing back from our patients is one way we can continue to improve our services. Please take a few minutes to complete the written survey that you may receive in the mail after your visit with us. Thank you!             Your Updated Medication List - Protect others around you: Learn how to safely use, store and throw away your medicines at www.disposemymeds.org.          This list is accurate as of 6/6/18  3:25 PM.  Always use your most recent med list.                   Brand Name Dispense Instructions for use Diagnosis    amphetamine-dextroamphetamine 30 MG per 24 hr capsule    ADDERALL XR    30 capsule     Take 1 capsule (30 mg) by mouth daily    Excessive sleepiness       aspirin 325 MG tablet      Take 325 mg by mouth daily.        augmented betamethasone dipropionate 0.05 % cream    DIPROLENE-AF    15 g    Apply sparingly to affected area twice daily as needed.  Do not apply to face.    Eczema, unspecified type       folic acid 800 MCG Tabs      Take 1 tablet by mouth daily Reported on 5/10/2017        hydrochlorothiazide 25 MG tablet    HYDRODIURIL    90 tablet    TAKE ONE TABLET BY MOUTH ONCE DAILY    Essential hypertension with goal blood pressure less than 140/90       lisinopril 20 MG tablet    PRINIVIL/ZESTRIL    90 tablet    Take 1 tablet (20 mg) by mouth daily    Essential hypertension with goal blood pressure less than 140/90       metoprolol succinate 100 MG 24 hr tablet    TOPROL-XL    90 tablet    Take 1 tablet (100 mg) by mouth daily    Essential hypertension with goal blood pressure less than 140/90       Multi-vitamin Tabs tablet   Generic drug:  multivitamin, therapeutic with minerals      1 TABLET DAILY        order for DME      Respironics REMSTAR 60 Series Auto CPAP 5-18cm H2O, Nuance pillow nasal mask large        * order for DME     1 Device    Equipment being ordered: quadcane    Cerebellar ataxia (H)       * order for DME     1 Device    Equipment being ordered: shower bench [ long ]    Cerebral ataxia (H)       order for DME     1 each    Knee high compression stockings (15-20 mmHg)    Bilateral leg edema       POTASSIUM PO      Take 595 mcg by mouth daily        sertraline 50 MG tablet    ZOLOFT    90 tablet    Take 1 tablet (50 mg) by mouth daily    Adjustment disorder with mixed anxiety and depressed mood       simvastatin 20 MG tablet    ZOCOR    90 tablet    ONE TABLET DAILY IN THE EVENING    Hyperlipidemia LDL goal <130       sulfaSALAzine 500 MG tablet    AZULFIDINE    360 tablet    Take 2 tablets (1,000 mg) by mouth 2 times daily    Crohn's disease of colon without complication (H)        * tamsulosin 0.4 MG capsule    FLOMAX    90 capsule    Take 1 capsule (0.4 mg) by mouth At Bedtime    Benign prostatic hyperplasia with urinary frequency       * tamsulosin 0.4 MG capsule    FLOMAX    180 capsule    Take 2 capsules (0.8 mg) by mouth daily    Benign prostatic hyperplasia with urinary frequency       traZODone 50 MG tablet    DESYREL    90 tablet    Take 1 tablet (50 mg) by mouth nightly as needed for sleep    Adjustment insomnia       * Notice:  This list has 4 medication(s) that are the same as other medications prescribed for you. Read the directions carefully, and ask your doctor or other care provider to review them with you.

## 2018-06-07 ENCOUNTER — DOCUMENTATION ONLY (OUTPATIENT)
Dept: SLEEP MEDICINE | Facility: CLINIC | Age: 68
End: 2018-06-07
Payer: MEDICARE

## 2018-06-07 NOTE — PROGRESS NOTES
Overnight oximetry completed by patient. Sent to scanning copy to HIRAM Rivera.    Recording Date: 6/6/2018    Duration: 08:20:00    Note: include mandibular advancement device

## 2018-06-07 NOTE — PROGRESS NOTES
Oximetry results were obtained for the date of 6/6/2018.  The patient was on a treatment of mandibular advancement device. The study showed a valid recording time of 8 hours and 20 minutes with a 4% desaturation index of 1.8.  The basal oxygen saturation was 92.3% and the lowest SpO2 was 87%.  The patient spent 2.9 minutes below 88% SpO2.    A/P:  No significant hypoxia on mandibular advancement device.   Continue using  MAD and follow up in 6 months to a year.     Nikita Guzmán PA-C

## 2018-06-08 ENCOUNTER — OFFICE VISIT (OUTPATIENT)
Dept: NEUROLOGY | Facility: CLINIC | Age: 68
End: 2018-06-08
Payer: COMMERCIAL

## 2018-06-08 VITALS
BODY MASS INDEX: 26.93 KG/M2 | WEIGHT: 177.7 LBS | HEIGHT: 68 IN | HEART RATE: 90 BPM | SYSTOLIC BLOOD PRESSURE: 148 MMHG | OXYGEN SATURATION: 95 % | DIASTOLIC BLOOD PRESSURE: 81 MMHG | TEMPERATURE: 98 F | RESPIRATION RATE: 24 BRPM

## 2018-06-08 DIAGNOSIS — R27.0 ATAXIA: Primary | ICD-10-CM

## 2018-06-08 DIAGNOSIS — G47.10 EXCESSIVE SLEEPINESS: ICD-10-CM

## 2018-06-08 DIAGNOSIS — R27.0 ATAXIA: ICD-10-CM

## 2018-06-08 PROBLEM — M54.31 SCIATICA OF RIGHT SIDE: Status: ACTIVE | Noted: 2017-03-10

## 2018-06-08 PROBLEM — E78.5 DYSLIPIDEMIA: Status: ACTIVE | Noted: 2017-03-10

## 2018-06-08 RX ORDER — DEXTROAMPHETAMINE SACCHARATE, AMPHETAMINE ASPARTATE MONOHYDRATE, DEXTROAMPHETAMINE SULFATE AND AMPHETAMINE SULFATE 7.5; 7.5; 7.5; 7.5 MG/1; MG/1; MG/1; MG/1
30 CAPSULE, EXTENDED RELEASE ORAL DAILY
Qty: 30 CAPSULE | Refills: 0 | Status: SHIPPED | OUTPATIENT
Start: 2018-09-03 | End: 2018-09-21

## 2018-06-08 RX ORDER — DEXTROAMPHETAMINE SACCHARATE, AMPHETAMINE ASPARTATE MONOHYDRATE, DEXTROAMPHETAMINE SULFATE AND AMPHETAMINE SULFATE 7.5; 7.5; 7.5; 7.5 MG/1; MG/1; MG/1; MG/1
30 CAPSULE, EXTENDED RELEASE ORAL DAILY
Qty: 30 CAPSULE | Refills: 0 | Status: SHIPPED | OUTPATIENT
Start: 2018-08-08 | End: 2018-06-08

## 2018-06-08 RX ORDER — DEXTROAMPHETAMINE SACCHARATE, AMPHETAMINE ASPARTATE MONOHYDRATE, DEXTROAMPHETAMINE SULFATE AND AMPHETAMINE SULFATE 7.5; 7.5; 7.5; 7.5 MG/1; MG/1; MG/1; MG/1
30 CAPSULE, EXTENDED RELEASE ORAL DAILY
Qty: 30 CAPSULE | Refills: 0 | Status: SHIPPED | OUTPATIENT
Start: 2018-06-08 | End: 2018-06-08

## 2018-06-08 RX ORDER — DEXTROAMPHETAMINE SACCHARATE, AMPHETAMINE ASPARTATE MONOHYDRATE, DEXTROAMPHETAMINE SULFATE AND AMPHETAMINE SULFATE 7.5; 7.5; 7.5; 7.5 MG/1; MG/1; MG/1; MG/1
30 CAPSULE, EXTENDED RELEASE ORAL DAILY
Qty: 30 CAPSULE | Refills: 0 | Status: SHIPPED | OUTPATIENT
Start: 2018-07-03 | End: 2018-06-08

## 2018-06-08 RX ORDER — DEXTROAMPHETAMINE SACCHARATE, AMPHETAMINE ASPARTATE MONOHYDRATE, DEXTROAMPHETAMINE SULFATE AND AMPHETAMINE SULFATE 7.5; 7.5; 7.5; 7.5 MG/1; MG/1; MG/1; MG/1
30 CAPSULE, EXTENDED RELEASE ORAL DAILY
Qty: 30 CAPSULE | Refills: 0 | Status: SHIPPED | OUTPATIENT
Start: 2018-08-03 | End: 2018-06-08

## 2018-06-08 RX ORDER — DEXTROAMPHETAMINE SACCHARATE, AMPHETAMINE ASPARTATE MONOHYDRATE, DEXTROAMPHETAMINE SULFATE AND AMPHETAMINE SULFATE 7.5; 7.5; 7.5; 7.5 MG/1; MG/1; MG/1; MG/1
30 CAPSULE, EXTENDED RELEASE ORAL DAILY
Qty: 30 CAPSULE | Refills: 0 | Status: SHIPPED | OUTPATIENT
Start: 2018-07-08 | End: 2018-06-08

## 2018-06-08 ASSESSMENT — PAIN SCALES - GENERAL: PAINLEVEL: NO PAIN (0)

## 2018-06-08 NOTE — LETTER
6/8/2018       RE: Arnold Lozada  [address] [address]     Dear Colleague,    Thank you for referring your patient, Arnold Lozada, to the [facility]UROLOG[facility] at [facility]. Please see a copy of my visit note below.    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 Previous testing for SCA1,2,3,6,7 have been negative.    He reports his condition is worsening. Using a walker and falling 2-3 times per month. Interested in a wheelchair.    Still inquiring regarding his diagnosis. Interested in further genetic testing. Has been looking up MSA. Occasional orthostasis. Urinary urgency but no incontinence. No stridor but takes very quick breaths at times. No sweating changes. ED for past two years.    No stiffness or cramps. Has hearing loss.    Reports mother had hearing loss. MGM with DM and short stature. Father with some speech changes at later age but no overt ataxia. Brother has tremor, and does everything slowly which he thinks was the same as his early symptoms. Has three daughters without ataxia.    Medications:  Current Outpatient Prescriptions   Medication     amphetamine-dextroamphetamine (ADDERALL XR) 30 MG per 24 hr capsule     aspirin 325 MG tablet     Cholecalciferol (D3 MAXIMUM STRENGTH PO)     folic acid 800 MCG TABS     hydrochlorothiazide (HYDRODIURIL) 25 MG tablet     lisinopril (PRINIVIL/ZESTRIL) 20 MG tablet     metoprolol succinate (TOPROL-XL) 100 MG 24 hr tablet     MULTI-VITAMIN PO TABS     Omega 3-6-9 Fatty Acids (OMEGA 3-6-9 COMPLEX) CAPS     order for DME     ORDER FOR DME     POTASSIUM PO     sertraline (ZOLOFT) 50 MG tablet     simvastatin (ZOCOR) 20 MG tablet     sulfaSALAzine (AZULFIDINE) 500 MG tablet     tamsulosin (FLOMAX) 0.4 MG  "capsule     traZODone (DESYREL) 50 MG tablet     No current facility-administered medications for this visit.        Exam:  /81  Pulse 90  Temp 98  F (36.7  C) (Oral)  Resp 24  Ht 1.727 m (5' 8\")  Wt 80.6 kg (177 lb 11.2 oz)  SpO2 95%  BMI 27.02 kg/m2    Neurological Examination (R/L):  Mental Status: Awake, alert. Fluent. Affect normal.  Cranial Nerves: Subtle low amplitude eyelid tremor bilaterally. Palatal tremor. Versions full, no nystagmus. Mild dysarthria.  Motor: Normal tone. No tremor.  Coordination: Finger to nose slight dysmetria, heel to shin with mild/moderate ataxia.   Gait: Can stand with feet together but noticeably off balance. Gait with walker is slow and stiff-appearing and mildly wide-based.    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 Etiology remains uncertain. Will plan to further pursue genetic testing with Next Gen sequencing. POLG mutations have now been described as a cause for palatal tremor and that is a consideration.    Plan:   -will pursue further genetic testing through Marcelino Frazier MD  Movement Disorders Fellow    I have personally interviewed and examined Mr. Arnold Kong and agree with diagnosis and management. The total time spent with the patient was 25 minutes, over 50% of the time spent in counseling and coordinating care.      Service Date: 06/08/2018      INTERVAL HISTORY:   Arnold Kong is a 67-year-old gentleman with palatal tremor and progressive ataxia.  I have examined Mr. Kong today.  He has severe gait imbalance.  He could not stand without support (from 2 persons).  He has high risk for falling.  He is currently using a walker.  However, in my opinion, it is medically necessary that he has a lightweight wheelchair.  His condition was confirmed by clinical examination and MRI scans.      D: 06/08/2018   T: 06/08/2018   MT: MAX      Name:     ARNOLD KONG   MRN:      " [id number]        Account:      [id number]   :      1950           Service Date: 2018      Document: [id number]         Again, thank you for allowing me to participate in the care of your patient.      Sincerely,    Giorgio Pena MD

## 2018-06-08 NOTE — MR AVS SNAPSHOT
"              After Visit Summary   6/8/2018    Arnold Lozada    MRN: [id number]           Patient Information     Date Of Birth          1950        Visit Information        Provider Department      6/8/2018 11:00 Giorgio Baird MD [facility]        Today's Diagnoses     Ataxia    -  1    Excessive sleepiness           Follow-ups after your visit        Who to contact     Please call your clinic at [phone] to:    Ask questions about your health    Make or cancel appointments    Discuss your medicines    Learn about your test results    Speak to your doctor            Additional Information About Your Visit        MyChart Information     [employer] gives you secure access to your electronic health record. If you see a primary care provider, you can also send messages to your care team and make appointments. If you have questions, please call your primary care clinic.  If you do not have a primary care provider, please call [phone] and they will assist you.      [employer] is an electronic gateway that provides easy, online access to your medical records. With [employer], you can request a clinic appointment, read your test results, renew a prescription or communicate with your care team.     To access your existing account, please contact your [facility] or call [phone] for assistance.        Care EveryWhere ID     This is your Care EveryWhere ID. This could be used by other organizations to access your [facility] medical records  [id number]        Your Vitals Were     Pulse Temperature Respirations Height Pulse Oximetry BMI (Body Mass Index)    90 98  F (36.7  C) (Oral) 24 1.727 m (5' 8\") 95% 27.02 kg/m2       Blood Pressure from Last 3 Encounters:   06/08/18 148/81   05/24/18 136/80   05/22/18 120/76    Weight from Last 3 Encounters:   06/08/18 80.6 kg (177 lb 11.2 oz)   05/24/18 82.6 kg (182 lb)   05/08/18 82.6 kg (182 lb)               "   Today's Medication Changes          These changes are accurate as of 6/8/18 11:59 PM.  If you have any questions, ask your nurse or doctor.               Start taking these medicines.        Dose/Directions    amphetamine-dextroamphetamine 30 MG per 24 hr capsule   Commonly known as:  ADDERALL XR   Used for:  Excessive sleepiness   Started by:  Giorgio Pena MD        Dose:  30 mg   Start taking on:  9/3/2018   Take 1 capsule (30 mg) by mouth daily   Quantity:  30 capsule   Refills:  0            Where to get your medicines      Some of these will need a paper prescription and others can be bought over the counter.  Ask your nurse if you have questions.     Bring a paper prescription for each of these medications     amphetamine-dextroamphetamine 30 MG per 24 hr capsule                Primary Care Provider Office Phone # Fax #    Nikhil Carpenter [phone] [address] [facility] [address]        Equal Access to Services     [facility]: Hadii johanna steinberg hadasho Soreji, waaxda luqadaha, qaybta kaalmada adeegyada, waxay dhara haymilo cornejo . So [facility] [phone].    ATENCIÓN: Si habla español, tiene a gaytan disposición servicios gratuitos de asistencia lingüística. Llame al [phone].    We comply with applicable federal civil rights laws and [place] laws. We do not discriminate on the basis of race, color, national origin, age, disability, sex, sexual orientation, or gender identity.            Thank you!     Thank you for choosing [facility] NEUROLOGY  for your care. Our goal is always to provide you with excellent care. Hearing back from our patients is one way we can continue to improve our services. Please take a few minutes to complete the written survey that you may receive in the mail after your visit with us. Thank you!             Your Updated Medication List - Protect others around you: Learn how to safely use, store and throw away your medicines at  www.disposemymeds.org.          This list is accurate as of 6/8/18 11:59 PM.  Always use your most recent med list.                   Brand Name Dispense Instructions for use Diagnosis    amphetamine-dextroamphetamine 30 MG per 24 hr capsule   Start taking on:  9/3/2018    ADDERALL XR    30 capsule    Take 1 capsule (30 mg) by mouth daily    Excessive sleepiness       aspirin 325 MG tablet      Take 325 mg by mouth daily.        D3 MAXIMUM STRENGTH PO      Take 400 Units by mouth daily        folic acid 800 MCG Tabs      Take 1 tablet by mouth daily Reported on 5/10/2017        hydrochlorothiazide 25 MG tablet    HYDRODIURIL    90 tablet    TAKE ONE TABLET BY MOUTH ONCE DAILY    Essential hypertension with goal blood pressure less than 140/90       lisinopril 20 MG tablet    PRINIVIL/ZESTRIL    90 tablet    Take 1 tablet (20 mg) by mouth daily    Essential hypertension with goal blood pressure less than 140/90       metoprolol succinate 100 MG 24 hr tablet    TOPROL-XL    90 tablet    Take 1 tablet (100 mg) by mouth daily    Essential hypertension with goal blood pressure less than 140/90       Multi-vitamin Tabs tablet   Generic drug:  multivitamin, therapeutic with minerals      1 TABLET DAILY        OMEGA 3-6-9 COMPLEX Caps      Take 1 capsule by mouth daily        order for DME      Respironics REMSTAR 60 Series Auto CPAP 5-18cm H2O, Nuance pillow nasal mask large        order for DME     1 each    Knee high compression stockings (15-20 mmHg)    Bilateral leg edema       POTASSIUM PO      Take 595 mcg by mouth daily        sertraline 50 MG tablet    ZOLOFT    90 tablet    Take 1 tablet (50 mg) by mouth daily    Adjustment disorder with mixed anxiety and depressed mood       simvastatin 20 MG tablet    ZOCOR    90 tablet    ONE TABLET DAILY IN THE EVENING    Hyperlipidemia LDL goal <130       sulfaSALAzine 500 MG tablet    AZULFIDINE    360 tablet    Take 2 tablets (1,000 mg) by mouth 2 times daily    Crohn's  disease of colon without complication (H)       tamsulosin 0.4 MG capsule    FLOMAX    180 capsule    Take 2 capsules (0.8 mg) by mouth daily    Benign prostatic hyperplasia with urinary frequency       traZODone 50 MG tablet    DESYREL    90 tablet    Take 1 tablet (50 mg) by mouth nightly as needed for sleep    Adjustment insomnia

## 2018-06-08 NOTE — PROGRESS NOTES
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 Previous testing for SCA1,2,3,6,7 have been negative.    He reports his condition is worsening. Using a walker and falling 2-3 times per month. Interested in a wheelchair.    Still inquiring regarding his diagnosis. Interested in further genetic testing. Has been looking up MSA. Occasional orthostasis. Urinary urgency but no incontinence. No stridor but takes very quick breaths at times. No sweating changes. ED for past two years.    No stiffness or cramps. Has hearing loss.    Reports mother had hearing loss. MGM with DM and short stature. Father with some speech changes at later age but no overt ataxia. Brother has tremor, and does everything slowly which he thinks was the same as his early symptoms. Has three daughters without ataxia.    Medications:  Current Outpatient Prescriptions   Medication     amphetamine-dextroamphetamine (ADDERALL XR) 30 MG per 24 hr capsule     aspirin 325 MG tablet     Cholecalciferol (D3 MAXIMUM STRENGTH PO)     folic acid 800 MCG TABS     hydrochlorothiazide (HYDRODIURIL) 25 MG tablet     lisinopril (PRINIVIL/ZESTRIL) 20 MG tablet     metoprolol succinate (TOPROL-XL) 100 MG 24 hr tablet     MULTI-VITAMIN PO TABS     Omega 3-6-9 Fatty Acids (OMEGA 3-6-9 COMPLEX) CAPS     order for DME     ORDER FOR DME     POTASSIUM PO     sertraline (ZOLOFT) 50 MG tablet     simvastatin (ZOCOR) 20 MG tablet     sulfaSALAzine (AZULFIDINE) 500 MG tablet     tamsulosin (FLOMAX) 0.4 MG capsule     traZODone (DESYREL) 50 MG tablet     No current facility-administered medications for this visit.        Exam:  /81  Pulse 90  Temp 98  F (36.7  C) (Oral)  Resp 24  Ht 1.727 m (5' 8\")  Wt 80.6 kg (177 lb 11.2 oz)  SpO2 95%  BMI 27.02 kg/m2    Neurological Examination " (R/L):  Mental Status: Awake, alert. Fluent. Affect normal.  Cranial Nerves: Subtle low amplitude eyelid tremor bilaterally. Palatal tremor. Versions full, no nystagmus. Mild dysarthria.  Motor: Normal tone. No tremor.  Coordination: Finger to nose slight dysmetria, heel to shin with mild/moderate ataxia.   Gait: Can stand with feet together but noticeably off balance. Gait with walker is slow and stiff-appearing and mildly wide-based.    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 Etiology remains uncertain. Will plan to further pursue genetic testing with Next Gen sequencing. POLG mutations have now been described as a cause for palatal tremor and that is a consideration.    Plan:   -will pursue further genetic testing through Marcelino Frazier MD  Movement Disorders Fellow    I have personally interviewed and examined Mr. Arnold Lozada and agree with diagnosis and management. The total time spent with the patient was 25 minutes, over 50% of the time spent in counseling and coordinating care.

## 2018-06-08 NOTE — PROGRESS NOTES
Service Date: 2018      INTERVAL HISTORY:   Arnold Kong is a 67-year-old gentleman with palatal tremor and progressive ataxia.  I have examined Mr. Kong today.  He has severe gait imbalance.  He could not stand without support (from 2 persons).  He has high risk for falling.  He is currently using a walker.  However, in my opinion, it is medically necessary that he has a lightweight wheelchair.  His condition was confirmed by clinical examination and MRI scans.      MD AILEEN Sun MD             D: 2018   T: 2018   MT: MAX      Name:     ARNOLD KONG   MRN:      [id number]        Account:      [id number]   :      1950           Service Date: 2018      Document: [id number]

## 2018-06-09 ENCOUNTER — MEDICAL CORRESPONDENCE (OUTPATIENT)
Dept: HEALTH INFORMATION MANAGEMENT | Facility: CLINIC | Age: 68
End: 2018-06-09

## 2018-06-11 LAB — COPATH REPORT: NORMAL

## 2018-06-19 ENCOUNTER — MYC MEDICAL ADVICE (OUTPATIENT)
Dept: FAMILY MEDICINE | Facility: CLINIC | Age: 68
End: 2018-06-19

## 2018-06-22 ENCOUNTER — TELEPHONE (OUTPATIENT)
Dept: NEUROLOGY | Facility: CLINIC | Age: 68
End: 2018-06-22

## 2018-06-22 NOTE — TELEPHONE ENCOUNTER
[facility] Call Center    Phone Message    May a detailed message be left on voicemail: no    Reason for Call: Other: Returning a missed call from [facility]. Please call back.      Action Taken: Message routed to:  [facility] neuro

## 2018-06-26 ENCOUNTER — OFFICE VISIT (OUTPATIENT)
Dept: FAMILY MEDICINE | Facility: CLINIC | Age: 68
End: 2018-06-26
Payer: MEDICARE

## 2018-06-26 VITALS
DIASTOLIC BLOOD PRESSURE: 78 MMHG | HEART RATE: 85 BPM | SYSTOLIC BLOOD PRESSURE: 124 MMHG | RESPIRATION RATE: 13 BRPM | HEIGHT: 68 IN | OXYGEN SATURATION: 98 % | TEMPERATURE: 98 F | WEIGHT: 177.8 LBS | BODY MASS INDEX: 26.95 KG/M2

## 2018-06-26 DIAGNOSIS — J06.9 VIRAL UPPER RESPIRATORY TRACT INFECTION: Primary | ICD-10-CM

## 2018-06-26 PROCEDURE — 99213 OFFICE O/P EST LOW 20 MIN: CPT | Performed by: FAMILY MEDICINE

## 2018-06-26 RX ORDER — FLUTICASONE PROPIONATE 50 MCG
1 SPRAY, SUSPENSION (ML) NASAL DAILY
COMMUNITY
End: 2023-10-18

## 2018-06-26 NOTE — MR AVS SNAPSHOT
After Visit Summary   6/26/2018    Arnold Lozada    MRN: [id number]           Patient Information     Date Of Birth          1950        Visit Information        Provider Department      6/26/2018 12:40 PM Eligio Newby MD [facility]        Today's Diagnoses     Viral upper respiratory tract infection    -  1      Care Instructions      Viral Upper Respiratory Illness (Adult)  You have a viral upper respiratory illness (URI), which is another term for the common cold. This illness is contagious during the first few days. It is spread through the air by coughing and sneezing. It may also be spread by direct contact (touching the sick person and then touching your own eyes, nose, or mouth). Frequent handwashing will decrease risk of spread. Most viral illnesses go away within 7 to 10 days with rest and simple home remedies. Sometimes the illness may last for several weeks. Antibiotics will not kill a virus, and they are generally not prescribed for this condition.    Home care    If symptoms are severe, rest at home for the first 2 to 3 days. When you resume activity, don't let yourself get too tired.    Avoid being exposed to cigarette smoke (yours or others ).    You may use acetaminophen or ibuprofen to control pain and fever, unless another medicine was prescribed. If you have chronic liver or kidney disease, have ever had a stomach ulcer or gastrointestinal bleeding, or are taking blood-thinning medicines, talk with your healthcare provider before using these medicines. Aspirin should never be given to anyone under 18 years of age who is ill with a viral infection or fever. It may cause severe liver or brain damage.    Your appetite may be poor, so a light diet is fine. Avoid dehydration by drinking 6 to 8 glasses of fluids per day (water, soft drinks, juices, tea, or soup). Extra fluids will help loosen secretions in the nose and lungs.    Over-the-counter cold  medicines will not shorten the length of time you re sick, but they may be helpful for the following symptoms: cough, sore throat, and nasal and sinus congestion. (Note: Do not use decongestants if you have high blood pressure.)  Follow-up care  Follow up with your healthcare provider, or as advised.  When to seek medical advice  Call your healthcare provider right away if any of these occur:    Cough with lots of colored sputum (mucus)    Severe headache; face, neck, or ear pain    Difficulty swallowing due to throat pain    Fever of 100.4 F (38 C) or higher, or as directed by your healthcare provider  Call 911  Call 911 if any of these occur:    Chest pain, shortness of breath, wheezing, or difficulty breathing    Coughing up blood    Inability to swallow due to throat pain  Date Last Reviewed: 9/13/2015 2000-2017 [employer]. [address] [place] [address]. All rights reserved. This information is not intended as a substitute for professional medical care. Always follow your healthcare professional's instructions.      [facility]    If you have any questions regarding to your visit please contact your care team:       Team Purple:   Clinic Hours Telephone Number   Dr. Camille Villagomez   7am-7pm  Monday - Thursday   7am-5pm  Fridays  [phone]  (Appointment scheduling available 24/7)    Questions about your recent visit?   Team Line:  [phone]   Urgent Care - [place] [facility] - 11am-9pm Monday-Friday Saturday-Sunday- 9am-5pm   [place] - 5pm-9pm Monday-Friday Saturday-Sunday- 9am-5pm  [phone] - Allie Francois  [phone] - [place]       What options do I have for a visit other than an office visit? We offer electronic visits (e-visits) and telephone visits, when medically appropriate.  Please check with your medical insurance to see if these types of visits are covered, as you will be  "responsible for any charges that are not paid by your insurance.      You can use [employer] (secure electronic communication) to access to your chart, send your primary care provider a message, or make an appointment. Ask a team member how to get started.     For a price quote for your services, please call our Consumer Price Line at [phone] or our Imaging Cost estimation line at [phone] (for imaging tests).    Rachelle Mcleod MA            Follow-ups after your visit        Who to contact     If you have questions or need follow up information about today's clinic visit or your schedule please contact [facility] directly at [phone].  Normal or non-critical lab and imaging results will be communicated to you by MyChart, letter or phone within 4 business days after the clinic has received the results. If you do not hear from us within 7 days, please contact the clinic through SeeFuturet or phone. If you have a critical or abnormal lab result, we will notify you by phone as soon as possible.  Submit refill requests through HyprKey or call your pharmacy and they will forward the refill request to us. Please allow 3 business days for your refill to be completed.          Additional Information About Your Visit        HyprKey Information     Hy[employer]Key gives you secure access to your electronic health record. If you see a primary care provider, you can also send messages to your care team and make appointments. If you have questions, please call your primary care clinic.  If you do not have a primary care provider, please call [phone] and they will assist you.        Care EveryWhere ID     This is your Care EveryWhere ID. This could be used by other organizations to access your [place] medical records  [id number]        Your Vitals Were     Pulse Temperature Respirations Height Pulse Oximetry BMI (Body Mass Index)    85 98  F (36.7  C) (Oral) 13 5' 8\" (1.727 m) 98% 27.03 kg/m2       Blood " Pressure from Last 3 Encounters:   06/26/18 124/78   06/08/18 148/81   05/24/18 136/80    Weight from Last 3 Encounters:   06/26/18 177 lb 12.8 oz (80.6 kg)   06/08/18 177 lb 11.2 oz (80.6 kg)   05/24/18 182 lb (82.6 kg)              Today, you had the following     No orders found for display       Primary Care Provider Office Phone # Fax #    Nikhil Carpenter [phone] [address] [facility] [address]        Equal Access to Services     [facility]: Hadii aad ku hadasho Soreji, waaxda luqadaha, qaybta kaalmada adealexyada, bernice cornejo . So [facility] [phone].    ATENCIÓN: Si habla español, tiene a gaytan disposición servicios gratuitos de asistencia lingüística. Llame al [phone].    We comply with applicable federal civil rights laws and [place] laws. We do not discriminate on the basis of race, color, national origin, age, disability, sex, sexual orientation, or gender identity.            Thank you!     Thank you for choosing [facility]  for your care. Our goal is always to provide you with excellent care. Hearing back from our patients is one way we can continue to improve our services. Please take a few minutes to complete the written survey that you may receive in the mail after your visit with us. Thank you!             Your Updated Medication List - Protect others around you: Learn how to safely use, store and throw away your medicines at www.disposemymeds.org.          This list is accurate as of 6/26/18  1:21 PM.  Always use your most recent med list.                   Brand Name Dispense Instructions for use Diagnosis    amphetamine-dextroamphetamine 30 MG per 24 hr capsule   Start taking on:  9/3/2018    ADDERALL XR    30 capsule    Take 1 capsule (30 mg) by mouth daily    Excessive sleepiness       aspirin 325 MG tablet      Take 325 mg by mouth daily.        D3 MAXIMUM STRENGTH PO      Take 400 Units by mouth daily         fluticasone 50 MCG/ACT spray    FLONASE     Spray 1 spray into both nostrils daily        folic acid 800 MCG Tabs      Take 1 tablet by mouth daily Reported on 5/10/2017        hydrochlorothiazide 25 MG tablet    HYDRODIURIL    90 tablet    TAKE ONE TABLET BY MOUTH ONCE DAILY    Essential hypertension with goal blood pressure less than 140/90       lisinopril 20 MG tablet    PRINIVIL/ZESTRIL    90 tablet    Take 1 tablet (20 mg) by mouth daily    Essential hypertension with goal blood pressure less than 140/90       metoprolol succinate 100 MG 24 hr tablet    TOPROL-XL    90 tablet    Take 1 tablet (100 mg) by mouth daily    Essential hypertension with goal blood pressure less than 140/90       Multi-vitamin Tabs tablet   Generic drug:  multivitamin, therapeutic with minerals      1 TABLET DAILY        OMEGA 3-6-9 COMPLEX Caps      Take 1 capsule by mouth daily        order for DME      [employer]ironics REMSTAR 60 Series Auto CPAP 5-18cm H2O, Nuance pillow nasal mask large        order for DME     1 each    Knee high compression stockings (15-20 mmHg)    Bilateral leg edema       POTASSIUM PO      Take 595 mcg by mouth daily        sertraline 50 MG tablet    ZOLOFT    90 tablet    Take 1 tablet (50 mg) by mouth daily    Adjustment disorder with mixed anxiety and depressed mood       simvastatin 20 MG tablet    ZOCOR    90 tablet    ONE TABLET DAILY IN THE EVENING    Hyperlipidemia LDL goal <130       sulfaSALAzine 500 MG tablet    AZULFIDINE    360 tablet    Take 2 tablets (1,000 mg) by mouth 2 times daily    Crohn's disease of colon without complication (H)       tamsulosin 0.4 MG capsule    FLOMAX    180 capsule    Take 2 capsules (0.8 mg) by mouth daily    Benign prostatic hyperplasia with urinary frequency       traZODone 50 MG tablet    DESYREL    90 tablet    Take 1 tablet (50 mg) by mouth nightly as needed for sleep    Adjustment insomnia

## 2018-06-26 NOTE — NURSING NOTE
"Chief Complaint   Patient presents with     URI     Initial /78 (BP Location: Left arm, Patient Position: Chair, Cuff Size: Adult Regular)  Pulse 85  Temp 98  F (36.7  C) (Oral)  Resp 13  Ht 5' 8\" (1.727 m)  Wt 177 lb 12.8 oz (80.6 kg)  SpO2 98%  BMI 27.03 kg/m2 Estimated body mass index is 27.03 kg/(m^2) as calculated from the following:    Height as of this encounter: 5' 8\" (1.727 m).    Weight as of this encounter: 177 lb 12.8 oz (80.6 kg).  BP completed using cuff size: regular    Curt Rousseau  "

## 2018-06-26 NOTE — PROGRESS NOTES
SUBJECTIVE:   Arnold Lozada is a 67 year old male who presents to clinic today for the following health issues:    RESPIRATORY SYMPTOMS    Duration: x 2-3 days    Description  nasal congestion, cough, fever, fatigue/malaise and hoarse voice    Severity: moderate    Accompanying signs and symptoms: None    History (predisposing factors):  none    Precipitating or alleviating factors: Flonase    Therapies tried and outcome:  none    Voice change, cough with clear yellow, coughs if lays down. A little nasal drainage. Fatigued.  PROBLEMS TO ADD ON...    Problem list and histories reviewed & adjusted, as indicated.  Additional history: as documented    Patient Active Problem List   Diagnosis     Hyperlipidemia LDL goal <130     Crohn's disease of colon (H)     Adenomatous colon polyp     Low back strain     GUILLERMINA (obstructive sleep apnea)- Mild/moderate ( AHI 16.2)     Family history of colon cancer     Advanced directives, counseling/discussion     Elevated troponin I level     Cerebellar ataxia (H)     Essential hypertension     Adjustment disorder with mixed anxiety and depressed mood     Cerebral ataxia (H)     ARF (acute renal failure) (H)     Dyslipidemia     Nocturia     Sciatica of right side     Temporary cerebral vascular dysfunction     Gait abnormality     Vasovagal syncope     Past Surgical History:   Procedure Laterality Date     COLONOSCOPY  2014    OK     HC TOOTH EXTRACTION W/FORCEP         Social History   Substance Use Topics     Smoking status: Never Smoker     Smokeless tobacco: Never Used      Comment: Never smoked.     Alcohol use Yes      Comment: 2-4 drinks per week     Family History   Problem Relation Age of Onset     Cancer Mother      brain tumor, age 79     Other Cancer Mother      Brain tumor     Depression Mother      Asthma Mother      Cancer Father      colon     Pneumonia Father       age 90 from pneumonia and sepsis     C.A.D. Father      Age 80 have MI and triple  "bypass     Coronary Artery Disease Father      Heart Attack     Hypertension Father      ???     Hyperlipidemia Father      ???     Colon Cancer Father      Cured     Diabetes Maternal Grandmother      Migraines Daughter          Reviewed and updated as needed this visit by clinical staff  Tobacco  Allergies  Meds  Med Hx  Surg Hx  Fam Hx  Soc Hx      ROS:  Constitutional, cardiovascular, gi and gu systems are negative, except as otherwise noted.    OBJECTIVE:     /78 (BP Location: Left arm, Patient Position: Chair, Cuff Size: Adult Regular)  Pulse 85  Temp 98  F (36.7  C) (Oral)  Resp 13  Ht 5' 8\" (1.727 m)  Wt 177 lb 12.8 oz (80.6 kg)  SpO2 98%  BMI 27.03 kg/m2  Body mass index is 27.03 kg/(m^2).  GENERAL: healthy, alert and no distress  NECK: no adenopathy and thyroid normal to palpation  RESP: lungs clear to auscultation - no rales, rhonchi or wheezes  CV: regular rate and rhythm, normal S1 S2, no S3 or S4, no murmur, click or rub.  ABDOMEN: soft, nontender, no masses and bowel sounds normal  MS: no gross musculoskeletal defects noted, no edema    ASSESSMENT/PLAN:     (J06.9,  B97.89) Viral upper respiratory tract infection  (primary encounter diagnosis)  Comment: Discussed general respiratory tract infection care including importance of hydration, rest, over the counter therapies and techniques to prevent future infection as well as transmission to others.  Discussed signs or symptoms that would indicate need for recheck.    Call or return to clinic prn if these symptoms worsen or fail to improve as anticipated in 1 week.    Eligio Newby MD  [facility]  "

## 2018-06-26 NOTE — PATIENT INSTRUCTIONS
Viral Upper Respiratory Illness (Adult)  You have a viral upper respiratory illness (URI), which is another term for the common cold. This illness is contagious during the first few days. It is spread through the air by coughing and sneezing. It may also be spread by direct contact (touching the sick person and then touching your own eyes, nose, or mouth). Frequent handwashing will decrease risk of spread. Most viral illnesses go away within 7 to 10 days with rest and simple home remedies. Sometimes the illness may last for several weeks. Antibiotics will not kill a virus, and they are generally not prescribed for this condition.    Home care    If symptoms are severe, rest at home for the first 2 to 3 days. When you resume activity, don't let yourself get too tired.    Avoid being exposed to cigarette smoke (yours or others ).    You may use acetaminophen or ibuprofen to control pain and fever, unless another medicine was prescribed. If you have chronic liver or kidney disease, have ever had a stomach ulcer or gastrointestinal bleeding, or are taking blood-thinning medicines, talk with your healthcare provider before using these medicines. Aspirin should never be given to anyone under 18 years of age who is ill with a viral infection or fever. It may cause severe liver or brain damage.    Your appetite may be poor, so a light diet is fine. Avoid dehydration by drinking 6 to 8 glasses of fluids per day (water, soft drinks, juices, tea, or soup). Extra fluids will help loosen secretions in the nose and lungs.    Over-the-counter cold medicines will not shorten the length of time you re sick, but they may be helpful for the following symptoms: cough, sore throat, and nasal and sinus congestion. (Note: Do not use decongestants if you have high blood pressure.)  Follow-up care  Follow up with your healthcare provider, or as advised.  When to seek medical advice  Call your healthcare provider right away if any of these  occur:    Cough with lots of colored sputum (mucus)    Severe headache; face, neck, or ear pain    Difficulty swallowing due to throat pain    Fever of 100.4 F (38 C) or higher, or as directed by your healthcare provider  Call 911  Call 911 if any of these occur:    Chest pain, shortness of breath, wheezing, or difficulty breathing    Coughing up blood    Inability to swallow due to throat pain  Date Last Reviewed: 9/13/2015 2000-2017 [employer]. [address] [place] [address]. All rights reserved. This information is not intended as a substitute for professional medical care. Always follow your healthcare professional's instructions.      [facility]    If you have any questions regarding to your visit please contact your care team:       Team Purple:   Clinic Hours Telephone Number   Dr. Camille Villagomez   7am-7pm  Monday - Thursday   7am-5pm  Fridays  [phone]  (Appointment scheduling available 24/7)    Questions about your recent visit?   Team Line:  [phone]   Urgent Care - [place] and [facility] - 11am-9pm Monday-Friday Saturday-Sunday- 9am-5pm   [place] - 5pm-9pm Monday-Friday Saturday-Sunday- 9am-5pm  [phone] - [place]  [phone] [facility]       What options do I have for a visit other than an office visit? We offer electronic visits (e-visits) and telephone visits, when medically appropriate.  Please check with your medical insurance to see if these types of visits are covered, as you will be responsible for any charges that are not paid by your insurance.      You can use [employer] (secure electronic communication) to access to your chart, send your primary care provider a message, or make an appointment. Ask a team member how to get started.     For a price quote for your services, please call our Consumer Price Line at [phone] or our Imaging Cost estimation line at  [phone] (for imaging tests).    Rachelle Mcleod MA

## 2018-07-16 ENCOUNTER — OFFICE VISIT (OUTPATIENT)
Dept: BEHAVIORAL HEALTH | Facility: CLINIC | Age: 68
End: 2018-07-16
Payer: MEDICARE

## 2018-07-16 DIAGNOSIS — F43.23 ADJUSTMENT DISORDER WITH MIXED ANXIETY AND DEPRESSED MOOD: Primary | ICD-10-CM

## 2018-07-16 PROCEDURE — 90791 PSYCH DIAGNOSTIC EVALUATION: CPT | Performed by: SOCIAL WORKER

## 2018-07-16 ASSESSMENT — ANXIETY QUESTIONNAIRES
7. FEELING AFRAID AS IF SOMETHING AWFUL MIGHT HAPPEN: NOT AT ALL
IF YOU CHECKED OFF ANY PROBLEMS ON THIS QUESTIONNAIRE, HOW DIFFICULT HAVE THESE PROBLEMS MADE IT FOR YOU TO DO YOUR WORK, TAKE CARE OF THINGS AT HOME, OR GET ALONG WITH OTHER PEOPLE: SOMEWHAT DIFFICULT
1. FEELING NERVOUS, ANXIOUS, OR ON EDGE: NOT AT ALL
5. BEING SO RESTLESS THAT IT IS HARD TO SIT STILL: NOT AT ALL
2. NOT BEING ABLE TO STOP OR CONTROL WORRYING: NOT AT ALL
3. WORRYING TOO MUCH ABOUT DIFFERENT THINGS: SEVERAL DAYS
6. BECOMING EASILY ANNOYED OR IRRITABLE: NOT AT ALL
GAD7 TOTAL SCORE: 1

## 2018-07-16 ASSESSMENT — PATIENT HEALTH QUESTIONNAIRE - PHQ9: 5. POOR APPETITE OR OVEREATING: NOT AT ALL

## 2018-07-16 NOTE — PROGRESS NOTES
"                                                                                                                                                                        Adult Intake Structured Interview  Standard Diagnostic Assessment      CLIENT'S NAME: Arnold Lozada  MRN:   [id number]  :   1950  ACCT. NUMBER: [id number]  DATE OF SERVICE: 18      Identifying Information:  Client is a 67 year old, ,  male. Client was referred for counseling by self. Client is currently disabled. Client attended the session alone.       Client's Statement of Presenting Concern:  Client reports the reason for seeking therapy at this time as \".  Client stated that his symptoms have resulted in the following functional impairments: self-care      History of Presenting Concern:  He struggle with motor skills and balance. This is a loss for him as prior to his dx he ran marathons and was overall very physically active. He is really struggling with the things he can not do, is not feeling a level of interest in identifying what he could be doing. Feels frustrated that it took so long to get a dx. He struggles with fatigue that is chronic and wonders if it is perhaps tied to depression.        Update: Physically things are changing, he is moving slower and speech is a challenge.  He reports \"things have gone downhill.\"  Mood is \"not quite as good as it was, it is so hard to talk now.\"  Rides his recumbent bike three times a week, also has a trike that he rides outside too.  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  He tried some medical marijuana earlier this year but did not find this to be helpful for speech or ambulation.  Wife is full time caregiver for him at this time.  Also has some in home care which helps.                    Social History:  Client reported he grew up in [place]. They were the first born of two children. his parents were together and happily  until they passed " away. Client reported that his childhood was very good, his parents were nurturing and loving. Client described his current relationships with family of origin as good with all family members.     Client has been  for 40 years and reports it is a good marriage. He has three adult daughters.  Client identified extensive stable and meaningful social connections. Client reported that he has not been involved with the legal system.  Client's highest education level was graduate school. Client did not identify any learning problems. There are no ethnic, cultural or Roman Catholic factors that may be relevant for therapy. Client identified his preferred language to be English. Client reported he does not need the assistance of an  or other support involved in therapy. Modifications will not be used to assist communication in therapy. Client did not serve in the .       Client reports family history includes Asthma in his mother; C.A.D. in his father; CANCER in his father and mother; Colon Cancer in his father; Coronary Artery Disease in his father; DIABETES in his maternal grandmother; Depression in his mother; Hyperlipidemia in his father; Hypertension in his father; Other Cancer in his mother; Pneumonia in his father.     Mental Health History:  Client reported the following biological family members or relatives with mental health issues: mom had bouts of depression.  Client has not been previously diagnosed with a mental health diagnosis.  Client has not received mental health services in the past.  Hospitalizations: None.  Client is not currently receiving any mental health services.        Chemical Health History:  Client reported no family history of chemical health issues. Client has not received chemical dependency treatment in the past. Client is not currently receiving any chemical dependency treatment. Client reports no problems as a result of their drinking / drug use.        Client  Reports:  Client reports using alcohol 2 times per week and has 2 glasses of wine at a time. Client first started drinking at age  .  Client denies using tobacco.  Client denies using marijuana.  Client denies using caffeine.  Client denies using street drugs.  Client denies the non-medical use of prescription or over the counter drugs.     CAGE: None of the patient's responses to the CAGE screening were positive / Negative CAGE score   Based on the negative Cage-Aid score and clinical interview there  are not indications of drug or alcohol abuse.     Discussed the general effects of drugs and alcohol on health and well-being. Therapist gave client printed information about the effects of chemical use on his health and well being.        Significant Losses / Trauma / Abuse / Neglect Issues:  There are no indications or report of: significant losses, trauma, abuse or neglect.     Issues of possible neglect are not present.        Medical Issues:  Client has had a physical exam to rule out medical causes for current symptoms. Date of last physical exam was within the past year. Client was encouraged to follow up with PCP if symptoms were to develop. The client has a [facility] Primary Care Provider, who is named Nikhil Carpenter.. The client reports not having a psychiatrist. Client reports the following current medical concerns: per EMR. The client denies the presence of chronic or episodic pain. There are not significant nutritional concerns.     Client reports current meds as:   Current Outpatient Prescriptions   Medication Sig     [START ON 9/3/2018] amphetamine-dextroamphetamine (ADDERALL XR) 30 MG per 24 hr capsule Take 1 capsule (30 mg) by mouth daily     aspirin 325 MG tablet Take 325 mg by mouth daily.     Cholecalciferol (D3 MAXIMUM STRENGTH PO) Take 400 Units by mouth daily     fluticasone (FLONASE) 50 MCG/ACT spray Spray 1 spray into both nostrils daily     folic acid 800 MCG TABS Take 1 tablet by mouth daily  Reported on 5/10/2017     hydrochlorothiazide (HYDRODIURIL) 25 MG tablet TAKE ONE TABLET BY MOUTH ONCE DAILY     lisinopril (PRINIVIL/ZESTRIL) 20 MG tablet Take 1 tablet (20 mg) by mouth daily     metoprolol succinate (TOPROL-XL) 100 MG 24 hr tablet Take 1 tablet (100 mg) by mouth daily     MULTI-VITAMIN PO TABS 1 TABLET DAILY     Omega 3-6-9 Fatty Acids (OMEGA 3-6-9 COMPLEX) CAPS Take 1 capsule by mouth daily     order for DME Knee high compression stockings (15-20 mmHg)     ORDER FOR DME Respironics REMSTAR 60 Series Auto CPAP 5-18cm H2O, Nuance pillow nasal mask large     POTASSIUM PO Take 595 mcg by mouth daily     sertraline (ZOLOFT) 50 MG tablet TAKE ONE TABLET BY MOUTH ONCE DAILY     simvastatin (ZOCOR) 20 MG tablet ONE TABLET DAILY IN THE EVENING     sulfaSALAzine (AZULFIDINE) 500 MG tablet Take 2 tablets (1,000 mg) by mouth 2 times daily     tamsulosin (FLOMAX) 0.4 MG capsule Take 2 capsules (0.8 mg) by mouth daily     traZODone (DESYREL) 50 MG tablet Take 1 tablet (50 mg) by mouth nightly as needed for sleep     No current facility-administered medications for this visit.        Client Allergies:  No Known Allergies  no known allergies to medications    Medical History:  Past Medical History:   Diagnosis Date     Abnormal gait 10/5/2014     Acute renal failure (H) 10/5/2014     Elevated troponin I level 10/5/2014     Family history of colon cancer 12/3/2013     Head injury 1965     History of colonoscopy nov, 2008     Hypertension      Low back pain      Nocturia 10/5/2014     Other nervous system complications 2014    Diagnosed in 2016 with Cerebellar Ataxia.     Temporary cerebral vascular dysfunction 10/5/2014     Vasovagal syncope 10/5/2014         Medication Adherence:  N/A - Client does not have prescribed psychiatric medications.    Client was provided recommendation to follow-up with prescribing physician.    Mental Status Assessment:  Appearance:   Appropriate   Eye Contact:   Good   Psychomotor  Behavior: Normal   Attitude:   Cooperative   Orientation:   All  Speech   Rate / Production: Slow  tremulous    Volume:  Normal   Mood:    Normal  Affect:    Constricted   Thought Content:  Clear   Thought Form:  Coherent  Logical   Insight:    Good       Review of Symptoms:  Depression: PHQ-9 score of 7  Cheyanne:  No symptoms  Psychosis: No symptoms  Anxiety: BRETT-7 score of 1  Panic:  No symptoms  Post Traumatic Stress Disorder: No symptoms  Obsessive Compulsive Disorder: No symptoms  Eating Disorder: No symptoms  Oppositional Defiant Disorder: No symptoms  ADD / ADHD: No symptoms  Conduct Disorder: No symptoms      Safety Assessment:    History of Safety Concerns:   Client denied a history of suicidal ideation.    Client denied a history of suicide attempts.    Client denied a history of homicidal ideation.    Client denied a history of self-injurious ideation and behaviors.    Client denied a history of personal safety concerns.    Client denied a history of assaultive behaviors.        Current Safety Concerns:  Client denies current suicidal ideation.    Client denies current homicidal ideation and behaviors.  Client denies current self-injurious ideation and behaviors.    Client denies current concerns for personal safety.    Client reports the following protective factors: positive relationships positive social network, forward/future oriented thinking, dedication to family/friends, safe and stable environment, regular sleep and effectively controls impulses    Client reports there are no firearms in the house.     Plan for Safety and Risk Management:  A safety and risk management plan has not been developed at this time, however client was given the after-hours number / 911 should there be a change in any of these risk factors.          Patient's Strengths and Limitations:  Client identified the following strengths or resources that will help him succeed in counseling: commitment to health and well being,  friends / good social support, family support and intelligence. Client identified the following supports: family and friends. Things that may interfere with the clients success in counseling include:none identified.        Diagnostic Criteria:  Adjustment Disorder with Mixed Anxiety and Depressed Mood: The predominant manifestation is a combination of depression and anxiety         DSM5 Diagnoses: (Sustained by DSM5 Criteria Listed Above)  Diagnoses: F43.23 adjustment disorder with anxiety and depression  Psychosocial & Contextual Factors: health problems decreased functioning  WHODAS 2.0 (12 item)                                       This questionnaire asks about difficulties due to health conditions. Health conditions                   include            disease or illnesses, other health problems that may be short or long lasting,                        injuries, mental health or emotional problems, and problems with alcohol or drugs.                              Think back over the past 30 days and answer these questions, thinking about how much              difficulty you had doing the following activities. For each question, please Inupiat only                   one response.     S1 Standing for long periods such as 30 minutes? Mild =           2   S2 Taking care of household responsibilities? Moderate =   3   S3 Learning a new task, for example, learning how to get to a new place? None =         1   S4 How much of a problem do you have joining community activities (for example, festivals, Alevism or other activities) in the same way as anyone else can? Mild =           2   S5 How much have you been emotionally affected by your health problems? Severe =       4           In the past 30 days, how much difficulty did you have in:   S6 Concentrating on doing something for ten minutes? Mild =           2   S7 Walking a long distance such as a kilometer (or equivalent)? Mild =           2   S8 Washing your whole  body? Mild =           2   S9 Getting dressed? Mild =           2   S10 Dealing with people you do not know? Mild =           2   S11 Maintaining a friendship? Mild =           2   S12 Your day to day work? Moderate =   3      H1 Overall, in the past 30 days, how many days were these difficulties present? Record number of days 30   H2 In the past 30 days, for how many days were you totally unable to carry out your usual activities or work because of any health condition? Record number of days  0   H3 In the past 30 days, not counting the days that you were totally unable, for how many days did you cut back or reduce your usual activities or work because of any health condition? Record number of days 30      Attendance Agreement:  Client has signed Attendance Agreement:Yes        Preliminary Treatment Plan:     Initial Treatment will focus on: processing losses and identifying what he can do going forward.        The client is receiving treatment / structured support from the following professional(s) / service and treatment. Collaboration will be initiated with: primary care physician.     Referral to another professional/service is not indicated at this time..     A Release of Information is not needed at this time.     Report to child / adult protection services was NA.     Client will have access to their [facility]' medical record.      Adi Ford, LICSW  July 16, 2018

## 2018-07-16 NOTE — MR AVS SNAPSHOT
MRN:[id number]                      After Visit Summary   7/16/2018    Arnold Lozada    MRN: [id number]           Visit Information        Provider Department      7/16/2018 11:30 AM Shad Portillo, [facility]      Your next 10 appointments already scheduled     Jul 24, 2018 11:30 AM CDT   Return Visit with RODRIGUEZ [facility]   [facility] ([facility])    [address] [place] [phone]              MyChart Information     [employer] gives you secure access to your electronic health record. If you see a primary care provider, you can also send messages to your care team and make appointments. If you have questions, please call your primary care clinic.  If you do not have a primary care provider, please call [phone] and they will assist you.        Care EveryWhere ID     This is your Care EveryWhere ID. This could be used by other organizations to access your [facility] medical records  [id number]        Equal Access to Services     KASHIF BOUCHER : Hadii aad ku hadasho Soaxelali, waaxda luqadaha, qaybta kaalmada adeegyaedin, bernice monsalve. So [facility] [phone].    ATENCIÓN: Si habla español, tiene a gaytan disposición servicios gratuitos de asistencia lingüística. [facility] [phone].    We comply with applicable federal civil rights laws and [place] laws. We do not discriminate on the basis of race, color, national origin, age, disability, sex, sexual orientation, or gender identity.

## 2018-07-17 ASSESSMENT — PATIENT HEALTH QUESTIONNAIRE - PHQ9: SUM OF ALL RESPONSES TO PHQ QUESTIONS 1-9: 7

## 2018-07-17 ASSESSMENT — ANXIETY QUESTIONNAIRES: GAD7 TOTAL SCORE: 1

## 2018-07-24 ENCOUNTER — OFFICE VISIT (OUTPATIENT)
Dept: BEHAVIORAL HEALTH | Facility: CLINIC | Age: 68
End: 2018-07-24
Payer: MEDICARE

## 2018-07-24 DIAGNOSIS — F43.23 ADJUSTMENT DISORDER WITH MIXED ANXIETY AND DEPRESSED MOOD: Primary | ICD-10-CM

## 2018-07-24 PROCEDURE — 90834 PSYTX W PT 45 MINUTES: CPT | Performed by: SOCIAL WORKER

## 2018-07-24 NOTE — MR AVS SNAPSHOT
MRN:[id number]                      After Visit Summary   7/24/2018    Arnold Lozada    MRN: [id number]           Visit Information        Provider Department      7/24/2018 11:30 AM Shad Portillo, RODRIGUEZ [facility]      Your next 10 appointments already scheduled     Sep 19, 2018  2:30 PM CDT   Return Visit with RODRIGUEZ Whiteside   [facility] ([facility])    [address] [place] [phone]              MyChart Information     [facility] [employer] gives you secure access to your electronic health record. If you see a primary care provider, you can also send messages to your care team and make appointments. If you have questions, please call your primary care clinic.  If you do not have a primary care provider, please call [phone] and they will assist you.        Care EveryWhere ID     This is your Care EveryWhere ID. This could be used by other organizations to access your [facility] medical records  [id number]        Equal Access to Services     KASHIF BOUCHER : Hadii aad ku hadasho Soaxelali, waaxda luqadaha, qaybta kaalmada adeegyaedin, bernice monsalve. So [facility] [phone].    ATENCIÓN: Si habla español, tiene a gaytan disposición servicios gratuitos de asistencia lingüística. [facility] [phone].    We comply with applicable federal civil rights laws and [place] laws. We do not discriminate on the basis of race, color, national origin, age, disability, sex, sexual orientation, or gender identity.

## 2018-07-24 NOTE — PROGRESS NOTES
"                                           Progress Note    Client Name: Arnold Lozada             Date:   7/24/2018                                           Service Type:Individual                           Session start time: 1130                 Session End Time:   1215                            Session Length:        38-52                Session #:      1                Attendees:     Client and spouse Willa    Treatment Plan Last Reviewed: 7/24/2018   PHQ-9 / BRETT-7 :                 DATA                            Progress Since Last Session (Related to Symptoms / Goals / Homework):              Symptoms: Stable    Homework: n/a                            Episode of Care Goals: Satisfactory progress - ACTION (Actively working towards change); Intervened by reinforcing change plan / affirming steps taken                Current / Ongoing Stressors and Concerns:                 Client notes that he has been \"ok, not much has changed.\"  Client is here today with his wife and we have a long conversation about managing stress.  Discussed the importance of affiliation with others when we need help.  Also framed client's condition and prognosis as akin to a series of losses.      Reviewed 5 Stages of Grief (Grisel Gutierrez MD) with client today in session:  1. Denial: This can t be happening to me.   2. Anger:  Why is this happening to me?   3. Bargaining:  Please make this not happen and in return I will do anything.   4. Depression/Mourning:  I am so sad that I cannot change what has happened.   5. Acceptance:  I have made peace both with what has happened as well as my inability to change it.   Reviewed these as phases that may not always follow a linear pattern (i.e. client may feel depressed one day and angry the next).  Identified strategies for managing grief and loss including journaling, exercise, and investing time and energy into healthy interpersonal " relationships.                    Treatment Objective(s) Addressed in This Session:          Client will use identified behavioral and cognitive skills to challenge negative self talk 90% of the time.    -reviewed ACEs in session today.                  Intervention:              Cognitive Behavioral Therapy:   Discussed cognitive restructuring and behavioral activation.  Explored the connection between thoughts, feelings, and actions by using examples relative to client's presenting concerns.  Explained major  domains of symptoms (cognitive, emotional, somatic, behavioral, interpersonal) and importance of targeted and specific interventions to reduce symptoms of anxiety and depression.  Discussed role of  symptom monitoring in cognitive behavioral treatment.                                ASSESSMENT: Current Emotional / Mental Status  of significant symptoms):              Risk status (Self / Other harm or suicidal ideation)              Client denies current fears or concerns for personal safety.              Client denies current or recent suicidal ideation or behaviors.              Client denies current or recent homicidal ideation or behaviors.              Client denies current or recent self injurious behavior or ideation.              Client denies other safety concerns.              A safety and risk management plan has not been developed at this time, however client was given the after-hours number / 911 should there be a change in any of these risk factors.                Appearance:                           Appropriate               Eye Contact:                           Good               Psychomotor Behavior:          Normal               Attitude:                                   Cooperative               Orientation:                             All              Speech                          Rate / Production:       Normal                           Volume:                       Normal                Mood:                                      Normal              Affect:                                      Appropriate               Thought Content:                    Clear               Thought Form:                        Coherent  Goal Directed  Logical               Insight:                                    Good                 Medication Review:              No current psychiatric medications prescribed                Medication Compliance:              NA                Changes in Health Issues:              Yes: feels like he notices a decline each week                Chemical Use Review:              Substance Use: Chemical use reviewed, no active concerns identified                 Tobacco Use: No current tobacco use.                  Collateral Reports Completed:              Communicated with: n/a    PLAN: (Client Tasks / Therapist Tasks / Other)  1.  Client will call to make next appointment  2.  CBT at next session                                                               ________________________________________________________________________    Treatment Plan    Client's Name: Arnold Lozada                      YOB: 1950    Date: 8-31-16    DSM5 Diagnoses: (Sustained by DSM5 Criteria Listed Above)  Diagnoses: F43.23 adjustment disorder with anxiety and depression  Psychosocial & Contextual Factors: health problems decreased functioning  WHODAS 2.0 (12 item)27    Referral / Collaboration:  suggested pursing support group and getting connected with organizations.    Anticipated number of session or this episode of care: 8-12    MeasurableTreatment Goal(s) related to diagnosis / functional impairment(s)  Goal-Depression: Client will decrease depressed mood    I will know I've met my goal when I am less depressed.      Objective #A (Client Action)    Status: Continued- Date: 7/24/2018     Client will use identified behavioral and cognitive skills to challenge  negative self talk 90% of the time.    Intervention(s)  Therapist will provide psychoeducation, behavioral activation, and cognitive restructuring.      Objective #B  Client will complete at least 10 minutes of self-regulation practice (e.g.: yoga, meditation, relaxation breathing, etc.) per day.    Status: Continued- Date: 7/24/2018     Intervention(s)  Therapist will provide psychoeducation, behavioral activation, and cognitive restructuring.      Objective #C  Client will exercise 30 minutes 36 times in the next 12 weeks.  Status: Continued- Date: 7/24/2018     Intervention(s)  Therapist will provide psychoeducation, behavioral activation, and cognitive restructuring.                Goal- Anxiety: Client will decrease anxiety    I will know I've met my goal when I am less anxious.      Objective #A (Client Action)    Status: Continued- Date: 7/24/2018     Client will use cognitive strategies identified in therapy to challenge anxious thoughts.    Intervention(s)  Therapist will provide psychoeducation, behavioral activation, and cognitive restructuring.    Objective #B  Client will use at least 3 coping skills for anxiety management in the next 12 weeks.    Status: Continued- Date: 7/24/2018     Intervention(s)  Therapist will provide psychoeducation, behavioral activation, and cognitive restructuring.      Objective #C  Client will identify three distraction and diversion activities and use those activities to decrease level of anxiety.  Status: Continued- Date: 7/24/2018     Intervention(s)  Therapist will provide psychoeducation, behavioral activation, and cognitive restructuring.                Client has reviewed and agreed to the above plan.      REYES Goodson [facility]               12/8/2016

## 2018-08-02 ENCOUNTER — MYC MEDICAL ADVICE (OUTPATIENT)
Dept: FAMILY MEDICINE | Facility: CLINIC | Age: 68
End: 2018-08-02

## 2018-08-03 NOTE — TELEPHONE ENCOUNTER
Patient has tried OTC treatment for his rash without relief  Any other recommendations?  Does he need to be seen?    Silverio Paz RN

## 2018-08-04 ENCOUNTER — MYC MEDICAL ADVICE (OUTPATIENT)
Dept: FAMILY MEDICINE | Facility: CLINIC | Age: 68
End: 2018-08-04

## 2018-08-04 DIAGNOSIS — R21 RASH AND NONSPECIFIC SKIN ERUPTION: Primary | ICD-10-CM

## 2018-08-06 RX ORDER — TRIAMCINOLONE ACETONIDE 1 MG/G
CREAM TOPICAL
Qty: 30 G | Refills: 0 | Status: SHIPPED | OUTPATIENT
Start: 2018-08-06 | End: 2018-08-23

## 2018-08-06 NOTE — TELEPHONE ENCOUNTER
Patient responding to 8/2/18 Pyrolia message  Please send in prescription for Triamcinolone 0.1% cream if appropriate    Silverio Paz RN

## 2018-08-09 ENCOUNTER — TELEPHONE (OUTPATIENT)
Dept: INTERNAL MEDICINE | Facility: CLINIC | Age: 68
End: 2018-08-09

## 2018-08-09 ENCOUNTER — OFFICE VISIT (OUTPATIENT)
Dept: URGENT CARE | Facility: URGENT CARE | Age: 68
End: 2018-08-09
Payer: MEDICARE

## 2018-08-09 VITALS
TEMPERATURE: 97.8 F | DIASTOLIC BLOOD PRESSURE: 82 MMHG | BODY MASS INDEX: 26.52 KG/M2 | HEART RATE: 88 BPM | WEIGHT: 174.4 LBS | OXYGEN SATURATION: 97 % | RESPIRATION RATE: 18 BRPM | SYSTOLIC BLOOD PRESSURE: 145 MMHG

## 2018-08-09 DIAGNOSIS — T78.40XA ALLERGIC REACTION, INITIAL ENCOUNTER: Primary | ICD-10-CM

## 2018-08-09 PROCEDURE — 99213 OFFICE O/P EST LOW 20 MIN: CPT | Performed by: NURSE PRACTITIONER

## 2018-08-09 RX ORDER — FAMOTIDINE 20 MG/1
20 TABLET, FILM COATED ORAL 2 TIMES DAILY
Qty: 14 TABLET | Refills: 0 | Status: SHIPPED | OUTPATIENT
Start: 2018-08-09 | End: 2018-08-16

## 2018-08-09 RX ORDER — METHYLPREDNISOLONE SODIUM SUCCINATE 125 MG/2ML
80 INJECTION, POWDER, LYOPHILIZED, FOR SOLUTION INTRAMUSCULAR; INTRAVENOUS ONCE
Qty: 1.3 ML | Refills: 0 | OUTPATIENT
Start: 2018-08-09 | End: 2018-08-09

## 2018-08-09 RX ORDER — CETIRIZINE HYDROCHLORIDE 10 MG/1
10 TABLET ORAL EVERY EVENING
Qty: 14 TABLET | Refills: 0 | Status: SHIPPED | OUTPATIENT
Start: 2018-08-09 | End: 2018-08-23

## 2018-08-09 RX ORDER — PREDNISONE 20 MG/1
20 TABLET ORAL 2 TIMES DAILY
Qty: 10 TABLET | Refills: 0 | Status: SHIPPED | OUTPATIENT
Start: 2018-08-09 | End: 2018-08-14

## 2018-08-09 ASSESSMENT — ENCOUNTER SYMPTOMS
COUGH: 0
FEVER: 0
CHILLS: 0
DIAPHORESIS: 0
NAUSEA: 0
SORE THROAT: 0
DIARRHEA: 0
RHINORRHEA: 0
SHORTNESS OF BREATH: 0
VOMITING: 0

## 2018-08-09 NOTE — NURSING NOTE
The following medication was given:     MEDICATION: Solu-Medrol  ROUTE: IM  SITE: Ventrogluteal - Left  DOSE: 1.3ml  LOT #: [id number]  :  [employer]  EXPIRATION DATE:  09/2020  NDC#:0261-9910-79    Heather Lopez CMA      Prior to injection verified patient identity using patient's name and date of birth.  Due to injection administration, patient instructed to remain in clinic for 15 minutes  afterwards, and to report any adverse reaction to me immediately.

## 2018-08-09 NOTE — MR AVS SNAPSHOT
After Visit Summary   8/9/2018    Arnold Lozada    MRN: [id number]           Patient Information     Date Of Birth          1950        Visit Information        Provider Department      8/9/2018 11:30 AM [facility] URGENT CARE [facility]        Today's Diagnoses     Allergic reaction, initial encounter    -  1      Care Instructions      General Allergic Reactions  An allergic reaction is a set of symptoms caused by an allergen. An allergen is something that causes a person s immune system to react. When a person comes in contact with an allergen, it causes the body to release chemicals. These include the chemical histamine. Histamine causes swelling and itching. It may affect the entire body. This is called a general allergic reaction. Often symptoms affect only 1 part of the body. This is called a local allergic reaction.  You are having an allergic reaction. Almost anything can cause one. Different people are allergic to different things. It is usually something that you ate or swallowed, came into contact with by getting or putting it on your skin or clothes, or something you breathed in the air. This can be very annoying and sometimes scary.  Most of us think of allergic reactions when we have a rash or itchy skin. Symptoms can include:    Itching of the eyes, nose, and roof of the mouth    Runny or stuffy nose    Watery eyes     Sneezing or coughing     A blocked feeling in the ear    Red, itchy rash called hives    Red and purple spots    Rash, redness, welts, blisters    Itching, burning, stinging, pain    Dry, flaky, cracking, scaly skin  Severe symptoms include:    Swelling of the face, lips, or other parts of the body    Hoarse voice    Trouble swallowing, feeling like your throat is closing    Trouble breathing, wheezing    Nausea, vomiting, diarrhea, stomach cramps    Feeling faint or lightheaded, rapid heart rate  Sometimes the cause may be obvious. But  there are so many things that can cause a reaction that you may not be able to figure out. The most important things to help find your allergen are:    Remembering when it started    What you were doing at the time or just before that    Any activities you were involved in    Any new products or contacts  Below are some common causes. But remember that almost anything can cause a reaction. You may not even be aware that you came into contact with one of these things:    Dust, mold, pollen    Plants (common ones are poison ivy and poison oak, but there are many others)     Animals    Foods such as shrimp, shellfish, peanuts, milk products, gluten, and eggs. Also food colorings, flavorings, and additives.    Insect bites or stings such as bees, mosquitos, fleas, ticks    Medicines such as penicillin, sulfa medicines, amoxicillin, aspirin, and ibuprofen. But any medicine can cause a reaction.    Jewelry such as nickel or gold. This can be new, or something you ve worn for a while, including zippers and buttons.    Latex such as in gloves, clothes, toys, balloons, or some tapes. Some people allergic to latex may also have problems with foods like bananas, avocados, kiwi, papaya, or chestnuts.    Lotions, perfumes, cosmetics, soaps, shampoos, skincare products, nail products    Chemicals or dyes in clothing, linen, , hair dyes, soaps, iodine  Many viruses and common colds can cause a rash that is not an allergic reaction. Sometimes it is hard to tell the difference between allergies, sensitivity, or an intolerance to something. This is especially true with food. Many things can cause diarrhea, vomiting, stomach cramps, and skin irritation.  Home care    The goal of treatment is to help relieve the symptoms and get you feeling better. The rash will usually fade over several days. But it can sometimes last a couple of weeks. Over the next couple of days, there may be times when it is gets a little worse, and then  better again. Here are some things to do:    If you know what you are allergic to, stay away from it. Future reactions could be worse than this one.    Avoid tight clothing and anything that heats up your skin (hot showers or baths, direct sunlight). Heat will make itching worse.    An ice pack will relieve local areas of intense itching and redness. To make an ice pack, put ice cubes in a plastic bag that seals at the top. Wrap it in a thin, clean towel. Don t put the ice directly on the skin because it can damage the skin.    Oral diphenhydramine is an over-the-counter antihistamine sold at pharmacy and grocery stores. Unless a prescription antihistamine was given, diphenhydramine may be used to reduce itching if large areas of the skin are involved. It may make you sleepy. So be careful using it in the daytime or when going to school, working, or driving. Note: Don t use diphenhydramine if you have glaucoma or if you are a man with trouble urinating due to an enlarged prostate. There are other antihistamines that won t make you so sleepy. These are good choices for daytime use. Ask your pharmacist for suggestions.    Don t use diphenhydramine cream on your skin. It can cause a further reaction in some people.    To help prevent an infection, don't scratch the affected area. Scratching may worsen the reaction and damage your skin. It can also lead to an infection. Always check the affected for signs of an infection.    Call your healthcare provider and ask what you can use to help decrease the itching.    To decrease allergic reactions, try the following:      Use heat-steam to clean your home    Use high-efficiency particulate (HEPA) vacuums and filters    Stay away from food and pet triggers    Kill any cockroaches    Clean your house often  Follow-up care  Follow up with your healthcare provider, or as advised. If you had a severe reaction today, or if you have had several mild to medium allergic reactions in  the past, ask your provider about allergy testing. This can help you find out what you are allergic to. If your reaction included dizziness, fainting, or trouble breathing or swallowing, ask your provider about carrying auto-injectable epinephrine.  Call 911  Call 911 if any of these occur:    Trouble breathing or swallowing, wheezing    Cool, moist, pale skin    Shortness of breath    Hoarse voice or trouble speaking    Confused     Very drowsy or trouble awakening    Fainting or loss of consciousness    Rapid heart rate    Feeling of dizziness or weakness or a sudden drop in blood pressure    Feeling of doom    Feeling lightheaded    Severe nausea or vomiting, or diarrhea    Seizure    Swelling in the face, eyelids, lips, mouth, throat or tongue    Drooling  When to seek medical advice  Call your healthcare provider right away if any of these occur:    Spreading areas of itching, redness or swelling    Nausea or stomach cramps or abdominal pain    Continuing or recurring symptoms    Spreading areas of redness, swelling, or itching    Signs of infection at the affected site:  ? Spreading redness  ? Increased pain or swelling  ? Fluid or colored drainage from the site  ? Fever of 100.4 F (38 C) or above lasting for 24 to 48 hours, or as directed by your provider  Date Last Reviewed: 3/1/2017    2526-3974 [employer]. [address] [place] [address]. All rights reserved. This information is not intended as a substitute for professional medical care. Always follow your healthcare professional's instructions.                Follow-ups after your visit        Your next 10 appointments already scheduled     Aug 10, 2018  3:30 PM CDT   LAB with [facility]   [facility] [place] ([facility])    [address]  [place] [address] [phone]           Please do not eat 10-12 hours before your appointment if you are coming in fasting for labs on lipids, cholesterol, or  glucose (sugar). This does not apply to pregnant women. Water, hot tea and black coffee (with nothing added) are okay. Do not drink other fluids, diet soda or chew gum.            Sep 19, 2018  2:30 PM CDT   Return Visit with RODRIGUEZ Whiteside   [facility] ([facility])    [address] [address] [phone]              Who to contact     If you have questions or need follow up information about today's clinic visit or your schedule please contact [facility] MALCOM MCCOY directly at [phone].  Normal or non-critical lab and imaging results will be communicated to you by MyChart, letter or phone within 4 business days after the clinic has received the results. If you do not hear from us within 7 days, please contact the clinic through [employer] or phone. If you have a critical or abnormal lab result, we will notify you by phone as soon as possible.  Submit refill requests through [employer] or call your pharmacy and they will forward the refill request to us. Please allow 3 business days for your refill to be completed.          Additional Information About Your Visit        [employer] Information     [employer] gives you secure access to your electronic health record. If you see a primary care provider, you can also send messages to your care team and make appointments. If you have questions, please call your primary care clinic.  If you do not have a primary care provider, please call [phone] and they will assist you.        Care EveryWhere ID     This is your Care EveryWhere ID. This could be used by other organizations to access your [place] medical records  [id number]        Your Vitals Were     Pulse Temperature Respirations Pulse Oximetry BMI (Body Mass Index)       88 97.8  F (36.6  C) (Tympanic) 18 97% 26.52 kg/m2        Blood Pressure from Last 3 Encounters:   08/09/18 145/82   06/26/18 124/78   06/08/18 148/81    Weight  from Last 3 Encounters:   08/09/18 174 lb 6.4 oz (79.1 kg)   06/26/18 177 lb 12.8 oz (80.6 kg)   06/08/18 177 lb 11.2 oz (80.6 kg)              Today, you had the following     No orders found for display         Today's Medication Changes          These changes are accurate as of 8/9/18 12:03 PM.  If you have any questions, ask your nurse or doctor.               Start taking these medicines.        Dose/Directions    cetirizine 10 MG tablet   Commonly known as:  zyrTEC   Used for:  Allergic reaction, initial encounter        Dose:  10 mg   Take 1 tablet (10 mg) by mouth every evening for 14 days   Quantity:  14 tablet   Refills:  0       famotidine 20 MG tablet   Commonly known as:  PEPCID   Used for:  Allergic reaction, initial encounter        Dose:  20 mg   Take 1 tablet (20 mg) by mouth 2 times daily for 7 days   Quantity:  14 tablet   Refills:  0       methylPREDNISolone sodium succinate 125 mg/2 mL injection   Commonly known as:  solu-MEDROL   Used for:  Allergic reaction, initial encounter        Dose:  80 mg   Inject 1.3 mLs (81.25 mg) into the muscle once for 1 dose   Quantity:  1.3 mL   Refills:  0       predniSONE 20 MG tablet   Commonly known as:  DELTASONE   Used for:  Allergic reaction, initial encounter        Dose:  20 mg   Take 1 tablet (20 mg) by mouth 2 times daily for 5 days   Quantity:  10 tablet   Refills:  0            Where to get your medicines      These medications were sent to [id number]  [place] - [address]  [address], [address] [address]     Phone:  [phone]     cetirizine 10 MG tablet    famotidine 20 MG tablet    predniSONE 20 MG tablet         Some of these will need a paper prescription and others can be bought over the counter.  Ask your nurse if you have questions.     You don't need a prescription for these medications     methylPREDNISolone sodium succinate 125 mg/2 mL injection                Primary Care Provider Office  Phone # Fax #    Nikhil Carpenter [phone]       [address]  [place] [address]        Equal Access to Services     SAMSONJORGE CITLALY : Hadmary johanna steinberg belemo Soreji, waaxda luqadaha, qaybta kaalmada adegiannada, bernice fleming briannealex oswald laJasmynmilo monsalve. So [facility] [phone].    ATENCIÓN: Si habla español, tiene a gaytan disposición servicios gratuitos de asistencia lingüística. Llame al [phone].    We comply with applicable federal civil rights laws and [place] laws. We do not discriminate on the basis of race, color, national origin, age, disability, sex, sexual orientation, or gender identity.            Thank you!     Thank you for choosing [facility]  for your care. Our goal is always to provide you with excellent care. Hearing back from our patients is one way we can continue to improve our services. Please take a few minutes to complete the written survey that you may receive in the mail after your visit with us. Thank you!             Your Updated Medication List - Protect others around you: Learn how to safely use, store and throw away your medicines at www.disposemymeds.org.          This list is accurate as of 8/9/18 12:03 PM.  Always use your most recent med list.                   Brand Name Dispense Instructions for use Diagnosis    amphetamine-dextroamphetamine 30 MG per 24 hr capsule   Start taking on:  9/3/2018    ADDERALL XR    30 capsule    Take 1 capsule (30 mg) by mouth daily    Excessive sleepiness       aspirin 325 MG tablet      Take 325 mg by mouth daily.        cetirizine 10 MG tablet    zyrTEC    14 tablet    Take 1 tablet (10 mg) by mouth every evening for 14 days    Allergic reaction, initial encounter       D3 MAXIMUM STRENGTH PO      Take 400 Units by mouth daily        famotidine 20 MG tablet    PEPCID    14 tablet    Take 1 tablet (20 mg) by mouth 2 times daily for 7 days    Allergic reaction, initial encounter       fluticasone 50 MCG/ACT  spray    FLONASE     Spray 1 spray into both nostrils daily        folic acid 800 MCG Tabs      Take 1 tablet by mouth daily Reported on 5/10/2017        hydrochlorothiazide 25 MG tablet    HYDRODIURIL    90 tablet    TAKE ONE TABLET BY MOUTH ONCE DAILY    Essential hypertension with goal blood pressure less than 140/90       lisinopril 20 MG tablet    PRINIVIL/ZESTRIL    90 tablet    Take 1 tablet (20 mg) by mouth daily    Essential hypertension with goal blood pressure less than 140/90       methylPREDNISolone sodium succinate 125 mg/2 mL injection    solu-MEDROL    1.3 mL    Inject 1.3 mLs (81.25 mg) into the muscle once for 1 dose    Allergic reaction, initial encounter       metoprolol succinate 100 MG 24 hr tablet    TOPROL-XL    90 tablet    Take 1 tablet (100 mg) by mouth daily    Essential hypertension with goal blood pressure less than 140/90       Multi-vitamin Tabs tablet   Generic drug:  multivitamin, therapeutic with minerals      1 TABLET DAILY        OMEGA 3-6-9 COMPLEX Caps      Take 1 capsule by mouth daily        order for DME      Respironics REMSTAR 60 Series Auto CPAP 5-18cm H2O, Nuance pillow nasal mask large        order for DME     1 each    Knee high compression stockings (15-20 mmHg)    Bilateral leg edema       POTASSIUM PO      Take 595 mcg by mouth daily        predniSONE 20 MG tablet    DELTASONE    10 tablet    Take 1 tablet (20 mg) by mouth 2 times daily for 5 days    Allergic reaction, initial encounter       sertraline 50 MG tablet    ZOLOFT    90 tablet    TAKE ONE TABLET BY MOUTH ONCE DAILY    Adjustment disorder with mixed anxiety and depressed mood       simvastatin 20 MG tablet    ZOCOR    90 tablet    ONE TABLET DAILY IN THE EVENING    Hyperlipidemia LDL goal <130       sulfaSALAzine 500 MG tablet    AZULFIDINE    360 tablet    Take 2 tablets (1,000 mg) by mouth 2 times daily    Crohn's disease of colon without complication (H)       tamsulosin 0.4 MG capsule    FLOMAX    180  capsule    Take 2 capsules (0.8 mg) by mouth daily    Benign prostatic hyperplasia with urinary frequency       traZODone 50 MG tablet    DESYREL    90 tablet    Take 1 tablet (50 mg) by mouth nightly as needed for sleep    Adjustment insomnia       triamcinolone 0.1 % cream    KENALOG    30 g    Apply sparingly to affected area three times daily for 14 days.    Rash and nonspecific skin eruption

## 2018-08-09 NOTE — TELEPHONE ENCOUNTER
Reason for call:  Patient reporting a symptom    Symptom or request: swelling of the lips w/ blister and one side of the face is swelling    Duration (how long have symptoms been present): woke up at 5a with swelling    Have you been treated for this before? No    Additional comments: Wife states patient woke up at 5a with swelling of lips and cheek. Just started a new cream trimcinolone and possibly could be a reaction to this per Lauren for rash on arm.     Phone Number patient can be reached at:  Home number on file [phone] (home)    Best Time:  any    Can we leave a detailed message on this number:  YES    Call taken on 8/9/2018 at 9:18 AM by Be Conn

## 2018-08-09 NOTE — PATIENT INSTRUCTIONS
General Allergic Reactions  An allergic reaction is a set of symptoms caused by an allergen. An allergen is something that causes a person s immune system to react. When a person comes in contact with an allergen, it causes the body to release chemicals. These include the chemical histamine. Histamine causes swelling and itching. It may affect the entire body. This is called a general allergic reaction. Often symptoms affect only 1 part of the body. This is called a local allergic reaction.  You are having an allergic reaction. Almost anything can cause one. Different people are allergic to different things. It is usually something that you ate or swallowed, came into contact with by getting or putting it on your skin or clothes, or something you breathed in the air. This can be very annoying and sometimes scary.  Most of us think of allergic reactions when we have a rash or itchy skin. Symptoms can include:    Itching of the eyes, nose, and roof of the mouth    Runny or stuffy nose    Watery eyes     Sneezing or coughing     A blocked feeling in the ear    Red, itchy rash called hives    Red and purple spots    Rash, redness, welts, blisters    Itching, burning, stinging, pain    Dry, flaky, cracking, scaly skin  Severe symptoms include:    Swelling of the face, lips, or other parts of the body    Hoarse voice    Trouble swallowing, feeling like your throat is closing    Trouble breathing, wheezing    Nausea, vomiting, diarrhea, stomach cramps    Feeling faint or lightheaded, rapid heart rate  Sometimes the cause may be obvious. But there are so many things that can cause a reaction that you may not be able to figure out. The most important things to help find your allergen are:    Remembering when it started    What you were doing at the time or just before that    Any activities you were involved in    Any new products or contacts  Below are some common causes. But remember that almost anything can cause a  reaction. You may not even be aware that you came into contact with one of these things:    Dust, mold, pollen    Plants (common ones are poison ivy and poison oak, but there are many others)     Animals    Foods such as shrimp, shellfish, peanuts, milk products, gluten, and eggs. Also food colorings, flavorings, and additives.    Insect bites or stings such as bees, mosquitos, fleas, ticks    Medicines such as penicillin, sulfa medicines, amoxicillin, aspirin, and ibuprofen. But any medicine can cause a reaction.    Jewelry such as nickel or gold. This can be new, or something you ve worn for a while, including zippers and buttons.    Latex such as in gloves, clothes, toys, balloons, or some tapes. Some people allergic to latex may also have problems with foods like bananas, avocados, kiwi, papaya, or chestnuts.    Lotions, perfumes, cosmetics, soaps, shampoos, skincare products, nail products    Chemicals or dyes in clothing, linen, , hair dyes, soaps, iodine  Many viruses and common colds can cause a rash that is not an allergic reaction. Sometimes it is hard to tell the difference between allergies, sensitivity, or an intolerance to something. This is especially true with food. Many things can cause diarrhea, vomiting, stomach cramps, and skin irritation.  Home care    The goal of treatment is to help relieve the symptoms and get you feeling better. The rash will usually fade over several days. But it can sometimes last a couple of weeks. Over the next couple of days, there may be times when it is gets a little worse, and then better again. Here are some things to do:    If you know what you are allergic to, stay away from it. Future reactions could be worse than this one.    Avoid tight clothing and anything that heats up your skin (hot showers or baths, direct sunlight). Heat will make itching worse.    An ice pack will relieve local areas of intense itching and redness. To make an ice pack, put ice  cubes in a plastic bag that seals at the top. Wrap it in a thin, clean towel. Don t put the ice directly on the skin because it can damage the skin.    Oral diphenhydramine is an over-the-counter antihistamine sold at pharmacy and grocery stores. Unless a prescription antihistamine was given, diphenhydramine may be used to reduce itching if large areas of the skin are involved. It may make you sleepy. So be careful using it in the daytime or when going to school, working, or driving. Note: Don t use diphenhydramine if you have glaucoma or if you are a man with trouble urinating due to an enlarged prostate. There are other antihistamines that won t make you so sleepy. These are good choices for daytime use. Ask your pharmacist for suggestions.    Don t use diphenhydramine cream on your skin. It can cause a further reaction in some people.    To help prevent an infection, don't scratch the affected area. Scratching may worsen the reaction and damage your skin. It can also lead to an infection. Always check the affected for signs of an infection.    Call your healthcare provider and ask what you can use to help decrease the itching.    To decrease allergic reactions, try the following:      Use heat-steam to clean your home    Use high-efficiency particulate (HEPA) vacuums and filters    Stay away from food and pet triggers    Kill any cockroaches    Clean your house often  Follow-up care  Follow up with your healthcare provider, or as advised. If you had a severe reaction today, or if you have had several mild to medium allergic reactions in the past, ask your provider about allergy testing. This can help you find out what you are allergic to. If your reaction included dizziness, fainting, or trouble breathing or swallowing, ask your provider about carrying auto-injectable epinephrine.  Call 911  Call 911 if any of these occur:    Trouble breathing or swallowing, wheezing    Cool, moist, pale skin    Shortness of  breath    Hoarse voice or trouble speaking    Confused     Very drowsy or trouble awakening    Fainting or loss of consciousness    Rapid heart rate    Feeling of dizziness or weakness or a sudden drop in blood pressure    Feeling of doom    Feeling lightheaded    Severe nausea or vomiting, or diarrhea    Seizure    Swelling in the face, eyelids, lips, mouth, throat or tongue    Drooling  When to seek medical advice  Call your healthcare provider right away if any of these occur:    Spreading areas of itching, redness or swelling    Nausea or stomach cramps or abdominal pain    Continuing or recurring symptoms    Spreading areas of redness, swelling, or itching    Signs of infection at the affected site:  ? Spreading redness  ? Increased pain or swelling  ? Fluid or colored drainage from the site  ? Fever of 100.4 F (38 C) or above lasting for 24 to 48 hours, or as directed by your provider  Date Last Reviewed: 3/1/2017    9862-7203 [employer]. [address] [place] [address], [place] [address]. All rights reserved. This information is not intended as a substitute for professional medical care. Always follow your healthcare professional's instructions.

## 2018-08-09 NOTE — TELEPHONE ENCOUNTER
Wife was on the phone with patient in the background answering questions    Per patient's wife, patient was fine last night but upon waking up at 0500 this morning, patient noted that his lips and right cheek were swollen.  Denies any redness, itchiness, or pain  Looks like there is a blister on the upper lip  Denies any tongue or throat swelling or any difficulty breathing  Denies any new meds or hygiene products, except for the triamcinolone cream that he has been using for the rash on his arm  He does not think that he got any of the cream on his face  Does not think he ate anything out of his norm  Does not have any allergy listed.  Per wife, patient had a reaction to kidney meat years ago but has not had any recently.  Has never had this reaction before  Took an Allegra this morning around 8:30AM without any improvement yet  Advised patient to take a benadryl and monitor symptoms. Explained that this can make him dizzy or drowsy so should use with caution   If no improvement, he should be seen for further evaluation and treatment  If having any tongue or throat swelling or difficulty breathing, call 911  Wife verbalized understanding    Silverio Paz RN

## 2018-08-09 NOTE — PROGRESS NOTES
SUBJECTIVE:   Arnold Lozada is a 67 year old male presenting with a chief complaint of   Chief Complaint   Patient presents with     Facial Swelling     Since 5 am this am.  Denies any tongue or throat swelling     Derm Problem     rash on arms- new medication triamcinolone cream       He is an established patient of [facility].    Facial swelling on the right cheek    Onset of symptoms was 5 hour(s) ago.  Course of illness is worsening.    Severity moderate  Current and Associated symptoms: Swelling on right cheek, and right side of lip, little tingling feeling, itchy rash on both arms  Denies: pain, numbness  Treatment measures tried include Rest.  Predisposing factors include: Pumpkin pie from a friend.        Review of Systems   Constitutional: Negative for chills, diaphoresis and fever.   HENT: Negative for congestion, ear pain, rhinorrhea and sore throat.    Respiratory: Negative for cough and shortness of breath.    Gastrointestinal: Negative for diarrhea, nausea and vomiting.   Skin: Positive for rash (on both forearms).        Right cheek swelling   All other systems reviewed and are negative.      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     Temporary cerebral vascular dysfunction 10/5/2014     Vasovagal syncope 10/5/2014     Family History   Problem Relation Age of Onset     Cancer Mother      brain tumor, age 79     Other Cancer Mother      Brain tumor     Depression Mother      Asthma Mother      Cancer Father      colon     Pneumonia Father       age 90 from pneumonia and sepsis     C.A.D. Father      Age 80 have MI and triple bypass      Coronary Artery Disease Father      Heart Attack     Hypertension Father      ???     Hyperlipidemia Father      ???     Colon Cancer Father      Cured     Diabetes Maternal Grandmother      Migraines Daughter      Current Outpatient Prescriptions   Medication Sig Dispense Refill     [START ON 9/3/2018] amphetamine-dextroamphetamine (ADDERALL XR) 30 MG per 24 hr capsule Take 1 capsule (30 mg) by mouth daily 30 capsule 0     aspirin 325 MG tablet Take 325 mg by mouth daily.       cetirizine (ZYRTEC) 10 MG tablet Take 1 tablet (10 mg) by mouth every evening for 14 days 14 tablet 0     Cholecalciferol (D3 MAXIMUM STRENGTH PO) Take 400 Units by mouth daily       famotidine (PEPCID) 20 MG tablet Take 1 tablet (20 mg) by mouth 2 times daily for 7 days 14 tablet 0     fluticasone (FLONASE) 50 MCG/ACT spray Spray 1 spray into both nostrils daily       folic acid 800 MCG TABS Take 1 tablet by mouth daily Reported on 5/10/2017       hydrochlorothiazide (HYDRODIURIL) 25 MG tablet TAKE ONE TABLET BY MOUTH ONCE DAILY 90 tablet 1     lisinopril (PRINIVIL/ZESTRIL) 20 MG tablet Take 1 tablet (20 mg) by mouth daily 90 tablet 1     methylPREDNISolone sodium succinate (SOLU-MEDROL) 125 mg/2 mL injection Inject 1.3 mLs (81.25 mg) into the muscle once for 1 dose 1.3 mL 0     metoprolol succinate (TOPROL-XL) 100 MG 24 hr tablet Take 1 tablet (100 mg) by mouth daily 90 tablet 1     MULTI-VITAMIN PO TABS 1 TABLET DAILY       Omega 3-6-9 Fatty Acids (OMEGA 3-6-9 COMPLEX) CAPS Take 1 capsule by mouth daily       order for DME Knee high compression stockings (15-20 mmHg) 1 each 1     ORDER FOR DME Respironics REMSTAR 60 Series Auto CPAP 5-18cm H2O, Nuance pillow nasal mask large       POTASSIUM PO Take 595 mcg by mouth daily       predniSONE (DELTASONE) 20 MG tablet Take 1 tablet (20 mg) by mouth 2 times daily for 5 days 10 tablet 0     sertraline (ZOLOFT) 50 MG tablet TAKE ONE TABLET BY MOUTH ONCE DAILY 90 tablet 1     simvastatin (ZOCOR) 20  MG tablet ONE TABLET DAILY IN THE EVENING 90 tablet 3     sulfaSALAzine (AZULFIDINE) 500 MG tablet Take 2 tablets (1,000 mg) by mouth 2 times daily 360 tablet 0     tamsulosin (FLOMAX) 0.4 MG capsule Take 2 capsules (0.8 mg) by mouth daily 180 capsule 1     traZODone (DESYREL) 50 MG tablet Take 1 tablet (50 mg) by mouth nightly as needed for sleep 90 tablet 1     triamcinolone (KENALOG) 0.1 % cream Apply sparingly to affected area three times daily for 14 days. 30 g 0     Social History   Substance Use Topics     Smoking status: Never Smoker     Smokeless tobacco: Never Used      Comment: Never smoked.     Alcohol use Yes      Comment: 2-4 drinks per week       OBJECTIVE  /82  Pulse 88  Temp 97.8  F (36.6  C) (Tympanic)  Resp 18  Wt 174 lb 6.4 oz (79.1 kg)  SpO2 97%  BMI 26.52 kg/m2    Physical Exam   Cardiovascular: Normal rate and normal heart sounds.    Pulmonary/Chest: Effort normal and breath sounds normal.   Neurological: He is alert.   Skin:    right cheek with erythema and swelling, maculapapular rash on the forearms, itchy and erythematous   Psychiatric: He has a normal mood and affect. His behavior is normal. Thought content normal.       Labs:  No results found for this or any previous visit (from the past 24 hour(s)).      ASSESSMENT:      ICD-10-CM    1. Allergic reaction, initial encounter T78.40XA methylPREDNISolone sodium succinate (SOLU-MEDROL) 125 mg/2 mL injection     predniSONE (DELTASONE) 20 MG tablet     cetirizine (ZYRTEC) 10 MG tablet     famotidine (PEPCID) 20 MG tablet      Differential Diagnosis:  Rash: Cellulitis  Eczema    Serious Comorbid Conditions:  Adult:  None    PLAN:  Appears to be an allergic reaction.  No airway or swallowing compromise.   I discussed anaphylactic reaction which can cause difficulty swallowing or difficulty breathing, if they occur then he needs to be in the ER. Offered an Epipen to use incase of an emergent situation, but patient prefer to use the  prescribed medication.   Patient educational/instructional material provided including reasons for follow-up    The patient indicates understanding of these issues and agrees with the plan.      Patient Instructions     General Allergic Reactions  An allergic reaction is a set of symptoms caused by an allergen. An allergen is something that causes a person s immune system to react. When a person comes in contact with an allergen, it causes the body to release chemicals. These include the chemical histamine. Histamine causes swelling and itching. It may affect the entire body. This is called a general allergic reaction. Often symptoms affect only 1 part of the body. This is called a local allergic reaction.  You are having an allergic reaction. Almost anything can cause one. Different people are allergic to different things. It is usually something that you ate or swallowed, came into contact with by getting or putting it on your skin or clothes, or something you breathed in the air. This can be very annoying and sometimes scary.  Most of us think of allergic reactions when we have a rash or itchy skin. Symptoms can include:    Itching of the eyes, nose, and roof of the mouth    Runny or stuffy nose    Watery eyes     Sneezing or coughing     A blocked feeling in the ear    Red, itchy rash called hives    Red and purple spots    Rash, redness, welts, blisters    Itching, burning, stinging, pain    Dry, flaky, cracking, scaly skin  Severe symptoms include:    Swelling of the face, lips, or other parts of the body    Hoarse voice    Trouble swallowing, feeling like your throat is closing    Trouble breathing, wheezing    Nausea, vomiting, diarrhea, stomach cramps    Feeling faint or lightheaded, rapid heart rate  Sometimes the cause may be obvious. But there are so many things that can cause a reaction that you may not be able to figure out. The most important things to help find your allergen are:    Remembering when  it started    What you were doing at the time or just before that    Any activities you were involved in    Any new products or contacts  Below are some common causes. But remember that almost anything can cause a reaction. You may not even be aware that you came into contact with one of these things:    Dust, mold, pollen    Plants (common ones are poison ivy and poison oak, but there are many others)     Animals    Foods such as shrimp, shellfish, peanuts, milk products, gluten, and eggs. Also food colorings, flavorings, and additives.    Insect bites or stings such as bees, mosquitos, fleas, ticks    Medicines such as penicillin, sulfa medicines, amoxicillin, aspirin, and ibuprofen. But any medicine can cause a reaction.    Jewelry such as nickel or gold. This can be new, or something you ve worn for a while, including zippers and buttons.    Latex such as in gloves, clothes, toys, balloons, or some tapes. Some people allergic to latex may also have problems with foods like bananas, avocados, kiwi, papaya, or chestnuts.    Lotions, perfumes, cosmetics, soaps, shampoos, skincare products, nail products    Chemicals or dyes in clothing, linen, , hair dyes, soaps, iodine  Many viruses and common colds can cause a rash that is not an allergic reaction. Sometimes it is hard to tell the difference between allergies, sensitivity, or an intolerance to something. This is especially true with food. Many things can cause diarrhea, vomiting, stomach cramps, and skin irritation.  Home care    The goal of treatment is to help relieve the symptoms and get you feeling better. The rash will usually fade over several days. But it can sometimes last a couple of weeks. Over the next couple of days, there may be times when it is gets a little worse, and then better again. Here are some things to do:    If you know what you are allergic to, stay away from it. Future reactions could be worse than this one.    Avoid tight  clothing and anything that heats up your skin (hot showers or baths, direct sunlight). Heat will make itching worse.    An ice pack will relieve local areas of intense itching and redness. To make an ice pack, put ice cubes in a plastic bag that seals at the top. Wrap it in a thin, clean towel. Don t put the ice directly on the skin because it can damage the skin.    Oral diphenhydramine is an over-the-counter antihistamine sold at pharmacy and grocery stores. Unless a prescription antihistamine was given, diphenhydramine may be used to reduce itching if large areas of the skin are involved. It may make you sleepy. So be careful using it in the daytime or when going to school, working, or driving. Note: Don t use diphenhydramine if you have glaucoma or if you are a man with trouble urinating due to an enlarged prostate. There are other antihistamines that won t make you so sleepy. These are good choices for daytime use. Ask your pharmacist for suggestions.    Don t use diphenhydramine cream on your skin. It can cause a further reaction in some people.    To help prevent an infection, don't scratch the affected area. Scratching may worsen the reaction and damage your skin. It can also lead to an infection. Always check the affected for signs of an infection.    Call your healthcare provider and ask what you can use to help decrease the itching.    To decrease allergic reactions, try the following:      Use heat-steam to clean your home    Use high-efficiency particulate (HEPA) vacuums and filters    Stay away from food and pet triggers    Kill any cockroaches    Clean your house often  Follow-up care  Follow up with your healthcare provider, or as advised. If you had a severe reaction today, or if you have had several mild to medium allergic reactions in the past, ask your provider about allergy testing. This can help you find out what you are allergic to. If your reaction included dizziness, fainting, or trouble  breathing or swallowing, ask your provider about carrying auto-injectable epinephrine.  Call 911  Call 911 if any of these occur:    Trouble breathing or swallowing, wheezing    Cool, moist, pale skin    Shortness of breath    Hoarse voice or trouble speaking    Confused     Very drowsy or trouble awakening    Fainting or loss of consciousness    Rapid heart rate    Feeling of dizziness or weakness or a sudden drop in blood pressure    Feeling of doom    Feeling lightheaded    Severe nausea or vomiting, or diarrhea    Seizure    Swelling in the face, eyelids, lips, mouth, throat or tongue    Drooling  When to seek medical advice  Call your healthcare provider right away if any of these occur:    Spreading areas of itching, redness or swelling    Nausea or stomach cramps or abdominal pain    Continuing or recurring symptoms    Spreading areas of redness, swelling, or itching    Signs of infection at the affected site:  ? Spreading redness  ? Increased pain or swelling  ? Fluid or colored drainage from the site  ? Fever of 100.4 F (38 C) or above lasting for 24 to 48 hours, or as directed by your provider  Date Last Reviewed: 3/1/2017    [id number] [employer]. [address] [place] [id number]. All rights reserved. This information is not intended as a substitute for professional medical care. Always follow your healthcare professional's instructions.

## 2018-08-20 NOTE — PROGRESS NOTES
SUBJECTIVE:   Arnold Lozada is a 67 year old male who presents to clinic today for the following health issues:        Patient presents with:  Derm Problem: follow up rash, getting better but still visable   Health Maintenance: dap     Patient is in for further follow up regarding a rash. We had a MyChart message regarding this and already tried treatment with triamcinolone cream 0.1% which was helpful. As it turns out the primary  of this rash which was most notable along the medial aspect of forearms was due to increased use of his wheelchair, which he has needed secondary to the slow progression of his cerebellar atrophy, see prior office visit notes for further discussion of this neurologic diagnosis       Problem list and histories reviewed & adjusted, as indicated.  Additional history: as documented    Patient Active Problem List   Diagnosis     Hyperlipidemia LDL goal <130     Crohn's disease of colon (H)     Adenomatous colon polyp     Low back strain     GUILLERMINA (obstructive sleep apnea)- Mild/moderate ( AHI 16.2)     Family history of colon cancer     Advanced directives, counseling/discussion     Elevated troponin I level     Cerebellar ataxia (H)     Essential hypertension     Adjustment disorder with mixed anxiety and depressed mood     Cerebral ataxia (H)     ARF (acute renal failure) (H)     Dyslipidemia     Nocturia     Sciatica of right side     Temporary cerebral vascular dysfunction     Gait abnormality     Vasovagal syncope     Past Surgical History:   Procedure Laterality Date     COLONOSCOPY  Fall 2014    OK     HC TOOTH EXTRACTION W/FORCEP         Social History   Substance Use Topics     Smoking status: Never Smoker     Smokeless tobacco: Never Used      Comment: Never smoked.     Alcohol use Yes      Comment: 2-4 drinks per week     Family History   Problem Relation Age of Onset     Cancer Mother      brain tumor, age 79     Other Cancer Mother      Brain tumor     Depression  Mother      Asthma Mother      Cancer Father      colon     Pneumonia Father       age 90 from pneumonia and sepsis     C.A.D. Father      Age 80 have MI and triple bypass     Coronary Artery Disease Father      Heart Attack     Hypertension Father      ???     Hyperlipidemia Father      ???     Colon Cancer Father      Cured     Diabetes Maternal Grandmother      Migraines Daughter          Current Outpatient Prescriptions   Medication Sig Dispense Refill     [START ON 9/3/2018] amphetamine-dextroamphetamine (ADDERALL XR) 30 MG per 24 hr capsule Take 1 capsule (30 mg) by mouth daily 30 capsule 0     aspirin 325 MG tablet Take 325 mg by mouth daily.       cetirizine (ZYRTEC) 10 MG tablet Take 1 tablet (10 mg) by mouth every evening for 14 days 14 tablet 0     Cholecalciferol (D3 MAXIMUM STRENGTH PO) Take 400 Units by mouth daily       fluticasone (FLONASE) 50 MCG/ACT spray Spray 1 spray into both nostrils daily       folic acid 800 MCG TABS Take 1 tablet by mouth daily Reported on 5/10/2017       hydrochlorothiazide (HYDRODIURIL) 25 MG tablet TAKE ONE TABLET BY MOUTH ONCE DAILY 90 tablet 1     lisinopril (PRINIVIL/ZESTRIL) 20 MG tablet Take 1 tablet (20 mg) by mouth daily 90 tablet 1     metoprolol succinate (TOPROL-XL) 100 MG 24 hr tablet Take 1 tablet (100 mg) by mouth daily 90 tablet 1     MULTI-VITAMIN PO TABS 1 TABLET DAILY       Omega 3-6-9 Fatty Acids (OMEGA 3-6-9 COMPLEX) CAPS Take 1 capsule by mouth daily       order for DME Knee high compression stockings (15-20 mmHg) 1 each 1     ORDER FOR DME Respironics REMSTAR 60 Series Auto CPAP 5-18cm H2O, Nuance pillow nasal mask large       POTASSIUM PO Take 595 mcg by mouth daily       sertraline (ZOLOFT) 50 MG tablet TAKE ONE TABLET BY MOUTH ONCE DAILY 90 tablet 1     simvastatin (ZOCOR) 20 MG tablet ONE TABLET DAILY IN THE EVENING 90 tablet 3     sulfaSALAzine (AZULFIDINE) 500 MG tablet Take 2 tablets (1,000 mg) by mouth 2 times daily 360 tablet 0      "tamsulosin (FLOMAX) 0.4 MG capsule Take 2 capsules (0.8 mg) by mouth daily 180 capsule 1     traZODone (DESYREL) 50 MG tablet Take 1 tablet (50 mg) by mouth nightly as needed for sleep 90 tablet 1     triamcinolone (KENALOG) 0.1 % cream Apply sparingly to affected area three times daily for 14 days. 80 g 0     No Known Allergies  Recent Labs   Lab Test  12/14/17   1145  12/19/16   0857  10/28/16   1307 06/21/16 04/29/16   1552 03/18/16   12/08/15   1001   A1C  5.2   --    --    --    --    --    --    --    LDL  94  110*   --    --    --    --    --   105*   HDL  57  64   --    --    --    --    --   65   TRIG  163*  83   --    --    --    --    --   98   ALT   --    --   30   --   25  27   --   32   CR  1.02   --   0.89   --    --   0.96   < >  1.05   GFRESTIMATED  73   --   86   --    --   >60   < >  71   GFRESTBLACK  88   --   >90   GFR Calc     --    --   >60   < >  86   POTASSIUM  4.6   --   4.4   --   3.5   --    < >  5.3   TSH   --    --   1.10  1.3   --    --    --   1.51    < > = values in this interval not displayed.      BP Readings from Last 3 Encounters:   08/23/18 142/72   08/09/18 145/82   06/26/18 124/78    Wt Readings from Last 3 Encounters:   08/23/18 180 lb (81.6 kg)   08/09/18 174 lb 6.4 oz (79.1 kg)   06/26/18 177 lb 12.8 oz (80.6 kg)                  Labs reviewed in EPIC    Reviewed and updated as needed this visit by clinical staff       Reviewed and updated as needed this visit by Provider         ROS:  Constitutional, HEENT, cardiovascular, pulmonary, gi and gu systems are negative, except as otherwise noted.    OBJECTIVE:                                                    /72  Pulse 95  Temp 97  F (36.1  C) (Oral)  Resp 16  Ht 5' 8\" (1.727 m)  Wt 180 lb (81.6 kg)  SpO2 96%  BMI 27.37 kg/m2  Body mass index is 27.37 kg/(m^2).  GENERAL APPEARANCE: healthy, alert and no distress  SKIN: diffuse faintly reddish skin along the medial aspect of each forearm in a " distribution suggestive of skin stress from wheelchair use. No other pathological features     Diagnostic test results:  Diagnostic Test Results:  none      ASSESSMENT/PLAN:                                                    1. Eczema, unspecified type  This is consistent with eczema . We discussed this diagnosis and gave treatment [ see patient after-visit summary ] and renewed prescription for triamcinolone cream 0.1%   - triamcinolone (KENALOG) 0.1 % cream; Apply sparingly to affected area three times daily for 14 days.  Dispense: 80 g; Refill: 0    2. Need for shingles vaccine  I recommended they receive the ShingRx vaccination against herpes zoster       Follow up with Provider - 3-6 months      Nikhil Carpenter MD  [facility]

## 2018-08-23 ENCOUNTER — OFFICE VISIT (OUTPATIENT)
Dept: FAMILY MEDICINE | Facility: CLINIC | Age: 68
End: 2018-08-23
Payer: MEDICARE

## 2018-08-23 VITALS
RESPIRATION RATE: 16 BRPM | WEIGHT: 180 LBS | HEART RATE: 95 BPM | BODY MASS INDEX: 27.28 KG/M2 | SYSTOLIC BLOOD PRESSURE: 138 MMHG | TEMPERATURE: 97 F | HEIGHT: 68 IN | OXYGEN SATURATION: 96 % | DIASTOLIC BLOOD PRESSURE: 72 MMHG

## 2018-08-23 DIAGNOSIS — L30.9 ECZEMA, UNSPECIFIED TYPE: Primary | ICD-10-CM

## 2018-08-23 DIAGNOSIS — Z23 NEED FOR SHINGLES VACCINE: ICD-10-CM

## 2018-08-23 PROCEDURE — 99213 OFFICE O/P EST LOW 20 MIN: CPT | Performed by: INTERNAL MEDICINE

## 2018-08-23 RX ORDER — TRIAMCINOLONE ACETONIDE 1 MG/G
CREAM TOPICAL
Qty: 80 G | Refills: 0 | Status: SHIPPED | OUTPATIENT
Start: 2018-08-23 | End: 2024-06-20

## 2018-08-23 NOTE — MR AVS SNAPSHOT
After Visit Summary   8/23/2018    Arnold Lozada    MRN: [id number]           Patient Information     Date Of Birth          1950        Visit Information        Provider Department      8/23/2018 2:10 PM Nikhil Carpenter MD [facility]        Today's Diagnoses     Eczema, unspecified type    -  1    Need for shingles vaccine          Care Instructions    Eczema treatment       1. Avoid all soaps , no washing [ unless essential ]  2. if you have to use soap only use non-drying non irritating soap like dove or basis is recommended [ or cetaphil ]  3. Generous Moisturizer use is encouraged - eucerin,or lubriderm, or lanolin  4. I am prescribing  Some topical steroid cream for the worst spots. Triamcinolone cream can be used to affected areas until cleared ; 1-3 times a day    [facility] ([facility]3]    If you have any questions regarding to your visit please contact your care team:     Team Pink:   Clinic Hours Telephone Number   Internal Medicine:  Dr. Lola Bauer NP       7am-7pm  Monday - Thursday   7am-5pm  Fridays  [phone]  (Appointment scheduling available 24/7)    Questions about your recent visit?  Team Line  [phone]   Urgent Care - [place] and [place] - 11am-9pm Monday-Friday Saturday-Sunday- 9am-5pm   [place] - 5pm-9pm Monday-Friday Saturday-Sunday- 9am-5pm  [phone] - [place]  [phone] - [place]       What options do I have for a visit other than an office visit? We offer electronic visits (e-visits) and telephone visits, when medically appropriate.  Please check with your medical insurance to see if these types of visits are covered, as you will be responsible for any charges that are not paid by your insurance.      You can use [employer] (secure electronic communication) to access to your chart, send your primary care provider a message, or make an appointment. Ask a team member how  "to get started.     For a price quote for your services, please call our Consumer Price Line at [phone] or our Imaging Cost estimation line at [phone] (for imaging tests).  Claribel MEI CMA ([facility])            Follow-ups after your visit        Your next 10 appointments already scheduled     Sep 20, 2018 12:30 PM CDT   Return Visit with Shad Portillo [facility] ([facility])    [address] [place] [id number] [phone]              Who to contact     If you have questions or need follow up information about today's clinic visit or your schedule please contact [facility] directly at [phone].  Normal or non-critical lab and imaging results will be communicated to you by MyChart, letter or phone within 4 business days after the clinic has received the results. If you do not hear from us within 7 days, please contact the clinic through MyChart or phone. If you have a critical or abnormal lab result, we will notify you by phone as soon as possible.  Submit refill requests through [employer] or call your pharmacy and they will forward the refill request to us. Please allow 3 business days for your refill to be completed.          Additional Information About Your Visit        [employer] Information     [employer] gives you secure access to your electronic health record. If you see a primary care provider, you can also send messages to your care team and make appointments. If you have questions, please call your primary care clinic.  If you do not have a primary care provider, please call [phone] and they will assist you.        Care EveryWhere ID     This is your Care EveryWhere ID. This could be used by other organizations to access your [place] medical records  [id number]        Your Vitals Were     Pulse Temperature Respirations Height Pulse Oximetry BMI (Body Mass Index)    95 97  F (36.1  C) (Oral) 16 5' 8\" (1.727 m) 96% " 27.37 kg/m2       Blood Pressure from Last 3 Encounters:   08/23/18 142/72   08/09/18 145/82   06/26/18 124/78    Weight from Last 3 Encounters:   08/23/18 180 lb (81.6 kg)   08/09/18 174 lb 6.4 oz (79.1 kg)   06/26/18 177 lb 12.8 oz (80.6 kg)              Today, you had the following     No orders found for display         Where to get your medicines      These medications were sent to [id number] - [place] - [address]  [address] [place] [address]     Phone:  [phone]     triamcinolone 0.1 % cream          Primary Care Provider Office Phone # Fax #    Nikhil Carpenter [phone] [address]  [place] [address]        Equal Access to Services     [facility] Center[facility] AN[facility]EL AH: Hadii johanna ku hadasho Soomaali, waaxda luqadaha, qaybta kaalmada adeegyada, bernice cornejo . So [facility] [phone].    ATENCIÓN: Si bart gonzalez, tiene a gaytan disposición servicios gratuitos de asistencia lingüística. Llame al [phone].    We comply with applicable federal civil rights laws and [place] laws. We do not discriminate on the basis of race, color, national origin, age, disability, sex, sexual orientation, or gender identity.            Thank you!     Thank you for choosing [facility]  for your care. Our goal is always to provide you with excellent care. Hearing back from our patients is one way we can continue to improve our services. Please take a few minutes to complete the written survey that you may receive in the mail after your visit with us. Thank you!             Your Updated Medication List - Protect others around you: Learn how to safely use, store and throw away your medicines at www.disposemymeds.org.          This list is accurate as of 8/23/18  2:56 PM.  Always use your most recent med list.                   Brand Name Dispense Instructions for use Diagnosis    amphetamine-dextroamphetamine 30 MG per 24 hr  capsule   Start taking on:  9/3/2018    ADDERALL XR    30 capsule    Take 1 capsule (30 mg) by mouth daily    Excessive sleepiness       aspirin 325 MG tablet      Take 325 mg by mouth daily.        cetirizine 10 MG tablet    zyrTEC    14 tablet    Take 1 tablet (10 mg) by mouth every evening for 14 days    Allergic reaction, initial encounter       D3 MAXIMUM STRENGTH PO      Take 400 Units by mouth daily        fluticasone 50 MCG/ACT spray    FLONASE     Spray 1 spray into both nostrils daily        folic acid 800 MCG Tabs      Take 1 tablet by mouth daily Reported on 5/10/2017        hydrochlorothiazide 25 MG tablet    HYDRODIURIL    90 tablet    TAKE ONE TABLET BY MOUTH ONCE DAILY    Essential hypertension with goal blood pressure less than 140/90       lisinopril 20 MG tablet    PRINIVIL/ZESTRIL    90 tablet    Take 1 tablet (20 mg) by mouth daily    Essential hypertension with goal blood pressure less than 140/90       metoprolol succinate 100 MG 24 hr tablet    TOPROL-XL    90 tablet    Take 1 tablet (100 mg) by mouth daily    Essential hypertension with goal blood pressure less than 140/90       Multi-vitamin Tabs tablet   Generic drug:  multivitamin, therapeutic with minerals      1 TABLET DAILY        OMEGA 3-6-9 COMPLEX Caps      Take 1 capsule by mouth daily        order for DME      Respironics REMSTAR 60 Series Auto CPAP 5-18cm H2O, Nuance pillow nasal mask large        order for DME     1 each    Knee high compression stockings (15-20 mmHg)    Bilateral leg edema       POTASSIUM PO      Take 595 mcg by mouth daily        sertraline 50 MG tablet    ZOLOFT    90 tablet    TAKE ONE TABLET BY MOUTH ONCE DAILY    Adjustment disorder with mixed anxiety and depressed mood       simvastatin 20 MG tablet    ZOCOR    90 tablet    ONE TABLET DAILY IN THE EVENING    Hyperlipidemia LDL goal <130       sulfaSALAzine 500 MG tablet    AZULFIDINE    360 tablet    Take 2 tablets (1,000 mg) by mouth 2 times daily     Crohn's disease of colon without complication (H)       tamsulosin 0.4 MG capsule    FLOMAX    180 capsule    Take 2 capsules (0.8 mg) by mouth daily    Benign prostatic hyperplasia with urinary frequency       traZODone 50 MG tablet    DESYREL    90 tablet    Take 1 tablet (50 mg) by mouth nightly as needed for sleep    Adjustment insomnia       triamcinolone 0.1 % cream    KENALOG    80 g    Apply sparingly to affected area three times daily for 14 days.    Eczema, unspecified type

## 2018-08-23 NOTE — PATIENT INSTRUCTIONS
Eczema treatment       1. Avoid all soaps , no washing [ unless essential ]  2. if you have to use soap only use non-drying non irritating soap like dove or basis is recommended [ or cetaphil ]  3. Generous Moisturizer use is encouraged - eucerin,or lubriderm, or lanolin  4. I am prescribing  Some topical steroid cream for the worst spots. Triamcinolone cream can be used to affected areas until cleared ; 1-3 times a day    [facility]    If you have any questions regarding to your visit please contact your care team:     Team Pink:   Clinic Hours Telephone Number   Internal Medicine:  Dr. Lola Bauer, NP       7am-7pm  Monday - Thursday   7am-5pm  Fridays  [phone]  (Appointment scheduling available 24/7)    Questions about your recent visit?  Team Line  [phone]   Urgent Care - [place] and [place] - 11am-9pm Monday-Friday Saturday-Sunday- 9am-5pm   [place] - 5pm-9pm Monday-Friday Saturday-Sunday- 9am-5pm  [phone] - [place]  [phone] - [place]       What options do I have for a visit other than an office visit? We offer electronic visits (e-visits) and telephone visits, when medically appropriate.  Please check with your medical insurance to see if these types of visits are covered, as you will be responsible for any charges that are not paid by your insurance.      You can use [employer] (secure electronic communication) to access to your chart, send your primary care provider a message, or make an appointment. Ask a team member how to get started.     For a price quote for your services, please call our Consumer Price Line at [phone] or our Imaging Cost estimation line at [phone] (for imaging tests).  Claribel MEI CMA ([facility])

## 2018-09-04 DIAGNOSIS — N40.1 BENIGN PROSTATIC HYPERPLASIA WITH URINARY FREQUENCY: ICD-10-CM

## 2018-09-04 DIAGNOSIS — R35.0 BENIGN PROSTATIC HYPERPLASIA WITH URINARY FREQUENCY: ICD-10-CM

## 2018-09-05 RX ORDER — TAMSULOSIN HYDROCHLORIDE 0.4 MG/1
CAPSULE ORAL
Qty: 180 CAPSULE | Refills: 2 | Status: SHIPPED | OUTPATIENT
Start: 2018-09-05 | End: 2018-12-18

## 2018-09-19 ENCOUNTER — TELEPHONE (OUTPATIENT)
Dept: FAMILY MEDICINE | Facility: CLINIC | Age: 68
End: 2018-09-19

## 2018-09-19 NOTE — TELEPHONE ENCOUNTER
Reason for call:  request  Patient called regarding (reason for call): Patient is asking for wax guards. He would like 6 packs. Please call when they are ready for   Additional comments:na    Phone number to reach patient:  Other phone number:  [phone]*    Best Time:  Anytime during the day    Can we leave a detailed message on this number?  YES

## 2018-09-20 ENCOUNTER — OFFICE VISIT (OUTPATIENT)
Dept: BEHAVIORAL HEALTH | Facility: CLINIC | Age: 68
End: 2018-09-20
Payer: MEDICARE

## 2018-09-20 DIAGNOSIS — F43.23 ADJUSTMENT DISORDER WITH MIXED ANXIETY AND DEPRESSED MOOD: Primary | ICD-10-CM

## 2018-09-20 PROCEDURE — 90834 PSYTX W PT 45 MINUTES: CPT | Performed by: SOCIAL WORKER

## 2018-09-20 NOTE — MR AVS SNAPSHOT
MRN:[id number]                      After Visit Summary   9/20/2018    Arnold Lozada    MRN: [id number]           Visit Information        Provider Department      9/20/2018 12:30 PM [facility]      Your next 10 appointments already scheduled     Dec 05, 2018 12:30 PM CST   Return Visit with RODRIGUEZ Whiteside   [facility] ([facility])    [address] [place] [address] [phone]              MyChart Information     [employer] gives you secure access to your electronic health record. If you see a primary care provider, you can also send messages to your care team and make appointments. If you have questions, please call your primary care clinic.  If you do not have a primary care provider, please call [phone] and they will assist you.        Care EveryWhere ID     This is your Care EveryWhere ID. This could be used by other organizations to access your [facility] medical records  [id number]        Equal Access to Services     KASHIF BOUCHER : Hadii aad ku hadasho Soreji, waaxda luqadaha, qaybta kaalmada adeegyaedin, bernice monsalve. So [facility] [phone].    ATENCIÓN: Si habla español, tiene a gaytan disposición servicios gratuitos de asistencia lingüística. Llame al [phone].    We comply with applicable federal civil rights laws and [place] laws. We do not discriminate on the basis of race, color, national origin, age, disability, sex, sexual orientation, or gender identity.

## 2018-09-20 NOTE — PROGRESS NOTES
"                                           Progress Note    Client Name: Arnold Lozada             Date:   9/20/2018                                            Service Type:Individual                           Session start time: 1230                 Session End Time:   115                            Session Length:        38-52                Session #:      2                Attendees:     Client and spouse Willa    Treatment Plan Last Reviewed: 7/24/2018   PHQ-9 / BRETT-7 :                 DATA                            Progress Since Last Session (Related to Symptoms / Goals / Homework):              Symptoms: Stable    Homework: n/a                            Episode of Care Goals: Satisfactory progress - ACTION (Actively working towards change); Intervened by reinforcing change plan / affirming steps taken                Current / Ongoing Stressors and Concerns:                 Client notes that he has been \"physically I am declining, and have noticed that my speech is worse.\"  Even getting in the car today is harder than normal.  Invited a speaker from the [employer] to their most recent [employer] meeting who unfortunately got the date wrong.  So client and his wife wound up speaking and this went well.  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            Reviewed 5 Stages of Grief (Grisel Gutierrez MD) with client today in session:  1. Denial: This can t be happening to me.   2. Anger:  Why is this happening to me?   3. Bargaining:  Please make this not happen and in return I will do anything.   4. Depression/Mourning:  I am so sad that I cannot change what has happened.   5. Acceptance:  I have made peace both with what has happened as well as my inability to change it.   Reviewed these as phases that may not always follow a linear pattern (i.e. client may feel depressed one day and angry the next).  Identified strategies for managing " grief and loss including journaling, exercise, and investing time and energy into healthy interpersonal relationships.                    Treatment Objective(s) Addressed in This Session:          Client will use identified behavioral and cognitive skills to challenge negative self talk 90% of the time.    -reviewed ACEs in session today.                  Intervention:              Cognitive Behavioral Therapy:   Discussed cognitive restructuring and behavioral activation.  Explored the connection between thoughts, feelings, and actions by using examples relative to client's presenting concerns.  Explained major  domains of symptoms (cognitive, emotional, somatic, behavioral, interpersonal) and importance of targeted and specific interventions to reduce symptoms of anxiety and depression.  Discussed role of  symptom monitoring in cognitive behavioral treatment.                                ASSESSMENT: Current Emotional / Mental Status  of significant symptoms):              Risk status (Self / Other harm or suicidal ideation)              Client denies current fears or concerns for personal safety.              Client denies current or recent suicidal ideation or behaviors.              Client denies current or recent homicidal ideation or behaviors.              Client denies current or recent self injurious behavior or ideation.              Client denies other safety concerns.              A safety and risk management plan has not been developed at this time, however client was given the after-hours number / 911 should there be a change in any of these risk factors.                Appearance:                           Appropriate               Eye Contact:                           Good               Psychomotor Behavior:          Normal               Attitude:                                   Cooperative                Orientation:                             All              Speech                          Rate / Production:       Normal                           Volume:                       Normal               Mood:                                      Normal              Affect:                                      Appropriate               Thought Content:                    Clear               Thought Form:                        Coherent  Goal Directed  Logical               Insight:                                    Good                 Medication Review:              No current psychiatric medications prescribed                Medication Compliance:              NA                Changes in Health Issues:              Yes: feels like he notices a decline each week                Chemical Use Review:              Substance Use: Chemical use reviewed, no active concerns identified                 Tobacco Use: No current tobacco use.                  Collateral Reports Completed:              Communicated with: n/a    PLAN: (Client Tasks / Therapist Tasks / Other)  1.  Client will call to make next appointment  2.  CBT at next session                                                               ________________________________________________________________________    Treatment Plan    Client's Name: Arnold Lozada                      YOB: 1950    Date: 8-31-16    DSM5 Diagnoses: (Sustained by DSM5 Criteria Listed Above)  Diagnoses: F43.23 adjustment disorder with anxiety and depression  Psychosocial & Contextual Factors: health problems decreased functioning  WHODAS 2.0 (12 item)27    Referral / Collaboration:  suggested pursing support group and getting connected with organizations.    Anticipated number of session or this episode of care: 8-12    MeasurableTreatment Goal(s) related to diagnosis / functional impairment(s)  Goal-Depression: Client will decrease depressed mood    I will  know I've met my goal when I am less depressed.      Objective #A (Client Action)    Status: Continued- Date: 7/24/2018     Client will use identified behavioral and cognitive skills to challenge negative self talk 90% of the time.    Intervention(s)  Therapist will provide psychoeducation, behavioral activation, and cognitive restructuring.      Objective #B  Client will complete at least 10 minutes of self-regulation practice (e.g.: yoga, meditation, relaxation breathing, etc.) per day.    Status: Continued- Date: 7/24/2018     Intervention(s)  Therapist will provide psychoeducation, behavioral activation, and cognitive restructuring.      Objective #C  Client will exercise 30 minutes 36 times in the next 12 weeks.  Status: Continued- Date: 7/24/2018     Intervention(s)  Therapist will provide psychoeducation, behavioral activation, and cognitive restructuring.                Goal- Anxiety: Client will decrease anxiety    I will know I've met my goal when I am less anxious.      Objective #A (Client Action)    Status: Continued- Date: 7/24/2018     Client will use cognitive strategies identified in therapy to challenge anxious thoughts.    Intervention(s)  Therapist will provide psychoeducation, behavioral activation, and cognitive restructuring.    Objective #B  Client will use at least 3 coping skills for anxiety management in the next 12 weeks.    Status: Continued- Date: 7/24/2018     Intervention(s)  Therapist will provide psychoeducation, behavioral activation, and cognitive restructuring.      Objective #C  Client will identify three distraction and diversion activities and use those activities to decrease level of anxiety.  Status: Continued- Date: 7/24/2018     Intervention(s)  Therapist will provide psychoeducation, behavioral activation, and cognitive restructuring.                Client has reviewed and agreed to the above plan.      REYES Goodson [facility]               12/8/2016

## 2018-09-21 DIAGNOSIS — G47.10 EXCESSIVE SLEEPINESS: ICD-10-CM

## 2018-09-21 RX ORDER — DEXTROAMPHETAMINE SACCHARATE, AMPHETAMINE ASPARTATE MONOHYDRATE, DEXTROAMPHETAMINE SULFATE AND AMPHETAMINE SULFATE 7.5; 7.5; 7.5; 7.5 MG/1; MG/1; MG/1; MG/1
30 CAPSULE, EXTENDED RELEASE ORAL DAILY
Qty: 30 CAPSULE | Refills: 0 | Status: SHIPPED | OUTPATIENT
Start: 2018-12-03 | End: 2018-12-04

## 2018-09-21 RX ORDER — DEXTROAMPHETAMINE SACCHARATE, AMPHETAMINE ASPARTATE MONOHYDRATE, DEXTROAMPHETAMINE SULFATE AND AMPHETAMINE SULFATE 7.5; 7.5; 7.5; 7.5 MG/1; MG/1; MG/1; MG/1
30 CAPSULE, EXTENDED RELEASE ORAL DAILY
Qty: 30 CAPSULE | Refills: 0 | Status: SHIPPED | OUTPATIENT
Start: 2018-10-03 | End: 2018-09-21

## 2018-09-21 RX ORDER — DEXTROAMPHETAMINE SACCHARATE, AMPHETAMINE ASPARTATE MONOHYDRATE, DEXTROAMPHETAMINE SULFATE AND AMPHETAMINE SULFATE 7.5; 7.5; 7.5; 7.5 MG/1; MG/1; MG/1; MG/1
30 CAPSULE, EXTENDED RELEASE ORAL DAILY
Qty: 30 CAPSULE | Refills: 0 | Status: SHIPPED | OUTPATIENT
Start: 2018-11-03 | End: 2018-09-21

## 2018-10-05 ENCOUNTER — CARE COORDINATION (OUTPATIENT)
Dept: NEUROLOGY | Facility: CLINIC | Age: 68
End: 2018-10-05

## 2018-10-08 ENCOUNTER — APPOINTMENT (OUTPATIENT)
Dept: LAB | Facility: CLINIC | Age: 68
End: 2018-10-08
Attending: GENETIC COUNSELOR, MS
Payer: MEDICARE

## 2018-10-08 ENCOUNTER — TELEPHONE (OUTPATIENT)
Dept: NEUROLOGY | Facility: CLINIC | Age: 68
End: 2018-10-08

## 2018-10-08 DIAGNOSIS — R27.0 ATAXIA: Primary | ICD-10-CM

## 2018-10-08 DIAGNOSIS — R27.0 ATAXIA: ICD-10-CM

## 2018-10-08 PROCEDURE — 81479 UNLISTED MOLECULAR PATHOLOGY: CPT | Mod: 91 | Performed by: GENETIC COUNSELOR, MS

## 2018-10-08 PROCEDURE — 81405 MOPATH PROCEDURE LEVEL 6: CPT | Mod: 91 | Performed by: GENETIC COUNSELOR, MS

## 2018-10-08 PROCEDURE — 81407 MOPATH PROCEDURE LEVEL 8: CPT | Mod: 91 | Performed by: GENETIC COUNSELOR, MS

## 2018-10-08 PROCEDURE — 81404 MOPATH PROCEDURE LEVEL 5: CPT | Mod: 91 | Performed by: GENETIC COUNSELOR, MS

## 2018-10-08 PROCEDURE — 81406 MOPATH PROCEDURE LEVEL 7: CPT | Mod: 91 | Performed by: GENETIC COUNSELOR, MS

## 2018-10-08 PROCEDURE — 81479 UNLISTED MOLECULAR PATHOLOGY: CPT | Performed by: GENETIC COUNSELOR, MS

## 2018-10-08 PROCEDURE — 81408 MOPATH PROCEDURE LEVEL 9: CPT | Mod: 91 | Performed by: GENETIC COUNSELOR, MS

## 2018-10-08 NOTE — TELEPHONE ENCOUNTER
GENETIC COUNSELING-Ataxia clinic  Arnold Lozada had requested that I call him to discuss genetic testing. 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  He had seen Dr. Pena and had wanted to proceed with additional genetic testing by next generation sequencing.  An order for an insurance hold had been placed at that time. He called to discuss the status of this order.  I explained that additional genetic testing would evaluate >100 different genes that can cause ataxia.  These forms of ataxia are in general more rare than the ones we originally tested for, but collectively, they account for another substantial proportion of hereditary ataxias. We discussed risks, benefits, limitations, and utility of genetic testing.  Arnold is motivated to determine a precise cause for his ataxia, and has additional concerns about potential genetic risks for his children and grandchildren. I indicated that I would place the order for this testing.  He will discuss this with his wife and if they change their mind, he will contact me.  We reviewed informed consent and I sent him a copy to sign and return to me.    Marcelino Whaley MS, [facility]  Licensed Genetic Counselor

## 2018-11-10 DIAGNOSIS — I10 ESSENTIAL HYPERTENSION WITH GOAL BLOOD PRESSURE LESS THAN 140/90: ICD-10-CM

## 2018-11-12 RX ORDER — METOPROLOL SUCCINATE 100 MG/1
TABLET, EXTENDED RELEASE ORAL
Qty: 90 TABLET | Refills: 0 | Status: SHIPPED | OUTPATIENT
Start: 2018-11-12 | End: 2018-12-18

## 2018-11-12 RX ORDER — LISINOPRIL 20 MG/1
TABLET ORAL
Qty: 90 TABLET | Refills: 0 | Status: SHIPPED | OUTPATIENT
Start: 2018-11-12 | End: 2018-12-18

## 2018-11-13 LAB — COPATH REPORT: NORMAL

## 2018-11-15 ENCOUNTER — TELEPHONE (OUTPATIENT)
Dept: NEUROLOGY | Facility: CLINIC | Age: 68
End: 2018-11-15

## 2018-11-15 NOTE — TELEPHONE ENCOUNTER
GENETIC COUNSELING-Ataxia clinic  I spoke with Arnold about his normal genetic testing for ataxia. The testing did not identify a clear genetic basis for this symptoms. There were some variants of uncertain clinical significance identified, but none of these appears likely to be the sole explanation for his symptoms. He did ask what the next step is to figure out what is causing his ataxia. I did explain that for many of our patients, we do not ever arrive at a specific answer to that question- and so he is not alone in not having a specific explanation for his ataxia. I did stress that with time, we do figure out answers for some patients- either because their symptoms evolve into a recognizable pattern or because our knowledge increases and we are able to identify a newer genetic cause.    All questions were answered    Marcelino Whaley MS, [facility]  Licensed Genetic Counselor

## 2018-11-20 DIAGNOSIS — I10 ESSENTIAL HYPERTENSION WITH GOAL BLOOD PRESSURE LESS THAN 140/90: ICD-10-CM

## 2018-11-20 RX ORDER — HYDROCHLOROTHIAZIDE 25 MG/1
TABLET ORAL
Qty: 90 TABLET | Refills: 0 | Status: SHIPPED | OUTPATIENT
Start: 2018-11-20 | End: 2018-12-18

## 2018-11-20 NOTE — TELEPHONE ENCOUNTER
"Prescription approved per [facility] Refill Protocol.  Requested Prescriptions   Pending Prescriptions Disp Refills     hydrochlorothiazide (HYDRODIURIL) 25 MG tablet [Pharmacy Med Name: HYDROCHLOROT 25MG   TAB] 90 tablet 1     Sig: TAKE 1 TABLET BY MOUTH ONCE DAILY    Diuretics (Including Combos) Protocol Passed    11/20/2018  9:32 AM       Passed - Blood pressure under 140/90 in past 12 months    BP Readings from Last 3 Encounters:   08/23/18 138/72   08/09/18 145/82   06/26/18 124/78                Passed - Recent (12 mo) or future (30 days) visit within the authorizing provider's specialty    Patient had office visit in the last 12 months or has a visit in the next 30 days with authorizing provider or within the authorizing provider's specialty.  See \"Patient Info\" tab in inbasket, or \"Choose Columns\" in Meds & Orders section of the refill encounter.             Passed - Patient is age 18 or older       Passed - Normal serum creatinine on file in past 12 months    Recent Labs   Lab Test  12/14/17   1145   CR  1.02             Passed - Normal serum potassium on file in past 12 months    Recent Labs   Lab Test  12/14/17   1145   POTASSIUM  4.6                   Passed - Normal serum sodium on file in past 12 months    Recent Labs   Lab Test  12/14/17   1145   NA  133              Sierra Farias RN - BC      "

## 2018-12-04 DIAGNOSIS — G47.10 EXCESSIVE SLEEPINESS: ICD-10-CM

## 2018-12-04 RX ORDER — DEXTROAMPHETAMINE SACCHARATE, AMPHETAMINE ASPARTATE MONOHYDRATE, DEXTROAMPHETAMINE SULFATE AND AMPHETAMINE SULFATE 7.5; 7.5; 7.5; 7.5 MG/1; MG/1; MG/1; MG/1
30 CAPSULE, EXTENDED RELEASE ORAL DAILY
Qty: 30 CAPSULE | Refills: 0 | Status: SHIPPED | OUTPATIENT
Start: 2019-01-03 | End: 2019-03-15

## 2018-12-18 ENCOUNTER — OFFICE VISIT (OUTPATIENT)
Dept: FAMILY MEDICINE | Facility: CLINIC | Age: 68
End: 2018-12-18
Payer: MEDICARE

## 2018-12-18 VITALS
SYSTOLIC BLOOD PRESSURE: 130 MMHG | TEMPERATURE: 97.1 F | RESPIRATION RATE: 16 BRPM | HEART RATE: 85 BPM | DIASTOLIC BLOOD PRESSURE: 82 MMHG | OXYGEN SATURATION: 96 %

## 2018-12-18 DIAGNOSIS — I10 ESSENTIAL HYPERTENSION WITH GOAL BLOOD PRESSURE LESS THAN 140/90: ICD-10-CM

## 2018-12-18 DIAGNOSIS — F43.23 ADJUSTMENT DISORDER WITH MIXED ANXIETY AND DEPRESSED MOOD: ICD-10-CM

## 2018-12-18 DIAGNOSIS — N40.1 BENIGN PROSTATIC HYPERPLASIA WITH URINARY FREQUENCY: ICD-10-CM

## 2018-12-18 DIAGNOSIS — L30.9 ECZEMA, UNSPECIFIED TYPE: ICD-10-CM

## 2018-12-18 DIAGNOSIS — F51.02 ADJUSTMENT INSOMNIA: ICD-10-CM

## 2018-12-18 DIAGNOSIS — R35.0 BENIGN PROSTATIC HYPERPLASIA WITH URINARY FREQUENCY: ICD-10-CM

## 2018-12-18 DIAGNOSIS — E78.5 HYPERLIPIDEMIA LDL GOAL <130: ICD-10-CM

## 2018-12-18 DIAGNOSIS — K50.10 CROHN'S DISEASE OF COLON WITHOUT COMPLICATION (H): Chronic | ICD-10-CM

## 2018-12-18 LAB
ALBUMIN SERPL-MCNC: 3.9 G/DL (ref 3.4–5)
ALP SERPL-CCNC: 72 U/L (ref 40–150)
ALT SERPL W P-5'-P-CCNC: 43 U/L (ref 0–70)
ANION GAP SERPL CALCULATED.3IONS-SCNC: 5 MMOL/L (ref 3–14)
AST SERPL W P-5'-P-CCNC: 23 U/L (ref 0–45)
BASOPHILS # BLD AUTO: 0 10E9/L (ref 0–0.2)
BASOPHILS NFR BLD AUTO: 0.3 %
BILIRUB SERPL-MCNC: 0.4 MG/DL (ref 0.2–1.3)
BUN SERPL-MCNC: 16 MG/DL (ref 7–30)
CALCIUM SERPL-MCNC: 9.2 MG/DL (ref 8.5–10.1)
CHLORIDE SERPL-SCNC: 93 MMOL/L (ref 94–109)
CHOLEST SERPL-MCNC: 196 MG/DL
CO2 SERPL-SCNC: 31 MMOL/L (ref 20–32)
CREAT SERPL-MCNC: 0.93 MG/DL (ref 0.66–1.25)
DIFFERENTIAL METHOD BLD: NORMAL
EOSINOPHIL # BLD AUTO: 0.5 10E9/L (ref 0–0.7)
EOSINOPHIL NFR BLD AUTO: 7.1 %
ERYTHROCYTE [DISTWIDTH] IN BLOOD BY AUTOMATED COUNT: 12.5 % (ref 10–15)
GFR SERPL CREATININE-BSD FRML MDRD: 81 ML/MIN/1.7M2
GLUCOSE SERPL-MCNC: 79 MG/DL (ref 70–99)
HCT VFR BLD AUTO: 42.7 % (ref 40–53)
HDLC SERPL-MCNC: 49 MG/DL
HGB BLD-MCNC: 15 G/DL (ref 13.3–17.7)
LDLC SERPL CALC-MCNC: 117 MG/DL
LYMPHOCYTES # BLD AUTO: 1.5 10E9/L (ref 0.8–5.3)
LYMPHOCYTES NFR BLD AUTO: 22 %
MCH RBC QN AUTO: 32.6 PG (ref 26.5–33)
MCHC RBC AUTO-ENTMCNC: 35.1 G/DL (ref 31.5–36.5)
MCV RBC AUTO: 93 FL (ref 78–100)
MONOCYTES # BLD AUTO: 1 10E9/L (ref 0–1.3)
MONOCYTES NFR BLD AUTO: 14.5 %
NEUTROPHILS # BLD AUTO: 3.7 10E9/L (ref 1.6–8.3)
NEUTROPHILS NFR BLD AUTO: 56.1 %
NONHDLC SERPL-MCNC: 147 MG/DL
PLATELET # BLD AUTO: 260 10E9/L (ref 150–450)
POTASSIUM SERPL-SCNC: 4.5 MMOL/L (ref 3.4–5.3)
PROT SERPL-MCNC: 7.4 G/DL (ref 6.8–8.8)
RBC # BLD AUTO: 4.6 10E12/L (ref 4.4–5.9)
SODIUM SERPL-SCNC: 129 MMOL/L (ref 133–144)
TRIGL SERPL-MCNC: 151 MG/DL
WBC # BLD AUTO: 6.6 10E9/L (ref 4–11)

## 2018-12-18 PROCEDURE — G0439 PPPS, SUBSEQ VISIT: HCPCS | Performed by: INTERNAL MEDICINE

## 2018-12-18 PROCEDURE — 36415 COLL VENOUS BLD VENIPUNCTURE: CPT | Performed by: INTERNAL MEDICINE

## 2018-12-18 PROCEDURE — 85025 COMPLETE CBC W/AUTO DIFF WBC: CPT | Performed by: INTERNAL MEDICINE

## 2018-12-18 PROCEDURE — 80053 COMPREHEN METABOLIC PANEL: CPT | Performed by: INTERNAL MEDICINE

## 2018-12-18 PROCEDURE — 80061 LIPID PANEL: CPT | Performed by: INTERNAL MEDICINE

## 2018-12-18 RX ORDER — HYDROCHLOROTHIAZIDE 25 MG/1
25 TABLET ORAL DAILY
Qty: 90 TABLET | Refills: 3 | Status: SHIPPED | OUTPATIENT
Start: 2018-12-18 | End: 2019-05-17 | Stop reason: ALTCHOICE

## 2018-12-18 RX ORDER — TRAZODONE HYDROCHLORIDE 50 MG/1
50 TABLET, FILM COATED ORAL
Qty: 90 TABLET | Refills: 3 | Status: SHIPPED | OUTPATIENT
Start: 2018-12-18 | End: 2020-03-18

## 2018-12-18 RX ORDER — METOPROLOL SUCCINATE 100 MG/1
100 TABLET, EXTENDED RELEASE ORAL DAILY
Qty: 90 TABLET | Refills: 3 | Status: SHIPPED | OUTPATIENT
Start: 2018-12-18 | End: 2019-05-17 | Stop reason: ALTCHOICE

## 2018-12-18 RX ORDER — SULFASALAZINE 500 MG/1
1000 TABLET ORAL 2 TIMES DAILY
Qty: 360 TABLET | Refills: 0 | Status: SHIPPED | OUTPATIENT
Start: 2018-12-18 | End: 2019-06-21

## 2018-12-18 RX ORDER — LISINOPRIL 20 MG/1
20 TABLET ORAL DAILY
Qty: 90 TABLET | Refills: 3 | Status: SHIPPED | OUTPATIENT
Start: 2018-12-18 | End: 2019-04-12

## 2018-12-18 RX ORDER — TAMSULOSIN HYDROCHLORIDE 0.4 MG/1
CAPSULE ORAL
Qty: 180 CAPSULE | Refills: 3 | Status: SHIPPED | OUTPATIENT
Start: 2018-12-18 | End: 2020-03-18

## 2018-12-18 RX ORDER — SIMVASTATIN 20 MG
TABLET ORAL
Qty: 90 TABLET | Refills: 3 | Status: SHIPPED | OUTPATIENT
Start: 2018-12-18 | End: 2020-03-12

## 2018-12-18 RX ORDER — TRIAMCINOLONE ACETONIDE 1 MG/G
CREAM TOPICAL
Qty: 80 G | Refills: 0 | Status: CANCELLED | OUTPATIENT
Start: 2018-12-18

## 2018-12-18 ASSESSMENT — ENCOUNTER SYMPTOMS
FREQUENCY: 0
HEMATURIA: 0
FEVER: 0
SHORTNESS OF BREATH: 0
SORE THROAT: 0
CONSTIPATION: 0
JOINT SWELLING: 0
HEMATOCHEZIA: 0
DYSURIA: 0
PARESTHESIAS: 0
HEADACHES: 0
COUGH: 0
ARTHRALGIAS: 0
PALPITATIONS: 0
NERVOUS/ANXIOUS: 0
DIZZINESS: 0
HEARTBURN: 0
CHILLS: 0
NAUSEA: 0
ABDOMINAL PAIN: 0
WEAKNESS: 1
MYALGIAS: 1
DIARRHEA: 0
EYE PAIN: 0

## 2018-12-18 ASSESSMENT — ACTIVITIES OF DAILY LIVING (ADL)
CURRENT_FUNCTION: PREPARING MEALS REQUIRES ASSISTANCE
CURRENT_FUNCTION: TRANSPORTATION REQUIRES ASSISTANCE
CURRENT_FUNCTION: HOUSEWORK REQUIRES ASSISTANCE
CURRENT_FUNCTION: SHOPPING REQUIRES ASSISTANCE
CURRENT_FUNCTION: LAUNDRY REQUIRES ASSISTANCE

## 2018-12-18 NOTE — PROGRESS NOTES
"SUBJECTIVE:   Arnold Lozada is a 68 year old male who presents for Preventive Visit.      Are you in the first 12 months of your Medicare coverage?  No    Annual Wellness Visit     In general, how would you rate your overall health?  Fair    Frequency of exercise:  4-5 days/week    Do you usually eat at least 4 servings of fruit and vegetables a day, include whole grains    & fiber and avoid regularly eating high fat or \"junk\" foods?  Yes    Taking medications regularly:  Yes    Medication side effects:  None    Ability to successfully perform activities of daily living:  Transportation requires assistance, shopping requires assistance, preparing meals requires assistance, housework requires assistance and laundry requires assistance    Home Safety:  No safety concerns identified    Hearing Impairment:  Difficulty following a conversation in a noisy restaurant or crowded room, difficulty following dialogue in the theater, find that men's voices are easier to understand than woman's, difficulty understanding soft or whispered speech and difficulty understanding speech on the telephone    In the past 6 months, have you been bothered by leaking of urine?  No    In general, how would you rate your overall mental or emotional health?  Fair    PHQ-2 Total Score: 0    Additional concerns today:  No    Do you feel safe in your environment? Yes    Do you have a Health Care Directive? Yes: Advance Directive has been received and scanned.    Wears hearing aids     Fall risk       Cognitive Screening   1) Repeat 3 items (Leader, Season, Table)    2) Clock draw: NORMAL  3) 3 item recall: Recalls 3 objects  Results: 3 items recalled: COGNITIVE IMPAIRMENT LESS LIKELY    Mini-CogTM Copyright MONSERRAT Griffiths. Licensed by the author for use in [facility]; reprinted with permission (nikos@.[facility]). All rights reserved.      Do you have sleep apnea, excessive snoring or daytime drowsiness?: no    Reviewed and updated as " needed this visit by clinical staff  Tobacco  Allergies  Meds  Med Hx  Surg Hx  Fam Hx  Soc Hx        Reviewed and updated as needed this visit by Provider        Social History     Tobacco Use     Smoking status: Never Smoker     Smokeless tobacco: Never Used     Tobacco comment: Never smoked.   Substance Use Topics     Alcohol use: Yes     Comment: 2-4 drinks per week       Alcohol Use 12/18/2018   If you drink alcohol do you typically have greater than 3 drinks per day OR greater than 7 drinks per week? No   No flowsheet data found.        Current providers sharing in care for this patient include:   Patient Care Team:  Nikhil Carpenter MD as PCP - General  Giorgio Pena MD as MD (Neurology)    The following health maintenance items are reviewed in Epic and correct as of today:  Health Maintenance   Topic Date Due     DEPRESSION ACTION PLAN  10/02/1969     ZOSTER IMMUNIZATION (2 of 3) 01/29/2015     INFLUENZA VACCINE (1) 09/01/2018     PHQ-9 Q6 MONTHS  01/16/2019     FALL RISK ASSESSMENT  06/08/2019     COLONOSCOPY Q3 YR  12/07/2019     ADVANCE DIRECTIVE PLANNING Q5 YRS  12/18/2020     DTAP/TDAP/TD IMMUNIZATION (3 - Td) 11/11/2021     LIPID SCREEN Q5 YR MALE (SYSTEM ASSIGNED)  12/14/2022     AORTIC ANEURYSM SCREENING (SYSTEM ASSIGNED)  Completed     HEPATITIS C SCREENING  Completed     IPV IMMUNIZATION  Aged Out     MENINGITIS IMMUNIZATION  Aged Out     Labs reviewed in EPIC  BP Readings from Last 3 Encounters:   12/18/18 130/82   08/23/18 138/72   08/09/18 145/82    Wt Readings from Last 3 Encounters:   08/23/18 81.6 kg (180 lb)   08/09/18 79.1 kg (174 lb 6.4 oz)   06/26/18 80.6 kg (177 lb 12.8 oz)                  Patient Active Problem List   Diagnosis     Hyperlipidemia LDL goal <130     Crohn's disease of colon (H)     Adenomatous colon polyp     Low back strain     GUILLERMINA (obstructive sleep apnea)- Mild/moderate ( AHI 16.2)     Family history of colon cancer     Advanced directives,  counseling/discussion     Elevated troponin I level     Cerebellar ataxia (H)     Essential hypertension     Adjustment disorder with mixed anxiety and depressed mood     Cerebral ataxia (H)     ARF (acute renal failure) (H)     Dyslipidemia     Nocturia     Sciatica of right side     Temporary cerebral vascular dysfunction     Gait abnormality     Vasovagal syncope     Past Surgical History:   Procedure Laterality Date     COLONOSCOPY  2014    OK     HC TOOTH EXTRACTION W/FORCEP         Social History     Tobacco Use     Smoking status: Never Smoker     Smokeless tobacco: Never Used     Tobacco comment: Never smoked.   Substance Use Topics     Alcohol use: Yes     Comment: 2-4 drinks per week     Family History   Problem Relation Age of Onset     Cancer Mother         brain tumor, age 79     Other Cancer Mother         Brain tumor     Depression Mother      Asthma Mother      Cancer Father         colon     Pneumonia Father          age 90 from pneumonia and sepsis     C.A.D. Father         Age 80 have MI and triple bypass     Coronary Artery Disease Father         Heart Attack     Hypertension Father         ???     Hyperlipidemia Father         ???     Colon Cancer Father         Cured     Diabetes Maternal Grandmother      Migraines Daughter          Current Outpatient Medications   Medication Sig Dispense Refill     [START ON 1/3/2019] amphetamine-dextroamphetamine (ADDERALL XR) 30 MG 24 hr capsule Take 1 capsule (30 mg) by mouth daily 30 capsule 0     aspirin 325 MG tablet Take 325 mg by mouth daily.       Cholecalciferol (D3 MAXIMUM STRENGTH PO) Take 400 Units by mouth daily       fluticasone (FLONASE) 50 MCG/ACT spray Spray 1 spray into both nostrils daily       folic acid 800 MCG TABS Take 1 tablet by mouth daily Reported on 5/10/2017       hydrochlorothiazide (HYDRODIURIL) 25 MG tablet TAKE 1 TABLET BY MOUTH ONCE DAILY 90 tablet 0     lisinopril (PRINIVIL/ZESTRIL) 20 MG tablet TAKE 1 TABLET BY  MOUTH ONCE DAILY 90 tablet 0     metoprolol succinate (TOPROL-XL) 100 MG 24 hr tablet TAKE 1 TABLET BY MOUTH ONCE DAILY 90 tablet 0     MULTI-VITAMIN PO TABS 1 TABLET DAILY       Omega 3-6-9 Fatty Acids (OMEGA 3-6-9 COMPLEX) CAPS Take 1 capsule by mouth daily       order for DME Knee high compression stockings (15-20 mmHg) 1 each 1     ORDER FOR DME Respironics REMSTAR 60 Series Auto CPAP 5-18cm H2O, Nuance pillow nasal mask large       POTASSIUM PO Take 595 mcg by mouth daily       sertraline (ZOLOFT) 50 MG tablet TAKE ONE TABLET BY MOUTH ONCE DAILY 90 tablet 1     simvastatin (ZOCOR) 20 MG tablet ONE TABLET DAILY IN THE EVENING 90 tablet 3     sulfaSALAzine (AZULFIDINE) 500 MG tablet Take 2 tablets (1,000 mg) by mouth 2 times daily 360 tablet 0     tamsulosin (FLOMAX) 0.4 MG capsule TAKE 2 CAPSULES BY MOUTH ONCE DAILY 180 capsule 2     traZODone (DESYREL) 50 MG tablet Take 1 tablet (50 mg) by mouth nightly as needed for sleep 90 tablet 1     triamcinolone (KENALOG) 0.1 % cream Apply sparingly to affected area three times daily for 14 days. 80 g 0     No Known Allergies  Recent Labs   Lab Test 12/14/17  1145 12/19/16  0857 10/28/16  1307 06/21/16 04/29/16  1552 03/18/16  12/08/15  1001   A1C 5.2  --   --   --   --   --   --   --    LDL 94 110*  --   --   --   --   --  105*   HDL 57 64  --   --   --   --   --  65   TRIG 163* 83  --   --   --   --   --  98   ALT  --   --  30  --  25 27  --  32   CR 1.02  --  0.89  --   --  0.96   < > 1.05   GFRESTIMATED 73  --  86  --   --  >60   < > 71   GFRESTBLACK 88  --  >90   GFR Calc    --   --  >60   < > 86   POTASSIUM 4.6  --  4.4  --  3.5  --    < > 5.3   TSH  --   --  1.10 1.3  --   --   --  1.51    < > = values in this interval not displayed.      Immunization History   Administered Date(s) Administered     Flu 65+ Years 10/11/2018     Influenza (High Dose) 3 valent vaccine 10/04/2016, 09/28/2017     Influenza (IIV3) PF 10/16/1999, 12/01/2001, 10/03/2003,  "12/29/2004, 11/05/2012, 09/25/2014, 10/05/2014, 10/15/2015     Influenza Vaccine IM 3yrs+ 4 Valent IIV4 10/26/2013     Influenza Vaccine, 3 YRS +, IM (QUADRIVALENT W/PRESERVATIVES) 11/01/2016     Pneumo Conj 13-V (2010&after) 12/08/2015     Pneumococcal (PCV 7) 11/13/2012     Pneumococcal 23 valent 05/15/2017     Pneumococcal, Unspecified 10/01/2015     TD (ADULT, 7+) 02/19/2001     TDAP Vaccine (Adacel) 11/11/2011     Zoster vaccine, live 12/04/2014   on the waiting list for ShingRx vaccination against herpes zoster       Review of Systems   Constitutional: Negative for chills and fever.   HENT: Positive for congestion. Negative for ear pain, hearing loss and sore throat.    Eyes: Negative for pain and visual disturbance.   Respiratory: Negative for cough and shortness of breath.    Cardiovascular: Positive for peripheral edema. Negative for chest pain and palpitations.   Gastrointestinal: Negative for abdominal pain, constipation, diarrhea, heartburn, hematochezia and nausea.   Genitourinary: Positive for impotence. Negative for discharge, dysuria, frequency, genital sores, hematuria and urgency.   Musculoskeletal: Positive for myalgias. Negative for arthralgias and joint swelling.   Skin: Negative for rash.   Neurological: Positive for weakness. Negative for dizziness, headaches and paresthesias.   Psychiatric/Behavioral: Negative for mood changes. The patient is not nervous/anxious.        OBJECTIVE:   /82   Pulse 85   Temp 97.1  F (36.2  C) (Oral)   Resp 16   SpO2 96%  Estimated body mass index is 27.37 kg/m  as calculated from the following:    Height as of 8/23/18: 1.727 m (5' 8\").    Weight as of 8/23/18: 81.6 kg (180 lb).  Physical Exam  GENERAL: healthy, alert and no distress, patient with an dysphasia secondary to cerebellar dysfunction, his words are still fully understandable but quite abnormal   EYES: Eyes grossly normal to inspection, PERRL and conjunctivae and sclerae normal  HENT: ear " canals and TM's normal, nose and mouth without ulcers or lesions  NECK: no adenopathy, no asymmetry, masses, or scars and thyroid normal to palpation  RESP: lungs clear to auscultation - no rales, rhonchi or wheezes  CV: regular rate and rhythm, normal S1 S2, no S3 or S4, no murmur, click or rub, no peripheral edema and peripheral pulses strong  ABDOMEN: soft, nontender, no hepatosplenomegaly, no masses and bowel sounds normal  MS: no gross musculoskeletal defects noted, no edema  SKIN: no suspicious lesions or rashes  NEURO: Normal strength and tone, mentation intact and speech normal, clear evidence of a movement disorder and patients symptoms have progressed just a little bit since last year   PSYCH: mentation appears normal, affect normal/bright    Diagnostic Test Results:  No orders of the defined types were placed in this encounter.        ASSESSMENT / PLAN:   (I10) Essential hypertension with goal blood pressure less than 140/90  Comment: continue current plan of care    Plan: hydrochlorothiazide (HYDRODIURIL) 25 MG tablet,        lisinopril (PRINIVIL/ZESTRIL) 20 MG tablet,         metoprolol succinate ER (TOPROL-XL) 100 MG 24         hr tablet, Comprehensive metabolic panel, CBC         with platelets differential            (F43.23) Adjustment disorder with mixed anxiety and depressed mood  Comment: continue current plan of care  , refills provided   Plan: sertraline (ZOLOFT) 50 MG tablet            (E78.5) Hyperlipidemia LDL goal <130  Comment: continue current plan of care    Plan: simvastatin (ZOCOR) 20 MG tablet, Lipid panel         reflex to direct LDL Non-fasting        Follow up as indicated on results     (N40.1,  R35.0) Benign prostatic hyperplasia with urinary frequency  Comment: continue current plan of care  , medication is helping  Plan: tamsulosin (FLOMAX) 0.4 MG capsule            (F51.02) Adjustment insomnia  Comment: continue current plan of care    Plan: traZODone (DESYREL) 50 MG tablet     "        (L30.9) Eczema, unspecified type  Comment: they did not need the topical steroid cream refilled today   Plan:     (K50.10) Crohn's disease of colon without complication (H)  Comment: we did provide a courtesy refill for sulfaSALAzine (AZULFIDINE)   Plan: sulfaSALAzine (AZULFIDINE) 500 MG tablet,         Comprehensive metabolic panel, CBC with         platelets differential              End of Life Planning:  Patient currently has an advanced directive: Yes.  Practitioner is supportive of decision.    COUNSELING:  Reviewed preventive health counseling, as reflected in patient instructions    BP Readings from Last 1 Encounters:   12/18/18 130/82     Estimated body mass index is 27.37 kg/m  as calculated from the following:    Height as of 8/23/18: 1.727 m (5' 8\").    Weight as of 8/23/18: 81.6 kg (180 lb).           reports that  has never smoked. he has never used smokeless tobacco.      Appropriate preventive services were discussed with this patient, including applicable screening as appropriate for cardiovascular disease, diabetes, osteopenia/osteoporosis, and glaucoma.  As appropriate for age/gender, discussed screening for colorectal cancer, prostate cancer, breast cancer, and cervical cancer. Checklist reviewing preventive services available has been given to the patient.    Reviewed patients plan of care and provided an AVS. The Complex Care Plan (for patients with higher acuity and needing more deliberate coordination of services) for Arnold meets the Care Plan requirement. This Care Plan has been established and reviewed with the Patient and spouse.    Counseling Resources:  ATP IV Guidelines  Pooled Cohorts Equation Calculator  Breast Cancer Risk Calculator  FRAX Risk Assessment  ICSI Preventive Guidelines  Dietary Guidelines for Americans, 2010  USDA's MyPlate  ASA Prophylaxis  Lung CA Screening    Nikhil Carpenter MD  [facility]  "

## 2018-12-18 NOTE — PROGRESS NOTES
"  Answers for HPI/ROS submitted by the patient on 12/18/2018   Annual Exam:  In general, how would you rate your overall physical health?: fair  Frequency of exercise:: 4-5 days/week  Do you usually eat at least 4 servings of fruit and vegetables a day, include whole grains & fiber, and avoid regularly eating high fat or \"junk\" foods? : Yes  Taking medications regularly:: Yes  Medication side effects:: None  Activities of Daily Living: transportation requires assistance, shopping requires assistance, preparing meals requires assistance, housework requires assistance, laundry requires assistance  Home safety: no safety concerns identified  Hearing Impairment:: difficulty following a conversation in a noisy restaurant or crowded room, difficulty following dialogue in the theater, find that men's voices are easier to understand than woman's, difficulty understanding soft or whispered speech, difficulty understanding speech on the telephone  In the past 6 months, have you been bothered by leaking of urine?: No  Positive for the following: congestion, peripheral edema, myalgias, weakness  Negative for the following: abdominal pain, Blood in stool, Blood in urine, chest pain, chills, constipation, cough, diarrhea, dizziness, ear pain, eye pain, nervous/anxious, fever, frequency, genital sores, headaches, hearing loss, heartburn, arthralgias, joint swelling, mood changes, nausea, dysuria, palpitations, Skin sensation changes, sore throat, urgency, rash, shortness of breath, visual disturbance  impotence: Yes  penile discharge: No  In general, how would you rate your overall mental or emotional health?: fair  Additional concerns today:: No  Duration of exercise:: 30-45 minutes    "

## 2018-12-21 ENCOUNTER — OFFICE VISIT (OUTPATIENT)
Dept: BEHAVIORAL HEALTH | Facility: CLINIC | Age: 68
End: 2018-12-21
Payer: MEDICARE

## 2018-12-21 DIAGNOSIS — F43.23 ADJUSTMENT DISORDER WITH MIXED ANXIETY AND DEPRESSED MOOD: Primary | ICD-10-CM

## 2018-12-21 PROCEDURE — 90834 PSYTX W PT 45 MINUTES: CPT | Performed by: SOCIAL WORKER

## 2018-12-21 NOTE — PROGRESS NOTES
"                                           Progress Note    Client Name: Arnold Lozada             Date:   12/21/2018                                           Service Type:Individual                           Session start time: 1030                 Session End Time:   1115                            Session Length:        38-52                Session #:      2                Attendees:     Client and spouse Willa    Treatment Plan Last Reviewed: 12/21/2018   PHQ-9 / BRETT-7 :                 DATA                            Progress Since Last Session (Related to Symptoms / Goals / Homework):              Symptoms: Stable    Homework: n/a                            Episode of Care Goals: Satisfactory progress - ACTION (Actively working towards change); Intervened by reinforcing change plan / affirming steps taken                Current / Ongoing Stressors and Concerns:                 Client notes that he has been \"ok.\"  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  Has had one chapter in the book already published.  Will be spending two months down in [place] with his wife and they are looking forward to this time away.  Long discussion today a bout their relationship with their daughter Carolee.  Discussed Norma Cook, PhD and her biosocial theory of emotion dysregulation.  Discussed emotion dysregulation as a combination of an emotional response system that is oversensitive coupled with an inability to modulate strong emotions and associated actions.                     Treatment Objective(s) Addressed in This Session:          Client will use identified behavioral and cognitive skills to challenge negative self talk 90% of the time.    -reviewed ACEs in session today.                  Intervention:              Cognitive Behavioral Therapy:   Discussed cognitive restructuring and behavioral activation.  Explored the " connection between thoughts, feelings, and actions by using examples relative to client's presenting concerns.  Explained major  domains of symptoms (cognitive, emotional, somatic, behavioral, interpersonal) and importance of targeted and specific interventions to reduce symptoms of anxiety and depression.  Discussed role of  symptom monitoring in cognitive behavioral treatment.                                ASSESSMENT: Current Emotional / Mental Status  of significant symptoms):              Risk status (Self / Other harm or suicidal ideation)              Client denies current fears or concerns for personal safety.              Client denies current or recent suicidal ideation or behaviors.              Client denies current or recent homicidal ideation or behaviors.              Client denies current or recent self injurious behavior or ideation.              Client denies other safety concerns.              A safety and risk management plan has not been developed at this time, however client was given the after-hours number / 911 should there be a change in any of these risk factors.                Appearance:                           Appropriate               Eye Contact:                           Good               Psychomotor Behavior:          Normal               Attitude:                                   Cooperative               Orientation:                             All              Speech                          Rate / Production:       Normal                           Volume:                       Normal               Mood:                                      Normal              Affect:                                      Appropriate               Thought Content:                    Clear               Thought Form:                        Coherent  Goal Directed  Logical               Insight:                                    Good                 Medication Review:              No current  psychiatric medications prescribed                Medication Compliance:              NA                Changes in Health Issues:              Yes: feels like he notices a decline each week                Chemical Use Review:              Substance Use: Chemical use reviewed, no active concerns identified                 Tobacco Use: No current tobacco use.                  Collateral Reports Completed:              Communicated with: n/a    PLAN: (Client Tasks / Therapist Tasks / Other)  1.  Client will call to make next appointment  2.  CBT at next session                                                               ________________________________________________________________________    Treatment Plan    Client's Name: Arnold Lozada                      YOB: 1950    Date: 8-31-16    DSM5 Diagnoses: (Sustained by DSM5 Criteria Listed Above)  Diagnoses: F43.23 adjustment disorder with anxiety and depression  Psychosocial & Contextual Factors: health problems decreased functioning  WHODAS 2.0 (12 item)27    Referral / Collaboration:  suggested pursing support group and getting connected with organizations.    Anticipated number of session or this episode of care: 8-12    MeasurableTreatment Goal(s) related to diagnosis / functional impairment(s)  Goal-Depression: Client will decrease depressed mood    I will know I've met my goal when I am less depressed.      Objective #A (Client Action)    Status: Continued- Date: 12/21/2018     Client will use identified behavioral and cognitive skills to challenge negative self talk 90% of the time.    Intervention(s)  Therapist will provide psychoeducation, behavioral activation, and cognitive restructuring.      Objective #B  Client will complete at least 10 minutes of self-regulation practice (e.g.: yoga, meditation, relaxation breathing, etc.) per day.    Status: Continued- Date: 12/21/2018      Intervention(s)  Therapist will provide  psychoeducation, behavioral activation, and cognitive restructuring.      Objective #C  Client will exercise 30 minutes 36 times in the next 12 weeks.  Status: Continued- Date: 12/21/2018     Intervention(s)  Therapist will provide psychoeducation, behavioral activation, and cognitive restructuring.                Goal- Anxiety: Client will decrease anxiety    I will know I've met my goal when I am less anxious.      Objective #A (Client Action)    Status: Continued- Date: 12/21/2018     Client will use cognitive strategies identified in therapy to challenge anxious thoughts.    Intervention(s)  Therapist will provide psychoeducation, behavioral activation, and cognitive restructuring.    Objective #B  Client will use at least 3 coping skills for anxiety management in the next 12 weeks.    Status: Continued- Date: 12/21/2018     Intervention(s)  Therapist will provide psychoeducation, behavioral activation, and cognitive restructuring.      Objective #C  Client will identify three distraction and diversion activities and use those activities to decrease level of anxiety.  Status: Continued- Date: 12/21/2018      Intervention(s)  Therapist will provide psychoeducation, behavioral activation, and cognitive restructuring.                Client has reviewed and agreed to the above plan.      REYES Goodson [facility]               12/8/2016

## 2018-12-26 ENCOUNTER — DOCUMENTATION ONLY (OUTPATIENT)
Dept: FAMILY MEDICINE | Facility: CLINIC | Age: 68
End: 2018-12-26

## 2018-12-26 DIAGNOSIS — E87.1 HYPONATREMIA: Primary | ICD-10-CM

## 2019-01-02 DIAGNOSIS — E87.1 HYPONATREMIA: ICD-10-CM

## 2019-01-02 LAB
ANION GAP SERPL CALCULATED.3IONS-SCNC: 8 MMOL/L (ref 3–14)
BUN SERPL-MCNC: 18 MG/DL (ref 7–30)
CALCIUM SERPL-MCNC: 9.5 MG/DL (ref 8.5–10.1)
CHLORIDE SERPL-SCNC: 93 MMOL/L (ref 94–109)
CO2 SERPL-SCNC: 30 MMOL/L (ref 20–32)
CREAT SERPL-MCNC: 0.91 MG/DL (ref 0.66–1.25)
GFR SERPL CREATININE-BSD FRML MDRD: 87 ML/MIN/{1.73_M2}
GLUCOSE SERPL-MCNC: 85 MG/DL (ref 70–99)
POTASSIUM SERPL-SCNC: 4.1 MMOL/L (ref 3.4–5.3)
SODIUM SERPL-SCNC: 131 MMOL/L (ref 133–144)

## 2019-01-02 PROCEDURE — 80048 BASIC METABOLIC PNL TOTAL CA: CPT | Performed by: INTERNAL MEDICINE

## 2019-01-02 PROCEDURE — 36415 COLL VENOUS BLD VENIPUNCTURE: CPT | Performed by: INTERNAL MEDICINE

## 2019-02-01 ENCOUNTER — TELEPHONE (OUTPATIENT)
Dept: NEUROLOGY | Facility: CLINIC | Age: 69
End: 2019-02-01

## 2019-02-01 NOTE — TELEPHONE ENCOUNTER
Received a request for a prior authorization for pt's adderall from pharmacy in [place]. Spoke to pt's wife, she said their insurance did not change in 2019 and they have already filled this prescription in Jan. Of 2019. Called pharmacy in [facility] and was told that the insurance company is requesting the prior auth. Explained to pharmacy staff that it will take several days to get prior auth, asked if they would accept a [employer] coupon if it was faxed to them. This writer found coupon so paying cash for Adderal would cost the pt. $65.94 instead of $231. Called pt's wife back and gave her this information. She is still going to call the insurance company before they go  his medicine.

## 2019-02-01 NOTE — TELEPHONE ENCOUNTER
[facility] Call Center    Phone Message    May a detailed message be left on voicemail: yes    Reason for Call: Other: Willa called in, asked to talk to Adelaida. Willa said PA for Adderall needed to be called in tonight to this number: [phone] their hours are 7am-9pm Eastern Standard Time. Please give Willa another call back.     Action Taken: Message routed to:  [facility] ([facility]): Neurology

## 2019-02-02 ENCOUNTER — TELEPHONE (OUTPATIENT)
Dept: NEUROLOGY | Facility: CLINIC | Age: 69
End: 2019-02-02

## 2019-02-02 NOTE — TELEPHONE ENCOUNTER
Spoke to patients wife and lost connection. I was trying to understand what she needed due to tarik already helped with the RX and spoke with patient wife.

## 2019-02-22 ENCOUNTER — TELEPHONE (OUTPATIENT)
Dept: NEUROLOGY | Facility: CLINIC | Age: 69
End: 2019-02-22

## 2019-02-22 NOTE — TELEPHONE ENCOUNTER
Prior Authorization Retail Medication Request    Medication/Dose: adderal  ICD code (if different than what is on RX):  G47.10  Previously Tried and Failed: ritalin LA  Rationale: sleep apnea, excessive daytime sleepiness    Insurance Name: -MEDICARE  Insurance ID:  [id number]      Pharmacy Information (if different than what is on RX)  Name:  [facility], [address] [place], [place]  Phone: [phone]        Pt had this prescription filled in January at his pharmacy here in [place]. They are in [place] for 2 months and we sent a with them hand written prescriptions and a letter on [facility] letter head stating that Dr. Pena had written the prescriptions for this pt. They tried to fill the prescription in [place] on Feb 1st and they were told they needed a prior auth even though their insurance had not changed and it had been filled once in 2019. They need help. I did find a GoodRX coupon and faxed it to the pharmacy and they said they would honor it so they could get the month prescription for $40+ dollars instead of $200+. If you can get the prior auth done and to [place] soon that would be much appreciated.

## 2019-02-25 NOTE — TELEPHONE ENCOUNTER
Central Prior Authorization Team   [phone]    PA Initiation    Medication: adderal  Insurance Company: [employer] - Phone [phone] Fax [phone]  [facility] [address] [place], [place] - [address] [facility] [address]  Filling Pharmacy Phone: [phone]  Filling Pharmacy Fax: [phone]  Start Date: 2/25/2019

## 2019-02-26 NOTE — TELEPHONE ENCOUNTER
Contacted [facility] in [place], [place]. They gave this writer their fax number and prior auth info was faxed to them.  Contacted pt's wife. She will take the next prescription in on Saturday. Copy of the prior auth info was emailed to her in case the pharmacy misplaces their copy.  She said the adderall helps Arnold a lot, he is able to stay awake and enjoy his day when he has the med.    Pt and wife will be driving back to [place] the first week in March. He will then need a new prescription when back in [place].

## 2019-02-26 NOTE — TELEPHONE ENCOUNTER
Prior Authorization Approval    Authorization Effective Date: 1/26/2019  Authorization Expiration Date: 2/25/2020  Medication: adderal-PA APPROVED   Approved Dose/Quantity:   Reference #:     Insurance Company: [employer] - Phone [phone] Fax [phone]  Expected CoPay:       CoPay Card Available:      Foundation Assistance Needed:    Which Pharmacy is filling the prescription (Not needed for infusion/clinic administered[facility] [address] [place], [place] [address] [address] [facility]  Pharmacy Notified: Yes-pharmacy stated that last filled for medication was 1/2019 and has not seen patient or new prescription for this medication.   Patient Notified: Yes-pharmacy will notify patient if patient brings in prescription to have it filled.    *Called patient and left a message for patient to call back in regards to PA Approval. [facility] [address] [address] [facility] is not where patient is filling, not able to locate which pharmacy patient is filling at. *

## 2019-03-15 DIAGNOSIS — G47.10 EXCESSIVE SLEEPINESS: ICD-10-CM

## 2019-03-15 RX ORDER — DEXTROAMPHETAMINE SACCHARATE, AMPHETAMINE ASPARTATE MONOHYDRATE, DEXTROAMPHETAMINE SULFATE AND AMPHETAMINE SULFATE 7.5; 7.5; 7.5; 7.5 MG/1; MG/1; MG/1; MG/1
30 CAPSULE, EXTENDED RELEASE ORAL DAILY
Qty: 30 CAPSULE | Refills: 0 | Status: SHIPPED | OUTPATIENT
Start: 2019-04-02 | End: 2019-03-15

## 2019-03-15 RX ORDER — DEXTROAMPHETAMINE SACCHARATE, AMPHETAMINE ASPARTATE MONOHYDRATE, DEXTROAMPHETAMINE SULFATE AND AMPHETAMINE SULFATE 7.5; 7.5; 7.5; 7.5 MG/1; MG/1; MG/1; MG/1
30 CAPSULE, EXTENDED RELEASE ORAL DAILY
Qty: 30 CAPSULE | Refills: 0 | Status: SHIPPED | OUTPATIENT
Start: 2019-05-02 | End: 2019-03-15

## 2019-03-15 RX ORDER — METHYLPHENIDATE HYDROCHLORIDE 30 MG/1
30 CAPSULE, EXTENDED RELEASE ORAL DAILY
Qty: 30 CAPSULE | Refills: 0 | Status: SHIPPED | OUTPATIENT
Start: 2019-04-15 | End: 2019-05-15

## 2019-03-15 RX ORDER — METHYLPHENIDATE HYDROCHLORIDE 30 MG/1
30 CAPSULE, EXTENDED RELEASE ORAL DAILY
Qty: 30 CAPSULE | Refills: 0 | Status: SHIPPED | OUTPATIENT
Start: 2019-03-15 | End: 2019-04-14

## 2019-03-15 RX ORDER — METHYLPHENIDATE HYDROCHLORIDE 30 MG/1
30 CAPSULE, EXTENDED RELEASE ORAL DAILY
Qty: 30 CAPSULE | Refills: 0 | Status: SHIPPED | OUTPATIENT
Start: 2019-05-16 | End: 2019-06-15

## 2019-03-15 RX ORDER — DEXTROAMPHETAMINE SACCHARATE, AMPHETAMINE ASPARTATE MONOHYDRATE, DEXTROAMPHETAMINE SULFATE AND AMPHETAMINE SULFATE 7.5; 7.5; 7.5; 7.5 MG/1; MG/1; MG/1; MG/1
30 CAPSULE, EXTENDED RELEASE ORAL DAILY
Qty: 30 CAPSULE | Refills: 0 | Status: SHIPPED | OUTPATIENT
Start: 2019-05-01 | End: 2019-03-15

## 2019-03-15 RX ORDER — DEXTROAMPHETAMINE SACCHARATE, AMPHETAMINE ASPARTATE MONOHYDRATE, DEXTROAMPHETAMINE SULFATE AND AMPHETAMINE SULFATE 7.5; 7.5; 7.5; 7.5 MG/1; MG/1; MG/1; MG/1
30 CAPSULE, EXTENDED RELEASE ORAL DAILY
Qty: 30 CAPSULE | Refills: 0 | Status: SHIPPED | OUTPATIENT
Start: 2019-06-01 | End: 2019-06-07

## 2019-04-12 ENCOUNTER — MYC MEDICAL ADVICE (OUTPATIENT)
Dept: FAMILY MEDICINE | Facility: CLINIC | Age: 69
End: 2019-04-12

## 2019-04-12 NOTE — TELEPHONE ENCOUNTER
Noted hospital stopped lisinopril   Removed med from epic med list and add to allergy list    Please see patient's mychart message below  Please advise if alternative is needed.    Silverio Paz RN

## 2019-04-14 NOTE — TELEPHONE ENCOUNTER
Lets keep an eye on things for now. If we DID need another anti-hypertensive medication we would most likely use losartan ( Cozaar )     Nikhil Carpenter MD

## 2019-04-15 ENCOUNTER — TELEPHONE (OUTPATIENT)
Dept: AUDIOLOGY | Facility: CLINIC | Age: 69
End: 2019-04-15

## 2019-05-10 ENCOUNTER — MYC MEDICAL ADVICE (OUTPATIENT)
Dept: FAMILY MEDICINE | Facility: CLINIC | Age: 69
End: 2019-05-10

## 2019-05-10 DIAGNOSIS — I10 ESSENTIAL HYPERTENSION: Primary | ICD-10-CM

## 2019-05-13 RX ORDER — AMLODIPINE BESYLATE 5 MG/1
5 TABLET ORAL DAILY
Qty: 30 TABLET | Refills: 1 | Status: SHIPPED | OUTPATIENT
Start: 2019-05-13 | End: 2019-07-01

## 2019-05-13 NOTE — TELEPHONE ENCOUNTER
Dr. Carpenter,  Please see PR Slides message below.    Lissette Vieira RN  [facility] [place]

## 2019-05-13 NOTE — TELEPHONE ENCOUNTER
See [employer] message - can you just call him to schedule medical assistant blood pressure recheck  ?    Nikhil Carpenter MD

## 2019-05-17 ENCOUNTER — TELEPHONE (OUTPATIENT)
Dept: FAMILY MEDICINE | Facility: CLINIC | Age: 69
End: 2019-05-17

## 2019-05-17 NOTE — TELEPHONE ENCOUNTER
Patient is taking hydrochlorothiazide 25mg daily, Metoprolol succinate 100mg daily, and Amlodipine 5mg daily.  See note and BP below.   Please advise   Megha Doherty RN

## 2019-05-17 NOTE — TELEPHONE ENCOUNTER
It's the Amlodipine [Norvasc] that has really dropped his blood pressure.    1. Stop hydrochlorothiazide and metoprolol   2. See how it goes over the next few days   3. Please update our office with this requested information     Nikhil Carpenter MD

## 2019-05-17 NOTE — TELEPHONE ENCOUNTER
Reason for call:  Patient reporting a symptom    Symptom or request: Light headed in the morning    Duration (how long have symptoms been present): Since bp medication was changed    Have you been treated for this before? Yes    Additional comments: Patient states his bp medication dose was changed and now he is lightheaded in the morning. His bp today with the home care nurse was 91/57 and yesterday was 100/66. Patent states he has a presentation tomorrow morning and was wondering if his medication should be adjusted. Please advise    Phone Number patient can be reached at:  Cell number on file:    Telephone Information:   Mobile [phone]       Best Time:  ASAP    Can we leave a detailed message on this number:  YES    Call taken on 5/17/2019 at 12:27 PM by Roseanna Dean

## 2019-05-17 NOTE — TELEPHONE ENCOUNTER
Left detailed message for patient with information below.  Advised to call RN hotline [phone] if any questions & with updates.     Clare Mitchell RN

## 2019-05-17 NOTE — TELEPHONE ENCOUNTER
Patient notified of providers message as written.  Patient verbalized understanding, no further questions or concerns.  Metoprolol and hydrochlorothiazide removed from med list.   Megha Doherty RN

## 2019-06-03 ENCOUNTER — TELEPHONE (OUTPATIENT)
Dept: INTERNAL MEDICINE | Facility: CLINIC | Age: 69
End: 2019-06-03

## 2019-06-03 ENCOUNTER — ALLIED HEALTH/NURSE VISIT (OUTPATIENT)
Dept: FAMILY MEDICINE | Facility: CLINIC | Age: 69
End: 2019-06-03
Payer: MEDICARE

## 2019-06-03 VITALS — DIASTOLIC BLOOD PRESSURE: 88 MMHG | SYSTOLIC BLOOD PRESSURE: 150 MMHG

## 2019-06-03 DIAGNOSIS — I10 ESSENTIAL HYPERTENSION: Primary | ICD-10-CM

## 2019-06-03 PROCEDURE — 99207 ZZC NO CHARGE NURSE ONLY: CPT | Performed by: INTERNAL MEDICINE

## 2019-06-03 NOTE — TELEPHONE ENCOUNTER
Called patient and left message to call clinic back.     Claribel MORA CMA ([facility])

## 2019-06-03 NOTE — TELEPHONE ENCOUNTER
Patient notified of Provider's message as written.  Patient verbalized understanding but would like to keep his Ritalin dose the same    He also reported some home BP readings-   5/18/19- 131/79  5/19/19- 138/86  5/20/19- 142/88  5/21/19- 159/104  5/22/19- 138/86  5/23/19- 112/72  5/29/19- 150/101  5/30/19- 142/88  5/31/19- 124/79  6/1/19- 141/89  6/2/19- 139/78  6/3/19- 131/85    Silverio Paz RN

## 2019-06-03 NOTE — TELEPHONE ENCOUNTER
Continue current plan of care  With same medications and a posture of observation  For now    Nikhil Carpenter MD

## 2019-06-03 NOTE — PROGRESS NOTES
I has misgivings about continuing to crank up more and more of his anti-hypertensive medication when we know the cause of this is methylphenidate (RITALIN)     I think we should either    A. Accept sub-optimal blood pressure control and leave medications alone  B. Try reducing methylphenidate (RITALIN) dose  C. Further medication increases I don't really want to keep going with this    Nikhil Carpenter MD

## 2019-06-03 NOTE — PROGRESS NOTES
Arnold Lozada was evaluated at [facility] on Cathie 3, 2019 at which time his blood pressure was:    BP Readings from Last 3 Encounters:   06/03/19 150/88   12/18/18 130/82   08/23/18 138/72     Pulse Readings from Last 3 Encounters:   12/18/18 85   08/23/18 95   08/09/18 88       Reviewed lifestyle modifications for blood pressure control and reduction: including making healthy food choices, managing weight, getting regular exercise, smoking cessation, reducing alcohol consumption, monitoring blood pressure regularly.     Symptoms: None    BP Goal:< 140/90 mmHg    BP Assessment:  BP too high    Potential Reasons for BP too high: Dose of BP medication too low    BP Follow-Up Plan: Referral to PCP    Recommendation to Provider: Possibly restart one of his BP meds.      Note completed by: Thank you,  Samantha Mckinney, PharmD  [facility]  [phone]

## 2019-06-07 DIAGNOSIS — G47.10 EXCESSIVE SLEEPINESS: ICD-10-CM

## 2019-06-07 RX ORDER — DEXTROAMPHETAMINE SACCHARATE, AMPHETAMINE ASPARTATE MONOHYDRATE, DEXTROAMPHETAMINE SULFATE AND AMPHETAMINE SULFATE 7.5; 7.5; 7.5; 7.5 MG/1; MG/1; MG/1; MG/1
30 CAPSULE, EXTENDED RELEASE ORAL DAILY
Qty: 30 CAPSULE | Refills: 0 | Status: SHIPPED | OUTPATIENT
Start: 2019-08-03 | End: 2019-06-07

## 2019-06-07 RX ORDER — DEXTROAMPHETAMINE SACCHARATE, AMPHETAMINE ASPARTATE MONOHYDRATE, DEXTROAMPHETAMINE SULFATE AND AMPHETAMINE SULFATE 7.5; 7.5; 7.5; 7.5 MG/1; MG/1; MG/1; MG/1
30 CAPSULE, EXTENDED RELEASE ORAL DAILY
Qty: 30 CAPSULE | Refills: 0 | Status: SHIPPED | OUTPATIENT
Start: 2019-07-03 | End: 2019-06-07

## 2019-06-07 RX ORDER — DEXTROAMPHETAMINE SACCHARATE, AMPHETAMINE ASPARTATE MONOHYDRATE, DEXTROAMPHETAMINE SULFATE AND AMPHETAMINE SULFATE 7.5; 7.5; 7.5; 7.5 MG/1; MG/1; MG/1; MG/1
30 CAPSULE, EXTENDED RELEASE ORAL DAILY
Qty: 30 CAPSULE | Refills: 0 | Status: SHIPPED | OUTPATIENT
Start: 2019-09-03 | End: 2019-09-27

## 2019-06-10 NOTE — LETTER
[facility]  [address] [place] [address]  [place] [address]    December 15, 2017    Arnold Lozada  [address] [facility] [id number]          Dear Arnold,    All of these tests are within acceptable limits. Things look good !     Results for orders placed or performed in visit on 12/14/17   Lipid panel reflex to direct LDL Non-fasting   Result Value Ref Range    Cholesterol 184 <200 mg/dL    Triglycerides 163 (H) <150 mg/dL    HDL Cholesterol 57 >39 mg/dL    LDL Cholesterol Calculated 94 <100 mg/dL    Non HDL Cholesterol 127 <130 mg/dL   Basic metabolic panel   Result Value Ref Range    Sodium 133 133 - 144 mmol/L    Potassium 4.6 3.4 - 5.3 mmol/L    Chloride 97 94 - 109 mmol/L    Carbon Dioxide 30 20 - 32 mmol/L    Anion Gap 6 3 - 14 mmol/L    Glucose 81 70 - 99 mg/dL    Urea Nitrogen 17 7 - 30 mg/dL    Creatinine 1.02 0.66 - 1.25 mg/dL    GFR Estimate 73 >60 mL/min/1.7m2    GFR Estimate If Black 88 >60 mL/min/1.7m2    Calcium 9.5 8.5 - 10.1 mg/dL   CBC with platelets differential   Result Value Ref Range    WBC 4.8 4.0 - 11.0 10e9/L    RBC Count 4.43 4.4 - 5.9 10e12/L    Hemoglobin 14.5 13.3 - 17.7 g/dL    Hematocrit 41.1 40.0 - 53.0 %    MCV 93 78 - 100 fl    MCH 32.7 26.5 - 33.0 pg    MCHC 35.3 31.5 - 36.5 g/dL    RDW 12.7 10.0 - 15.0 %    Platelet Count 198 150 - 450 10e9/L    Diff Method Automated Method     % Neutrophils 54.8 %    % Lymphocytes 24.0 %    % Monocytes 14.5 %    % Eosinophils 6.5 %    % Basophils 0.2 %    Absolute Neutrophil 2.6 1.6 - 8.3 10e9/L    Absolute Lymphocytes 1.1 0.8 - 5.3 10e9/L    Absolute Monocytes 0.7 0.0 - 1.3 10e9/L    Absolute Eosinophils 0.3 0.0 - 0.7 10e9/L    Absolute Basophils 0.0 0.0 - 0.2 10e9/L   Hemoglobin A1c   Result Value Ref Range    Hemoglobin A1C 5.2 4.3 - 6.0 %       If you have any questions or concerns, please me or my clinic team at [phone].      Sincerely,        Nikhil  Jayden ARMENTA/bt             stated

## 2019-06-18 ENCOUNTER — TELEPHONE (OUTPATIENT)
Dept: NEUROLOGY | Facility: CLINIC | Age: 69
End: 2019-06-18

## 2019-06-18 NOTE — TELEPHONE ENCOUNTER
This writer was contacted via email that pt wanted to make a follow up appointment with Dr. Pena. Appointment was made for Sept 6 at 11am.

## 2019-07-01 ENCOUNTER — MYC MEDICAL ADVICE (OUTPATIENT)
Dept: FAMILY MEDICINE | Facility: CLINIC | Age: 69
End: 2019-07-01

## 2019-07-01 DIAGNOSIS — I10 ESSENTIAL HYPERTENSION: ICD-10-CM

## 2019-07-01 RX ORDER — AMLODIPINE BESYLATE 5 MG/1
5 TABLET ORAL DAILY
Qty: 90 TABLET | Refills: 1 | Status: SHIPPED | OUTPATIENT
Start: 2019-07-01 | End: 2019-12-20

## 2019-07-05 ENCOUNTER — ALLIED HEALTH/NURSE VISIT (OUTPATIENT)
Dept: FAMILY MEDICINE | Facility: CLINIC | Age: 69
End: 2019-07-05
Payer: MEDICARE

## 2019-07-05 VITALS — SYSTOLIC BLOOD PRESSURE: 148 MMHG | DIASTOLIC BLOOD PRESSURE: 90 MMHG

## 2019-07-05 DIAGNOSIS — Z01.30 BP CHECK: Primary | ICD-10-CM

## 2019-07-05 PROCEDURE — 99207 ZZC NO CHARGE NURSE ONLY: CPT | Performed by: INTERNAL MEDICINE

## 2019-07-05 NOTE — PROGRESS NOTES
Arnold Lozada was evaluated at [facility] on July 5, 2019 at which time his blood pressure was:    BP Readings from Last 3 Encounters:   07/05/19 148/90   06/03/19 150/88   12/18/18 130/82     Pulse Readings from Last 3 Encounters:   12/18/18 85   08/23/18 95   08/09/18 88       Reviewed lifestyle modifications for blood pressure control and reduction: including making healthy food choices, managing weight, getting regular exercise, smoking cessation, reducing alcohol consumption, monitoring blood pressure regularly.     Symptoms: None    BP Goal:< 140/90 mmHg    BP Assessment:  BP too high    Potential Reasons for BP too high: Unknown/Other: unknown    BP Follow-Up Plan: Recheck BP in 30 days at pharmacy    Recommendation to Provider: none     Note completed by: Nilsa Farley Rph  [facility]  [address] [place] [address] [place] [address]  [phone]

## 2019-07-05 NOTE — Clinical Note
Routing message to PCP for review because BP checked at pharmacy is above goal. Recommended patient to follow-up with PCP.  PCP please close this encounter.148/90Nilsa Farley [facility] [place], [place] [address]TAYLOR Alcantara [phone]

## 2019-07-08 ENCOUNTER — TELEPHONE (OUTPATIENT)
Dept: FAMILY MEDICINE | Facility: CLINIC | Age: 69
End: 2019-07-08

## 2019-07-08 DIAGNOSIS — H90.3 SENSORINEURAL HEARING LOSS, BILATERAL: Primary | ICD-10-CM

## 2019-07-08 NOTE — TELEPHONE ENCOUNTER
Reason for call:  Referral needed  Patient called regarding (reason for call): appointment  Additional comments: Patient will need a referral placed to see the Audiologist on 7-18-19.    Phone number to reach patient:  Home number on file [phone] (home)    Best Time:  any    Can we leave a detailed message on this number?  YES

## 2019-07-12 NOTE — TELEPHONE ENCOUNTER
Mag level was normal. We could recheck a K level for dx restless legs   See MyChart moody Carpenter MD

## 2019-07-18 ENCOUNTER — OFFICE VISIT (OUTPATIENT)
Dept: AUDIOLOGY | Facility: CLINIC | Age: 69
End: 2019-07-18
Payer: MEDICARE

## 2019-07-18 DIAGNOSIS — H90.3 SENSORINEURAL HEARING LOSS, BILATERAL: Primary | ICD-10-CM

## 2019-07-18 PROCEDURE — 92557 COMPREHENSIVE HEARING TEST: CPT | Performed by: AUDIOLOGIST

## 2019-07-18 PROCEDURE — V5299 HEARING SERVICE: HCPCS | Performed by: AUDIOLOGIST

## 2019-07-18 PROCEDURE — 92550 TYMPANOMETRY & REFLEX THRESH: CPT | Performed by: AUDIOLOGIST

## 2019-07-18 PROCEDURE — 99207 ZZC NO CHARGE LOS: CPT | Performed by: AUDIOLOGIST

## 2019-07-18 NOTE — PROGRESS NOTES
AUDIOLOGY REPORT    SUBJECTIVE:  Arnold Lozada is a 68 year old male who was seen in the Audiology [facility] on 7/18/19 for audiologic evaluation, referred by Dr. Carpenter.  The patient has been seen previously in this clinic on 2/23/2017 for assessment and results indicated bilateral sensorineural hearing loss. The patient reports a decline in hearing and understanding. The patient notes difficulty with communication in a variety of listening situations. They were accompanied today by their wife.    Abuse Screening:  Do you feel unsafe at home or work/school? No  Do you feel threatened by someone? No  Does anyone try to keep you from having contact with others, or doing things outside of your home? No  Physical signs of abuse present? No     OBJECTIVE:    Otoscopic exam indicates partial obstruction with cerumen bilaterally   NOTE: in the cerumen of the left ear there is a waxguard      Pure Tone Thresholds assessed using standard techniques  audiometry with good  reliability from 250-8000 Hz bilaterally using insert earphones and TDH headphones     RIGHT:  mild and profound sensorineural hearing loss    LEFT:    mild and profound sensorineural hearing loss    Tympanogram:    RIGHT: hyper complaint     LEFT:   Hyper compliant     Reflexes (reported by stimulus ear): 1000 Hz  RIGHT: Ipsilateral is present at normal levels  RIGHT: Contralateral is present at normal levels  LEFT:   Ipsilateral is present at normal levels  LEFT:   Contralateral is present at normal levels    Speech Reception Threshold:    RIGHT: 40 dB HL    LEFT:   40 dB HL    Word Recognition Score:     RIGHT: 100% at 80 dB HL using NU-6 recorded word list.    LEFT:   88% at 80 dB HL using NU-6 recorded word list.    ASSESSMENT:   Bilateral sensorineural hearing loss      Compared to patient's previous audiogram dated 2/23/2017, hearing has declined slightly bilaterally. Today we performed general cleaning of the Satiety  RITE hearing aids and earmolds. We also updated the programming in the hearing aids to match this most recent test. Patient reports improved hearing once the hearing aids are returned to the patient.  Today s results were discussed with the patient in detail.     PLAN:  Patient was counseled regarding hearing loss and impact on communication. It is recommended that the patient return as needed for hearing aid concerns.  Please call this clinic with questions regarding these results or recommendations.    Josué Stout CCC-A  Licensed Audiologist #[id number]  7/18/2019    CC: Dr. Carpenter

## 2019-07-28 ENCOUNTER — MYC MEDICAL ADVICE (OUTPATIENT)
Dept: FAMILY MEDICINE | Facility: CLINIC | Age: 69
End: 2019-07-28

## 2019-07-28 DIAGNOSIS — G47.10 EXCESSIVE SLEEPINESS: ICD-10-CM

## 2019-07-29 RX ORDER — DEXTROAMPHETAMINE SACCHARATE, AMPHETAMINE ASPARTATE MONOHYDRATE, DEXTROAMPHETAMINE SULFATE AND AMPHETAMINE SULFATE 7.5; 7.5; 7.5; 7.5 MG/1; MG/1; MG/1; MG/1
30 CAPSULE, EXTENDED RELEASE ORAL DAILY
Qty: 30 CAPSULE | Refills: 0 | Status: SHIPPED | OUTPATIENT
Start: 2019-08-01 | End: 2019-10-25

## 2019-07-29 NOTE — TELEPHONE ENCOUNTER
Yes we can do this. Please alert patient to his prescription being at [facility] [place] Carpenter MD

## 2019-08-12 ENCOUNTER — OFFICE VISIT (OUTPATIENT)
Dept: BEHAVIORAL HEALTH | Facility: CLINIC | Age: 69
End: 2019-08-12
Payer: MEDICARE

## 2019-08-12 DIAGNOSIS — F43.23 ADJUSTMENT DISORDER WITH MIXED ANXIETY AND DEPRESSED MOOD: Primary | ICD-10-CM

## 2019-08-12 PROCEDURE — 90791 PSYCH DIAGNOSTIC EVALUATION: CPT | Performed by: SOCIAL WORKER

## 2019-08-12 ASSESSMENT — COLUMBIA-SUICIDE SEVERITY RATING SCALE - C-SSRS
4. HAVE YOU HAD THESE THOUGHTS AND HAD SOME INTENTION OF ACTING ON THEM?: NO
ATTEMPT LIFETIME: NO
3. HAVE YOU BEEN THINKING ABOUT HOW YOU MIGHT KILL YOURSELF?: NO
4. HAVE YOU HAD THESE THOUGHTS AND HAD SOME INTENTION OF ACTING ON THEM?: NO
2. HAVE YOU ACTUALLY HAD ANY THOUGHTS OF KILLING YOURSELF?: NO
1. IN THE PAST MONTH, HAVE YOU WISHED YOU WERE DEAD OR WISHED YOU COULD GO TO SLEEP AND NOT WAKE UP?: NO
1. IN THE PAST MONTH, HAVE YOU WISHED YOU WERE DEAD OR WISHED YOU COULD GO TO SLEEP AND NOT WAKE UP?: NO
2. HAVE YOU ACTUALLY HAD ANY THOUGHTS OF KILLING YOURSELF LIFETIME?: NO
5. HAVE YOU STARTED TO WORK OUT OR WORKED OUT THE DETAILS OF HOW TO KILL YOURSELF? DO YOU INTEND TO CARRY OUT THIS PLAN?: NO
ATTEMPT PAST THREE MONTHS: NO
5. HAVE YOU STARTED TO WORK OUT OR WORKED OUT THE DETAILS OF HOW TO KILL YOURSELF? DO YOU INTEND TO CARRY OUT THIS PLAN?: NO

## 2019-08-12 NOTE — PROGRESS NOTES
"                                                                                                                                                                        Adult Intake Structured Interview  Standard Diagnostic Assessment      CLIENT'S NAME: Arnold Lozada  MRN:   [id number]  :   1950  ACCT. NUMBER: [id number]  DATE OF SERVICE: 19  VIDEO VISIT: No    Identifying Information:  Client is a 68 year old, ,  male. Client was referred for counseling by self. Client is currently disabled. Client attended the session alone.       Client's Statement of Presenting Concern:  Client reports the reason for seeking therapy at this time as \"declining health.\"  Client stated that his symptoms have resulted in the following functional impairments: self-care      History of Presenting Concern:  He struggle with motor skills and balance. This is a loss for him as prior to his dx he ran marathons and was overall very physically active. He is really struggling with the things he can not do, is not feeling a level of interest in identifying what he could be doing. Feels frustrated that it took so long to get a dx. He struggles with fatigue that is chronic and wonders if it is perhaps tied to depression.         Update: Physically things are changing, he is moving slower and speech is a challenge.  He reports \"things have gone downhill.\"  Mood is \"not quite as good as it was, it is so hard to talk now.\"  Rides his recumbent bike three times a week, also has a trike that he rides outside too.  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  He tried some medical marijuana earlier this year but did not find this to be helpful for speech or ambulation.  Wife is full time caregiver for him at this time.  Also has some in home care which helps.             Update 2019:  6 months since our last visit.  Health concerns continue and his ability to speak and move both continue to decline.  Just got " back from his 50th high school reunion and a baseball tour, had a nice time there.  Some struggles communicating, also hearing has declined.  Has learned a bit of sign language and his wife has too.  Also has been writing things down to communicate, but has been hampered a bit with this due to the ataxia.                Social History:  Client reported he grew up in [place]. They were the first born of two children. his parents were together and happily  until they passed away. Client reported that his childhood was very good, his parents were nurturing and loving. Client described his current relationships with family of origin as good with all family members.     Client has been  for 40 years and reports it is a good marriage. He has three adult daughters.  Client identified extensive stable and meaningful social connections. Client reported that he has not been involved with the legal system.  Client's highest education level was graduate school. Client did not identify any learning problems. There are no ethnic, cultural or Islam factors that may be relevant for therapy. Client identified his preferred language to be English. Client reported he does not need the assistance of an  or other support involved in therapy. Modifications will not be used to assist communication in therapy. Client did not serve in the .       Client reports family history includes Asthma in his mother; C.A.D. in his father; CANCER in his father and mother; Colon Cancer in his father; Coronary Artery Disease in his father; DIABETES in his maternal grandmother; Depression in his mother; Hyperlipidemia in his father; Hypertension in his father; Other Cancer in his mother; Pneumonia in his father.     Mental Health History:  Client reported the following biological family members or relatives with mental health issues: mom had bouts of depression.  Client has not been previously diagnosed with a  mental health diagnosis.  Client has not received mental health services in the past.  Hospitalizations: None.  Client is not currently receiving any mental health services.        Chemical Health History:  Client reported no family history of chemical health issues. Client has not received chemical dependency treatment in the past. Client is not currently receiving any chemical dependency treatment. Client reports no problems as a result of their drinking / drug use.        Client Reports:  Client reports using alcohol 2 times per week and has 2 glasses of wine at a time. Client first started drinking at age  .  Client denies using tobacco.  Client denies using marijuana.  Client denies using caffeine.  Client denies using street drugs.  Client denies the non-medical use of prescription or over the counter drugs.     CAGE: None of the patient's responses to the CAGE screening were positive / Negative CAGE score   Based on the negative Cage-Aid score and clinical interview there  are not indications of drug or alcohol abuse.     Discussed the general effects of drugs and alcohol on health and well-being. Therapist gave client printed information about the effects of chemical use on his health and well being.        Significant Losses / Trauma / Abuse / Neglect Issues:  There are no indications or report of: significant losses, trauma, abuse or neglect.     Issues of possible neglect are not present    Medical Issues:  Client has had a physical exam to rule out medical causes for current symptoms. Date of last physical exam was within the past year. Client was encouraged to follow up with PCP if symptoms were to develop. The client has a [facility] Provider, who is named Nikhil Carpenter.. The client reports not having a psychiatrist. Client reports the following current medical concerns: per EMR. The client denies the presence of chronic or episodic pain. There are not significant nutritional concerns.      Client reports current meds as:   Current Outpatient Medications   Medication Sig     amLODIPine (NORVASC) 5 MG tablet Take 1 tablet (5 mg) by mouth daily     amphetamine-dextroamphetamine (ADDERALL XR) 30 MG 24 hr capsule Take 1 capsule (30 mg) by mouth daily     [START ON 9/3/2019] amphetamine-dextroamphetamine (ADDERALL XR) 30 MG 24 hr capsule Take 1 capsule (30 mg) by mouth daily     aspirin 325 MG tablet Take 325 mg by mouth daily.     Cholecalciferol (D3 MAXIMUM STRENGTH PO) Take 400 Units by mouth daily     fluticasone (FLONASE) 50 MCG/ACT spray Spray 1 spray into both nostrils daily     folic acid 800 MCG TABS Take 1 tablet by mouth daily Reported on 5/10/2017     MULTI-VITAMIN PO TABS 1 TABLET DAILY     Omega 3-6-9 Fatty Acids (OMEGA 3-6-9 COMPLEX) CAPS Take 1 capsule by mouth daily     order for DME Knee high compression stockings (15-20 mmHg)     ORDER FOR DME Respironics REMSTAR 60 Series Auto CPAP 5-18cm H2O, Nuance pillow nasal mask large     POTASSIUM PO Take 595 mcg by mouth daily     sertraline (ZOLOFT) 50 MG tablet Take 1 tablet (50 mg) by mouth daily     simvastatin (ZOCOR) 20 MG tablet ONE TABLET DAILY IN THE EVENING     sulfaSALAzine (AZULFIDINE) 500 MG tablet Take 2 tablets (1,000 mg) by mouth 2 times daily     tamsulosin (FLOMAX) 0.4 MG capsule TAKE 2 CAPSULES BY MOUTH ONCE DAILY     traZODone (DESYREL) 50 MG tablet Take 1 tablet (50 mg) by mouth nightly as needed for sleep     triamcinolone (KENALOG) 0.1 % cream Apply sparingly to affected area three times daily for 14 days.     No current facility-administered medications for this visit.        Client Allergies:  Allergies   Allergen Reactions     Lisinopril Swelling     Angioedema         Medical History:  Past Medical History:   Diagnosis Date     Abnormal gait 10/5/2014     Acute renal failure (H) 10/5/2014     Elevated troponin I level 10/5/2014     Family history of colon cancer 12/3/2013     Head injury 1965     History of  colonoscopy nov, 2008     Hypertension      Low back pain      Nocturia 10/5/2014     Other nervous system complications 2014    Diagnosed in 2016 with Cerebellar Ataxia.     Temporary cerebral vascular dysfunction 10/5/2014     Vasovagal syncope 10/5/2014         Medication Adherence:  Client reports taking prescribed medications as prescribed.    Client was provided recommendation to follow-up with prescribing physician.    Mental Status Assessment:  Appearance:   Appropriate   Eye Contact:   Good   Psychomotor Behavior: Retarded (Slowed)   Attitude:   Cooperative   Orientation:   All  Speech   Rate / Production: Slow    Volume:  Normal   Mood:    Normal  Affect:    Constricted   Thought Content:  Clear   Thought Form:  Coherent  Logical   Insight:    Good       Review of Symptoms:  Depression: Sleep Interest Energy  [place]:  No symptoms  Psychosis: No symptoms  Anxiety: Worries  Panic:  No symptoms  Post Traumatic Stress Disorder: No symptoms  Obsessive Compulsive Disorder: No symptoms  Eating Disorder: No symptoms  Oppositional Defiant Disorder: No symptoms  ADD / ADHD: No symptoms  Conduct Disorder: No symptoms      Safety Assessment:     History of Safety Concerns:   Client denied a history of suicidal ideation.    Client denied a history of suicide attempts.    Client denied a history of homicidal ideation.    Client denied a history of self-injurious ideation and behaviors.    Client denied a history of personal safety concerns.    Client denied a history of assaultive behaviors.          Current Safety Concerns:  Client denies current suicidal ideation.    Client denies current homicidal ideation and behaviors.  Client denies current self-injurious ideation and behaviors.    Client denies current concerns for personal safety.    Client reports the following protective factors: positive relationships positive social network, forward/future oriented thinking, dedication to family/friends, safe and stable  environment, regular sleep and effectively controls impulses     Client reports there are no firearms in the house.      Plan for Safety and Risk Management:  A safety and risk management plan has not been developed at this time, however client was given the after-hours number / 911 should there be a change in any of these risk factors.           Patient's Strengths and Limitations:  Client identified the following strengths or resources that will help him succeed in counseling: commitment to health and well being, friends / good social support, family support and intelligence. Client identified the following supports: family and friends. Things that may interfere with the clients success in counseling include:none identified.        Diagnostic Criteria:  Adjustment Disorder with Mixed Anxiety and Depressed Mood: The predominant manifestation is a combination of depression and anxiety         DSM5 Diagnoses: (Sustained by DSM5 Criteria Listed Above)  Diagnoses: F43.23 adjustment disorder with anxiety and depression  Psychosocial & Contextual Factors: health problems decreased functioning  WHODAS 2.0 (12 item)                                       This questionnaire asks about difficulties due to health conditions. Health conditions                   include            disease or illnesses, other health problems that may be short or long lasting,                        injuries, mental health or emotional problems, and problems with alcohol or drugs.                              Think back over the past 30 days and answer these questions, thinking about how much              difficulty you had doing the following activities. For each question, please [place] only                   one response.     S1 Standing for long periods such as 30 minutes? Mild =           2   S2 Taking care of household responsibilities? Moderate =   3   S3 Learning a new task, for example, learning how to get to a new place? None =         1    S4 How much of a problem do you have joining community activities (for example, festivals, Mu-ism or other activities) in the same way as anyone else can? Mild =           2   S5 How much have you been emotionally affected by your health problems? Severe =       4              In the past 30 days, how much difficulty did you have in:   S6 Concentrating on doing something for ten minutes? Mild =           2   S7 Walking a long distance such as a kilometer (or equivalent)? Mild =           2   S8 Washing your whole body? Mild =           2   S9 Getting dressed? Mild =           2   S10 Dealing with people you do not know? Mild =           2   S11 Maintaining a friendship? Mild =           2   S12 Your day to day work? Moderate =   3      H1 Overall, in the past 30 days, how many days were these difficulties present? Record number of days 30   H2 In the past 30 days, for how many days were you totally unable to carry out your usual activities or work because of any health condition? Record number of days  0   H3 In the past 30 days, not counting the days that you were totally unable, for how many days did you cut back or reduce your usual activities or work because of any health condition? Record number of days 30      Attendance Agreement:  Client has signed Attendance Agreement:Yes        Preliminary Treatment Plan:     Initial Treatment will focus on: processing losses and identifying what he can do going forward.        The client is receiving treatment / structured support from the following professional(s) / service and treatment. Collaboration will be initiated with: primary care physician.     Referral to another professional/service is not indicated at this time..     A Release of Information is not needed at this time.     Report to child / adult protection services was NA.     Client will have access to their [facility]' medical record.      Adi Ford, Riverview Psychiatric CenterSW  August 12, 2019

## 2019-08-21 ENCOUNTER — OFFICE VISIT (OUTPATIENT)
Dept: BEHAVIORAL HEALTH | Facility: CLINIC | Age: 69
End: 2019-08-21
Payer: MEDICARE

## 2019-08-21 DIAGNOSIS — F43.23 ADJUSTMENT DISORDER WITH MIXED ANXIETY AND DEPRESSED MOOD: Primary | ICD-10-CM

## 2019-08-21 PROCEDURE — 90834 PSYTX W PT 45 MINUTES: CPT | Performed by: SOCIAL WORKER

## 2019-08-21 NOTE — PROGRESS NOTES
"                                           Progress Note    Client Name: Arnold Lozada             Date:   8/21/2019                                           Service Type:Individual                           Session start time: 1230                 Session End Time:   115                            Session Length:        38-52                Session #:      2                Attendees:     Client and spouse Willa    Treatment Plan Last Reviewed: 8/21/2019   PHQ-9 / BRETT-7 :                 DATA                            Progress Since Last Session (Related to Symptoms / Goals / Homework):              Symptoms: Stable    Homework: n/a                            Episode of Care Goals: Satisfactory progress - ACTION (Actively working towards change); Intervened by reinforcing change plan / affirming steps taken                Current / Ongoing Stressors and Concerns:                 Client notes that he has been \"ok.\"  Some stress lately around finding in home caregivers.  They have had a hard time finding someone to come in on a consistent basis.  Sounds like their plan is to try working with a new agency.  Also some stress with wife's brother.                       Treatment Objective(s) Addressed in This Session:          Client will use identified behavioral and cognitive skills to challenge negative self talk 90% of the time.    -reviewed ACEs in session today.                  Intervention:              Cognitive Behavioral Therapy:  Discussed physical, mental, and emotional boundaries and limit-setting with others. Explored management of boundaries through direct communication and limiting contact and communication with others.  Discussed barriers to using boundary management skills including strong emotions and physical proximity.  Client given handout on boundaries today in session.                                ASSESSMENT: Current Emotional / Mental Status  of significant symptoms):              Risk " status (Self / Other harm or suicidal ideation)              Client denies current fears or concerns for personal safety.              Client denies current or recent suicidal ideation or behaviors.              Client denies current or recent homicidal ideation or behaviors.              Client denies current or recent self injurious behavior or ideation.              Client denies other safety concerns.              A safety and risk management plan has not been developed at this time, however client was given the after-hours number / 911 should there be a change in any of these risk factors.                Appearance:                           Appropriate               Eye Contact:                           Good               Psychomotor Behavior:          Normal               Attitude:                                   Cooperative               Orientation:                             All              Speech                          Rate / Production:       Normal                           Volume:                       Normal               Mood:                                      Normal              Affect:                                      Appropriate               Thought Content:                    Clear               Thought Form:                        Coherent  Goal Directed  Logical               Insight:                                    Good                 Medication Review:              No current psychiatric medications prescribed                Medication Compliance:              NA                Changes in Health Issues:              Yes: feels like he notices a decline each week                Chemical Use Review:              Substance Use: Chemical use reviewed, no active concerns identified                 Tobacco Use: No current tobacco use.                  Collateral Reports Completed:              Communicated with: n/a    PLAN: (Client Tasks / Therapist Tasks / Other)  1.  Client  will call to make next appointment  2.  Client will use one boundary management skill (i.e., direct communication, saying no to a request) by next session.                                                                 ________________________________________________________________________    Treatment Plan    Client's Name: Arnold Lozada                      YOB: 1950    Date: 8-31-16    DSM5 Diagnoses: (Sustained by DSM5 Criteria Listed Above)  Diagnoses: F43.23 adjustment disorder with anxiety and depression  Psychosocial & Contextual Factors: health problems decreased functioning  WHODAS 2.0 (12 item)27    Referral / Collaboration:  suggested pursing support group and getting connected with organizations.    Anticipated number of session or this episode of care: 8-12    MeasurableTreatment Goal(s) related to diagnosis / functional impairment(s)  Goal-Depression: Client will decrease depressed mood    I will know I've met my goal when I am less depressed.      Objective #A (Client Action)    Status: Continued- Date: 12/21/2018     Client will use identified behavioral and cognitive skills to challenge negative self talk 90% of the time.    Intervention(s)  Therapist will provide psychoeducation, behavioral activation, and cognitive restructuring.      Objective #B  Client will complete at least 10 minutes of self-regulation practice (e.g.: yoga, meditation, relaxation breathing, etc.) per day.    Status: Continued- Date: 12/21/2018      Intervention(s)  Therapist will provide psychoeducation, behavioral activation, and cognitive restructuring.      Objective #C  Client will exercise 30 minutes 36 times in the next 12 weeks.  Status: Continued- Date: 12/21/2018     Intervention(s)  Therapist will provide psychoeducation, behavioral activation, and cognitive restructuring.                Goal- Anxiety: Client will decrease anxiety    I will know I've met my goal when I am less anxious.       Objective #A (Client Action)    Status: new- Date: 8/21/2019      Client will use cognitive strategies identified in therapy to challenge anxious thoughts.    Intervention(s)  Therapist will provide psychoeducation, behavioral activation, and cognitive restructuring.    Objective #B  Client will use at least 3 coping skills for anxiety management in the next 12 weeks.    Status: new- Date: 8/21/2019      Intervention(s)  Therapist will provide psychoeducation, behavioral activation, and cognitive restructuring.      Objective #C  Client will identify three distraction and diversion activities and use those activities to decrease level of anxiety.  Status: new- Date: 8/21/2019      Intervention(s)  Therapist will provide psychoeducation, behavioral activation, and cognitive restructuring.                Client has reviewed and agreed to the above plan.      REYES Goodson [facility]               8/21/2019

## 2019-08-28 ENCOUNTER — OFFICE VISIT (OUTPATIENT)
Dept: BEHAVIORAL HEALTH | Facility: CLINIC | Age: 69
End: 2019-08-28
Payer: MEDICARE

## 2019-08-28 ENCOUNTER — ALLIED HEALTH/NURSE VISIT (OUTPATIENT)
Dept: NURSING | Facility: CLINIC | Age: 69
End: 2019-08-28
Payer: MEDICARE

## 2019-08-28 ENCOUNTER — ALLIED HEALTH/NURSE VISIT (OUTPATIENT)
Dept: FAMILY MEDICINE | Facility: CLINIC | Age: 69
End: 2019-08-28

## 2019-08-28 VITALS — HEART RATE: 100 BPM | DIASTOLIC BLOOD PRESSURE: 90 MMHG | SYSTOLIC BLOOD PRESSURE: 160 MMHG

## 2019-08-28 VITALS — SYSTOLIC BLOOD PRESSURE: 164 MMHG | DIASTOLIC BLOOD PRESSURE: 104 MMHG

## 2019-08-28 DIAGNOSIS — Z01.30 BLOOD PRESSURE CHECK: Primary | ICD-10-CM

## 2019-08-28 DIAGNOSIS — Z01.30 BP CHECK: Primary | ICD-10-CM

## 2019-08-28 DIAGNOSIS — F43.23 ADJUSTMENT DISORDER WITH MIXED ANXIETY AND DEPRESSED MOOD: Primary | ICD-10-CM

## 2019-08-28 PROCEDURE — 99207 ZZC NO CHARGE NURSE ONLY: CPT

## 2019-08-28 PROCEDURE — 99207 ZZC NO CHARGE NURSE ONLY: CPT | Performed by: INTERNAL MEDICINE

## 2019-08-28 PROCEDURE — 90834 PSYTX W PT 45 MINUTES: CPT | Performed by: SOCIAL WORKER

## 2019-08-28 NOTE — PROGRESS NOTES
Arnold Lozada is a 68 year old patient who comes in today for a Blood Pressure check.  Initial BP:  BP (!) 160/90 (BP Location: Right arm, Patient Position: Sitting, Cuff Size: Adult Regular)   Pulse 100        Disposition: follow-up as previously indicated by provider and results routed to provider    Patient had BP checked at [facility] pharmacy earlier today- reading was high. He had appt today with Lyle Portillo- and pharmacist advised that he have it checked once more. Blood pressure was still high- same range as earlier. Advised patient wait 10-15 mins and we can recheck it one more time, but patient and wife stated they had to leave and did not want to wait for another recheck. Patient has BP machine at home and will continue to monitor and if any concerns- they will follow up with PCP.    Annelise Wyman MA

## 2019-08-28 NOTE — PROGRESS NOTES
"                                           Progress Note    Client Name: Arnold Lozada             Date:   8/28/2019                                           Service Type:Individual                           Session start time: 130                 Session End Time:   215                            Session Length:        38-52                Session #:      3                Attendees:     Client and spouse Willa    Treatment Plan Last Reviewed: 8/21/2019   PHQ-9 / BRETT-7 :                 DATA                            Progress Since Last Session (Related to Symptoms / Goals / Homework):              Symptoms: Stable    Homework: n/a                            Episode of Care Goals: Satisfactory progress - ACTION (Actively working towards change); Intervened by reinforcing change plan / affirming steps taken                Current / Ongoing Stressors and Concerns:                 Client notes that he has been \"ok.\"  Client has been making some efforts to get some new care providers, and it sounds like they are going to have a new provider start next week.  Wife reports that client make the phone calls himself and this was good for both of them for him to do this.                       Treatment Objective(s) Addressed in This Session:          Client will use identified behavioral and cognitive skills to challenge negative self talk 90% of the time.    -reviewed ACEs in session today.                  Intervention:              Cognitive Behavioral Therapy:  Discussed physical, mental, and emotional boundaries and limit-setting with others. Explored management of boundaries through direct communication and limiting contact and communication with others.  Discussed barriers to using boundary management skills including strong emotions and physical proximity.  Client given handout on boundaries today in session.                                ASSESSMENT: Current Emotional / Mental Status  of significant " symptoms):              Risk status (Self / Other harm or suicidal ideation)              Client denies current fears or concerns for personal safety.              Client denies current or recent suicidal ideation or behaviors.              Client denies current or recent homicidal ideation or behaviors.              Client denies current or recent self injurious behavior or ideation.              Client denies other safety concerns.              A safety and risk management plan has not been developed at this time, however client was given the after-hours number / 911 should there be a change in any of these risk factors.                Appearance:                           Appropriate               Eye Contact:                           Good               Psychomotor Behavior:          Normal               Attitude:                                   Cooperative               Orientation:                             All              Speech                          Rate / Production:       Normal                           Volume:                       Normal               Mood:                                      Normal              Affect:                                      Appropriate               Thought Content:                    Clear               Thought Form:                        Coherent  Goal Directed  Logical               Insight:                                    Good                 Medication Review:              No current psychiatric medications prescribed                Medication Compliance:              NA                Changes in Health Issues:              Yes: feels like he notices a decline each week                Chemical Use Review:              Substance Use: Chemical use reviewed, no active concerns identified                 Tobacco Use: No current tobacco use.                  Collateral Reports Completed:              Communicated with: n/a    PLAN: (Client Tasks / Therapist  Tasks / Other)  1.  Client will call to make next appointment  2.  Client will use one boundary management skill (i.e., direct communication, saying no to a request) by next session.                                                                 ________________________________________________________________________    Treatment Plan    Client's Name: Arnold Lozada                      YOB: 1950    Date: 8-31-16    DSM5 Diagnoses: (Sustained by DSM5 Criteria Listed Above)  Diagnoses: F43.23 adjustment disorder with anxiety and depression  Psychosocial & Contextual Factors: health problems decreased functioning  WHODAS 2.0 (12 item)27    Referral / Collaboration:  suggested pursing support group and getting connected with organizations.    Anticipated number of session or this episode of care: 8-12    MeasurableTreatment Goal(s) related to diagnosis / functional impairment(s)  Goal-Depression: Client will decrease depressed mood    I will know I've met my goal when I am less depressed.      Objective #A (Client Action)    Status: Continued- Date: 12/21/2018     Client will use identified behavioral and cognitive skills to challenge negative self talk 90% of the time.    Intervention(s)  Therapist will provide psychoeducation, behavioral activation, and cognitive restructuring.      Objective #B  Client will complete at least 10 minutes of self-regulation practice (e.g.: yoga, meditation, relaxation breathing, etc.) per day.    Status: Continued- Date: 12/21/2018      Intervention(s)  Therapist will provide psychoeducation, behavioral activation, and cognitive restructuring.      Objective #C  Client will exercise 30 minutes 36 times in the next 12 weeks.  Status: Continued- Date: 12/21/2018     Intervention(s)  Therapist will provide psychoeducation, behavioral activation, and cognitive restructuring.                Goal- Anxiety: Client will decrease anxiety    I will know I've met my goal  when I am less anxious.      Objective #A (Client Action)    Status: new- Date: 8/21/2019      Client will use cognitive strategies identified in therapy to challenge anxious thoughts.    Intervention(s)  Therapist will provide psychoeducation, behavioral activation, and cognitive restructuring.    Objective #B  Client will use at least 3 coping skills for anxiety management in the next 12 weeks.    Status: new- Date: 8/21/2019      Intervention(s)  Therapist will provide psychoeducation, behavioral activation, and cognitive restructuring.      Objective #C  Client will identify three distraction and diversion activities and use those activities to decrease level of anxiety.  Status: new- Date: 8/21/2019      Intervention(s)  Therapist will provide psychoeducation, behavioral activation, and cognitive restructuring.                Client has reviewed and agreed to the above plan.      REYES Goodson [facility]               8/21/2019

## 2019-08-28 NOTE — PROGRESS NOTES
Arnold Lozada was evaluated at [facility] on August 28, 2019 at which time his blood pressure was:    BP Readings from Last 3 Encounters:   08/28/19 (!) 164/104   07/05/19 148/90   06/03/19 150/88     Pulse Readings from Last 3 Encounters:   12/18/18 85   08/23/18 95   08/09/18 88       Reviewed lifestyle modifications for blood pressure control and reduction: including making healthy food choices, managing weight, getting regular exercise, smoking cessation, reducing alcohol consumption, monitoring blood pressure regularly.     Symptoms: None    BP Goal:< 140/90 mmHg    BP Assessment:  BP too high    Potential Reasons for BP too high: Unknown/Other: Patient could be a nervous about getting his blood pressure in clinic    BP Follow-Up Plan: Other: Arnold and his wife pay close attention to his BP, checking BP every morning. It was stated that his BP has been slightly below or slightly above 140/90 the past 11 days. Referring to clinic for a nurse BP check was offered, but patient and his wife had no reason for concern at this time. Arnold also has an appointment this afternoon and I advised them to see if a nurse could check their blood pressure at the appointment. Arnold also stated that he'd take his BP at home tonight and tomorrow morning again, and if it remained elevated he'd make sure to let Dr. Carpenter know immediately.     Recommendation to Provider: Continue to monitor Arnold's BP closely.    Note completed by: Harley Ortega, Pharm.D.

## 2019-08-29 ENCOUNTER — TELEPHONE (OUTPATIENT)
Dept: FAMILY MEDICINE | Facility: CLINIC | Age: 69
End: 2019-08-29

## 2019-08-29 NOTE — PROGRESS NOTES
Please ask patient to schedule a follow up appointment with me    Regarding blood pressure and recheck issues     Nikhil Carpenter MD

## 2019-09-06 ENCOUNTER — OFFICE VISIT (OUTPATIENT)
Dept: NEUROLOGY | Facility: CLINIC | Age: 69
End: 2019-09-06
Payer: COMMERCIAL

## 2019-09-06 VITALS
DIASTOLIC BLOOD PRESSURE: 90 MMHG | RESPIRATION RATE: 16 BRPM | SYSTOLIC BLOOD PRESSURE: 156 MMHG | BODY MASS INDEX: 27.52 KG/M2 | HEART RATE: 112 BPM | WEIGHT: 181 LBS | OXYGEN SATURATION: 96 % | TEMPERATURE: 96.5 F

## 2019-09-06 DIAGNOSIS — R27.0 ATAXIA: Primary | ICD-10-CM

## 2019-09-06 ASSESSMENT — PAIN SCALES - GENERAL: PAINLEVEL: NO PAIN (0)

## 2019-09-06 NOTE — NURSING NOTE
Pt is here for an ataxia check up. He said he does not have headaches. He said he does not have double or blurred vision. His last eye exam was in 2018. He said he sometimes chokes when he eats and drinks. He said it has been a year or so since he had a swallow study. He said his arm strength remains the same. He said he he does have some bladder problems. He said he has not fallen for a long time. He said he uses his wheel chair all the time. He does not have any balance left. He said his leg strength has declined. He said he sleeps ok at night. He does not wear a c-pap. He said the doctor told him he didn't have to wear it but he could use a mouth piece, but he doesn't wear it often. He does have hearing aides. He said he is coughing more than in the past.

## 2019-09-06 NOTE — PROGRESS NOTES
Department of Neurology  Movement Disorders Division     Patient: Arnold Lozada   MRN: [id number]   : 1950   Date of Visit: 2019     Reason for visit: ataxia follow up     History of Present Illness: Mr. Lozada is a 68 year old  male with a histroy of ataxia, palatal tremor with olivary hypertrophy who presents for follow up.    Patient was last seen on 2018. He remains wheel chair bound, able to stand up with assistance and transfer. No falls, with worsening dysarthria in the last year, occasional coughing with food particles, very educated on aspiration precautions and which type of food to avoid.  No urinary incontinence, probably increased urinary frequency.  Still inquiring about the diagnosis, explained that the cause of his ataxia is unknown, Ataxia with palatal myoclonus.Also we discussed about the possibility of undergoing whole ex ome genetic sequencing when funds available , next generation sequencing was negative.    Arnold is interested in any exercise that might improve his stamina . We advised recumbent bike which is safe . He is interested in gait training [facility] ,  at [facility].   He continues to be on Amphetamine , no side effects .    He is diagnosed with sleep apnea 2018 and wears a mouth guard. No dream enactment behavior and no stridor.    Review of Systems:  Other than that mentioned above, the remainder of 12 systems reviewed were negative.    Medications:  Current Outpatient Medications   Medication Sig Dispense Refill     amLODIPine (NORVASC) 5 MG tablet Take 1 tablet (5 mg) by mouth daily 90 tablet 1     amphetamine-dextroamphetamine (ADDERALL XR) 30 MG 24 hr capsule Take 1 capsule (30 mg) by mouth daily 30 capsule 0     amphetamine-dextroamphetamine (ADDERALL XR) 30 MG 24 hr capsule Take 1 capsule (30 mg) by mouth daily 30 capsule 0     aspirin 325 MG tablet Take 325 mg by mouth daily.       Cholecalciferol (D3 MAXIMUM STRENGTH PO) Take 400  Units by mouth daily       fluticasone (FLONASE) 50 MCG/ACT spray Spray 1 spray into both nostrils daily       folic acid 800 MCG TABS Take 1 tablet by mouth daily Reported on 5/10/2017       MULTI-VITAMIN PO TABS 1 TABLET DAILY       Omega 3-6-9 Fatty Acids (OMEGA 3-6-9 COMPLEX) CAPS Take 1 capsule by mouth daily       order for DME Knee high compression stockings (15-20 mmHg) 1 each 1     ORDER FOR DME Respironics REMSTAR 60 Series Auto CPAP 5-18cm H2O, Nuance pillow nasal mask large       POTASSIUM PO Take 595 mcg by mouth daily       sertraline (ZOLOFT) 50 MG tablet Take 1 tablet (50 mg) by mouth daily 90 tablet 3     simvastatin (ZOCOR) 20 MG tablet ONE TABLET DAILY IN THE EVENING 90 tablet 3     sulfaSALAzine (AZULFIDINE) 500 MG tablet Take 2 tablets (1,000 mg) by mouth 2 times daily 360 tablet 0     tamsulosin (FLOMAX) 0.4 MG capsule TAKE 2 CAPSULES BY MOUTH ONCE DAILY 180 capsule 3     traZODone (DESYREL) 50 MG tablet Take 1 tablet (50 mg) by mouth nightly as needed for sleep 90 tablet 3     triamcinolone (KENALOG) 0.1 % cream Apply sparingly to affected area three times daily for 14 days. 80 g 0         Physical Exam:  The patient's  weight is 82.1 kg (181 lb). His oral temperature is 96.5  F (35.8  C). His blood pressure is 156/90 (abnormal) and his pulse is 112. His respiration is 16 and oxygen saturation is 96%.      Arnold is alert and oriented to person , place, time , provides excellent details of interval hx. Speech is dysarthric.  Cranial nerves: EOMI, face is symmetric, palatal tremor , tongue protrudes to midline.  Tone is normal in UE, unable to relax LE, but most likely mild increased tone in LE.No abnormal movements. Slight dysmetria and ataxia with FTN.  DTR: bilateral biceps, brachioradialis 1+, trace reflexes in bilateral patella.  Able to stand up with help, wide based , can take 1 step with lots of support.    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    Plan:   - patients will be continued on amphetamines  - discussed safety during exercising, recommend  recumbent bike  - He is interested in gait training devise with harness named  Lite Gait . External Referral provide to [facility] as per patient request.  - whole exome sequencing is something to consider in the future when funds will be available.    RTC:  Shelley Fernandez MD  Movement Disorders Fellow    Patient seen and discussed with .    Summary of orders placed this encounter:  No orders of the defined types were placed in this encounter.

## 2019-09-06 NOTE — TELEPHONE ENCOUNTER
2019       Kwaku Yadav MD  [address]  [facility] [address]  VIA Facsimile: [phone]      Patient: Dora Lopez   YOB: 1963   Date of Visit: 2019       Dear Dr. Yadav:    I saw your patient, Dora Lopez, for an evaluation. Below are my notes for this visit with her.    If you have questions, please do not hesitate to call me.      Sincerely,        Raisa Walker CNP        CC: No Recipients  Raisa Walker CNP  2019 12:26 PM  Sign when Signing Visit  [facility]  [address], [place] [address]  P [phone]  F [phone]    PATIENT:  Dora Lopez   : 1963  REFERRING PHYSICIAN:Kwaku Yadav MD      CHIEF COMPLAINT:   Chief Complaint   Patient presents with    Hospital F/U       HISTORY OF PRESENT ILLNESS:  Dora is a 56 year old female patient is here for ER follow-up.  She was last seen by her primary cardiologist Dr. Blanca on 2019.  At that appointment she was doing well her main issue was severe back pain for which she was following with the pain clinic.  Her amiodarone was discontinued and she was instructed to wean off of her daily Lasix.  She has been using a morphine pump as well as PRN Norco for her pain.  She presented to [facility] on 2319 for worsening back pain.  She was hypotensive with a systolic pressure in the 60s although she was asymptomatic.  She was given IV hydration.  Blood pressure improved.  BNP was slightly she was not in active heart failure.  Medications were adjusted.  She was discharged home on 2019 in stable condition with instructions to follow-up in our office for further medication adjustments.  She then woke up on 830/19 with a large bruise on her left upper arm.  She went to the Coumadin clinic her INR was 2.2 but they did suggest she go to the hospital for ultrasound to rule out DVT as there was a large lump.  Ultrasound was negative.  She was once again discharged from the  Detailed message left on patient's VM explaining that provider has reviewed BP numbers and will not be making any changes in plan of care or meds at this time  Requested that he call back if there are any concerns    Silverio Paz RN     ER in stable condition    Is here today to follow-up with her son.  Back pain.  She continues to go weekly for her INR she states she has had fluctuating readings generally she remains subtherapeutic she reports no high INR readings.  She has excessive bruising on her upper extremities and chest area.  She denies any falls or injuries only spontaneous bruising.  She denies any blood in the stool or urine.  No other signs of bleeding.    She denies any exertional chest pains or palpitations.  No shortness of breath orthopnea.  No claudication.  She has mild chronic lower extremity conservative, But generally this is well controlled.    She brings in a blood pressure log with very stable readings.  Her medication list was reviewed as this was not updated from the hospital.  She is still taking Cartia.    She is following with a pain clinic specialist who manages her morphine pump and pain medications.  She states she is supposed to be having a spinal injection next week but does not recall him talking about stopping her Coumadin or any need for Lovenox bridging    PAST MEDICAL HISTORY:    Past Medical History:   Diagnosis Date   • Anticoagulant long-term use 5/17/2019   • H/O coronary angiogram    • History of mechanical aortic valve replacement 5/17/2019   • Iron deficiency anemia 5/17/2019   • Mixed hyperlipidemia 5/17/2019   • PAF> post AVR ( 4/19) Started on amioderone 5/17/2019       REVIEW OF SYSTEMS:    Constitutional: Negative.    HENT: Negative.    Eyes: Negative.    Respiratory: Negative.    Cardiovascular:        SEE HPI   Endocrine: Negative.    Genitourinary: Negative.    Skin: Negative.    Allergic/Immunologic: Negative.    Neurological: Negative.    Hematological: Negative.    Psychiatric/Behavioral: Negative.      ALLERGIES:    ALLERGIES:   Allergen Reactions   • Adhesive   (Environmental) RASH       MEDICATIONS:     Outpatient Medications Prior to Visit   Medication Sig Dispense Refill   • warfarin  (COUMADIN) 4 MG tablet Take 4 mg by mouth daily.     • potassium chloride (K-TAB) 20 MEQ ER tablet Take 20 mEq by mouth daily.     • venlafaxine XR (EFFEXOR XR) 150 MG 24 hr capsule Take 150 mg by mouth 2 times daily.     • latanoprost (XALATAN) 0.005 % ophthalmic solution 1 drop nightly.     • acetaminophen (TYLENOL) 325 MG tablet Take 650 mg by mouth every 4 hours as needed for Pain.     • tiZANidine (ZANAFLEX) 4 MG tablet Take 4 mg by mouth daily as needed.     • BIOTIN PO Take 1 tablet by mouth daily.     • Zinc Acetate, Oral, (ZINC ACETATE PO) Take 1 tablet by mouth daily.     • morphine pain pump 1 each by Intrathecal route.     • diphenhydrAMINE (BENADRYL) 25 MG tablet Take 25 mg by mouth 3 times daily.      • simvastatin (ZOCOR) 40 MG tablet Take 40 mg by mouth nightly.     • Multiple Vitamins-Minerals (MULTIVITAMIN ADULT PO) Take 1 tablet by mouth daily.     • Unable to Find Medication Morphine pump implanted     • lisinopril (ZESTRIL) 5 MG tablet Take 1 tablet by mouth daily.     • ketoconazole (NIZORAL) 2 % cream as needed.     • zolpidem (AMBIEN) 10 MG tablet Take 1 tablet by mouth daily.     • sumatriptan (IMITREX) 100 MG tablet Take 1 tablet by mouth daily.     • clonazePAM (KLONOPIN) 1 MG tablet Take 1 tablet by mouth as needed.     • HYDROcodone-acetaminophen (NORCO) 5-325 MG per tablet Take 1 tablet by mouth as needed.     • OLANZapine (ZYPREXA) 15 MG tablet Take 15 mg by mouth nightly.     • vitamin D, Cholecalciferol, 1000 units capsule Take 25 Units by mouth daily.      • Ferrous Sulfate (IRON) 325 (65 Fe) MG Tab Take 1 tablet by mouth daily.     • aspirin 81 MG tablet Take 1 tablet by mouth daily.     • furosemide (LASIX) 40 MG tablet Take 40 mg by mouth daily.     • metoPROLOL tartrate (LOPRESSOR) 25 MG tablet Take 25 mg by mouth every 12 hours.     • dilTIAZem (CARDIZEM CD) 240 MG 24 hr capsule Take 1 capsule by mouth daily. 90 capsule 3     No facility-administered medications prior to visit.         SOCIAL HISTORY:    Social History     Tobacco Use   • Smoking status: Never Smoker   • Smokeless tobacco: Never Used   Substance Use Topics   • Alcohol use: No     Frequency: Never   • Drug use: Not on file         FAMILY HISTORY:    Family History   Problem Relation Age of Onset   • Cancer Mother    • Cancer, Lung Father    • Heart disease Father         s/p CABG   • Diabetes Father        PHYSICAL EXAMINATION:  /69   Pulse 88   Ht 6' 1\" (1.854 m)   Wt 125.2 kg (276 lb)   BMI 36.41 kg/m²   BSA 2.47 m²    Constitutional: oriented to person, place, and time. appears well-developed and well-nourished.   HENT: Head: Normocephalic and atraumatic.   Mouth/Throat: Oropharynx is clear and moist.   Eyes: Pupils are equal, round, and reactive to light.   Neck: Normal range of motion. Neck supple.   Cardiovascular: Normal rate, regular rhythm, S1 normal, S2 normal Exam reveals no S3, no S4 and no friction rub. No murmur heard. crisp AV click.   Pulmonary/Chest: Breath sounds normal., equal, no wheezes.  no rales. sternal incision well approximated. No drainage. Chest tube sites also clean and healing well  Abdominal: Soft. Bowel sounds are normal.  no distension. There is no tenderness.   Musculoskeletal: Normal range of motion.no pain or tenderness.   Neurological: He is alert and oriented to person, place, and time.   Skin: Skin is warm and dry. No rash noted.   Psychiatric: He has a normal mood and affect. His behavior is normal.   Extremities: no open wound on lower legs.     ECHOCARDIOGRAM ( 3/29/19)  IMPRESSION:  1. Normal left ventricular size and systolic function with a normal  estimated ejection fraction.    2. Calcified aortic valve with severe stenosis and mild regurgitation    ECHOCARDIOGRAM ( 7/19)  IMPRESSION:  1.   Normal systolic left ventricular function.  Mild diastolic  dysfunction.  Mild increase in left ventricular wall thickness i.e.  mild concentric left ventricular hypertrophy.  Left  atrium is mildly  enlarged.    2.   Normal right-sided chambers and borderline pulmonary hypertension  3.   Mechanical prosthesis in aortic position.  Peak velocity 3.1 m/s  dimensionless index 0.3.  Findings suggestive mild degree of patient  prosthesis mismatch.    4.   Normal mitral valve  5.   No pericardial effusion.    ECHOCARDIOGRAM ( 8/19)  IMPRESSION:  1.Normal left ventricular size and systolic function with a normal  estimated ejection fraction  2. Functioning aortic valve mechanical prosthesis. No regurgitation no perivalvular  leaks. Maximum velocity 2.5 m/s with peak gradient 49 mmHg mean  gradient 28 mmHg.  Compared to the echocardiogram from 7/19 slightly increased gradients. Recommended  follow-up with echocardiograms.    ASSESSMENT:       Problem List Items Addressed This Visit        Cardiology Problems    Aortic valve stenosis> s/p mechanical AVR 21 mm [facility] mechanical ( 4/19) dr stevens    Essential hypertension    Mixed hyperlipidemia    PAF> post AVR ( 4/19) Started on amioderone - Primary       Other    Depression    History of mechanical aortic valve replacement    H/O coronary angiogram    Anticoagulant long-term use    Iron deficiency anemia          History of mechanical aortic valve replacement   S/P Mechanical  AVR ( 21mm [facility])  4/19 with dr Stevens  LIFE LONG COUMADIN WITH LOVENOX BRIDGING  She was told to speak with her pain specialist as she would need Lovenox bridging before spinal injection if they need our cardiac clearance    Aortic valve stenosis, etiology of cardiac valve disease unspecified   status post mechanical mitral valve replacement ( 4/19).  Possible patient/prosthetic mismatch, with increased mean gradient of 28 mmHg by echocardiogram in August 2019 ( slight increase from echo done in July 2019)  Continue to monitor with yearly echo    PAF> post AVR ( 4/19) Started on amioderone  Postoperative atrial fibrillation ( 4/19)   no meds recurrent  arrhythmias.  Amiodarone was discontinued at her last office visit in August 2019.  She will monitor for any symptoms of palpitations or rapid heartbeats and will call with any symptoms.  Otherwise follow-up as per Dr. Hurt    Pleural effusion  -Postop pleural effusion  & thoracentesis.    RESOLVED  .  Essential hypertension, benign  Recent admission with asymptomatic hypotension with systolic readings in the 60s.  Probably overmedicated especially with the combined use of morphine pump and as needed p.o. narcotics.  Will discontinue Cardizem and metoprolol.  She will continue low-dose lisinopril 5 mg daily.  She will continue to log blood pressures at home.  If she has any issues with symptomatic hypotension she will call.  Goal average BP less than 140/80.    Depression, unspecified depression type  As per PCP  Not suicidal or homicidal       Mixed hyperlipidemia  Calling pcp for recent labs      Anticoagulant long-term use  For mechanical valve  Managed by [facility]. INR has been subtheraputic, she continues to go weekly.  Cont asa 81 mg a day as well  She does have easy bruising but stable CBC from 8/19 and no other signs of bleeding.      No orders of the defined types were placed in this encounter.      Return in about 4 weeks (around 10/4/2019) for dr hurt.    Plan of care discussed with the patient.  All questions were answered.  Patient verbalized understanding and agrees with the plan.  Primary care issues are being followed by the primary care physician.     A letter has been sent to referring physician  Sydney Walker, ANP-BC, MSN,BSN  [facility] [place]  [facility]

## 2019-09-06 NOTE — LETTER
2019       RE: Arnold Lozada  [address] [id number]     Dear Colleague,    Thank you for referring your patient, Arnold Lozada, to the [facility]UROLOG[facility]. Please see a copy of my visit note below.    Department of Neurology  Movement Disorders Division     Patient: Arnold Lozada   MRN: [id number]   : 1950   Date of Visit: 2019     Reason for visit: ataxia follow up     History of Present Illness: Mr. Lozada is a 68 year old  male with a histroy of ataxia, palatal tremor with olivary hypertrophy who presents for follow up.    Patient was last seen on 2018. He remains wheel chair bound, able to stand up with assistance and transfer. No falls, with worsening dysarthria in the last year, occasional coughing with food particles, very educated on aspiration precautions and which type of food to avoid.  No urinary incontinence, probably increased urinary frequency.  Still inquiring about the diagnosis, explained that the cause of his ataxia is unknown, Ataxia with palatal myoclonus.Also we discussed about the possibility of undergoing whole ex ome genetic sequencing when funds available , next generation sequencing was negative.    Arnold is interested in any exercise that might improve his stamina . We advised recumbent bike which is safe . He is interested in gait training [facility] ,  at [facility].   He continues to be on Amphetamine , no side effects .    He is diagnosed with sleep apnea 2018 and wears a mouth guard. No dream enactment behavior and no stridor.    Review of Systems:  Other than that mentioned above, the remainder of 12 systems reviewed were negative.    Medications:  Current Outpatient Medications   Medication Sig Dispense Refill     amLODIPine (NORVASC) 5 MG tablet Take 1 tablet (5 mg) by mouth daily 90 tablet 1     amphetamine-dextroamphetamine (ADDERALL XR) 30 MG 24  hr capsule Take 1 capsule (30 mg) by mouth daily 30 capsule 0     amphetamine-dextroamphetamine (ADDERALL XR) 30 MG 24 hr capsule Take 1 capsule (30 mg) by mouth daily 30 capsule 0     aspirin 325 MG tablet Take 325 mg by mouth daily.       Cholecalciferol (D3 MAXIMUM STRENGTH PO) Take 400 Units by mouth daily       fluticasone (FLONASE) 50 MCG/ACT spray Spray 1 spray into both nostrils daily       folic acid 800 MCG TABS Take 1 tablet by mouth daily Reported on 5/10/2017       MULTI-VITAMIN PO TABS 1 TABLET DAILY       Omega 3-6-9 Fatty Acids (OMEGA 3-6-9 COMPLEX) CAPS Take 1 capsule by mouth daily       order for DME Knee high compression stockings (15-20 mmHg) 1 each 1     ORDER FOR DME Respironics REMSTAR 60 Series Auto CPAP 5-18cm H2O, Nuance pillow nasal mask large       POTASSIUM PO Take 595 mcg by mouth daily       sertraline (ZOLOFT) 50 MG tablet Take 1 tablet (50 mg) by mouth daily 90 tablet 3     simvastatin (ZOCOR) 20 MG tablet ONE TABLET DAILY IN THE EVENING 90 tablet 3     sulfaSALAzine (AZULFIDINE) 500 MG tablet Take 2 tablets (1,000 mg) by mouth 2 times daily 360 tablet 0     tamsulosin (FLOMAX) 0.4 MG capsule TAKE 2 CAPSULES BY MOUTH ONCE DAILY 180 capsule 3     traZODone (DESYREL) 50 MG tablet Take 1 tablet (50 mg) by mouth nightly as needed for sleep 90 tablet 3     triamcinolone (KENALOG) 0.1 % cream Apply sparingly to affected area three times daily for 14 days. 80 g 0         Physical Exam:  The patient's  weight is 82.1 kg (181 lb). His oral temperature is 96.5  F (35.8  C). His blood pressure is 156/90 (abnormal) and his pulse is 112. His respiration is 16 and oxygen saturation is 96%.      Arnold is alert and oriented to person , place, time , provides excellent details of interval hx. Speech is dysarthric.  Cranial nerves: EOMI, face is symmetric, palatal tremor , tongue protrudes to midline.  Tone is normal in UE, unable to relax LE, but most likely mild increased tone in LE.No abnormal  movements. Slight dysmetria and ataxia with FTN.  DTR: bilateral biceps, brachioradialis 1+, trace reflexes in bilateral patella.  Able to stand up with help, wide based , can take 1 step with lots of support.    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    Plan:   - patients will be continued on amphetamines  - discussed safety during exercising, recommend  recumbent bike  - He is interested in gait training devise with harness named  [employer] . External Referral provide to [facility] as per patient request.  - whole exome sequencing is something to consider in the future when funds will be available.    RTC:  Shelley Fernandez MD  Movement Disorders Fellow    Patient seen and discussed with .    Summary of orders placed this encounter:  No orders of the defined types were placed in this encounter.    I have personally interviewed and examined Mr. Arnold Lozada and agree with diagnosis and management. The total time spent with the patient was 25 minutes, over 50% of the time spent in counseling and coordinating care.    Again, thank you for allowing me to participate in the care of your patient.      Sincerely,    Giorgio Pena MD

## 2019-09-06 NOTE — PROGRESS NOTES
I have personally interviewed and examined . Arnold Lozada and agree with diagnosis and management. The total time spent with the patient was 25 minutes, over 50% of the time spent in counseling and coordinating care.

## 2019-09-12 ENCOUNTER — ALLIED HEALTH/NURSE VISIT (OUTPATIENT)
Dept: AUDIOLOGY | Facility: CLINIC | Age: 69
End: 2019-09-12

## 2019-09-12 DIAGNOSIS — H90.3 SENSORINEURAL HEARING LOSS, BILATERAL: Primary | ICD-10-CM

## 2019-09-12 PROCEDURE — 99207 ZZC NO CHARGE LOS: CPT | Performed by: AUDIOLOGIST

## 2019-09-12 PROCEDURE — V5014 HEARING AID REPAIR/MODIFYING: HCPCS | Performed by: AUDIOLOGIST

## 2019-09-12 NOTE — PROGRESS NOTES
HEARING AID DROP-OFF    Patient Name:  Arnold Lozada    Patient Age:   68 year old    :  1950    Background:   Patient dropped off their hearing aid with the report of broken.      SIDE: Both    : Phonak    TYPE: Audeo V50-13    S/N: [id number] & [id number]    WARRANTY:  [2018]    Procedures:   Replaced the left ear hearing  with a stock size 2 xS . Performed general cleaning of the hearing aids and earmolds.     Plan:   Patient was contacted in regards to status of hearing aid/s dropped off today. A message was left on the number provided that the hearing aids were ready for  at the 2nd floor .     CHARGES:   .005 Out of warranty  replacement     Josué Stout CCC-A  Licensed Audiologist  2019

## 2019-09-19 ENCOUNTER — OFFICE VISIT (OUTPATIENT)
Dept: FAMILY MEDICINE | Facility: CLINIC | Age: 69
End: 2019-09-19
Payer: MEDICARE

## 2019-09-19 VITALS
DIASTOLIC BLOOD PRESSURE: 102 MMHG | RESPIRATION RATE: 14 BRPM | TEMPERATURE: 98.9 F | HEART RATE: 115 BPM | OXYGEN SATURATION: 97 % | BODY MASS INDEX: 27.34 KG/M2 | SYSTOLIC BLOOD PRESSURE: 168 MMHG | WEIGHT: 179.8 LBS

## 2019-09-19 DIAGNOSIS — E78.5 HYPERLIPIDEMIA LDL GOAL <130: Primary | Chronic | ICD-10-CM

## 2019-09-19 DIAGNOSIS — G11.9 CEREBELLAR ATAXIA (H): ICD-10-CM

## 2019-09-19 DIAGNOSIS — Z23 NEEDS FLU SHOT: ICD-10-CM

## 2019-09-19 DIAGNOSIS — I10 ESSENTIAL HYPERTENSION: ICD-10-CM

## 2019-09-19 DIAGNOSIS — K50.10 CROHN'S DISEASE OF COLON WITHOUT COMPLICATION (H): Chronic | ICD-10-CM

## 2019-09-19 PROCEDURE — G0008 ADMIN INFLUENZA VIRUS VAC: HCPCS | Performed by: INTERNAL MEDICINE

## 2019-09-19 PROCEDURE — 90662 IIV NO PRSV INCREASED AG IM: CPT | Performed by: INTERNAL MEDICINE

## 2019-09-19 PROCEDURE — 99214 OFFICE O/P EST MOD 30 MIN: CPT | Mod: 25 | Performed by: INTERNAL MEDICINE

## 2019-09-19 RX ORDER — SULFASALAZINE 500 MG/1
1000 TABLET ORAL 2 TIMES DAILY
Qty: 360 TABLET | Refills: 3 | Status: SHIPPED | OUTPATIENT
Start: 2019-09-19 | End: 2020-09-10

## 2019-09-19 RX ORDER — METOPROLOL SUCCINATE 25 MG/1
25 TABLET, EXTENDED RELEASE ORAL DAILY
Qty: 30 TABLET | Refills: 2 | Status: SHIPPED | OUTPATIENT
Start: 2019-09-19 | End: 2019-10-09

## 2019-09-19 ASSESSMENT — PATIENT HEALTH QUESTIONNAIRE - PHQ9: SUM OF ALL RESPONSES TO PHQ QUESTIONS 1-9: 6

## 2019-09-19 NOTE — Clinical Note
Lets please alert patient to the fact that we want to restart in addition to the Amlodipine [Norvasc] he should be taking Toprol [metoprolol XL] 25 milligrams a day with an eye towards gradually increasing this. Set up medical assistant blood pressure recheck  2-3 weeks

## 2019-09-19 NOTE — PROGRESS NOTES
Subjective     Arnold Lozada is a 68 year old male who presents to clinic today for the following health issues:    Hypertension   At last visit stopped lisinopril and had angioedema reaction, also stopped hydrochlorothiazide. Used to take metoprolol as well, but unsure when/why this was stopped. He began treatment with amlodipine. Also takes Flomax. Of note is that he also takes Adderall for hypersomnolence. Has a diagnosis of mild/moderate Obstructive Sleep Apnea. He does check blood pressure  regularly outside of the clinic. Outside blood pressures have been persistently over 140/90. Averages  He follows a low salt diet. it seems clear that the Adderall [ amphetamine and dextroamphetamine ] is playing a major role here with not only hypertensive but also tachycardic. He feels the Adderall [ amphetamine and dextroamphetamine ] is of definite benefit     BP Readings from Last 6 Encounters:   09/19/19 (!) 168/102   09/06/19 (!) 156/90   08/28/19 (!) 160/90   08/28/19 (!) 164/104   07/05/19 148/90   06/03/19 150/88     Hyperlipidemia   He presents for follow up of hyperlipidemia.  He is taking medication to lower cholesterol. He is not having myalgia or other side effects to statin medications.He is not reporting shortness of breath, increased sweating or nausea with activity, left-sided neck or arm pain, chest pain or pressure, or pain in calves when walking 1-2 blocks.    Patient Active Problem List   Diagnosis     Hyperlipidemia LDL goal <130     Crohn's disease of colon (H)     Adenomatous colon polyp     Low back strain     GUILLERMINA (obstructive sleep apnea)- Mild/moderate ( AHI 16.2)     Family history of colon cancer     Advanced directives, counseling/discussion     Elevated troponin I level     Cerebellar ataxia (H)     Essential hypertension     Adjustment disorder with mixed anxiety and depressed mood     Cerebral ataxia (H)     ARF (acute renal failure) (H)     Dyslipidemia     Nocturia     Sciatica of  right side     Temporary cerebral vascular dysfunction     Gait abnormality     Vasovagal syncope     Past Surgical History:   Procedure Laterality Date     COLONOSCOPY  2014    OK     HC TOOTH EXTRACTION W/FORCEP         Social History     Tobacco Use     Smoking status: Never Smoker     Smokeless tobacco: Never Used     Tobacco comment: Never smoked.   Substance Use Topics     Alcohol use: Yes     Comment: 2-4 drinks per week     Family History   Problem Relation Age of Onset     Cancer Mother         brain tumor, age 79     Other Cancer Mother         Brain tumor     Depression Mother      Asthma Mother      Cancer Father         colon     Pneumonia Father          age 90 from pneumonia and sepsis     C.A.D. Father         Age 80 have MI and triple bypass     Coronary Artery Disease Father         Heart Attack     Hypertension Father         ???     Hyperlipidemia Father         ???     Colon Cancer Father         Cured     Diabetes Maternal Grandmother      Migraines Daughter          BP Readings from Last 3 Encounters:   19 (!) 168/102   19 (!) 156/90   19 (!) 160/90    Wt Readings from Last 3 Encounters:   19 81.6 kg (179 lb 12.8 oz)   19 82.1 kg (181 lb)   18 81.6 kg (180 lb)         Reviewed and updated as needed this visit by Provider       Review of Systems   ROS COMP: Constitutional, HEENT, cardiovascular, pulmonary, GI, , musculoskeletal, neuro, skin, endocrine and psych systems are negative, except as otherwise noted.    This document serves as a record of the services and decisions personally performed and made by Nikhil Carpenter MD. It was created on his behalf by Toño Parr, a trained medical scribe. The creation of this document is based on the provider's statements to the medical scribe.  Toño Parr 11:28 AM 2019      Objective    BP (!) 168/102   Pulse 115   Temp 98.9  F (37.2  C) (Oral)   Resp 14   Wt 81.6 kg (179 lb 12.8  oz)   SpO2 97%   BMI 27.34 kg/m    Body mass index is 27.34 kg/m .  Physical Exam   GENERAL: healthy, alert and no distress  EYES: Eyes grossly normal to inspection, PERRL and conjunctivae and sclerae normal  RESP: lungs clear to auscultation - no rales, rhonchi or wheezes  CV: his pulse is tachycardic and has a regular rhythm, normal S1 S2, no S3 or S4, no murmur, click or rub, no peripheral edema and peripheral pulses strong  MS: no gross musculoskeletal defects noted, no edema  SKIN: on the center of the chest he has a small reddened quality skin lesion with irregular borders, it's directly at the xiphoid process area with a small amount of Pectus excavatum . This lesion seems most consistent with eczema , lowgrade chance of fungal infection   NEURO: Normal strength and tone, mentation intact and speech normal  PSYCH: mentation appears normal, affect normal/bright    Diagnostic Test Results:  Labs reviewed in Epic  No results found for this or any previous visit (from the past 24 hour(s)).        Assessment & Plan     (E78.5) Hyperlipidemia LDL goal <130  (primary encounter diagnosis)  Comment: historically at goal   Plan: continue current plan of care     (K50.10) Crohn's disease of colon without complication (H)  Comment: refills provided   Plan: sulfaSALAzine (AZULFIDINE) 500 MG tablet            (I10) Essential hypertension  Comment: still out of range for a long time. Apparently stopped metoprolol and hydrochlorothiazide but not sure exactly why. I will review records and see why. Further review suggests we should start with adding back Toprol [metoprolol XL] along with the Amlodipine [Norvasc] . Lets get some blood pressure readings in 2-3 weeks with further medication adjustments as necessary to achieve normotensive blood pressure readings   Plan: Assuming these medications were not stopped due to side effects or other serious reasons, we would restart metoprolol and recheck blood pressure in a month and  add back hydrochlorothiazide also if needed.     (G11.9) Cerebellar ataxia (H)  Comment: see office visit notes with Dr. Pena  Plan: as above     (Z23) Needs flu shot  Comment: given   Plan: FLU VACCINE, INCREASED ANTIGEN, PRESV FREE, AGE        65+ [17583], ADMIN INFLUENZA  (For MEDICARE         Patients ONLY) []      No follow-ups on file.    The information in this document, created by the medical scribe for me, accurately reflects the services I personally performed and the decisions made by me. I have reviewed and approved this document for accuracy prior to leaving the patient care area.  September 19, 2019 11:29 AM    Nikhil Carpenter MD  [facility]

## 2019-09-19 NOTE — PATIENT INSTRUCTIONS
Before starting these other blood pressure medications I will do some more in depth review to find out if there was any reason why these were stopped. It seems that they were somehow dropped due to miscommunication. I think we will probably start metoprolol again and then if blood pressure is still not adequately controlled with Amlodipine, metoprolol and Flomax, then we may add back hydrochlorothiazide as well.     For allergies I recommended Zertec or Claratin which can be purchased over the counter. The fluticasone (flonase) is fine to take.     If skin condition worsens we can add a prescription for this, for now try to keep the chest clear and dry.

## 2019-09-19 NOTE — LETTER
My Depression Action Plan  Name: Arnold Lozada   Date of Birth 1950  Date: 9/19/2019    My doctor: Nikhil Carpenter   My clinic: [facility]  [address]  [place] [address]  [phone]          GREEN    ZONE   Good Control    What it looks like:     Things are going generally well. You have normal up s and down s. You may even feel depressed from time to time, but bad moods usually last less than a day.   What you need to do:  1. Continue to care for yourself (see self care plan)  2. Check your depression survival kit and update it as needed  3. Follow your physician s recommendations including any medication.  4. Do not stop taking medication unless you consult with your physician first.           YELLOW         ZONE Getting Worse    What it looks like:     Depression is starting to interfere with your life.     It may be hard to get out of bed; you may be starting to isolate yourself from others.    Symptoms of depression are starting to last most all day and this has happened for several days.     You may have suicidal thoughts but they are not constant.   What you need to do:     1. Call your care team, your response to treatment will improve if you keep your care team informed of your progress. Yellow periods are signs an adjustment may need to be made.     2. Continue your self-care, even if you have to fake it!    3. Talk to someone in your support network    4. Open up your depression survival kit           RED    ZONE Medical Alert - Get Help    What it looks like:     Depression is seriously interfering with your life.     You may experience these or other symptoms: You can t get out of bed most days, can t work or engage in other necessary activities, you have trouble taking care of basic hygiene, or basic responsibilities, thoughts of suicide or death that will not go away, self-injurious behavior.     What you need to do:  1. Call your care team and  request a same-day appointment. If they are not available (weekends or after hours) call your local crisis line, emergency room or 911.            Depression Self Care Plan / Survival Kit    Self-Care for Depression  Here s the deal. Your body and mind are really not as separate as most people think.  What you do and think affects how you feel and how you feel influences what you do and think. This means if you do things that people who feel good do, it will help you feel better.  Sometimes this is all it takes.  There is also a place for medication and therapy depending on how severe your depression is, so be sure to consult with your medical provider and/ or Behavioral Health Consultant if your symptoms are worsening or not improving.     In order to better manage my stress, I will:    Exercise  Get some form of exercise, every day. This will help reduce pain and release endorphins, the  feel good  chemicals in your brain. This is almost as good as taking antidepressants!  This is not the same as joining a gym and then never going! (they count on that by the way ) It can be as simple as just going for a walk or doing some gardening, anything that will get you moving.      Hygiene   Maintain good hygiene (Get out of bed in the morning, Make your bed, Brush your teeth, Take a shower, and Get dressed like you were going to work, even if you are unemployed).  If your clothes don't fit try to get ones that do.    Diet  I will strive to eat foods that are good for me, drink plenty of water, and avoid excessive sugar, caffeine, alcohol, and other mood-altering substances.  Some foods that are helpful in depression are: complex carbohydrates, B vitamins, flaxseed, fish or fish oil, fresh fruits and vegetables.    Psychotherapy  I agree to participate in Individual Therapy (if recommended).    Medication  If prescribed medications, I agree to take them.  Missing doses can result in serious side effects.  I understand that  drinking alcohol, or other illicit drug use, may cause potential side effects.  I will not stop my medication abruptly without first discussing it with my provider.    Staying Connected With Others  I will stay in touch with my friends, family members, and my primary care provider/team.    Use your imagination  Be creative.  We all have a creative side; it doesn t matter if it s oil painting, sand castles, or mud pies! This will also kick up the endorphins.    Witness Beauty  (AKA stop and smell the roses) Take a look outside, even in mid-winter. Notice colors, textures. Watch the squirrels and birds.     Service to others  Be of service to others.  There is always someone else in need.  By helping others we can  get out of ourselves  and remember the really important things.  This also provides opportunities for practicing all the other parts of the program.    Humor  Laugh and be silly!  Adjust your TV habits for less news and crime-drama and more comedy.    Control your stress  Try breathing deep, massage therapy, biofeedback, and meditation. Find time to relax each day.     My support system    Clinic Contact:  Phone number:    Contact 1:  Phone number:    Contact 2:  Phone number:    Yazidi/:  Phone number:    Therapist:  Phone number:    Local crisis center:    Phone number:    Other community support:  Phone number:

## 2019-09-20 ENCOUNTER — TELEPHONE (OUTPATIENT)
Dept: INTERNAL MEDICINE | Facility: CLINIC | Age: 69
End: 2019-09-20

## 2019-09-20 NOTE — TELEPHONE ENCOUNTER
Patient notified of Provider's message as written.  Patient verbalized understanding.  However, he would like to go to the  pharmacy to have BP rechecked instead of scheduling an ancillary appointment as he did not have his calendar at the moment    Silverio Paz RN

## 2019-09-20 NOTE — TELEPHONE ENCOUNTER
Pre-Kidney Transplant Review:      Listing Date: NA  Dialysis start date: 2015  General Nephrologist: Almas  Transplant Neph: Myra  Dialysis Unit [facility] [address],   Dialysis Days: T  S  Active or Inactive: NA  Original disease/previous transplant:  BMI: 35- sleeve gastrectomy  Race: AA  HbA1c: 7.5    2018 - had been 14.1    Evaluation Review:    1- Cardiac:    2018 Echo: EF: 70, DD not noted, RVSP: 23    - Stress test: not done    2017  Left Heart cath: normal coronaries   2017  Right Heart cath: RVSP 33    2018 PORT score: 23  Khitha  2018 Khitha: moderate to severe risk      - Repeat Cardiac testing due date:   Echo:  Due  2019  - Repeat Stress test due date: per cardiology ordered DSE due 2019  - Next cardiology follow up: 2019      2- Immunologic:  c PRA: 0  - Blood type: O pos  - Cross match: NA  - Immune suppression plan: high dose thymo        3- Psychosocial:  - Support/Contact: 24 hr: Dtrs, sister, niece, nephew, cousin  - Compliance:yes  2018   visit: Bhavya:   - Transport: oldest dtr and MTM    2018: Nayan: anxiety- no follow up needed        4- Financial:  Will update if added to list          5- Cancer Screenin2019 R  Mammogram/US:  Birads 3 - follow up R US/ mammo due 2019 Bilat mammo : left normal, R birads 3 - left due 2019 Pap smear/Pelvic: negative for intraepithelial lesion or malignancy and HPV negative -PRIOR  atypical cells on Pap smear- next Pap due 2023 and yearly pelvic per provider      2019 Rein pulmonary nodules noted back to 2013 likely sequelae of infection or inflammation: no changes in nodules on 2018 scan - ok to list if  CT 2019 mult pul nodules stable and they were .   Repeat CT 3 months after transplant and then yearly    3/2017  Colonoscopy:  Adenoma- return 3/2022        3/2019 [facility] Hem/Onc  MGUS and Carcinoid:   Carcinoid: 2016  perforated appendix and low-grade  Nikhil Carpenter MD  P Fz Rn Triage Pool             Lets please alert patient to the fact that we want to restart in addition to the Amlodipine [Norvasc] he should be taking Toprol [metoprolol XL] 25 milligrams a day with an eye towards gradually increasing this. Set up medical assistant blood pressure recheck  2-3 weeks      BP Readings from Last 3 Encounters:   09/19/19 (!) 168/102   09/06/19 (!) 156/90   08/28/19 (!) 160/90     Prescription was already sent by provider    Silverio Paz RN     neuroendocrine tumor, grade 1 carcinoid tumor, Ki-67 less than 2... very small tumor... unlikely that it will have any long-term effects...okay to be evaluated for transplant   MGUS since 2014: skel survey 2017 neg, BM 3/2019: 8-10% bone marrow plasma cells, kappa monoclonal . Normal 46,XX female karyotype.  ( FISH findings may be consistent with hyperdiploidy. Hyperdiploidy, if present, in the absence of unfavorable cytogenetics is associated with a favorable prognosis in multiple myeloma- I d/w other hematology colleagues and we all are not sure the significance of her FISH studies in this lady with normal cytogenetics otherwise) 12/2018 monoclonal protein 0.4 gm/dL, IgA kappa... benefit of doubt given and OK for listing for transplant  Return 6/17/2019        6- Imaging:  - CT abdomen pelvis for vascular disease:   TRANSPLANT SURGEON CT SCAN REVIEW: Dr. Marshall CT Dated 5/18/2018  Aorta: few calcifications  Visceral Vessels: no calcifications  Common Iliacs: mild / moderate calcifications   External Iliacs: no calcifications  - Follow up imaging: not needed        7. Living Donors:  - NO          8. Preop planning/Special instructions:  - Left heart cath: done  - Fluid management: no recommendations  - Anticoagulation: on plavix for moderate PAD R worse than left  - Vascular: R worse than left PAD      11/2018 Chronic Hepatitis B- on Tenofovir. Liver bx 2016: minimal active chronic hepatitis-B, grade 0-1, slight portal fibrosis, stage 0-1, and mild steatosis. cleared by hepatology. 10/2018 US: normal liver, no mass.  Follows with hepatology every 6 months.  Next due 5/29/2019

## 2019-09-27 DIAGNOSIS — G47.10 EXCESSIVE SLEEPINESS: ICD-10-CM

## 2019-09-27 RX ORDER — DEXTROAMPHETAMINE SACCHARATE, AMPHETAMINE ASPARTATE MONOHYDRATE, DEXTROAMPHETAMINE SULFATE AND AMPHETAMINE SULFATE 7.5; 7.5; 7.5; 7.5 MG/1; MG/1; MG/1; MG/1
30 CAPSULE, EXTENDED RELEASE ORAL DAILY
Qty: 31 CAPSULE | Refills: 0 | Status: SHIPPED | OUTPATIENT
Start: 2019-12-02 | End: 2019-12-20

## 2019-09-27 RX ORDER — DEXTROAMPHETAMINE SACCHARATE, AMPHETAMINE ASPARTATE MONOHYDRATE, DEXTROAMPHETAMINE SULFATE AND AMPHETAMINE SULFATE 7.5; 7.5; 7.5; 7.5 MG/1; MG/1; MG/1; MG/1
30 CAPSULE, EXTENDED RELEASE ORAL DAILY
Qty: 31 CAPSULE | Refills: 0 | Status: SHIPPED | OUTPATIENT
Start: 2019-11-02 | End: 2019-09-27

## 2019-09-27 RX ORDER — DEXTROAMPHETAMINE SACCHARATE, AMPHETAMINE ASPARTATE MONOHYDRATE, DEXTROAMPHETAMINE SULFATE AND AMPHETAMINE SULFATE 7.5; 7.5; 7.5; 7.5 MG/1; MG/1; MG/1; MG/1
30 CAPSULE, EXTENDED RELEASE ORAL DAILY
Qty: 31 CAPSULE | Refills: 0 | Status: SHIPPED | OUTPATIENT
Start: 2019-10-03 | End: 2019-09-27

## 2019-10-09 ENCOUNTER — ALLIED HEALTH/NURSE VISIT (OUTPATIENT)
Dept: FAMILY MEDICINE | Facility: CLINIC | Age: 69
End: 2019-10-09
Payer: MEDICARE

## 2019-10-09 ENCOUNTER — DOCUMENTATION ONLY (OUTPATIENT)
Dept: NEUROLOGY | Facility: CLINIC | Age: 69
End: 2019-10-09

## 2019-10-09 ENCOUNTER — TELEPHONE (OUTPATIENT)
Dept: INTERNAL MEDICINE | Facility: CLINIC | Age: 69
End: 2019-10-09

## 2019-10-09 VITALS — SYSTOLIC BLOOD PRESSURE: 142 MMHG | DIASTOLIC BLOOD PRESSURE: 84 MMHG

## 2019-10-09 DIAGNOSIS — Z01.30 BP CHECK: Primary | ICD-10-CM

## 2019-10-09 DIAGNOSIS — I10 ESSENTIAL HYPERTENSION: ICD-10-CM

## 2019-10-09 PROCEDURE — 99207 ZZC NO CHARGE NURSE ONLY: CPT | Performed by: INTERNAL MEDICINE

## 2019-10-09 RX ORDER — METOPROLOL SUCCINATE 50 MG/1
50 TABLET, EXTENDED RELEASE ORAL DAILY
Qty: 30 TABLET | Refills: 0 | Status: SHIPPED | OUTPATIENT
Start: 2019-10-09 | End: 2019-12-06

## 2019-10-09 NOTE — PROGRESS NOTES
We are definitely making progress ! Not there yet but very close.    Recommend double up on the Toprol [metoprolol XL] to 2 pills per day. 50 at once , splitting into one pill 2 times a day isn't necessary     Schedule recheck blood pressure in 2-4 weeks    Nikhil Carpenter MD

## 2019-10-09 NOTE — PROGRESS NOTES
Arnold Lozada was evaluated at [facility] on October 9, 2019 at which time his blood pressure was:    BP Readings from Last 3 Encounters:   10/09/19 (!) 142/84   09/19/19 (!) 168/102   09/06/19 (!) 156/90     Pulse Readings from Last 3 Encounters:   09/19/19 115   09/06/19 112   08/28/19 100       Reviewed lifestyle modifications for blood pressure control and reduction: including making healthy food choices, managing weight, getting regular exercise, smoking cessation, reducing alcohol consumption, monitoring blood pressure regularly.     Symptoms: None    BP Goal:< 140/90 mmHg    BP Assessment:  BP too high    Potential Reasons for BP too high: NA - Not applicable    BP Follow-Up Plan: Recheck BP in 30 days at pharmacy    Recommendation to Provider: Pt restarted metoprolol succ. 25mg once daily in addition to amlodipine 5mg once daily. Pt had not taken his amlodipine dose today. Pt denied symptoms of dizziness, headache, or chest pain. Could consider increasing dose of antihypertensive if targeting a more stringent BP goal.     Note completed by:   Crystal Cisneros, PharmD  PGY-1 Pharmacist Resident  [facility]

## 2019-10-09 NOTE — PROGRESS NOTES
Physical Therapy Plan from [facility] Michael Sotomayor has been received it has been given to Adelaida Juarez RN

## 2019-10-09 NOTE — TELEPHONE ENCOUNTER
We are definitely making progress ! Not there yet but very close.     Recommend double up on the Toprol [metoprolol XL] to 2 pills per day. 50 at once, splitting into one pill 2 times a day isn't necessary      Schedule recheck blood pressure in 2-4 weeks     Nikhil Carpenter MD        Called and spoke with patient & notified of provider's results as written.   Verbalizes understanding & no further questions.     Clare Gonzales RN

## 2019-10-11 ENCOUNTER — DOCUMENTATION ONLY (OUTPATIENT)
Dept: NEUROLOGY | Facility: CLINIC | Age: 69
End: 2019-10-11

## 2019-10-11 ENCOUNTER — TRANSFERRED RECORDS (OUTPATIENT)
Dept: HEALTH INFORMATION MANAGEMENT | Facility: CLINIC | Age: 69
End: 2019-10-11

## 2019-10-11 NOTE — PROGRESS NOTES
Received forms from Michael harley, provider signed, forms were fax to [phone], sent to be scanned

## 2019-10-25 DIAGNOSIS — G47.10 EXCESSIVE SLEEPINESS: ICD-10-CM

## 2019-10-25 RX ORDER — DEXTROAMPHETAMINE SACCHARATE, AMPHETAMINE ASPARTATE MONOHYDRATE, DEXTROAMPHETAMINE SULFATE AND AMPHETAMINE SULFATE 7.5; 7.5; 7.5; 7.5 MG/1; MG/1; MG/1; MG/1
30 CAPSULE, EXTENDED RELEASE ORAL DAILY
Qty: 30 CAPSULE | Refills: 0 | Status: SHIPPED | OUTPATIENT
Start: 2019-11-26 | End: 2019-10-25

## 2019-10-25 RX ORDER — DEXTROAMPHETAMINE SACCHARATE, AMPHETAMINE ASPARTATE MONOHYDRATE, DEXTROAMPHETAMINE SULFATE AND AMPHETAMINE SULFATE 7.5; 7.5; 7.5; 7.5 MG/1; MG/1; MG/1; MG/1
30 CAPSULE, EXTENDED RELEASE ORAL DAILY
Qty: 30 CAPSULE | Refills: 0 | Status: SHIPPED | OUTPATIENT
Start: 2019-12-27 | End: 2019-12-20

## 2019-10-25 RX ORDER — DEXTROAMPHETAMINE SACCHARATE, AMPHETAMINE ASPARTATE MONOHYDRATE, DEXTROAMPHETAMINE SULFATE AND AMPHETAMINE SULFATE 7.5; 7.5; 7.5; 7.5 MG/1; MG/1; MG/1; MG/1
30 CAPSULE, EXTENDED RELEASE ORAL DAILY
Qty: 30 CAPSULE | Refills: 0 | Status: SHIPPED | OUTPATIENT
Start: 2019-10-31 | End: 2019-10-25

## 2019-11-06 ENCOUNTER — MYC MEDICAL ADVICE (OUTPATIENT)
Dept: INTERNAL MEDICINE | Facility: CLINIC | Age: 69
End: 2019-11-06

## 2019-11-07 ENCOUNTER — ALLIED HEALTH/NURSE VISIT (OUTPATIENT)
Dept: FAMILY MEDICINE | Facility: CLINIC | Age: 69
End: 2019-11-07
Payer: MEDICARE

## 2019-11-07 VITALS — SYSTOLIC BLOOD PRESSURE: 138 MMHG | DIASTOLIC BLOOD PRESSURE: 88 MMHG

## 2019-11-07 DIAGNOSIS — Z01.30 BP CHECK: Primary | ICD-10-CM

## 2019-11-07 PROCEDURE — 99207 ZZC NO CHARGE NURSE ONLY: CPT | Performed by: INTERNAL MEDICINE

## 2019-11-07 NOTE — PROGRESS NOTES
Arnold Lozada was evaluated at [facility] on November 7, 2019 at which time his blood pressure was:    BP Readings from Last 3 Encounters:   11/07/19 138/88   10/09/19 (!) 142/84   09/19/19 (!) 168/102     Pulse Readings from Last 3 Encounters:   09/19/19 115   09/06/19 112   08/28/19 100       Reviewed lifestyle modifications for blood pressure control and reduction: including making healthy food choices, managing weight, getting regular exercise, smoking cessation, reducing alcohol consumption, monitoring blood pressure regularly.     Symptoms: None    BP Goal:< 140/90 mmHg    BP Assessment:  BP at goal    Potential Reasons for BP too high: NA - Not applicable    BP Follow-Up Plan: Recheck BP in 6 months at pharmacy    Recommendation to Provider: Continue current plan.     Note completed by: Thank you,  Samantha Mckinney, PharmD  [facility]  [phone]

## 2019-12-06 ENCOUNTER — OFFICE VISIT (OUTPATIENT)
Dept: URGENT CARE | Facility: URGENT CARE | Age: 69
End: 2019-12-06
Payer: MEDICARE

## 2019-12-06 VITALS
DIASTOLIC BLOOD PRESSURE: 81 MMHG | HEART RATE: 109 BPM | TEMPERATURE: 98.1 F | OXYGEN SATURATION: 95 % | SYSTOLIC BLOOD PRESSURE: 147 MMHG

## 2019-12-06 DIAGNOSIS — J20.9 ACUTE BRONCHITIS WITH COEXISTING CONDITION REQUIRING PROPHYLACTIC TREATMENT: Primary | ICD-10-CM

## 2019-12-06 DIAGNOSIS — I10 ESSENTIAL HYPERTENSION: ICD-10-CM

## 2019-12-06 DIAGNOSIS — G47.33 OSA (OBSTRUCTIVE SLEEP APNEA): ICD-10-CM

## 2019-12-06 DIAGNOSIS — Z86.69: ICD-10-CM

## 2019-12-06 PROCEDURE — 99214 OFFICE O/P EST MOD 30 MIN: CPT | Performed by: PHYSICIAN ASSISTANT

## 2019-12-06 RX ORDER — AZITHROMYCIN 250 MG/1
TABLET, FILM COATED ORAL
Qty: 6 TABLET | Refills: 0 | Status: SHIPPED | OUTPATIENT
Start: 2019-12-06 | End: 2019-12-11

## 2019-12-06 NOTE — PROGRESS NOTES
S: 69-year-old male is here with his wife for productive green cough for 4 days.  Just returned from [place].  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  Did feel feverish last night.  No vomiting or diarrhea.  No rash.  Started with a sore throat but the sore throat resolved.  Mild nasal congestion.  No postnasal drip.          Allergies   Allergen Reactions     Lisinopril Swelling     Angioedema       Past Medical History:   Diagnosis Date     Abnormal gait 10/5/2014     Acute renal failure (H) 10/5/2014     Elevated troponin I level 10/5/2014     Family history of colon cancer 12/3/2013     Head injury 1965     History of colonoscopy 2008     Hypertension      Low back pain      Nocturia 10/5/2014     Other nervous system complications     Diagnosed in  with Cerebellar Ataxia.     Temporary cerebral vascular dysfunction 10/5/2014     Vasovagal syncope 10/5/2014       amLODIPine (NORVASC) 5 MG tablet, Take 1 tablet (5 mg) by mouth daily  [START ON 2019] amphetamine-dextroamphetamine (ADDERALL XR) 30 MG 24 hr capsule, Take 1 capsule (30 mg) by mouth daily  amphetamine-dextroamphetamine (ADDERALL XR) 30 MG 24 hr capsule, Take 1 capsule (30 mg) by mouth daily  aspirin 325 MG tablet, Take 325 mg by mouth daily.  Cholecalciferol (D3 MAXIMUM STRENGTH PO), Take 400 Units by mouth daily  fluticasone (FLONASE) 50 MCG/ACT spray, Spray 1 spray into both nostrils daily  folic acid 800 MCG TABS, Take 1 tablet by mouth daily Reported on 5/10/2017  [] methylphenidate (RITALIN LA) 30 MG 24 hr capsule, Take 1 capsule (30 mg) by mouth daily  metoprolol succinate ER (TOPROL-XL) 50 MG 24 hr tablet, Take 1 tablet (50 mg) by mouth daily  MULTI-VITAMIN PO TABS, 1 TABLET DAILY  Omega 3-6-9 Fatty Acids (OMEGA 3-6-9 COMPLEX) CAPS, Take 1 capsule by mouth daily  order for DME, Knee high compression stockings (15-20 mmHg)  ORDER FOR DME,  Respironics REMSTAR 60 Series Auto CPAP 5-18cm H2O, Nuance pillow nasal mask large  POTASSIUM PO, Take 595 mcg by mouth daily  sertraline (ZOLOFT) 50 MG tablet, Take 1 tablet (50 mg) by mouth daily  simvastatin (ZOCOR) 20 MG tablet, ONE TABLET DAILY IN THE EVENING  sulfaSALAzine (AZULFIDINE) 500 MG tablet, Take 2 tablets (1,000 mg) by mouth 2 times daily  tamsulosin (FLOMAX) 0.4 MG capsule, TAKE 2 CAPSULES BY MOUTH ONCE DAILY  traZODone (DESYREL) 50 MG tablet, Take 1 tablet (50 mg) by mouth nightly as needed for sleep  triamcinolone (KENALOG) 0.1 % cream, Apply sparingly to affected area three times daily for 14 days.    No current facility-administered medications on file prior to visit.       Social History     Tobacco Use     Smoking status: Never Smoker     Smokeless tobacco: Never Used     Tobacco comment: Never smoked.   Substance Use Topics     Alcohol use: Yes     Comment: 2-4 drinks per week       ROS:  CONSTITUTIONAL: Negative for fatigue or fever.  EYES: Negative for eye problems.  ENT: As above.  RESP: As above.  CV: Negative for chest pains.  GI: Negative for vomiting.  MUSCULOSKELETAL:  Negative for significant muscle or joint pains.  NEUROLOGIC: Negative for headaches.  SKIN: Negative for rash.  PSYCH: Normal mentation for age.    OBJECTIVE:  BP (!) 147/81 (BP Location: Left arm, Patient Position: Chair, Cuff Size: Adult Regular)   Pulse 109   Temp 98.1  F (36.7  C) (Oral)   SpO2 95%     GENERAL APPEARANCE: Healthy, alert and no distress.  EYES:Conjunctiva/sclera clear.  EARS: No cerumen.   Ear canals w/o erythema.  TM's intact w/o erythema.    NOSE/MOUTH: Nose without ulcers, erythema or lesions.  SINUSES: No maxillary sinus tenderness.  THROAT: No erythema w/o tonsillar enlargement . No exudates.  NECK: Supple, nontender, no lymphadenopathy.  RESP: Lungs clear to auscultation - no rales, rhonchi or wheezes  CV: Regular rate and rhythm, normal S1 S2, no murmur noted.  NEURO: Awake, alert    SKIN:  No rashes        ASSESSMENT:     ICD-10-CM    1. Acute bronchitis with coexisting condition requiring prophylactic treatment J20.9 azithromycin (ZITHROMAX) 250 MG tablet   2. GUILLERMINA (obstructive sleep apnea) G47.33 azithromycin (ZITHROMAX) 250 MG tablet   3. H/O cerebellar ataxia Z86.69            PLAN: Recheck 5 days with primary care provider.  I have discussed clinical findings with patient.  Side effects of medications discussed.  Symptomatic care is discussed.  I have discussed the possibility of  worsening symptoms and to RTC or ER if they occur.  All questions are answered and patient is in agreement with plan.   Patient care instructions are given to at the end of visit.   Lots of rest and fluids.    Pastora Felipe PA-C

## 2019-12-09 ENCOUNTER — MYC MEDICAL ADVICE (OUTPATIENT)
Dept: INTERNAL MEDICINE | Facility: CLINIC | Age: 69
End: 2019-12-09

## 2019-12-09 ENCOUNTER — TELEPHONE (OUTPATIENT)
Dept: INTERNAL MEDICINE | Facility: CLINIC | Age: 69
End: 2019-12-09

## 2019-12-09 NOTE — TELEPHONE ENCOUNTER
Panel Management Review      Patient has the following on his problem list:     Hypertension   Last three blood pressure readings:  BP Readings from Last 3 Encounters:   12/06/19 (!) 147/81   11/07/19 138/88   10/09/19 (!) 142/84     Blood pressure: FAILED    HTN Guidelines:  Less than 140/90      Composite cancer screening  Chart review shows that this patient is due/due soon for the following Colonoscopy  Summary:    Patient is due/failing the following:   wellness and COLONOSCOPY    Action needed:   Patient needs office visit for blood pressure check.    Type of outreach:    none patient seen [facility] with good blood pressure     Questions for provider review:    None                                                                                                                                    Claribel MORA CMA ([facility])        Chart routed to none .

## 2019-12-09 NOTE — TELEPHONE ENCOUNTER
"Routing refill request to provider for review/approval because:  Labs out of range:  BP    Requested Prescriptions   Pending Prescriptions Disp Refills     metoprolol succinate ER (TOPROL-XL) 50 MG 24 hr tablet 90 tablet 0     Sig: Take 1 tablet (50 mg) by mouth daily       Beta-Blockers Protocol Failed - 12/6/2019  3:12 PM        Failed - Blood pressure under 140/90 in past 12 months     BP Readings from Last 3 Encounters:   12/06/19 (!) 147/81   11/07/19 138/88   10/09/19 (!) 142/84                 Failed - Recent (12 mo) or future (30 days) visit within the authorizing provider's specialty     Patient has had an office visit with the authorizing provider or a provider within the authorizing providers department within the previous 12 mos or has a future within next 30 days. See \"Patient Info\" tab in inbasket, or \"Choose Columns\" in Meds & Orders section of the refill encounter.              Passed - Patient is age 6 or older        Passed - Medication is active on med list        Clare Gonzales RN  "

## 2019-12-11 ENCOUNTER — OFFICE VISIT (OUTPATIENT)
Dept: BEHAVIORAL HEALTH | Facility: CLINIC | Age: 69
End: 2019-12-11
Payer: MEDICARE

## 2019-12-11 DIAGNOSIS — F43.23 ADJUSTMENT DISORDER WITH MIXED ANXIETY AND DEPRESSED MOOD: Primary | ICD-10-CM

## 2019-12-11 PROCEDURE — 90834 PSYTX W PT 45 MINUTES: CPT | Performed by: SOCIAL WORKER

## 2019-12-11 RX ORDER — METOPROLOL SUCCINATE 50 MG/1
50 TABLET, EXTENDED RELEASE ORAL DAILY
Qty: 90 TABLET | Refills: 0 | Status: SHIPPED | OUTPATIENT
Start: 2019-12-11 | End: 2020-03-12

## 2019-12-11 NOTE — PROGRESS NOTES
"                                           Progress Note    Client Name: Arnold Lozada             Date:   12/11/2019                                           Service Type:Individual                         Session start time: 130                 Session End Time:   215                            Session Length:        38-52                Session #:      4                Attendees:     Client and spouse Willa    Treatment Plan Last Reviewed: 12/11/2019   PHQ-9 / BRETT-7 :                 DATA                            Progress Since Last Session (Related to Symptoms / Goals / Homework):              Symptoms: Stable    Homework: n/a                            Episode of Care Goals: Satisfactory progress - ACTION (Actively working towards change); Intervened by reinforcing change plan / affirming steps taken                Current / Ongoing Stressors and Concerns:                 Client notes that he has been \"pretty good.\"  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  Just got back from [place] where they spent Thanksgiving.  They are going to [place] in January, they will spend about two months down there.  Also started another round of PT at [facility], and thinks that this is going well.  Doing some work on his gait and also he is pushing a walker.  Back up on his feet when he wants to be.                            Treatment Objective(s) Addressed in This Session:          Client will use identified behavioral and cognitive skills to challenge negative self talk 90% of the time.    -reviewed ACEs in session today.                  Intervention:              Cognitive Behavioral Therapy:  Discussed cognitive therapy today in session.  Reviewed that cognitive therapy addresses cognitions on three levels: automatic thoughts, conditional assumptions, and core beliefs.  Identified that way that these three levels of cognition can influence emotions, " behaviors, and body sensations.  Discussed interpersonal strategies informed by these three levels of cognitive dysfunction that the patient has used in the past                                ASSESSMENT: Current Emotional / Mental Status  of significant symptoms):              Risk status (Self / Other harm or suicidal ideation)              Client denies current fears or concerns for personal safety.              Client denies current or recent suicidal ideation or behaviors.              Client denies current or recent homicidal ideation or behaviors.              Client denies current or recent self injurious behavior or ideation.              Client denies other safety concerns.              A safety and risk management plan has not been developed at this time, however client was given the after-hours number / 911 should there be a change in any of these risk factors.                Appearance:                           Appropriate               Eye Contact:                           Good               Psychomotor Behavior:          Normal               Attitude:                                   Cooperative               Orientation:                             All              Speech                          Rate / Production:       Normal                           Volume:                       Normal               Mood:                                      Normal              Affect:                                      Appropriate               Thought Content:                    Clear               Thought Form:                        Coherent  Goal Directed  Logical               Insight:                                    Good                 Medication Review:              No current psychiatric medications prescribed                Medication Compliance:              NA                Changes in Health Issues:              Yes: feels like he notices a decline each week                Chemical Use  Review:              Substance Use: Chemical use reviewed, no active concerns identified                 Tobacco Use: No current tobacco use.                  Collateral Reports Completed:              Communicated with: n/a    PLAN: (Client Tasks / Therapist Tasks / Other)  1.  Client will call to make next appointment  2.  Client will read handouts on cognitive therapy by next session.                                                                   ________________________________________________________________________    Treatment Plan    Client's Name: Arnold Lozada                      YOB: 1950    Date: 8-31-16    DSM5 Diagnoses: (Sustained by DSM5 Criteria Listed Above)  Diagnoses: F43.23 adjustment disorder with anxiety and depression  Psychosocial & Contextual Factors: health problems decreased functioning  WHODAS 2.0 (12 item)27    Referral / Collaboration:  suggested pursing support group and getting connected with organizations.    Anticipated number of session or this episode of care: 8-12    MeasurableTreatment Goal(s) related to diagnosis / functional impairment(s)  Goal-Depression: Client will decrease depressed mood    I will know I've met my goal when I am less depressed.      Objective #A (Client Action)    Status: cont- Date: 12/11/2019     Client will use identified behavioral and cognitive skills to challenge negative self talk 90% of the time.    Intervention(s)  Therapist will provide psychoeducation, behavioral activation, and cognitive restructuring.      Objective #B  Client will complete at least 10 minutes of self-regulation practice (e.g.: yoga, meditation, relaxation breathing, etc.) per day.    Status: cont- Date: 12/11/2019     Intervention(s)  Therapist will provide psychoeducation, behavioral activation, and cognitive restructuring.      Objective #C  Client will exercise 30 minutes 36 times in the next 12 weeks.  Status: cont- Date: 12/11/2019       Intervention(s)  Therapist will provide psychoeducation, behavioral activation, and cognitive restructuring.                Goal- Anxiety: Client will decrease anxiety    I will know I've met my goal when I am less anxious.      Objective #A (Client Action)    Status: cont- Date: 12/11/2019      Client will use cognitive strategies identified in therapy to challenge anxious thoughts.    Intervention(s)  Therapist will provide psychoeducation, behavioral activation, and cognitive restructuring.    Objective #B  Client will use at least 3 coping skills for anxiety management in the next 12 weeks.    Status: cont- Date: 12/11/2019      Intervention(s)  Therapist will provide psychoeducation, behavioral activation, and cognitive restructuring.      Objective #C  Client will identify three distraction and diversion activities and use those activities to decrease level of anxiety.  Status: cont- Date: 12/11/2019     Intervention(s)  Therapist will provide psychoeducation, behavioral activation, and cognitive restructuring.                Client has reviewed and agreed to the above plan.      REEYS Goodson [facility]               8/21/2019

## 2019-12-20 DIAGNOSIS — G47.10 EXCESSIVE SLEEPINESS: ICD-10-CM

## 2019-12-20 DIAGNOSIS — F43.23 ADJUSTMENT DISORDER WITH MIXED ANXIETY AND DEPRESSED MOOD: ICD-10-CM

## 2019-12-20 RX ORDER — DEXTROAMPHETAMINE SACCHARATE, AMPHETAMINE ASPARTATE MONOHYDRATE, DEXTROAMPHETAMINE SULFATE AND AMPHETAMINE SULFATE 7.5; 7.5; 7.5; 7.5 MG/1; MG/1; MG/1; MG/1
30 CAPSULE, EXTENDED RELEASE ORAL DAILY
Qty: 30 CAPSULE | Refills: 0 | Status: SHIPPED | OUTPATIENT
Start: 2020-01-24 | End: 2019-12-20

## 2019-12-20 RX ORDER — DEXTROAMPHETAMINE SACCHARATE, AMPHETAMINE ASPARTATE MONOHYDRATE, DEXTROAMPHETAMINE SULFATE AND AMPHETAMINE SULFATE 7.5; 7.5; 7.5; 7.5 MG/1; MG/1; MG/1; MG/1
30 CAPSULE, EXTENDED RELEASE ORAL DAILY
Qty: 30 CAPSULE | Refills: 0 | Status: SHIPPED | OUTPATIENT
Start: 2020-01-24 | End: 2020-03-20

## 2019-12-20 RX ORDER — DEXTROAMPHETAMINE SACCHARATE, AMPHETAMINE ASPARTATE MONOHYDRATE, DEXTROAMPHETAMINE SULFATE AND AMPHETAMINE SULFATE 7.5; 7.5; 7.5; 7.5 MG/1; MG/1; MG/1; MG/1
30 CAPSULE, EXTENDED RELEASE ORAL DAILY
Qty: 30 CAPSULE | Refills: 0 | Status: SHIPPED | OUTPATIENT
Start: 2020-02-21 | End: 2020-03-17

## 2019-12-23 NOTE — TELEPHONE ENCOUNTER
"Routing refill request to provider for review/approval because:  Labs out of range:  PHQ-9    Requested Prescriptions   Pending Prescriptions Disp Refills     sertraline (ZOLOFT) 50 MG tablet [Pharmacy Med Name: Sertraline HCl 50 MG Oral Tablet]  0     Sig: TAKE 1 TABLET BY MOUTH ONCE DAILY       SSRIs Protocol Failed - 12/20/2019 11:08 AM        Failed - PHQ-9 score less than 5 in past 6 months     Please review last PHQ-9 score.           Passed - Medication is active on med list        Passed - Patient is age 18 or older        Passed - Recent (6 mo) or future (30 days) visit within the authorizing provider's specialty     Patient had office visit in the last 6 months or has a visit in the next 30 days with authorizing provider or within the authorizing provider's specialty.  See \"Patient Info\" tab in inbasket, or \"Choose Columns\" in Meds & Orders section of the refill encounter.            Clare Gonzales RN  "

## 2020-01-24 ENCOUNTER — MEDICAL CORRESPONDENCE (OUTPATIENT)
Dept: HEALTH INFORMATION MANAGEMENT | Facility: CLINIC | Age: 70
End: 2020-01-24

## 2020-02-05 ENCOUNTER — TRANSFERRED RECORDS (OUTPATIENT)
Dept: HEALTH INFORMATION MANAGEMENT | Facility: CLINIC | Age: 70
End: 2020-02-05

## 2020-02-11 ENCOUNTER — DOCUMENTATION ONLY (OUTPATIENT)
Dept: NEUROLOGY | Facility: CLINIC | Age: 70
End: 2020-02-11

## 2020-02-11 NOTE — PROGRESS NOTES
Received Eval plan Of Care, PT Plan Of Care from [facility], form was placed in provider folder for signature

## 2020-02-12 ENCOUNTER — TELEPHONE (OUTPATIENT)
Dept: NEUROLOGY | Facility: CLINIC | Age: 70
End: 2020-02-12

## 2020-02-12 NOTE — TELEPHONE ENCOUNTER
CAlled [facility] in [place], explained the situation with Dr. Fernandez being a neurology fellow and Dr. Pena her supervising physician. Orders have been received, they will be signed today and faxed back to [facility]    Phone Message    May a detailed message be left on voicemail: yes     Reason for Call: Order(s): Other:   Reason for requested: PT and Speech Therapy  Date needed: ASAP  Provider name: Dr Pena    They rec'd Orders for PT and Speech Therapy but need to confirm which provider is referring and get signed orders - they thought provider referring was Dr Shelley Fernandez - but Pt said it was his [facility] Neurologist which is Dr Pena - please call them back either way and let them know if you are referring or not - Thanks    Fax # if needed [phone]    Lizbeth from [facility] called [facility],  # [phone]      Action Taken: Message routed to:  [facility] ([facility] Neurology    Travel Screening: Not Applicable

## 2020-02-18 NOTE — PROGRESS NOTES
-- DO NOT REPLY / DO NOT REPLY ALL --  -- Message is from the [facility]--    General Patient Message      Reason for Call: Patient stated that she needs to see DR. Spence today regarding the burning needle pains in her feet. Stated that she does want to go to emergency room or see a trusted partner. Stated she doesn't want Wednesday 02/19/2020 appointment wants appointment today.    Caller Information       Type Contact Phone    02/17/2020 02:43 PM Phone (Incoming) Francine Cee (Self) [phone] (H)          Alternative phone number: [phone]    Turnaround time given to caller:   \"This message will be sent to [state Provider's name]. The clinical team will fulfill your request as soon as they review your message.\"}     Patient is completing a home sleep test for concerns primarily related to snoring and suspected GUILLERMINA.  He was instructed on how to put on the Noxturnal T3 device and associated equipment before going to bed and given the opportunity to practice putting it on before leaving the sleep center.  He was reminded to bring the home sleep test kit back to the center tomorrow for download and reporting.  April Faith [facility] 5/24/2018 2:18 PM

## 2020-02-23 ENCOUNTER — HEALTH MAINTENANCE LETTER (OUTPATIENT)
Age: 70
End: 2020-02-23

## 2020-02-26 ENCOUNTER — TRANSFERRED RECORDS (OUTPATIENT)
Dept: HEALTH INFORMATION MANAGEMENT | Facility: CLINIC | Age: 70
End: 2020-02-26

## 2020-02-27 ENCOUNTER — DOCUMENTATION ONLY (OUTPATIENT)
Dept: NEUROLOGY | Facility: CLINIC | Age: 70
End: 2020-02-27

## 2020-02-27 NOTE — PROGRESS NOTES
Eval plan Of Care from [facility], has been received and scan to chart . No signature required

## 2020-02-28 ENCOUNTER — TRANSFERRED RECORDS (OUTPATIENT)
Dept: HEALTH INFORMATION MANAGEMENT | Facility: CLINIC | Age: 70
End: 2020-02-28

## 2020-03-07 ASSESSMENT — ENCOUNTER SYMPTOMS
HEARTBURN: 0
HEMATOCHEZIA: 0
EYE PAIN: 0
SHORTNESS OF BREATH: 0
FREQUENCY: 1
MYALGIAS: 1
DIZZINESS: 0
WEAKNESS: 1
FEVER: 0
CHILLS: 0
PARESTHESIAS: 0
HEMATURIA: 0
COUGH: 1
JOINT SWELLING: 0
HEADACHES: 0
ARTHRALGIAS: 0
CONSTIPATION: 0
DYSURIA: 0
NERVOUS/ANXIOUS: 0
PALPITATIONS: 0
ABDOMINAL PAIN: 0
DIARRHEA: 0
NAUSEA: 0
SORE THROAT: 0

## 2020-03-07 ASSESSMENT — ACTIVITIES OF DAILY LIVING (ADL)
CURRENT_FUNCTION: SHOPPING REQUIRES ASSISTANCE
CURRENT_FUNCTION: LAUNDRY REQUIRES ASSISTANCE
CURRENT_FUNCTION: TRANSPORTATION REQUIRES ASSISTANCE

## 2020-03-10 DIAGNOSIS — I10 ESSENTIAL HYPERTENSION: ICD-10-CM

## 2020-03-10 DIAGNOSIS — E78.5 HYPERLIPIDEMIA LDL GOAL <130: ICD-10-CM

## 2020-03-10 ASSESSMENT — ENCOUNTER SYMPTOMS
HEARTBURN: 0
WEAKNESS: 1
HEMATURIA: 0
SORE THROAT: 0
EYE PAIN: 0
HEMATOCHEZIA: 0
DIARRHEA: 0
PALPITATIONS: 0
FEVER: 0
DYSURIA: 0
CHILLS: 0
FREQUENCY: 1
ARTHRALGIAS: 0
NAUSEA: 0
JOINT SWELLING: 0
MYALGIAS: 1
SHORTNESS OF BREATH: 0
DIZZINESS: 0
CONSTIPATION: 0
NERVOUS/ANXIOUS: 0
ABDOMINAL PAIN: 0
COUGH: 1
HEADACHES: 0
PARESTHESIAS: 0

## 2020-03-10 ASSESSMENT — ACTIVITIES OF DAILY LIVING (ADL)
CURRENT_FUNCTION: LAUNDRY REQUIRES ASSISTANCE
CURRENT_FUNCTION: TRANSPORTATION REQUIRES ASSISTANCE
CURRENT_FUNCTION: SHOPPING REQUIRES ASSISTANCE

## 2020-03-10 NOTE — PROGRESS NOTES
"SUBJECTIVE:   Arnold Lozada is a 69 year old male who presents for Preventive Visit.  Are you in the first 12 months of your Medicare coverage?  No    Healthy Habits:     In general, how would you rate your overall health?  Fair    Frequency of exercise:  2-3 days/week    Duration of exercise:  30-45 minutes    Do you usually eat at least 4 servings of fruit and vegetables a day, include whole grains    & fiber and avoid regularly eating high fat or \"junk\" foods?  Yes    Taking medications regularly:  Yes    Medication side effects:  Other    Ability to successfully perform activities of daily living:  Transportation requires assistance, shopping requires assistance and laundry requires assistance    Home Safety:  No safety concerns identified    Hearing Impairment:  Feel that people are mumbling or not speaking clearly, find that men's voices are easier to understand than woman's and difficulty understanding soft or whispered speech    In the past 6 months, have you been bothered by leaking of urine?  No    In general, how would you rate your overall mental or emotional health?  Good      PHQ-2 Total Score: 0    Additional concerns today:  No    Do you feel safe in your environment? Yes    Complicated patient with multiple medical problems . 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 Pena who commanages patient with me.    Wears hearing aids     Questions about Adderall [ amphetamine and dextroamphetamine ] not being covered. Does patient have diagnosis of narcolepsy ?  This was not formally diagnosed. For treatment for cerebellar ataxia it's an established treatment. But the Pharmacy Benefits decline to cover this medication. See assessment and plan section      Has had 5 Polysomnography (PSG) / sleep studies, last couple of years for his obstructive sleep apnea. Has obstructive sleep apnea and uses a mouth guard. But no formal diagnosis of narcolepsy " . Arnold comments Adderall [ amphetamine and dextroamphetamine ] works for his daytime hypersomnolence / napping behavior , methylphenidate (RITALIN) didn't work as well as Adderall [ amphetamine and dextroamphetamine ] and this was prescribed originally by Dr. Pena. They brought the denial letter from their health insurance. Let me review facts with diagnoses and stimulant medication with [facility] pharmacist. 5 dollars had increased to 36 dollars a month and spouse is upset by this.    Have you ever done Advance Care Planning? (For example, a Health Directive, POLST, or a discussion with a medical provider or your loved ones about your wishes): Yes, advance care planning is on file.    Fall risk  Fallen 2 or more times in the past year?: No  Any fall with injury in the past year?: No    Cognitive Screening   1) Repeat 3 items (Leader, Season, Table)    2) Clock draw: NORMAL  3) 3 item recall: Recalls 3 objects  Results: 3 items recalled: COGNITIVE IMPAIRMENT LESS LIKELY    Mini-CogTM Copyright S Aye. Licensed by the author for use in [facility]; reprinted with permission (nikos@[facility]). All rights reserved.      Do you have sleep apnea, excessive snoring or daytime drowsiness?: yes    Reviewed and updated as needed this visit by clinical staff         Reviewed and updated as needed this visit by Provider        Social History     Tobacco Use     Smoking status: Never Smoker     Smokeless tobacco: Never Used     Tobacco comment: Never smoked.   Substance Use Topics     Alcohol use: Yes     Comment: 2-4 drinks per week   If you drink alcohol do you typically have >3 drinks per day or >7 drinks per week? No  No flowsheet data found.    Current providers sharing in care for this patient include:   Patient Care Team:  Nikhil Carpenter MD as PCP - General  Nikhil Carpenter MD as Assigned PCP  Giorgio Pena MD as MD (Neurology)    The following health maintenance items are  reviewed in Epic and correct as of today:  Health Maintenance   Topic Date Due     COLORECTAL CANCER SCREENING  12/07/2019     MEDICARE ANNUAL WELLNESS VISIT  12/18/2019     FALL RISK ASSESSMENT  09/19/2020     ADVANCE CARE PLANNING  12/18/2020     DTAP/TDAP/TD IMMUNIZATION (3 - Td) 11/11/2021     LIPID  12/18/2023     HEPATITIS C SCREENING  Completed     PHQ-2  Completed     INFLUENZA VACCINE  Completed     PNEUMOCOCCAL IMMUNIZATION 65+ LOW/MEDIUM RISK  Completed     ZOSTER IMMUNIZATION  Completed     AORTIC ANEURYSM SCREENING (SYSTEM ASSIGNED)  Completed     IPV IMMUNIZATION  Aged Out     MENINGITIS IMMUNIZATION  Aged Out     Lab work is in process  Labs reviewed in EPIC  BP Readings from Last 3 Encounters:   03/13/20 138/88   12/06/19 (!) 147/81   11/07/19 138/88    Wt Readings from Last 3 Encounters:   03/13/20 81.6 kg (180 lb)   09/19/19 81.6 kg (179 lb 12.8 oz)   09/06/19 82.1 kg (181 lb)                  Patient Active Problem List   Diagnosis     Hyperlipidemia LDL goal <130     Crohn's disease of colon (H)     Adenomatous colon polyp     Low back strain     GUILLERMINA (obstructive sleep apnea)- Mild/moderate ( AHI 16.2)     Family history of colon cancer     Advanced directives, counseling/discussion     Elevated troponin I level     Cerebellar ataxia (H)     Essential hypertension     Adjustment disorder with mixed anxiety and depressed mood     Cerebral ataxia (H)     ARF (acute renal failure) (H)     Dyslipidemia     Nocturia     Sciatica of right side     Temporary cerebral vascular dysfunction     Gait abnormality     Vasovagal syncope     Past Surgical History:   Procedure Laterality Date     COLONOSCOPY  Fall 2014    OK     HC TOOTH EXTRACTION W/FORCEP         Social History     Tobacco Use     Smoking status: Never Smoker     Smokeless tobacco: Never Used     Tobacco comment: Never smoked.   Substance Use Topics     Alcohol use: Yes     Comment: 2-4 drinks per week     Family History   Problem Relation Age  of Onset     Cancer Mother         brain tumor, age 79     Other Cancer Mother         Brain tumor     Depression Mother      Asthma Mother      Cancer Father         colon     Pneumonia Father          age 90 from pneumonia and sepsis     C.A.D. Father         Age 80 have MI and triple bypass     Coronary Artery Disease Father         Heart Attack     Hypertension Father         ???     Hyperlipidemia Father         ???     Colon Cancer Father         Cured     Diabetes Maternal Grandmother      Migraines Daughter          Current Outpatient Medications   Medication Sig Dispense Refill     amLODIPine (NORVASC) 5 MG tablet TAKE 1 TABLET BY MOUTH ONCE DAILY 90 tablet 1     amphetamine-dextroamphetamine (ADDERALL XR) 30 MG 24 hr capsule Take 1 capsule (30 mg) by mouth daily 30 capsule 0     amphetamine-dextroamphetamine (ADDERALL XR) 30 MG 24 hr capsule Take 1 capsule (30 mg) by mouth daily 30 capsule 0     aspirin 325 MG tablet Take 325 mg by mouth daily.       Cholecalciferol (D3 MAXIMUM STRENGTH PO) Take 400 Units by mouth daily       fluticasone (FLONASE) 50 MCG/ACT spray Spray 1 spray into both nostrils daily       folic acid 800 MCG TABS Take 1 tablet by mouth daily Reported on 5/10/2017       metoprolol succinate ER (TOPROL-XL) 50 MG 24 hr tablet Take 1 tablet by mouth once daily 90 tablet 0     MULTI-VITAMIN PO TABS 1 TABLET DAILY       Omega 3-6-9 Fatty Acids (OMEGA 3-6-9 COMPLEX) CAPS Take 1 capsule by mouth daily       order for DME Knee high compression stockings (15-20 mmHg) 1 each 1     ORDER FOR DME Respironics REMSTAR 60 Series Auto CPAP 5-18cm H2O, Nuance pillow nasal mask large       POTASSIUM PO Take 595 mcg by mouth daily       sertraline (ZOLOFT) 50 MG tablet TAKE 1 TABLET BY MOUTH ONCE DAILY 90 tablet 1     simvastatin (ZOCOR) 20 MG tablet TAKE 1 TABLET BY MOUTH ONCE DAILY IN THE EVENING 90 tablet 0     sulfaSALAzine (AZULFIDINE) 500 MG tablet Take 2 tablets (1,000 mg) by mouth 2 times  daily 360 tablet 3     tamsulosin (FLOMAX) 0.4 MG capsule TAKE 2 CAPSULES BY MOUTH ONCE DAILY 180 capsule 3     traZODone (DESYREL) 50 MG tablet Take 1 tablet (50 mg) by mouth nightly as needed for sleep 90 tablet 3     triamcinolone (KENALOG) 0.1 % cream Apply sparingly to affected area three times daily for 14 days. 80 g 0     Allergies   Allergen Reactions     Lisinopril Swelling     Angioedema     Recent Labs   Lab Test 01/02/19  1104 12/18/18  1116 12/14/17  1145 12/19/16  0857 10/28/16  1307 06/21/16 04/29/16  1552   A1C  --   --  5.2  --   --   --   --    LDL  --  117* 94 110*  --   --   --    HDL  --  49 57 64  --   --   --    TRIG  --  151* 163* 83  --   --   --    ALT  --  43  --   --  30  --  25   CR 0.91 0.93 1.02  --  0.89  --   --    GFRESTIMATED 87 81 73  --  86  --   --    GFRESTBLACK >90 >90 88  --  >90   GFR Calc    --   --    POTASSIUM 4.1 4.5 4.6  --  4.4  --  3.5   TSH  --   --   --   --  1.10 1.3  --           Review of Systems   Constitutional: Negative for chills and fever.   HENT: Positive for congestion and hearing loss. Negative for ear pain and sore throat.    Eyes: Negative for pain and visual disturbance.   Respiratory: Positive for cough. Negative for shortness of breath.    Cardiovascular: Negative for chest pain, palpitations and peripheral edema.   Gastrointestinal: Negative for abdominal pain, constipation, diarrhea, heartburn, hematochezia and nausea.   Genitourinary: Positive for frequency, impotence and urgency. Negative for discharge, dysuria, genital sores and hematuria.   Musculoskeletal: Positive for myalgias. Negative for arthralgias and joint swelling.   Skin: Negative for rash.   Neurological: Positive for weakness. Negative for dizziness, headaches and paresthesias.   Psychiatric/Behavioral: Negative for mood changes. The patient is not nervous/anxious.      Constitutional, HEENT, cardiovascular, pulmonary, gi and gu systems are negative, except as  "otherwise noted.    We would approve all refills provided good for one year , based on patient call or pharmacy call.    OBJECTIVE:   /88   Pulse 108   Resp 19   Ht 1.734 m (5' 8.27\")   Wt 81.6 kg (180 lb)   SpO2 96%   BMI 27.15 kg/m   Estimated body mass index is 27.34 kg/m  as calculated from the following:    Height as of 8/23/18: 1.727 m (5' 8\").    Weight as of 9/19/19: 81.6 kg (179 lb 12.8 oz).  Physical Exam  GENERAL: healthy, alert and no distress, has become increasingly dysarthric and sometimes hard to understand his speech.  EYES: Eyes grossly normal to inspection, PERRL and conjunctivae and sclerae normal  HENT: ear canals and TM's normal, nose and mouth without ulcers or lesions  NECK: no adenopathy, no asymmetry, masses, or scars and thyroid normal to palpation  RESP: lungs clear to auscultation - no rales, rhonchi or wheezes  CV: regular rate and rhythm, normal S1 S2, no S3 or S4, no murmur, click or rub, no peripheral edema and peripheral pulses strong  ABDOMEN: soft, nontender, no hepatosplenomegaly, no masses and bowel sounds normal  MS: no gross musculoskeletal defects noted, no edema  SKIN: no suspicious lesions or rashes  NEURO: evidence of central nervous system dysfunction with  Cerebellar ataxia, mentation intact and speech abnormal  PSYCH: mentation appears normal, affect normal/bright    Diagnostic Test Results:  Orders Placed This Encounter   Procedures     Lipid panel reflex to direct LDL Fasting     Comprehensive metabolic panel     CBC with platelets differential     GASTROENTEROLOGY ADULT REF PROCEDURE ONLY         ASSESSMENT / PLAN:   (Z00.00) Medicare annual wellness visit, subsequent  (primary encounter diagnosis)  Comment: routine screening issues   Plan: see orders section of this encounter     (G11.9) Cerebellar ataxia (H)  Comment: comanage with Dr. Pena  Plan: Comprehensive metabolic panel, CBC with         platelets differential        Follow up as indicated on " "results     (K50.10) Crohn's disease of colon without complication (H)  Comment: this was a separate matter. We found the colonoscopy report from December 2016, he has scarring throughout the colon consistent with healed ulceration and then had a sessile serrated adenoma . A follow up colonoscopy was requested bathroom Dr. Mei in 3 years and patient and spouse did not have a true understanding of the problems . We had a detailed discussion and I gave a printed copy of the colonoscopy report. I recommended they have the colonoscopy or call Dr. Mei, we explained to patient the different prognoses with hyperplastic colon polyps versus tubular adenoma versus sessile serrated adenoma   Plan: GASTROENTEROLOGY ADULT REF PROCEDURE ONLY            (E78.5) Hyperlipidemia LDL goal <130  Comment: due for recheck   Plan: Lipid panel reflex to direct LDL Fasting        Not in a fasting state     (G47.33) GUILLERMINA (obstructive sleep apnea)- Mild/moderate ( AHI 16.2)  Comment: noted as a point of historical importance   Plan: will discuss Adderall [ amphetamine and dextroamphetamine ] with medication therapy management pharmacist     (I10) Essential hypertension  Comment: controlled within acceptable limits , barely  Plan: continue current plan of care      (D12.6) Serrated adenoma of colon  Comment: recommended , as detailed above   Plan: GASTROENTEROLOGY ADULT REF PROCEDURE ONLY              COUNSELING:  Reviewed preventive health counseling, as reflected in patient instructions    Estimated body mass index is 27.34 kg/m  as calculated from the following:    Height as of 8/23/18: 1.727 m (5' 8\").    Weight as of 9/19/19: 81.6 kg (179 lb 12.8 oz).    Weight management plan: Discussed healthy diet and exercise guidelines     reports that he has never smoked. He has never used smokeless tobacco.      Appropriate preventive services were discussed with this patient, including applicable screening as appropriate for cardiovascular " disease, diabetes, osteopenia/osteoporosis, and glaucoma.  As appropriate for age/gender, discussed screening for colorectal cancer, prostate cancer, breast cancer, and cervical cancer. Checklist reviewing preventive services available has been given to the patient.    Reviewed patients plan of care and provided an AVS. The Complex Care Plan (for patients with higher acuity and needing more deliberate coordination of services) for Arnold meets the Care Plan requirement. This Care Plan has been established and reviewed with the Patient and spouse.    Counseling Resources:  ATP IV Guidelines  Pooled Cohorts Equation Calculator  Breast Cancer Risk Calculator  FRAX Risk Assessment  ICSI Preventive Guidelines  Dietary Guidelines for Americans, 2010  USDA's MyPlate  ASA Prophylaxis  Lung CA Screening    Nikhil Carpenter MD  [facility]    Identified Health Risks:

## 2020-03-11 ENCOUNTER — OFFICE VISIT (OUTPATIENT)
Dept: BEHAVIORAL HEALTH | Facility: CLINIC | Age: 70
End: 2020-03-11
Payer: MEDICARE

## 2020-03-11 DIAGNOSIS — F43.23 ADJUSTMENT DISORDER WITH MIXED ANXIETY AND DEPRESSED MOOD: Primary | ICD-10-CM

## 2020-03-11 PROCEDURE — 90834 PSYTX W PT 45 MINUTES: CPT | Performed by: SOCIAL WORKER

## 2020-03-11 NOTE — PROGRESS NOTES
"                                           Progress Note    Client Name: Arnold Lozada             Date:   3/11/2020                                           Service Type:Individual                         Session start time: 130                 Session End Time:   215                            Session Length:        38-52                Session #:      5                Attendees:     Client and spouse Willa    Treatment Plan Last Reviewed: 3/11/2020   PHQ-9 / BRETT-7 :                 DATA                            Progress Since Last Session (Related to Symptoms / Goals / Homework):              Symptoms: Stable    Homework: n/a                            Episode of Care Goals: Satisfactory progress - ACTION (Actively working towards change); Intervened by reinforcing change plan / affirming steps taken                Current / Ongoing Stressors and Concerns:                 Client notes that he has been \"ok.\"  Been to [place] and back since our last meeting.  Had a nice time in [place], client did some pt and speech therapy while they were down there.  They had him walking on the treadmill and some balance exercises.  Going up steps as well, walking with the walker.  He was doing hour sessions three days per week.                             Treatment Objective(s) Addressed in This Session:          Client will use identified behavioral and cognitive skills to challenge negative self talk 90% of the time.    -reviewed ACEs in session today.                  Intervention:              Cognitive Behavioral Therapy:    Discussed mindfulness as being aware of what we are experiencing while we are experiencing it.  Contrasted this with avoidance and rumination.  Explored the role of mindfulness as an overall coping strategy for managing depression, anxiety, and strong emotions.  Explained concept of state of mind using SIFT (sensations, images, feelings, thoughts) pneumonic.  Led client in a guided mindfulness " exercise focusing on sensations, images, feelings, and thoughts.  Discussed mindfulness as a tool to intentionally shift our awareness and focus as needed.                  ASSESSMENT: Current Emotional / Mental Status  of significant symptoms):              Risk status (Self / Other harm or suicidal ideation)              Client denies current fears or concerns for personal safety.              Client denies current or recent suicidal ideation or behaviors.              Client denies current or recent homicidal ideation or behaviors.              Client denies current or recent self injurious behavior or ideation.              Client denies other safety concerns.              A safety and risk management plan has not been developed at this time, however client was given the after-hours number / 911 should there be a change in any of these risk factors.                Appearance:                           Appropriate               Eye Contact:                           Good               Psychomotor Behavior:          Normal               Attitude:                                   Cooperative               Orientation:                             All              Speech                          Rate / Production:       Normal                           Volume:                       Normal               Mood:                                      Normal              Affect:                                      Appropriate               Thought Content:                    Clear               Thought Form:                        Coherent  Goal Directed  Logical               Insight:                                    Good                 Medication Review:              No current psychiatric medications prescribed                Medication Compliance:              NA                Changes in Health Issues:              Yes: feels like he notices a decline each week                Chemical Use  Review:              Substance Use: Chemical use reviewed, no active concerns identified                 Tobacco Use: No current tobacco use.                  Collateral Reports Completed:              Communicated with: n/a    PLAN: (Client Tasks / Therapist Tasks / Other)  1.  Client will call to make next appointment  2.  Client will read handouts on cognitive therapy by next session.  3.   Just one Thing by [employer]                                                                   ________________________________________________________________________    Treatment Plan    Client's Name: Arnold Lozada                      YOB: 1950    Date: 8-31-16    DSM5 Diagnoses: (Sustained by DSM5 Criteria Listed Above)  Diagnoses: F43.23 adjustment disorder with anxiety and depression  Psychosocial & Contextual Factors: health problems decreased functioning  WHODAS 2.0 (12 item)27    Referral / Collaboration:  suggested pursing support group and getting connected with organizations.    Anticipated number of session or this episode of care: 8-12    MeasurableTreatment Goal(s) related to diagnosis / functional impairment(s)  Goal-Depression: Client will decrease depressed mood    I will know I've met my goal when I am less depressed.      Objective #A (Client Action)    Status: cont- Date: 3/11/2020     Client will use identified behavioral and cognitive skills to challenge negative self talk 90% of the time.    Intervention(s)  Therapist will provide psychoeducation, behavioral activation, and cognitive restructuring.      Objective #B  Client will complete at least 10 minutes of self-regulation practice (e.g.: yoga, meditation, relaxation breathing, etc.) per day.    Status: cont- Date: 3/11/2020     Intervention(s)  Therapist will provide psychoeducation, behavioral activation, and cognitive restructuring.      Objective #C  Client will exercise 30 minutes 36 times in the next 12 weeks.  Status:  cont- Date: 3/11/2020      Intervention(s)  Therapist will provide psychoeducation, behavioral activation, and cognitive restructuring.                Goal- Anxiety: Client will decrease anxiety    I will know I've met my goal when I am less anxious.      Objective #A (Client Action)    Status: cont- Date: 3/11/2020      Client will use cognitive strategies identified in therapy to challenge anxious thoughts.    Intervention(s)  Therapist will provide psychoeducation, behavioral activation, and cognitive restructuring.    Objective #B  Client will use at least 3 coping skills for anxiety management in the next 12 weeks.    Status: cont- Date: 3/11/2020      Intervention(s)  Therapist will provide psychoeducation, behavioral activation, and cognitive restructuring.      Objective #C  Client will identify three distraction and diversion activities and use those activities to decrease level of anxiety.  Status: cont- Date: 3/11/2020     Intervention(s)  Therapist will provide psychoeducation, behavioral activation, and cognitive restructuring.                Client has reviewed and agreed to the above plan.      REYES [facility]               8/21/2019

## 2020-03-12 RX ORDER — METOPROLOL SUCCINATE 50 MG/1
TABLET, EXTENDED RELEASE ORAL
Qty: 90 TABLET | Refills: 0 | Status: SHIPPED | OUTPATIENT
Start: 2020-03-12 | End: 2020-06-10

## 2020-03-12 RX ORDER — SIMVASTATIN 20 MG
TABLET ORAL
Qty: 90 TABLET | Refills: 0 | Status: SHIPPED | OUTPATIENT
Start: 2020-03-12 | End: 2020-06-10

## 2020-03-12 NOTE — TELEPHONE ENCOUNTER
Routing refill request to provider for review/approval because:  Patient due for BP check and fasting labs but is scheduled for visit tomorrow.   Megha Doherty RN

## 2020-03-13 ENCOUNTER — OFFICE VISIT (OUTPATIENT)
Dept: INTERNAL MEDICINE | Facility: CLINIC | Age: 70
End: 2020-03-13
Payer: MEDICARE

## 2020-03-13 VITALS
DIASTOLIC BLOOD PRESSURE: 88 MMHG | RESPIRATION RATE: 19 BRPM | BODY MASS INDEX: 27.28 KG/M2 | HEIGHT: 68 IN | WEIGHT: 180 LBS | OXYGEN SATURATION: 96 % | HEART RATE: 108 BPM | SYSTOLIC BLOOD PRESSURE: 138 MMHG

## 2020-03-13 DIAGNOSIS — K50.10 CROHN'S DISEASE OF COLON WITHOUT COMPLICATION (H): ICD-10-CM

## 2020-03-13 DIAGNOSIS — Z00.00 MEDICARE ANNUAL WELLNESS VISIT, SUBSEQUENT: Primary | ICD-10-CM

## 2020-03-13 DIAGNOSIS — I10 ESSENTIAL HYPERTENSION: ICD-10-CM

## 2020-03-13 DIAGNOSIS — E78.5 HYPERLIPIDEMIA LDL GOAL <130: Chronic | ICD-10-CM

## 2020-03-13 DIAGNOSIS — G47.33 OSA (OBSTRUCTIVE SLEEP APNEA): Chronic | ICD-10-CM

## 2020-03-13 DIAGNOSIS — D12.6 SERRATED ADENOMA OF COLON: ICD-10-CM

## 2020-03-13 DIAGNOSIS — G11.9 CEREBELLAR ATAXIA (H): ICD-10-CM

## 2020-03-13 LAB
ALBUMIN SERPL-MCNC: 3.9 G/DL (ref 3.4–5)
ALP SERPL-CCNC: 72 U/L (ref 40–150)
ALT SERPL W P-5'-P-CCNC: 28 U/L (ref 0–70)
ANION GAP SERPL CALCULATED.3IONS-SCNC: 5 MMOL/L (ref 3–14)
AST SERPL W P-5'-P-CCNC: 18 U/L (ref 0–45)
BASOPHILS # BLD AUTO: 0 10E9/L (ref 0–0.2)
BASOPHILS NFR BLD AUTO: 0.5 %
BILIRUB SERPL-MCNC: 0.3 MG/DL (ref 0.2–1.3)
BUN SERPL-MCNC: 13 MG/DL (ref 7–30)
CALCIUM SERPL-MCNC: 9.5 MG/DL (ref 8.5–10.1)
CHLORIDE SERPL-SCNC: 101 MMOL/L (ref 94–109)
CO2 SERPL-SCNC: 32 MMOL/L (ref 20–32)
CREAT SERPL-MCNC: 0.95 MG/DL (ref 0.66–1.25)
DIFFERENTIAL METHOD BLD: NORMAL
EOSINOPHIL # BLD AUTO: 0.3 10E9/L (ref 0–0.7)
EOSINOPHIL NFR BLD AUTO: 8.3 %
ERYTHROCYTE [DISTWIDTH] IN BLOOD BY AUTOMATED COUNT: 12.8 % (ref 10–15)
GFR SERPL CREATININE-BSD FRML MDRD: 81 ML/MIN/{1.73_M2}
GLUCOSE SERPL-MCNC: 72 MG/DL (ref 70–99)
HCT VFR BLD AUTO: 44 % (ref 40–53)
HGB BLD-MCNC: 15.1 G/DL (ref 13.3–17.7)
LYMPHOCYTES # BLD AUTO: 1.1 10E9/L (ref 0.8–5.3)
LYMPHOCYTES NFR BLD AUTO: 27.3 %
MCH RBC QN AUTO: 31.9 PG (ref 26.5–33)
MCHC RBC AUTO-ENTMCNC: 34.3 G/DL (ref 31.5–36.5)
MCV RBC AUTO: 93 FL (ref 78–100)
MONOCYTES # BLD AUTO: 0.5 10E9/L (ref 0–1.3)
MONOCYTES NFR BLD AUTO: 12.9 %
NEUTROPHILS # BLD AUTO: 2.1 10E9/L (ref 1.6–8.3)
NEUTROPHILS NFR BLD AUTO: 51 %
PLATELET # BLD AUTO: 203 10E9/L (ref 150–450)
POTASSIUM SERPL-SCNC: 4.4 MMOL/L (ref 3.4–5.3)
PROT SERPL-MCNC: 7.1 G/DL (ref 6.8–8.8)
RBC # BLD AUTO: 4.74 10E12/L (ref 4.4–5.9)
SODIUM SERPL-SCNC: 138 MMOL/L (ref 133–144)
WBC # BLD AUTO: 4.1 10E9/L (ref 4–11)

## 2020-03-13 PROCEDURE — 99213 OFFICE O/P EST LOW 20 MIN: CPT | Mod: 25 | Performed by: INTERNAL MEDICINE

## 2020-03-13 PROCEDURE — 80053 COMPREHEN METABOLIC PANEL: CPT | Performed by: INTERNAL MEDICINE

## 2020-03-13 PROCEDURE — G0439 PPPS, SUBSEQ VISIT: HCPCS | Performed by: INTERNAL MEDICINE

## 2020-03-13 PROCEDURE — 36415 COLL VENOUS BLD VENIPUNCTURE: CPT | Performed by: INTERNAL MEDICINE

## 2020-03-13 PROCEDURE — 80061 LIPID PANEL: CPT | Performed by: INTERNAL MEDICINE

## 2020-03-13 PROCEDURE — 85025 COMPLETE CBC W/AUTO DIFF WBC: CPT | Performed by: INTERNAL MEDICINE

## 2020-03-13 ASSESSMENT — MIFFLIN-ST. JEOR: SCORE: 1565.22

## 2020-03-13 NOTE — LETTER
March 17, 2020      Arnold Lozada  [address] [facility] [address]          Dear ,    We are writing to inform you of your test results.  All of these tests are within acceptable limits , things look good !       Resulted Orders   Comprehensive metabolic panel   Result Value Ref Range    Sodium 138 133 - 144 mmol/L    Potassium 4.4 3.4 - 5.3 mmol/L    Chloride 101 94 - 109 mmol/L    Carbon Dioxide 32 20 - 32 mmol/L    Anion Gap 5 3 - 14 mmol/L    Glucose 72 70 - 99 mg/dL    Urea Nitrogen 13 7 - 30 mg/dL    Creatinine 0.95 0.66 - 1.25 mg/dL    GFR Estimate 81 >60 mL/min/[1.73_m2]      Comment:      Non  GFR Calc  Starting 12/18/2018, serum creatinine based estimated GFR (eGFR) will be   calculated using the Chronic Kidney Disease Epidemiology Collaboration   (CKD-EPI) equation.      GFR Estimate If Black >90 >60 mL/min/[1.73_m2]      Comment:       GFR Calc  Starting 12/18/2018, serum creatinine based estimated GFR (eGFR) will be   calculated using the Chronic Kidney Disease Epidemiology Collaboration   (CKD-EPI) equation.      Calcium 9.5 8.5 - 10.1 mg/dL    Bilirubin Total 0.3 0.2 - 1.3 mg/dL    Albumin 3.9 3.4 - 5.0 g/dL    Protein Total 7.1 6.8 - 8.8 g/dL    Alkaline Phosphatase 72 40 - 150 U/L    ALT 28 0 - 70 U/L    AST 18 0 - 45 U/L   CBC with platelets differential   Result Value Ref Range    WBC 4.1 4.0 - 11.0 10e9/L    RBC Count 4.74 4.4 - 5.9 10e12/L    Hemoglobin 15.1 13.3 - 17.7 g/dL    Hematocrit 44.0 40.0 - 53.0 %    MCV 93 78 - 100 fl    MCH 31.9 26.5 - 33.0 pg    MCHC 34.3 31.5 - 36.5 g/dL    RDW 12.8 10.0 - 15.0 %    Platelet Count 203 150 - 450 10e9/L    Diff Method Automated Method     % Neutrophils 51.0 %    % Lymphocytes 27.3 %    % Monocytes 12.9 %    % Eosinophils 8.3 %    % Basophils 0.5 %    Absolute Neutrophil 2.1 1.6 - 8.3 10e9/L    Absolute Lymphocytes 1.1 0.8 - 5.3 10e9/L    Absolute Monocytes 0.5 0.0 - 1.3 10e9/L     Absolute Eosinophils 0.3 0.0 - 0.7 10e9/L    Absolute Basophils 0.0 0.0 - 0.2 10e9/L   Lipid panel reflex to direct LDL Fasting   Result Value Ref Range    Cholesterol 201 (H) <200 mg/dL      Comment:      Desirable:       <200 mg/dl    Triglycerides 228 (H) <150 mg/dL      Comment:      Borderline high:  150-199 mg/dl  High:             200-499 mg/dl  Very high:       >499 mg/dl      HDL Cholesterol 46 >39 mg/dL    LDL Cholesterol Calculated 109 (H) <100 mg/dL      Comment:      Above desirable:  100-129 mg/dl  Borderline High:  130-159 mg/dL  High:             160-189 mg/dL  Very high:       >189 mg/dl      Non HDL Cholesterol 155 (H) <130 mg/dL      Comment:      Above Desirable:  130-159 mg/dl  Borderline high:  160-189 mg/dl  High:             190-219 mg/dl  Very high:       >219 mg/dl         If you have any questions or concerns, please call the clinic at the number listed above.       Sincerely,        Nikhil Carpenter MD

## 2020-03-13 NOTE — LETTER
[facility]  [address] [place] [address]  [place] [address]  [phone]          March 18, 2020    Arnold Lozada                                                                                                            [facility] [facility] [address]            Dear Renee:    I spoke with our Medication Therapy Management Pharmacist and the only FDA approved indication for Adderall is ADHD.  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  She did contact the pharmacy, but they don't have a record of cost increasing from $5 to $36/month - they would need to run a claim, which they don't have an active prescription for.  I would recommend that we send in a prescription to the pharmacy to try and process.  If not covered we could consider a letter of appeal.       Please let us know how you would like to proceed.  Feel free to contact myself or my nurse with any questions or concerns.    Sincerely,         Nikhil Carpenter MD/colby

## 2020-03-13 NOTE — Clinical Note
Sorry to overburden you but I wanted to have a quick chat about different diagnoses and stimulant medication with this jazzmine. He would be up for an medication therapy management consult if you felt it necessary

## 2020-03-16 LAB
CHOLEST SERPL-MCNC: 201 MG/DL
HDLC SERPL-MCNC: 46 MG/DL
LDLC SERPL CALC-MCNC: 109 MG/DL
NONHDLC SERPL-MCNC: 155 MG/DL
TRIGL SERPL-MCNC: 228 MG/DL

## 2020-03-17 DIAGNOSIS — F51.02 ADJUSTMENT INSOMNIA: ICD-10-CM

## 2020-03-17 DIAGNOSIS — G47.10 EXCESSIVE SLEEPINESS: ICD-10-CM

## 2020-03-17 DIAGNOSIS — R35.0 BENIGN PROSTATIC HYPERPLASIA WITH URINARY FREQUENCY: ICD-10-CM

## 2020-03-17 DIAGNOSIS — N40.1 BENIGN PROSTATIC HYPERPLASIA WITH URINARY FREQUENCY: ICD-10-CM

## 2020-03-17 RX ORDER — DEXTROAMPHETAMINE SACCHARATE, AMPHETAMINE ASPARTATE MONOHYDRATE, DEXTROAMPHETAMINE SULFATE AND AMPHETAMINE SULFATE 7.5; 7.5; 7.5; 7.5 MG/1; MG/1; MG/1; MG/1
30 CAPSULE, EXTENDED RELEASE ORAL DAILY
Qty: 30 CAPSULE | Refills: 0 | Status: SHIPPED | OUTPATIENT
Start: 2020-03-21 | End: 2021-12-03

## 2020-03-18 RX ORDER — TRAZODONE HYDROCHLORIDE 50 MG/1
TABLET, FILM COATED ORAL
Qty: 90 TABLET | Refills: 2 | Status: SHIPPED | OUTPATIENT
Start: 2020-03-18 | End: 2020-11-29

## 2020-03-18 RX ORDER — TAMSULOSIN HYDROCHLORIDE 0.4 MG/1
CAPSULE ORAL
Qty: 180 CAPSULE | Refills: 2 | Status: SHIPPED | OUTPATIENT
Start: 2020-03-18 | End: 2020-11-29

## 2020-03-19 ENCOUNTER — TELEPHONE (OUTPATIENT)
Dept: AUDIOLOGY | Facility: CLINIC | Age: 70
End: 2020-03-19
Payer: MEDICARE

## 2020-03-19 DIAGNOSIS — H90.3 SENSORINEURAL HEARING LOSS, BILATERAL: Primary | ICD-10-CM

## 2020-03-19 PROCEDURE — 99207 ZZC NO CHARGE LOS: CPT | Performed by: AUDIOLOGIST

## 2020-03-19 PROCEDURE — V5267 HEARING AID SUP/ACCESS/DEV: HCPCS | Mod: GY | Performed by: AUDIOLOGIST

## 2020-03-19 NOTE — TELEPHONE ENCOUNTER
Reason for Call:  Other Wax Guards for Hearing Aid    Detailed comments: patient wants to order 4 gray packets of wax guards for his hearing aid.    Call him when ready for pick-up in [place].    Phone Number Patient can be reached at: Home number on file [phone] (home)    Best Time: asap    Can we leave a detailed message on this number? YES    Call taken on 3/19/2020 at 3:41 PM by Radha Javier

## 2020-03-19 NOTE — TELEPHONE ENCOUNTER
Patient calling back to confirm the message that was left for him and that he would like to order 4 packages. Please ship to the address on file.

## 2020-03-20 DIAGNOSIS — G47.10 EXCESSIVE SLEEPINESS: ICD-10-CM

## 2020-03-20 RX ORDER — DEXTROAMPHETAMINE SACCHARATE, AMPHETAMINE ASPARTATE MONOHYDRATE, DEXTROAMPHETAMINE SULFATE AND AMPHETAMINE SULFATE 7.5; 7.5; 7.5; 7.5 MG/1; MG/1; MG/1; MG/1
30 CAPSULE, EXTENDED RELEASE ORAL DAILY
Qty: 90 CAPSULE | Refills: 0 | Status: SHIPPED | OUTPATIENT
Start: 2020-03-20 | End: 2020-03-22

## 2020-03-20 NOTE — TELEPHONE ENCOUNTER
Per patient request 4 packs of waxguards were mailed to the address on file.     CHARGES:    Accessories/supplies.     Josué Stout CCC-A  Licensed Audiologist #[id number]  3/20/2020

## 2020-03-22 RX ORDER — DEXTROAMPHETAMINE SACCHARATE, AMPHETAMINE ASPARTATE MONOHYDRATE, DEXTROAMPHETAMINE SULFATE AND AMPHETAMINE SULFATE 7.5; 7.5; 7.5; 7.5 MG/1; MG/1; MG/1; MG/1
30 CAPSULE, EXTENDED RELEASE ORAL DAILY
Qty: 90 CAPSULE | Refills: 0 | Status: SHIPPED | OUTPATIENT
Start: 2020-03-22 | End: 2020-11-29

## 2020-03-24 NOTE — TELEPHONE ENCOUNTER
CAlled pt's wife's cell phone, no answer, left message to find out if Arnold's prescription had gone through to her pharmacy.

## 2020-05-14 ENCOUNTER — TELEPHONE (OUTPATIENT)
Dept: AUDIOLOGY | Facility: CLINIC | Age: 70
End: 2020-05-14

## 2020-05-14 NOTE — TELEPHONE ENCOUNTER
Reason for call:  Other   Patient called regarding (reason for call): hearing aid     Additional comments: Patient calling regarding hearing aid plastic piece has split in half & wants to know if he needs a new hearing aid. Please call to advise.     Phone number to reach patient:  Home number on file [phone] (home)    Best Time:  Any     Can we leave a detailed message on this number?  YES    Travel screening: Not Applicable

## 2020-05-15 NOTE — TELEPHONE ENCOUNTER
The right ear earmold is broken and needs to be replaced. I will have [employer] build a new one with the scan on file and as soon as the new earmold is in I will call and we will coordinate a time to swap the earmold.     -Josué Stout CCC-A

## 2020-06-09 DIAGNOSIS — I10 ESSENTIAL HYPERTENSION: ICD-10-CM

## 2020-06-09 DIAGNOSIS — E78.5 HYPERLIPIDEMIA LDL GOAL <130: ICD-10-CM

## 2020-06-09 DIAGNOSIS — F43.23 ADJUSTMENT DISORDER WITH MIXED ANXIETY AND DEPRESSED MOOD: ICD-10-CM

## 2020-06-10 RX ORDER — AMLODIPINE BESYLATE 5 MG/1
TABLET ORAL
Qty: 90 TABLET | Refills: 1 | Status: SHIPPED | OUTPATIENT
Start: 2020-06-10 | End: 2020-11-29

## 2020-06-10 RX ORDER — METOPROLOL SUCCINATE 50 MG/1
TABLET, EXTENDED RELEASE ORAL
Qty: 90 TABLET | Refills: 1 | Status: SHIPPED | OUTPATIENT
Start: 2020-06-10 | End: 2020-11-29

## 2020-06-10 RX ORDER — SIMVASTATIN 20 MG
TABLET ORAL
Qty: 90 TABLET | Refills: 1 | Status: SHIPPED | OUTPATIENT
Start: 2020-06-10 | End: 2020-11-29

## 2020-06-12 ENCOUNTER — TELEPHONE (OUTPATIENT)
Dept: AUDIOLOGY | Facility: CLINIC | Age: 70
End: 2020-06-12

## 2020-06-12 NOTE — TELEPHONE ENCOUNTER
Reason for Call:  Other Hearing Aid    Detailed comments: Patient's wife calling, checking on the status of hearing aid repair. They would like an update on how much longer it will take to repair. Please call back.    Phone Number Patient can be reached at: Home number on file [phone] (home)    Best Time: any    Can we leave a detailed message on this number? YES    Call taken on 6/12/2020 at 1:18 PM by Josué Wyman

## 2020-06-15 NOTE — TELEPHONE ENCOUNTER
I returned phone call and spoke with Willa, after I called [facility]. [facility] has no record of the order for the right earmold, so I placed the order over the phone today for a new right earmold, using scan for SN [id number].  [facility] told me it would be completed in 5-8 business days; I told Willa she would be called as soon as the earmold arrives.  She stated that the hearing aid was left here with Dr. Davis in May, so patient is not wearing his right aid at all.    I apologized for the delay and asked her to call Dr. Davis if she does not hear something by the end of next week.    Jas Murcia MA, CCC-A  [place] Licensed Audiologist #[id number]  [facility]      6/15/2020

## 2020-06-24 ENCOUNTER — TELEPHONE (OUTPATIENT)
Dept: AUDIOLOGY | Facility: CLINIC | Age: 70
End: 2020-06-24

## 2020-07-01 ENCOUNTER — OFFICE VISIT (OUTPATIENT)
Dept: AUDIOLOGY | Facility: CLINIC | Age: 70
End: 2020-07-01

## 2020-07-01 DIAGNOSIS — H90.3 SENSORINEURAL HEARING LOSS, BILATERAL: Primary | ICD-10-CM

## 2020-07-01 PROCEDURE — 99207 ZZC NO CHARGE LOS: CPT | Performed by: AUDIOLOGIST

## 2020-07-01 PROCEDURE — V5264 EAR MOLD/INSERT: HCPCS | Mod: RT | Performed by: AUDIOLOGIST

## 2020-07-01 NOTE — PROGRESS NOTES
AUDIOLOGY REPORT    SUBJECTIVE: Arnold Lozada is a 69 year old male was seen in the Audiology Clinic at  [facility] on 7/01/20 to be fit with new earmold/s. The patient was accompanied by their wife.     OBJECTIVE:    Otoscopy revealed ears are clear of cerumen bilaterally.     New earmold was fit for the right ear. The earmold fit comfortably and securely. Patient reports good physical fit and comfort with the new earmold. Patient demonstrated ease of insertion and removal of the new earmold. The patient did not bring his right hearing aid to this appointment so we worked with Arnold and Willa on how to connect the  to the hearing aid at home.      ASSESSMENT:   Bilateral sensorineural hearing loss      Reviewed warranty information with patient. The 90 day remake period was explained to patient.    PLAN: Arnold will evaluate the new earmold and return if further adjustments are needed. Please contact this clinic with any questions or concerns.      Josué Stout CCC-A  Licensed Audiologist #[id number]  7/1/2020

## 2020-07-03 DIAGNOSIS — R27.0 ATAXIA: Primary | ICD-10-CM

## 2020-07-13 DIAGNOSIS — G47.19 DAYTIME HYPERSOMNOLENCE: ICD-10-CM

## 2020-07-13 RX ORDER — DEXTROAMPHETAMINE SACCHARATE, AMPHETAMINE ASPARTATE MONOHYDRATE, DEXTROAMPHETAMINE SULFATE AND AMPHETAMINE SULFATE 7.5; 7.5; 7.5; 7.5 MG/1; MG/1; MG/1; MG/1
30 CAPSULE, EXTENDED RELEASE ORAL DAILY
Qty: 30 CAPSULE | Refills: 0 | Status: SHIPPED | OUTPATIENT
Start: 2020-07-13 | End: 2024-06-14

## 2020-07-24 ENCOUNTER — MYC MEDICAL ADVICE (OUTPATIENT)
Dept: NEUROLOGY | Facility: CLINIC | Age: 70
End: 2020-07-24

## 2020-08-04 ENCOUNTER — TELEPHONE (OUTPATIENT)
Dept: NEUROLOGY | Facility: CLINIC | Age: 70
End: 2020-08-04

## 2020-08-04 NOTE — TELEPHONE ENCOUNTER
[facility] Call Center    Phone Message    May a detailed message be left on voicemail: no     Reason for Call: Other: Monse received a referral for PT, there is no doctors name or signature. Please call Monse back to discuss at your earliest convenience.     Action Taken: Message routed to:  [facility] ([facility]):  NEUROLOGY    Travel Screening: Not Applicable

## 2020-08-10 ENCOUNTER — TELEPHONE (OUTPATIENT)
Dept: NEUROLOGY | Facility: CLINIC | Age: 70
End: 2020-08-10

## 2020-08-10 NOTE — TELEPHONE ENCOUNTER
"----- Message from Suzie Wallace RN sent at 8/4/2020  7:40 AM CDT -----  Hi to all of you, I don't know who is at the U or available to fax, who is sick that I need to twit over and who is getting better. I wish we all could be back at work at our new normal. Just know as an \"old\" woman I appreciate each of you and what you do for me.    Now on to the problem of the hour.  Arnold had physical therapy orders written July 3 (external) and Dr. Pena just signed them last night (thanks to Suzie's nagdarryl). Will someone please fax those orders to Priscila Rockwell at  Fax [phone]. He has an appointment on Thursday but they need the signed orders before he is allowed to go.    Next time there is a pandemic and I don't work from the clinic I'm going to install a fax machine in my home.    Stay well all of you and thanks.  Adelaida    "

## 2020-09-08 ENCOUNTER — MYC MEDICAL ADVICE (OUTPATIENT)
Dept: INTERNAL MEDICINE | Facility: CLINIC | Age: 70
End: 2020-09-08

## 2020-09-08 DIAGNOSIS — K50.10 CROHN'S DISEASE OF COLON WITHOUT COMPLICATION (H): Chronic | ICD-10-CM

## 2020-09-08 DIAGNOSIS — F43.23 ADJUSTMENT DISORDER WITH MIXED ANXIETY AND DEPRESSED MOOD: ICD-10-CM

## 2020-09-09 NOTE — TELEPHONE ENCOUNTER
sulfaSALAzine (AZULFIDINE) 500 MG tablet       Last Written Prescription Date:  09/19/2019  Last Fill Quantity: 360,   # refills: 3  Last Office Visit: 03/13/2020  Future Office visit:       Routing refill request to provider for review/approval because:  Drug not on the [facility], [facility] or  Health refill protocol or controlled substance  An Wyman, CMA

## 2020-09-10 RX ORDER — SULFASALAZINE 500 MG/1
TABLET ORAL
Qty: 360 TABLET | Refills: 0 | Status: SHIPPED | OUTPATIENT
Start: 2020-09-10 | End: 2020-11-29

## 2020-09-19 NOTE — MR AVS SNAPSHOT
After Visit Summary   6/2/2017    Arnold Lozada    MRN: [id number]           Patient Information     Date Of Birth          1950        Visit Information        Provider Department      6/2/2017 12:10 PM Nikhil Carpenter MD [facility]        Today's Diagnoses     Urinary urgency    -  1      Care Instructions    [facility]    If you have any questions regarding to your visit please contact your care team:     Team Pink:   Clinic Hours Telephone Number   Internal Medicine:  Dr. Lola Bauer NP       7am-7pm  Monday - Thursday   7am-5pm  Fridays  [phone]  (Appointment scheduling available 24/7)    Questions about your visit?  Team Line  [phone]   Urgent Care - [place] [facility] - 11am-9pm Monday-Friday Saturday-Sunday- 9am-5pm   [place] - 5pm-9pm Monday-Friday Saturday-Sunday- 9am-5pm  [phone] - [place]   [phone] - [place]       What options do I have for visits at the clinic other than the traditional office visit?  To expand how we care for you, many of our providers are utilizing electronic visits (e-visits) and telephone visits, when medically appropriate, for interactions with their patients rather than a visit in the clinic.   We also offer nurse visits for many medical concerns. Just like any other service, we will bill your insurance company for this type of visit based on time spent on the phone with your provider. Not all insurance companies cover these visits. Please check with your medical insurance if this type of visit is covered. You will be responsible for any charges that are not paid by your insurance.      E-visits via marshallindex:  generally incur a $35.00 fee.  Telephone visits:  Time spent on the phone: *charged based on time that is spent on the phone in increments of 10 minutes. Estimated cost:   5-10 mins $30.00   11-20 mins. $59.00   21-30 mins. $85.00   Use  [employer] (secure email communication and access to your chart) to send your primary care provider a message or make an appointment. Ask someone on your Team how to sign up for [email or web address].    For a Price Quote for your services, please call our Consumer Price Line at [phone].    As always, Thank you for trusting us with your health care needs!    RIKI/MA            Follow-ups after your visit        Your next 10 appointments already scheduled     Jun 09, 2017 11:30 AM CDT   (Arrive by 11:15 AM)   Return Ataxia with Giorgio Pena MD   [facility] ([facility])    [address]  3rd [address]  [facility] [address] [phone]            Aug 01, 2017 12:30 PM CDT   Return Visit with Shad Portillo [facility] ([facility])    [address] [facility] [address] [phone]              Who to contact     If you have questions or need follow up information about today's clinic visit or your schedule please contact [facility] directly at [phone].  Normal or non-critical lab and imaging results will be communicated to you by MyChart, letter or phone within 4 business days after the clinic has received the results. If you do not hear from us within 7 days, please contact the clinic through MyChart or phone. If you have a critical or abnormal lab result, we will notify you by phone as soon as possible.  Submit refill requests through [employer] or call your pharmacy and they will forward the refill request to us. Please allow 3 business days for your refill to be completed.          Additional Information About Your Visit        [employer] Information     [email or web address] gives you secure access to your electronic health record. If you see a primary care provider, you can also send messages to your care team and make appointments. If you have questions, please call your primary care clinic.  If  you do not have a primary care provider, please call [phone] and they will assist you.        Care EveryWhere ID     This is your Care EveryWhere ID. This could be used by other organizations to access your [facility] medical records  [id number]        Your Vitals Were     Pulse Temperature Pulse Oximetry BMI (Body Mass Index)          100 97.2  F (36.2  C) (Oral) 98% 26.46 kg/m2         Blood Pressure from Last 3 Encounters:   06/02/17 130/70   05/15/17 126/72   05/10/17 138/84    Weight from Last 3 Encounters:   06/02/17 174 lb (78.9 kg)   05/15/17 173 lb (78.5 kg)   03/15/17 167 lb (75.8 kg)              We Performed the Following     UA with Microscopic reflex to Culture          Today's Medication Changes          These changes are accurate as of: 6/2/17 12:39 PM.  If you have any questions, ask your nurse or doctor.               Start taking these medicines.        Dose/Directions    tolterodine 2 MG 24 hr capsule   Commonly known as:  DETROL LA   Used for:  Urinary urgency   Started by:  Nikhil Carpenter MD        Dose:  2 mg   Take 1 capsule (2 mg) by mouth daily   Quantity:  30 capsule   Refills:  1            Where to get your medicines      These medications were sent to [id number]  [place] - [address]  [address] [place] [address]     Phone:  [phone]     tolterodine 2 MG 24 hr capsule                Primary Care Provider Office Phone # Fax #    Nikhil Carpenter [phone]       [facility] [address] [place] [address]        Thank you!     Thank you for choosing [facility]  for your care. Our goal is always to provide you with excellent care. Hearing back from our patients is one way we can continue to improve our services. Please take a few minutes to complete the written survey that you may receive in the mail after your visit with us. Thank you!             Your Updated Medication List -  Protect others around you: Learn how to safely use, store and throw away your medicines at www.disposemymeds.org.          This list is accurate as of: 6/2/17 12:39 PM.  Always use your most recent med list.                   Brand Name Dispense Instructions for use    * amphetamine-dextroamphetamine 30 MG per 24 hr capsule    ADDERALL XR    30 capsule    Take 1 capsule (30 mg) by mouth daily       * amphetamine-dextroamphetamine 30 MG per 24 hr capsule   Start taking on:  6/3/2017    ADDERALL XR    90 capsule    Take 1 capsule (30 mg) by mouth daily       aspirin 325 MG tablet      Take 325 mg by mouth daily.       COQ-10 PO      Reported on 5/10/2017       CYCLOBENZAPRINE HCL PO      Take 5 mg by mouth 3 times daily Reported on 5/10/2017       folic acid 800 MCG Tabs      Take 1 tablet by mouth daily Reported on 5/10/2017       hydrochlorothiazide 25 MG tablet    HYDRODIURIL    90 tablet    Take 1 tablet (25 mg) by mouth daily       lisinopril 20 MG tablet    PRINIVIL/ZESTRIL    90 tablet    Take 1 tablet (20 mg) by mouth daily       metoprolol 50 MG 24 hr tablet    TOPROL-XL    90 tablet    Take 1 tablet (50 mg) by mouth daily       Multi-vitamin Tabs tablet   Generic drug:  multivitamin, therapeutic with minerals      1 TABLET DAILY       order for DME      Respironics REMSTAR 60 Series Auto CPAP 5-18cm H2O, Nuance pillow nasal mask large       * order for DME     1 Device    Equipment being ordered: quadcane       * order for DME     1 Device    Equipment being ordered: shower bench [ long ]       oxyCODONE-acetaminophen 5-325 MG per tablet    PERCOCET     Take 1 tablet by mouth every 4 hours as needed for moderate to severe pain Reported on 5/10/2017       POTASSIUM PO      Take 595 mcg by mouth daily       predniSONE 20 MG tablet    DELTASONE    10 tablet    Take 1 tablet (20 mg) by mouth 2 times daily       sertraline 50 MG tablet    ZOLOFT    90 tablet    Take 1 tablet (50 mg) by mouth daily        simvastatin 20 MG tablet    ZOCOR    90 tablet    ONE TABLET DAILY IN THE EVENING       sulfaSALAzine 500 MG tablet    AZULFIDINE     Take 500 mg by mouth 4 times daily.       tolterodine 2 MG 24 hr capsule    DETROL LA    30 capsule    Take 1 capsule (2 mg) by mouth daily       traZODone 50 MG tablet    DESYREL    90 tablet    Take 1 tablet (50 mg) by mouth nightly as needed for sleep       * Notice:  This list has 4 medication(s) that are the same as other medications prescribed for you. Read the directions carefully, and ask your doctor or other care provider to review them with you.       15

## 2020-10-30 ENCOUNTER — TELEPHONE (OUTPATIENT)
Dept: AUDIOLOGY | Facility: CLINIC | Age: 70
End: 2020-10-30

## 2020-10-30 DIAGNOSIS — H90.3 SENSORINEURAL HEARING LOSS, BILATERAL: Primary | ICD-10-CM

## 2020-10-30 PROCEDURE — 99207 PR NO CHARGE LOS: CPT | Performed by: AUDIOLOGIST

## 2020-10-30 PROCEDURE — V5267 HEARING AID SUP/ACCESS/DEV: HCPCS | Performed by: AUDIOLOGIST

## 2020-11-05 NOTE — TELEPHONE ENCOUNTER
I informed Willa that the requested waxguards were ready for  at the 2nd floor  and I informed her of the new 2nd floor  hours, 8am to 3:30pm.     CHARGES:   [id number] Tasia Stout CCC-A

## 2020-11-13 DIAGNOSIS — G47.19 DAYTIME HYPERSOMNOLENCE: ICD-10-CM

## 2020-11-13 RX ORDER — DEXTROAMPHETAMINE SACCHARATE, AMPHETAMINE ASPARTATE MONOHYDRATE, DEXTROAMPHETAMINE SULFATE AND AMPHETAMINE SULFATE 7.5; 7.5; 7.5; 7.5 MG/1; MG/1; MG/1; MG/1
30 CAPSULE, EXTENDED RELEASE ORAL DAILY
Qty: 30 CAPSULE | Refills: 0 | Status: SHIPPED | OUTPATIENT
Start: 2020-11-13 | End: 2021-09-17

## 2020-11-29 ENCOUNTER — MYC REFILL (OUTPATIENT)
Dept: FAMILY MEDICINE | Facility: CLINIC | Age: 70
End: 2020-11-29

## 2020-11-29 DIAGNOSIS — F51.02 ADJUSTMENT INSOMNIA: ICD-10-CM

## 2020-11-29 DIAGNOSIS — E78.5 HYPERLIPIDEMIA LDL GOAL <130: ICD-10-CM

## 2020-11-29 DIAGNOSIS — F43.23 ADJUSTMENT DISORDER WITH MIXED ANXIETY AND DEPRESSED MOOD: ICD-10-CM

## 2020-11-29 DIAGNOSIS — R35.0 BENIGN PROSTATIC HYPERPLASIA WITH URINARY FREQUENCY: ICD-10-CM

## 2020-11-29 DIAGNOSIS — I10 ESSENTIAL HYPERTENSION: ICD-10-CM

## 2020-11-29 DIAGNOSIS — K50.10 CROHN'S DISEASE OF COLON WITHOUT COMPLICATION (H): Chronic | ICD-10-CM

## 2020-11-29 DIAGNOSIS — N40.1 BENIGN PROSTATIC HYPERPLASIA WITH URINARY FREQUENCY: ICD-10-CM

## 2020-12-01 ENCOUNTER — TELEPHONE (OUTPATIENT)
Dept: NEUROLOGY | Facility: CLINIC | Age: 70
End: 2020-12-01

## 2020-12-01 DIAGNOSIS — R27.0 ATAXIA: Primary | ICD-10-CM

## 2020-12-01 RX ORDER — SULFASALAZINE 500 MG/1
1000 TABLET ORAL 2 TIMES DAILY
Qty: 360 TABLET | Refills: 1 | Status: SHIPPED | OUTPATIENT
Start: 2020-12-01 | End: 2021-07-08

## 2020-12-01 RX ORDER — METOPROLOL SUCCINATE 50 MG/1
50 TABLET, EXTENDED RELEASE ORAL DAILY
Qty: 90 TABLET | Refills: 1 | Status: SHIPPED | OUTPATIENT
Start: 2020-12-01 | End: 2021-06-18

## 2020-12-01 RX ORDER — TAMSULOSIN HYDROCHLORIDE 0.4 MG/1
CAPSULE ORAL
Qty: 180 CAPSULE | Refills: 1 | Status: SHIPPED | OUTPATIENT
Start: 2020-12-01 | End: 2021-07-08

## 2020-12-01 RX ORDER — TRAZODONE HYDROCHLORIDE 50 MG/1
50 TABLET, FILM COATED ORAL AT BEDTIME
Qty: 90 TABLET | Refills: 1 | Status: SHIPPED | OUTPATIENT
Start: 2020-12-01 | End: 2021-06-18

## 2020-12-01 RX ORDER — AMLODIPINE BESYLATE 5 MG/1
5 TABLET ORAL DAILY
Qty: 90 TABLET | Refills: 1 | Status: SHIPPED | OUTPATIENT
Start: 2020-12-01 | End: 2021-06-18

## 2020-12-01 RX ORDER — SIMVASTATIN 20 MG
TABLET ORAL
Qty: 90 TABLET | Refills: 1 | Status: SHIPPED | OUTPATIENT
Start: 2020-12-01 | End: 2021-06-18

## 2020-12-01 NOTE — TELEPHONE ENCOUNTER
GENETIC COUNSELING-Neurology  Arnold Lozada contacted our clinic. He previously had negative genetic testing for autosomal dominant repeat mediated ataxias (SCA1,2,3,6,7) and for many ataxias that could be detected by next generation sequencing. He had been in contact with a friend who is a researcher in the area and the friend suggested that he have a frataxin level to rule out Friedriech ataxia.  I am placing the order for this testing.    Marcelino Whaley MS, [facility] [place]  Certified Genetic Counselor

## 2020-12-01 NOTE — TELEPHONE ENCOUNTER
Routing refill request to provider for review/approval because:  Drug not on the [facility] refill protocol: Azulfidine    Katerina CONNELLYN, RN

## 2020-12-06 ENCOUNTER — HEALTH MAINTENANCE LETTER (OUTPATIENT)
Age: 70
End: 2020-12-06

## 2020-12-11 DIAGNOSIS — R27.0 ATAXIA: ICD-10-CM

## 2020-12-11 LAB — MISCELLANEOUS TEST: NORMAL

## 2020-12-17 ENCOUNTER — MYC MEDICAL ADVICE (OUTPATIENT)
Dept: NEUROLOGY | Facility: CLINIC | Age: 70
End: 2020-12-17

## 2020-12-17 LAB
RESULT: NORMAL
SEND OUTS MISC TEST CODE: NORMAL
SEND OUTS MISC TEST SPECIMEN: NORMAL
TEST NAME: NORMAL

## 2020-12-29 NOTE — PROGRESS NOTES
Called the patient to review Home Sleep Test. I will call him 5/30/2018.   Nikita Guzmán PA-C   (3) no apparent problem

## 2021-03-18 ENCOUNTER — DOCUMENTATION ONLY (OUTPATIENT)
Dept: NEUROLOGY | Facility: CLINIC | Age: 71
End: 2021-03-18

## 2021-03-18 ENCOUNTER — TRANSFERRED RECORDS (OUTPATIENT)
Dept: HEALTH INFORMATION MANAGEMENT | Facility: CLINIC | Age: 71
End: 2021-03-18

## 2021-03-18 DIAGNOSIS — G47.10 EXCESSIVE SLEEPINESS: ICD-10-CM

## 2021-03-18 RX ORDER — DEXTROAMPHETAMINE SACCHARATE, AMPHETAMINE ASPARTATE MONOHYDRATE, DEXTROAMPHETAMINE SULFATE AND AMPHETAMINE SULFATE 7.5; 7.5; 7.5; 7.5 MG/1; MG/1; MG/1; MG/1
CAPSULE, EXTENDED RELEASE ORAL
Qty: 90 CAPSULE | Refills: 0 | Status: SHIPPED | OUTPATIENT
Start: 2021-03-18 | End: 2021-06-13

## 2021-03-18 NOTE — PROGRESS NOTES
PT orders were signed by provider and fax to [facility], fax number was provided on cover sheet [phone], sent to be scanned

## 2021-05-07 ENCOUNTER — TRANSFERRED RECORDS (OUTPATIENT)
Dept: HEALTH INFORMATION MANAGEMENT | Facility: CLINIC | Age: 71
End: 2021-05-07

## 2021-06-06 ENCOUNTER — HEALTH MAINTENANCE LETTER (OUTPATIENT)
Age: 71
End: 2021-06-06

## 2021-06-13 ENCOUNTER — MYC REFILL (OUTPATIENT)
Dept: NEUROLOGY | Facility: CLINIC | Age: 71
End: 2021-06-13
Payer: COMMERCIAL

## 2021-06-13 DIAGNOSIS — G47.10 EXCESSIVE SLEEPINESS: ICD-10-CM

## 2021-06-13 DIAGNOSIS — G47.19 DAYTIME HYPERSOMNOLENCE: ICD-10-CM

## 2021-06-15 RX ORDER — DEXTROAMPHETAMINE SACCHARATE, AMPHETAMINE ASPARTATE MONOHYDRATE, DEXTROAMPHETAMINE SULFATE AND AMPHETAMINE SULFATE 7.5; 7.5; 7.5; 7.5 MG/1; MG/1; MG/1; MG/1
30 CAPSULE, EXTENDED RELEASE ORAL DAILY
Qty: 90 CAPSULE | Refills: 0 | Status: SHIPPED | OUTPATIENT
Start: 2021-06-15 | End: 2021-06-18

## 2021-07-08 ENCOUNTER — OFFICE VISIT (OUTPATIENT)
Dept: INTERNAL MEDICINE | Facility: CLINIC | Age: 71
End: 2021-07-08
Payer: MEDICARE

## 2021-07-08 VITALS
DIASTOLIC BLOOD PRESSURE: 86 MMHG | SYSTOLIC BLOOD PRESSURE: 132 MMHG | TEMPERATURE: 98.6 F | OXYGEN SATURATION: 97 % | HEART RATE: 81 BPM | RESPIRATION RATE: 14 BRPM

## 2021-07-08 DIAGNOSIS — E78.5 HYPERLIPIDEMIA LDL GOAL <130: ICD-10-CM

## 2021-07-08 DIAGNOSIS — R35.0 BENIGN PROSTATIC HYPERPLASIA WITH URINARY FREQUENCY: ICD-10-CM

## 2021-07-08 DIAGNOSIS — G47.10 EXCESSIVE SLEEPINESS: ICD-10-CM

## 2021-07-08 DIAGNOSIS — H61.23 BILATERAL IMPACTED CERUMEN: ICD-10-CM

## 2021-07-08 DIAGNOSIS — N40.1 BENIGN PROSTATIC HYPERPLASIA WITH URINARY FREQUENCY: ICD-10-CM

## 2021-07-08 DIAGNOSIS — F51.02 ADJUSTMENT INSOMNIA: ICD-10-CM

## 2021-07-08 DIAGNOSIS — Z12.5 SCREENING FOR PROSTATE CANCER: ICD-10-CM

## 2021-07-08 DIAGNOSIS — F43.23 ADJUSTMENT DISORDER WITH MIXED ANXIETY AND DEPRESSED MOOD: ICD-10-CM

## 2021-07-08 DIAGNOSIS — Z00.00 ENCOUNTER FOR MEDICARE ANNUAL WELLNESS EXAM: ICD-10-CM

## 2021-07-08 DIAGNOSIS — K50.10 CROHN'S DISEASE OF COLON WITHOUT COMPLICATION (H): Chronic | ICD-10-CM

## 2021-07-08 DIAGNOSIS — G11.9 CEREBELLAR ATAXIA (H): ICD-10-CM

## 2021-07-08 DIAGNOSIS — I10 ESSENTIAL HYPERTENSION: ICD-10-CM

## 2021-07-08 DIAGNOSIS — Z00.00 MEDICARE ANNUAL WELLNESS VISIT, SUBSEQUENT: Primary | ICD-10-CM

## 2021-07-08 LAB
BASOPHILS # BLD AUTO: 0 10E9/L (ref 0–0.2)
BASOPHILS NFR BLD AUTO: 0.2 %
DIFFERENTIAL METHOD BLD: NORMAL
EOSINOPHIL # BLD AUTO: 0.4 10E9/L (ref 0–0.7)
EOSINOPHIL NFR BLD AUTO: 7 %
ERYTHROCYTE [DISTWIDTH] IN BLOOD BY AUTOMATED COUNT: 12.6 % (ref 10–15)
ERYTHROCYTE [SEDIMENTATION RATE] IN BLOOD BY WESTERGREN METHOD: 6 MM/H (ref 0–20)
HCT VFR BLD AUTO: 42.5 % (ref 40–53)
HGB BLD-MCNC: 15 G/DL (ref 13.3–17.7)
LYMPHOCYTES # BLD AUTO: 1.3 10E9/L (ref 0.8–5.3)
LYMPHOCYTES NFR BLD AUTO: 23.5 %
MCH RBC QN AUTO: 32.3 PG (ref 26.5–33)
MCHC RBC AUTO-ENTMCNC: 35.3 G/DL (ref 31.5–36.5)
MCV RBC AUTO: 92 FL (ref 78–100)
MONOCYTES # BLD AUTO: 0.6 10E9/L (ref 0–1.3)
MONOCYTES NFR BLD AUTO: 10.7 %
NEUTROPHILS # BLD AUTO: 3.1 10E9/L (ref 1.6–8.3)
NEUTROPHILS NFR BLD AUTO: 58.6 %
PLATELET # BLD AUTO: 198 10E9/L (ref 150–450)
RBC # BLD AUTO: 4.64 10E12/L (ref 4.4–5.9)
WBC # BLD AUTO: 5.3 10E9/L (ref 4–11)

## 2021-07-08 PROCEDURE — 80061 LIPID PANEL: CPT | Performed by: INTERNAL MEDICINE

## 2021-07-08 PROCEDURE — 85652 RBC SED RATE AUTOMATED: CPT | Performed by: INTERNAL MEDICINE

## 2021-07-08 PROCEDURE — 99213 OFFICE O/P EST LOW 20 MIN: CPT | Mod: 25 | Performed by: INTERNAL MEDICINE

## 2021-07-08 PROCEDURE — 86140 C-REACTIVE PROTEIN: CPT | Performed by: INTERNAL MEDICINE

## 2021-07-08 PROCEDURE — 69210 REMOVE IMPACTED EAR WAX UNI: CPT | Mod: RT | Performed by: INTERNAL MEDICINE

## 2021-07-08 PROCEDURE — 80048 BASIC METABOLIC PNL TOTAL CA: CPT | Performed by: INTERNAL MEDICINE

## 2021-07-08 PROCEDURE — G0103 PSA SCREENING: HCPCS | Performed by: INTERNAL MEDICINE

## 2021-07-08 PROCEDURE — 85025 COMPLETE CBC W/AUTO DIFF WBC: CPT | Performed by: INTERNAL MEDICINE

## 2021-07-08 PROCEDURE — G0439 PPPS, SUBSEQ VISIT: HCPCS | Performed by: INTERNAL MEDICINE

## 2021-07-08 PROCEDURE — 36415 COLL VENOUS BLD VENIPUNCTURE: CPT | Performed by: INTERNAL MEDICINE

## 2021-07-08 RX ORDER — SULFASALAZINE 500 MG/1
1000 TABLET ORAL 2 TIMES DAILY
Qty: 360 TABLET | Refills: 3 | Status: SHIPPED | OUTPATIENT
Start: 2021-07-08

## 2021-07-08 RX ORDER — METOPROLOL SUCCINATE 50 MG/1
TABLET, EXTENDED RELEASE ORAL
Qty: 90 TABLET | Refills: 3 | Status: SHIPPED | OUTPATIENT
Start: 2021-07-08 | End: 2023-10-18

## 2021-07-08 RX ORDER — DEXTROAMPHETAMINE SACCHARATE, AMPHETAMINE ASPARTATE MONOHYDRATE, DEXTROAMPHETAMINE SULFATE AND AMPHETAMINE SULFATE 7.5; 7.5; 7.5; 7.5 MG/1; MG/1; MG/1; MG/1
30 CAPSULE, EXTENDED RELEASE ORAL DAILY
Qty: 90 CAPSULE | Refills: 0 | Status: CANCELLED | OUTPATIENT
Start: 2021-07-08

## 2021-07-08 RX ORDER — TRAZODONE HYDROCHLORIDE 50 MG/1
TABLET, FILM COATED ORAL
Qty: 90 TABLET | Refills: 3 | Status: SHIPPED | OUTPATIENT
Start: 2021-07-08 | End: 2023-10-18

## 2021-07-08 RX ORDER — TAMSULOSIN HYDROCHLORIDE 0.4 MG/1
CAPSULE ORAL
Qty: 180 CAPSULE | Refills: 3 | Status: SHIPPED | OUTPATIENT
Start: 2021-07-08 | End: 2022-06-09

## 2021-07-08 RX ORDER — AMLODIPINE BESYLATE 5 MG/1
TABLET ORAL
Qty: 90 TABLET | Refills: 3 | Status: SHIPPED | OUTPATIENT
Start: 2021-07-08 | End: 2024-06-20

## 2021-07-08 RX ORDER — SIMVASTATIN 20 MG
TABLET ORAL
Qty: 90 TABLET | Refills: 3 | Status: SHIPPED | OUTPATIENT
Start: 2021-07-08 | End: 2024-06-20

## 2021-07-08 ASSESSMENT — ENCOUNTER SYMPTOMS
ABDOMINAL PAIN: 0
CONSTIPATION: 0
HEMATURIA: 0
NERVOUS/ANXIOUS: 0
HEMATOCHEZIA: 0
ARTHRALGIAS: 0
DIARRHEA: 0
DIZZINESS: 0
COUGH: 0
NAUSEA: 0
JOINT SWELLING: 0
SORE THROAT: 0
HEARTBURN: 0
SHORTNESS OF BREATH: 0
MYALGIAS: 0
DYSURIA: 0
WEAKNESS: 0
PARESTHESIAS: 0
FREQUENCY: 1
EYE PAIN: 0
CHILLS: 0
PALPITATIONS: 0
HEADACHES: 0
FEVER: 0

## 2021-07-08 ASSESSMENT — ACTIVITIES OF DAILY LIVING (ADL)
CURRENT_FUNCTION: PREPARING MEALS REQUIRES ASSISTANCE
CURRENT_FUNCTION: BATHING REQUIRES ASSISTANCE
CURRENT_FUNCTION: SHOPPING REQUIRES ASSISTANCE
CURRENT_FUNCTION: TRANSPORTATION REQUIRES ASSISTANCE
CURRENT_FUNCTION: LAUNDRY REQUIRES ASSISTANCE
CURRENT_FUNCTION: HOUSEWORK REQUIRES ASSISTANCE

## 2021-07-08 NOTE — PATIENT INSTRUCTIONS
Patient Education   Personalized Prevention Plan  You are due for the preventive services outlined below.  Your care team is available to assist you in scheduling these services.  If you have already completed any of these items, please share that information with your care team to update in your medical record.  Health Maintenance Due   Topic Date Due     ANNUAL REVIEW OF  ORDERS  Never done     Colorectal Cancer Screening  12/07/2019     FALL RISK ASSESSMENT  03/13/2021     Your Health Risk Assessment indicates you feel you are not in good health    A healthy lifestyle helps keep the body fit and the mind alert. It helps protect you from disease, helps you fight disease, and helps prevent chronic disease (disease that doesn't go away) from getting worse. This is important as you get older and begin to notice twinges in muscles and joints and a decline in the strength and stamina you once took for granted. A healthy lifestyle includes good healthcare, good nutrition, weight control, recreation, and regular exercise. Avoid harmful substances and do what you can to keep safe. Another part of a healthy lifestyle is stay mentally active and socially involved.    Good healthcare     Have a wellness visit every year.     If you have new symptoms, let us know right away. Don't wait until the next checkup.     Take medicines exactly as prescribed and keep your medicines in a safe place. Tell us if your medicine causes problems.   Healthy diet and weight control     Eat 3 or 4 small, nutritious, low-fat, high-fiber meals a day. Include a variety of fruits, vegetables, and whole-grain foods.     Make sure you get enough calcium in your diet. Calcium, vitamin D, and exercise help prevent osteoporosis (bone thinning).     If you live alone, try eating with others when you can. That way you get a good meal and have company while you eat it.     Try to keep a healthy weight. If you eat more calories than your body uses for  energy, it will be stored as fat and you will gain weight.     Recreation   Recreation is not limited to sports and team events. It includes any activity that provides relaxation, interest, enjoyment, and exercise. Recreation provides an outlet for physical, mental, and social energy. It can give a sense of worth and achievement. It can help you stay healthy.    Mental Exercise and Social Involvement  Mental and emotional health is as important as physical health. Keep in touch with friends and family. Stay as active as possible. Continue to learn and challenge yourself.   Things you can do to stay mentally active are:    Learn something new, like a foreign language or musical instrument.     Play SCRABBLE or do crossword puzzles. If you cannot find people to play these games with you at home, you can play them with others on your computer through the Internet.     Join a games club--anything from card games to chess or checkers or lawn bowling.     Start a new hobby.     Go back to school.     Volunteer.     Read.   Keep up with world events.  Activities of Daily Living    Your Health Risk Assessment indicates you have difficulties with activities of daily living such as housework, bathing, preparing meals, taking medication, etc. Please make a follow up appointment for us to address this issue in more detail.    Signs of Hearing Loss      Hearing much better with one ear can be a sign of hearing loss.   Hearing loss is a problem shared by many people. In fact, it is one of the most common health problems, particularly as people age. Most people age 65 and older have some hearing loss. By age 80, almost everyone does. Hearing loss often occurs slowly over the years. So you may not realize your hearing has gotten worse.  Have your hearing checked  Call your healthcare provider if you:    Have to strain to hear normal conversation    Have to watch other people s faces very carefully to follow what they re  saying    Need to ask people to repeat what they ve said    Often misunderstand what people are saying    Turn the volume of the television or radio up so high that others complain    Feel that people are mumbling when they re talking to you    Find that the effort to hear leaves you feeling tired and irritated    Notice, when using the phone, that you hear better with one ear than the other  Paga last reviewed this educational content on 1/1/2020 2000-2021 [employer]. All rights reserved. This information is not intended as a substitute for professional medical care. Always follow your healthcare professional's instructions.

## 2021-07-08 NOTE — LETTER
July 9, 2021      Arnold Lozada  [address]   [facility] [address]        Dear ,    We are writing to inform you of your test results.    All of these tests are within acceptable limits , things look good !     Resulted Orders   PSA, screen   Result Value Ref Range    PSA 1.30 0 - 4 ug/L      Comment:      Assay Method:  Chemiluminescence using Siemens Vista analyzer   Lipid panel reflex to direct LDL Non-fasting   Result Value Ref Range    Cholesterol 193 <200 mg/dL    Triglycerides 213 (H) <150 mg/dL      Comment:      Borderline high:  150-199 mg/dl  High:             200-499 mg/dl  Very high:       >499 mg/dl  Non Fasting      HDL Cholesterol 43 >39 mg/dL    LDL Cholesterol Calculated 107 (H) <100 mg/dL      Comment:      Above desirable:  100-129 mg/dl  Borderline High:  130-159 mg/dL  High:             160-189 mg/dL  Very high:       >189 mg/dl      Non HDL Cholesterol 150 (H) <130 mg/dL      Comment:      Above Desirable:  130-159 mg/dl  Borderline high:  160-189 mg/dl  High:             190-219 mg/dl  Very high:       >219 mg/dl     CBC with platelets differential   Result Value Ref Range    WBC 5.3 4.0 - 11.0 10e9/L    RBC Count 4.64 4.4 - 5.9 10e12/L    Hemoglobin 15.0 13.3 - 17.7 g/dL    Hematocrit 42.5 40.0 - 53.0 %    MCV 92 78 - 100 fl    MCH 32.3 26.5 - 33.0 pg    MCHC 35.3 31.5 - 36.5 g/dL    RDW 12.6 10.0 - 15.0 %    Platelet Count 198 150 - 450 10e9/L    % Neutrophils 58.6 %    % Lymphocytes 23.5 %    % Monocytes 10.7 %    % Eosinophils 7.0 %    % Basophils 0.2 %    Absolute Neutrophil 3.1 1.6 - 8.3 10e9/L    Absolute Lymphocytes 1.3 0.8 - 5.3 10e9/L    Absolute Monocytes 0.6 0.0 - 1.3 10e9/L    Absolute Eosinophils 0.4 0.0 - 0.7 10e9/L    Absolute Basophils 0.0 0.0 - 0.2 10e9/L    Diff Method Automated Method    Basic metabolic panel   Result Value Ref Range    Sodium 135 133 - 144 mmol/L    Potassium 4.3 3.4 - 5.3 mmol/L    Chloride 102 94 - 109 mmol/L    Carbon  Dioxide 30 20 - 32 mmol/L    Anion Gap 3 3 - 14 mmol/L    Glucose 98 70 - 99 mg/dL      Comment:      Non Fasting    Urea Nitrogen 17 7 - 30 mg/dL    Creatinine 0.99 0.66 - 1.25 mg/dL    GFR Estimate 77 >60 mL/min/[1.73_m2]      Comment:      Non  GFR Calc  Starting 12/18/2018, serum creatinine based estimated GFR (eGFR) will be   calculated using the Chronic Kidney Disease Epidemiology Collaboration   (CKD-EPI) equation.      GFR Estimate If Black 89 >60 mL/min/[1.73_m2]      Comment:       GFR Calc  Starting 12/18/2018, serum creatinine based estimated GFR (eGFR) will be   calculated using the Chronic Kidney Disease Epidemiology Collaboration   (CKD-EPI) equation.      Calcium 9.2 8.5 - 10.1 mg/dL   Erythrocyte sedimentation rate auto   Result Value Ref Range    Sed Rate 6 0 - 20 mm/h   CRP inflammation   Result Value Ref Range    CRP Inflammation 3.1 0.0 - 8.0 mg/L       If you have any questions or concerns, please call the clinic at the number listed above.       Sincerely,      Nikhil Carpenter MD/dotty

## 2021-07-08 NOTE — PROGRESS NOTES
"SUBJECTIVE:   Arnold Lozada is a 70 year old male who presents for Preventive Visit.      [place] is the town he lives in now. It's closer to [place].    Are you in the first 12 months of your Medicare coverage?  No    Healthy Habits:     In general, how would you rate your overall health?  Poor    Duration of exercise:  30-45 minutes    Do you usually eat at least 4 servings of fruit and vegetables a day, include whole grains    & fiber and avoid regularly eating high fat or \"junk\" foods?  Yes    Ability to successfully perform activities of daily living:  Transportation requires assistance, shopping requires assistance, preparing meals requires assistance, housework requires assistance, bathing requires assistance and laundry requires assistance    Home Safety:  No safety concerns identified    Hearing Impairment:  Difficulty following a conversation in a noisy restaurant or crowded room, feel that people are mumbling or not speaking clearly, difficulty following dialogue in the theater, find that men's voices are easier to understand than woman's, difficulty understanding soft or whispered speech and difficulty understanding speech on the telephone    In the past 6 months, have you been bothered by leaking of urine?  No    In general, how would you rate your overall mental or emotional health?  Good      PHQ-2 Total Score: 0    Additional concerns today:  No    Patient has a power wheelchair and a personal care attendant who comes 3 days per week 4 hours at a time. 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 He is seeing a neurologist Dr. Pena and according to my understanding there's no formal treatment that slows this disease. Patient did discuss today that he's had his vaccine for Coronavirus (COVID-19) and it's possible he's going to change his primary health care provider towards " a doctor closer to home. If so I totally understand and recommended he day so if this works better for him. Travel in the winter is not advised for this gentleman and if nothing else, a follow up appointment with me in one year would be probably adequate       Do you feel safe in your environment? Yes    Have you ever done Advance Care Planning? (For example, a Health Directive, POLST, or a discussion with a medical provider or your loved ones about your wishes): Yes, advance care planning is on file.    Has hearing aids , has a bilateral ear wax impaction today, especially right sided    Fall risk  Fallen 2 or more times in the past year?: No  Any fall with injury in the past year?: No    Cognitive Screening   1) Repeat 3 items (Leader, Season, Table)    2) Clock draw: NORMAL  3) 3 item recall: Recalls 3 objects  Results: 3 items recalled: COGNITIVE IMPAIRMENT LESS LIKELY    Mini-CogTM Copyright S Aye. Licensed by the author for use in [facility]; reprinted with permission (nikos@[facility]). All rights reserved.      Do you have sleep apnea, excessive snoring or daytime drowsiness?: yes - he wears a mouth guard     Reviewed and updated as needed this visit by clinical staff  Tobacco  Allergies  Meds              Reviewed and updated as needed this visit by Provider                Social History     Tobacco Use     Smoking status: Never Smoker     Smokeless tobacco: Never Used     Tobacco comment: Never smoked.   Substance Use Topics     Alcohol use: Yes     Comment: 2-4 drinks per week     If you drink alcohol do you typically have >3 drinks per day or >7 drinks per week? No    Alcohol Use 7/8/2021   Prescreen: >3 drinks/day or >7 drinks/week? No   Prescreen: >3 drinks/day or >7 drinks/week? -   No flowsheet data found.          Current providers sharing in care for this patient include:   Patient Care Team:  Nikhil Carpenter MD as PCP - General  Nikhil Carpenter MD as Assigned PCP  Giorgio Pena  MD Dandre as MD (Neurology)    The following health maintenance items are reviewed in Epic and correct as of today:  Health Maintenance Due   Topic Date Due     ANNUAL REVIEW OF  ORDERS  Never done     COLORECTAL CANCER SCREENING  2019     FALL RISK ASSESSMENT  2021     Lab work is in process  Labs reviewed in EPIC  BP Readings from Last 3 Encounters:   21 132/86   20 138/88   19 (!) 147/81    Wt Readings from Last 3 Encounters:   20 81.6 kg (180 lb)   19 81.6 kg (179 lb 12.8 oz)   19 82.1 kg (181 lb)                  Patient Active Problem List   Diagnosis     Hyperlipidemia LDL goal <130     Crohn's disease of colon (H)     Adenomatous colon polyp     Low back strain     GUILLERMINA (obstructive sleep apnea)- Mild/moderate ( AHI 16.2)     Family history of colon cancer     Advanced directives, counseling/discussion     Elevated troponin I level     Cerebellar ataxia (H)     Essential hypertension     Adjustment disorder with mixed anxiety and depressed mood     Cerebral ataxia (H)     ARF (acute renal failure) (H)     Dyslipidemia     Nocturia     Sciatica of right side     Temporary cerebral vascular dysfunction     Gait abnormality     Vasovagal syncope     Past Surgical History:   Procedure Laterality Date     COLONOSCOPY  2014    OK     HC TOOTH EXTRACTION W/FORCEP         Social History     Tobacco Use     Smoking status: Never Smoker     Smokeless tobacco: Never Used     Tobacco comment: Never smoked.   Substance Use Topics     Alcohol use: Yes     Comment: 2-4 drinks per week     Family History   Problem Relation Age of Onset     Cancer Mother         brain tumor, age 79     Other Cancer Mother         Brain tumor     Depression Mother      Asthma Mother      Cancer Father         colon     Pneumonia Father          age 90 from pneumonia and sepsis     C.A.D. Father         Age 80 have MI and triple bypass     Coronary Artery Disease Father         Heart  Attack     Hypertension Father         ???     Hyperlipidemia Father         ???     Colon Cancer Father         Cured     Diabetes Maternal Grandmother      Migraines Daughter          Current Outpatient Medications   Medication Sig Dispense Refill     amLODIPine (NORVASC) 5 MG tablet TAKE 1 TABLET BY MOUTH ONE TIME DAILY  90 tablet 0     amphetamine-dextroamphetamine (ADDERALL XR) 30 MG 24 hr capsule Take 1 capsule (30 mg) by mouth daily 90 capsule 0     aspirin 325 MG tablet Take 325 mg by mouth daily.       Cholecalciferol (D3 MAXIMUM STRENGTH PO) Take 400 Units by mouth daily       fluticasone (FLONASE) 50 MCG/ACT spray Spray 1 spray into both nostrils daily       folic acid 800 MCG TABS Take 1 tablet by mouth daily Reported on 5/10/2017       metoprolol succinate ER (TOPROL-XL) 50 MG 24 hr tablet TAKE 1 TABLET BY MOUTH ONE TIME DAILY  90 tablet 0     MULTI-VITAMIN PO TABS 1 TABLET DAILY       Omega 3-6-9 Fatty Acids (OMEGA 3-6-9 COMPLEX) CAPS Take 1 capsule by mouth daily       order for DME Knee high compression stockings (15-20 mmHg) 1 each 1     POTASSIUM PO Take 595 mcg by mouth daily       sertraline (ZOLOFT) 50 MG tablet TAKE 1 TABLET BY MOUTH ONE TIME DAILY  90 tablet 0     simvastatin (ZOCOR) 20 MG tablet TAKE ONE TABLET BY MOUTH EVERY EVENING 90 tablet 0     sulfaSALAzine (AZULFIDINE) 500 MG tablet Take 2 tablets (1,000 mg) by mouth 2 times daily Take 2 tablets by mouth twice daily 360 tablet 1     tamsulosin (FLOMAX) 0.4 MG capsule Take 2 capsules by mouth once daily 180 capsule 1     traZODone (DESYREL) 50 MG tablet TAKE ONE TABLET BY MOUTH AT BEDTIME AS NEEDED FOR SLEEP  90 tablet 0     triamcinolone (KENALOG) 0.1 % cream Apply sparingly to affected area three times daily for 14 days. 80 g 0     amphetamine-dextroamphetamine (ADDERALL XR) 30 MG 24 hr capsule Take 1 capsule (30 mg) by mouth daily 30 capsule 0     amphetamine-dextroamphetamine (ADDERALL XR) 30 MG 24 hr capsule Take 1 capsule (30 mg)  by mouth daily 30 capsule 0     amphetamine-dextroamphetamine (ADDERALL XR) 30 MG 24 hr capsule Take 1 capsule (30 mg) by mouth daily 30 capsule 0     ORDER FOR DME Respironics REMSTAR 60 Series Auto CPAP 5-18cm H2O, Nuance pillow nasal mask large       Allergies   Allergen Reactions     Lisinopril Swelling     Angioedema     Recent Labs   Lab Test 03/13/20  1048 01/02/19  1104 12/18/18  1116 12/14/17  1145 10/28/16  1307 10/28/16  1307 06/21/16   A1C  --   --   --  5.2  --   --   --    *  --  117* 94   < >  --   --    HDL 46  --  49 57   < >  --   --    TRIG 228*  --  151* 163*   < >  --   --    ALT 28  --  43  --   --  30  --    CR 0.95 0.91 0.93 1.02  --  0.89  --    GFRESTIMATED 81 87 81 73  --  86  --    GFRESTBLACK >90 >90 >90 88  --  >90   GFR Calc    --    POTASSIUM 4.4 4.1 4.5 4.6  --  4.4  --    TSH  --   --   --   --   --  1.10 1.3    < > = values in this interval not displayed.        Review of Systems   Constitutional: Negative for chills and fever.   HENT: Positive for hearing loss. Negative for congestion, ear pain and sore throat.    Eyes: Negative for pain and visual disturbance.   Respiratory: Negative for cough and shortness of breath.    Cardiovascular: Negative for chest pain, palpitations and peripheral edema.   Gastrointestinal: Negative for abdominal pain, constipation, diarrhea, heartburn, hematochezia and nausea.   Genitourinary: Positive for frequency and urgency. Negative for discharge, dysuria, genital sores, hematuria and impotence.   Musculoskeletal: Negative for arthralgias, joint swelling and myalgias.   Skin: Negative for rash.   Neurological: Negative for dizziness, weakness, headaches and paresthesias.   Psychiatric/Behavioral: Negative for mood changes. The patient is not nervous/anxious.      Constitutional, HEENT, cardiovascular, pulmonary, gi and gu systems are negative, except as otherwise noted.    OBJECTIVE:   /86   Pulse 81   Temp 98.6  F (37  " C) (Oral)   Resp 14   SpO2 97%  Estimated body mass index is 27.15 kg/m  as calculated from the following:    Height as of 3/13/20: 1.734 m (5' 8.27\").    Weight as of 3/13/20: 81.6 kg (180 lb).  Physical Exam  GENERAL: healthy, alert and no distress  EYES: Eyes grossly normal to inspection, PERRL and conjunctivae and sclerae normal  HENT: ear canals and TM's normal, nose and mouth without ulcers or lesions  NECK: no adenopathy, no asymmetry, masses, or scars and thyroid normal to palpation  RESP: lungs clear to auscultation - no rales, rhonchi or wheezes  CV: regular rate and rhythm, normal S1 S2, no S3 or S4, no murmur, click or rub, no peripheral edema and peripheral pulses strong  ABDOMEN: soft, nontender, no hepatosplenomegaly, no masses and bowel sounds normal  MS: no gross musculoskeletal defects noted, no edema  SKIN: no suspicious lesions or rashes  NEURO: mentation intact and speech normal. Significantly abnormal neurology exam and is essentially totally wheelchair bound patient now. He's quite dysarthric and has troubles with coordinated body movements, gait not testable today   PSYCH: mentation appears normal, affect normal/bright    Diagnostic Test Results:  Labs reviewed in Epic  No orders of the defined types were placed in this encounter.        ASSESSMENT / PLAN:   (Z00.00) Medicare annual wellness visit, subsequent  (primary encounter diagnosis)  Comment: routine screening issues   Plan: see orders section of this encounter     (I10) Essential hypertension  Comment: controlled within acceptable limits , continue current plan of care    Plan: amLODIPine (NORVASC) 5 MG tablet, metoprolol         succinate ER (TOPROL-XL) 50 MG 24 hr tablet,         Basic metabolic panel            (G47.10) Excessive sleepiness  Comment: he has obstructive sleep apnea and it's being treated   Plan: CBC with platelets differential            (F43.23) Adjustment disorder with mixed anxiety and depressed mood  Comment: " continue current plan of care , maintainence dose   Plan: sertraline (ZOLOFT) 50 MG tablet            (E78.5) Hyperlipidemia LDL goal <130  Comment: continue current plan of care    Plan: simvastatin (ZOCOR) 20 MG tablet, Lipid panel         reflex to direct LDL Non-fasting            (F51.02) Adjustment insomnia  Comment: as above   Plan: traZODone (DESYREL) 50 MG tablet            (N40.1,  R35.0) Benign prostatic hyperplasia with urinary frequency  Comment: some benign prostatic hyperplasia issues  present  Plan: tamsulosin (FLOMAX) 0.4 MG capsule            (K50.10) Crohn's disease of colon without complication (H)  Comment: he has a 30 year history of of ulcerative colitis and we got into a thoughtful discussion today. Patient last had colonoscopy with a lot of scarring from ulcerative colitis and also a sessile serrated adenoma . A follow up colonoscopy was recommended in 2 years. Patient has never had this repeat done. He doesn't intend to. I am left with a difficult set of choices. Ideally he would be seen by [facility] and get his colonoscopy. The big risk long term is that ulcerative colitis usually progresses to colon cancer at some point and that is a serious risk. We had the discussion about quality of life and quantity versus quality. I have been prescribing  The sulfaSALAzine (AZULFIDINE) against my better judgement because of rapport with patient and his spouse, and I have already gone over all this information before. We face the same set of perils. Patient has no intention of following up with [facility] and explained to patient and his spouse my concern. I did explained to patient that I am sending an update / question to his gastroenterologist to get some full dose on how urgently a follow up appointment is needed. Further follow up with patient dependent upon what I hear back.  Plan: sulfaSALAzine (AZULFIDINE) 500 MG tablet, CBC         with platelets  "differential, Erythrocyte         sedimentation rate auto, CRP inflammation            (H61.23) Bilateral impacted cerumen  Comment: successfully irrigated out by my medical assistant  , there was some modest bleeding from a small external ear canal abrasion that was inflicted accidentally to the right ear during the irrigation . This seems quite minor and no further treatment beyond a posture of observation  Is recommended   Plan: REMOVE IMPACTED CERUMEN            (Z12.5) Screening for prostate cancer  Comment: routine screening   Plan: PSA, screen              Patient has been advised of split billing requirements and indicates understanding: Yes  COUNSELING:  Reviewed preventive health counseling, as reflected in patient instructions    Estimated body mass index is 27.15 kg/m  as calculated from the following:    Height as of 3/13/20: 1.734 m (5' 8.27\").    Weight as of 3/13/20: 81.6 kg (180 lb).    Weight management plan: Discussed healthy diet and exercise guidelines    He reports that he has never smoked. He has never used smokeless tobacco.      Appropriate preventive services were discussed with this patient, including applicable screening as appropriate for cardiovascular disease, diabetes, osteopenia/osteoporosis, and glaucoma.  As appropriate for age/gender, discussed screening for colorectal cancer, prostate cancer, breast cancer, and cervical cancer. Checklist reviewing preventive services available has been given to the patient.    Reviewed patients plan of care and provided an AVS. The Basic Care Plan (routine screening as documented in Health Maintenance) for Arnold meets the Care Plan requirement. This Care Plan has been established and reviewed with the Patient and spouse.    Counseling Resources:  ATP IV Guidelines  Pooled Cohorts Equation Calculator  Breast Cancer Risk Calculator  Breast Cancer: Medication to Reduce Risk  FRAX Risk Assessment  ICSI Preventive Guidelines  Dietary Guidelines for " Americans, 2010  [employer]'s MyPlate  ASA Prophylaxis  Lung CA Screening    Nikhil Carpenter MD  [facility]    Identified Health Risks:    The patient was provided with suggestions to help him develop a healthy physical lifestyle.  The patient reports that he has difficulty with activities of daily living. I have asked that the patient make a follow up appointment in 53 weeks where this issue will be further evaluated and addressed.  The patient was provided with written information regarding signs of hearing loss.

## 2021-07-08 NOTE — Clinical Note
See entry on ulcerative colitis, I want you to form a letter to [facility]. Please huddle with me on this case if it's not clear    Nikhil Carpenter MD

## 2021-07-09 LAB
ANION GAP SERPL CALCULATED.3IONS-SCNC: 3 MMOL/L (ref 3–14)
BUN SERPL-MCNC: 17 MG/DL (ref 7–30)
CALCIUM SERPL-MCNC: 9.2 MG/DL (ref 8.5–10.1)
CHLORIDE SERPL-SCNC: 102 MMOL/L (ref 94–109)
CHOLEST SERPL-MCNC: 193 MG/DL
CO2 SERPL-SCNC: 30 MMOL/L (ref 20–32)
CREAT SERPL-MCNC: 0.99 MG/DL (ref 0.66–1.25)
CRP SERPL-MCNC: 3.1 MG/L (ref 0–8)
GFR SERPL CREATININE-BSD FRML MDRD: 77 ML/MIN/{1.73_M2}
GLUCOSE SERPL-MCNC: 98 MG/DL (ref 70–99)
HDLC SERPL-MCNC: 43 MG/DL
LDLC SERPL CALC-MCNC: 107 MG/DL
NONHDLC SERPL-MCNC: 150 MG/DL
POTASSIUM SERPL-SCNC: 4.3 MMOL/L (ref 3.4–5.3)
PSA SERPL-ACNC: 1.3 UG/L (ref 0–4)
SODIUM SERPL-SCNC: 135 MMOL/L (ref 133–144)
TRIGL SERPL-MCNC: 213 MG/DL

## 2021-07-12 ENCOUNTER — TELEPHONE (OUTPATIENT)
Dept: FAMILY MEDICINE | Facility: CLINIC | Age: 71
End: 2021-07-12

## 2021-07-12 NOTE — TELEPHONE ENCOUNTER
Jami RDZ from [employer] calling for updated orders on compression stockings. Order written, placed on provider's desk for review and signature. Team to fax to [phone] when completed.     Hina Yee RN

## 2021-07-13 NOTE — TELEPHONE ENCOUNTER
Order signed by Dr. Carpenter and faxed to KAJAL Farrell at [facility] to fax number [phone].  Amanda Lakhani,

## 2021-07-14 ENCOUNTER — MYC MEDICAL ADVICE (OUTPATIENT)
Dept: INTERNAL MEDICINE | Facility: CLINIC | Age: 71
End: 2021-07-14

## 2021-07-14 DIAGNOSIS — K50.10 CROHN'S DISEASE OF COLON WITHOUT COMPLICATION (H): Primary | ICD-10-CM

## 2021-07-23 ENCOUNTER — TRANSFERRED RECORDS (OUTPATIENT)
Dept: HEALTH INFORMATION MANAGEMENT | Facility: CLINIC | Age: 71
End: 2021-07-23

## 2021-08-03 ENCOUNTER — TRANSFERRED RECORDS (OUTPATIENT)
Dept: HEALTH INFORMATION MANAGEMENT | Facility: CLINIC | Age: 71
End: 2021-08-03

## 2021-08-06 ENCOUNTER — OFFICE VISIT (OUTPATIENT)
Dept: NEUROLOGY | Facility: CLINIC | Age: 71
End: 2021-08-06
Payer: MEDICARE

## 2021-08-06 VITALS
BODY MASS INDEX: 26.85 KG/M2 | DIASTOLIC BLOOD PRESSURE: 88 MMHG | WEIGHT: 178 LBS | HEART RATE: 91 BPM | SYSTOLIC BLOOD PRESSURE: 150 MMHG | OXYGEN SATURATION: 94 %

## 2021-08-06 DIAGNOSIS — R27.0 ATAXIA: Primary | ICD-10-CM

## 2021-08-06 PROCEDURE — 99212 OFFICE O/P EST SF 10 MIN: CPT | Performed by: PSYCHIATRY & NEUROLOGY

## 2021-08-06 NOTE — PROGRESS NOTES
Service Date: 2021    HISTORY OF PRESENT ILLNESS:  Mr. Kong returns for followup today.  He is a 70-year-old gentleman with progressive cerebellar ataxia and palatal tremor.  He does report progression of his gait imbalance.  He uses albuterol for fatigue and this seems to help tremendously.  He is now using a regular walker and a wheelchair.  He was requesting a scooter or a light weight scooter that is easy to transport in the car by his wife.      PHYSICAL EXAMINATION:  His examination was unchanged.  He continues to have a palatal tremor about 1 Hz.  His wife tells us that he sometimes has abdominal contractions at the same frequency.  He has noted ataxia of the upper extremities.  His gait was wide-based.  He could not stand with the feet less than 1 foot apart.  His gait and stance are quite profoundly ataxic with high fall risk.      PLAN:  I have written him a request for a lightweight scooter.  We will continue providing Adderall, as it is helpful, and I will see him back in the clinic as planned.  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    Giorgio Pena MD        D: 2021   T: 2021   MT: raymond    Name:     EARL KONG  MRN:      [id number]        Account:      [id number]   :      1950           Service Date: 2021       Document: [id number]

## 2021-08-06 NOTE — LETTER
2021       RE: Arnold Lozada  [address]  [place] [facility] [address]     Dear Colleague,    Thank you for referring your patient, Arnold Lozada, to the [facility] NEUROLOGY [facility] [place] [facility]. Please see a copy of my visit note below.    Department of Neurology  Movement Disorders Division  Return to Clinic Appointment    Patient: Arnold Lozada   MRN: [id number]   : 1950   Date of Visit: 2021    Reason for visit: ataxia follow up     History of Present Illness: Mr. Lozada is a 68 year old  male with a history of ataxia, palatal tremor with olivary hypertrophy, negative genetic testing (autosomal dom repeat-mediated ataxias, SCA1,2,3,6,7, next gen seq, normal frataxin) who presents for follow up.Patient was last seen on 2019 by Dr. Fernandez and Dr. Pena at this clinic.    No significant issues noted since the last visit. Does say that walking/balance has gotten worse in the past year. He remains predominantly confined to a wheel chair, able to stand up with assistance and help with transfers. No falls for 16+months and says that the last one was related to leaning too far over in his wheelchair and just fell out. Working with [facility], every two weeks where among other things work w/ harnesses/assisted walking. With a walker and stand-by assist, typically walks ~8000+steps a week. Also does recumbent biking stationary and outside with his PCA, who comes in and helps with exercises and daily activities MWF. Wife and he are interested in lighter options for wheelchairs since they are trying to be more active and get out more often but the weight of the motorized wheelchair and their bulkier manual wheelchair is limiting.    Continued worsening dysarthria in the last year, cannot use talk to text due to the amount of errors. Does speech exercises. Does not choke or aspirate when  "he eats but does seem to cough more often. Has been cautious and is educated on aspiration precautions and which type of food to avoid in the past.     He continues to be on Amphetamine, which helps him significantly and lets him stay awake during the day and into the evening before going to bed a little after ~10pm. No issues with sleeping (had been diagnosed w/ GUILLERMINA in 2018, wearing mouth guard, did not re-visit reduring this visit).     Other issues discussed - does not notice his palatal tremor, at times wife notices similar frequency contraction of his abdomen, not painful or persistent. Some increased difficulty writing and typing - uses one finger on each hand to type out everything. maybe once a day, patient will have an isolated \"hiccup\"-like episode; singular \"gasp\" and does not recur; not painful. Of note, wife reports that patient's father had similar mannerisms and required a wheelchair later in life.     Review of Systems:  Other than that mentioned above, the remainder of 12 systems reviewed were negative.    Medications:  Current Outpatient Medications   Medication Sig Dispense Refill     amLODIPine (NORVASC) 5 MG tablet TAKE 1 TABLET BY MOUTH ONE TIME DAILY 90 tablet 3     amphetamine-dextroamphetamine (ADDERALL XR) 30 MG 24 hr capsule Take 1 capsule (30 mg) by mouth daily 90 capsule 0     aspirin 325 MG tablet Take 325 mg by mouth daily.       Cholecalciferol (D3 MAXIMUM STRENGTH PO) Take 400 Units by mouth daily       fluticasone (FLONASE) 50 MCG/ACT spray Spray 1 spray into both nostrils daily       folic acid 800 MCG TABS Take 1 tablet by mouth daily Reported on 5/10/2017       metoprolol succinate ER (TOPROL-XL) 50 MG 24 hr tablet TAKE 1 TABLET BY MOUTH ONE TIME DAILY 90 tablet 3     MULTI-VITAMIN PO TABS 1 TABLET DAILY       Omega 3-6-9 Fatty Acids (OMEGA 3-6-9 COMPLEX) CAPS Take 1 capsule by mouth daily       order for DME Knee high compression stockings (15-20 mmHg) 1 each 1     POTASSIUM PO " Take 595 mcg by mouth daily       sertraline (ZOLOFT) 50 MG tablet TAKE 1 TABLET BY MOUTH ONE TIME DAILY 90 tablet 3     simvastatin (ZOCOR) 20 MG tablet TAKE ONE TABLET BY MOUTH EVERY EVENING 90 tablet 3     sulfaSALAzine (AZULFIDINE) 500 MG tablet Take 2 tablets (1,000 mg) by mouth 2 times daily Take 2 tablets by mouth twice daily 360 tablet 3     tamsulosin (FLOMAX) 0.4 MG capsule Take 2 capsules by mouth once daily 180 capsule 3     traZODone (DESYREL) 50 MG tablet TAKE ONE TABLET BY MOUTH AT BEDTIME AS NEEDED FOR SLEEP 90 tablet 3     triamcinolone (KENALOG) 0.1 % cream Apply sparingly to affected area three times daily for 14 days. 80 g 0     amphetamine-dextroamphetamine (ADDERALL XR) 30 MG 24 hr capsule Take 1 capsule (30 mg) by mouth daily 30 capsule 0     amphetamine-dextroamphetamine (ADDERALL XR) 30 MG 24 hr capsule Take 1 capsule (30 mg) by mouth daily 30 capsule 0     amphetamine-dextroamphetamine (ADDERALL XR) 30 MG 24 hr capsule Take 1 capsule (30 mg) by mouth daily 30 capsule 0     ORDER FOR [facility] Respironics REMSTAR 60 Series Auto CPAP 5-18cm H2O, Nuance pillow nasal mask large           Physical Exam:  The patient's  weight is 80.7 kg (178 lb). His blood pressure is 150/88 (abnormal) and his pulse is 91. His oxygen saturation is 94%.        Neurologic Exam: performed with Dr. Pena    Mental status: Arnold is alert and oriented. Provides coherent history. Speech is nonfluent, broken due to dysarthria but no spontaneous word-finding issues. Follows commands w/o issue.    Cranial nerves: EOMI, face is symmetric, palatal tremor ~1hz, mod dysarthric speech but still discernable speech, tongue protrudes to midline.    Motor:No abnormal movements at rest. Antigravity in all extremities, moves all limbs equally + symmetrically.     Coord: no dysmetria w/ FNF.    Gait: Able to stand up with assist of 1-2. Maintains posture w/ wide base for 10s with apparent axial ataxia, can take 1 step with lots of  support.    Data Reviewed:   Previous MRI brain reviewed: prominent symmetric hyperintensity of the olivary bodies     Impression:  Mr. Lozada is a 68 year old  male with a history of ataxia, palatal tremor with olivary hypertrophy, negative genetic testing (autosomal dom repeat-mediated ataxias, SCA1,2,3,6,7, next gen seq, normal frataxin). Slow progression of symptoms with appropriate conservative therapies, environmental modifications and medications.    Plan:   - patients will be continued on amphetamines  - continue PT + exercises  - Due to difficulty and limitations due to weight, we will place a DME referral for a lighter motorized wheelchair scooter     RTC: 1yr    Patient was seen and discussed with my attending, Dr. Pena, who agrees with the above assessment and plan.    Jani Lara MD  PGY-4 Neurology Resident  [facility] Neurology      Summary of orders placed this encounter:  Orders Placed This Encounter   Procedures     Wheelchair Scooter Order for DME - ONLY FOR DME       Service Date: 08/06/2021    HISTORY OF PRESENT ILLNESS:  Mr. Lozada returns for followup today.  He is a 70-year-old gentleman with progressive cerebellar ataxia and palatal tremor.  He does report progression of his gait imbalance.  He uses albuterol for fatigue and this seems to help tremendously.  He is now using a regular walker and a wheelchair.  He was requesting a scooter or a light weight scooter that is easy to transport in the car by his wife.      PHYSICAL EXAMINATION:  His examination was unchanged.  He continues to have a palatal tremor about 1 Hz.  His wife tells us that he sometimes has abdominal contractions at the same frequency.  He has noted ataxia of the upper extremities.  His gait was wide-based.  He could not stand with the feet less than 1 foot apart.  His gait and stance are quite profoundly ataxic with high fall risk.      PLAN:  I have written him a request for a lightweight scooter.  We will continue  providing Adderall, as it is helpful, and I will see him back in the clinic as planned.  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    Giorgio Pena MD

## 2021-08-06 NOTE — PROGRESS NOTES
Department of Neurology  Movement Disorders Division  Return to Clinic Appointment    Patient: Arnold Lozada   MRN: [id number]   : 1950   Date of Visit: 2021    Reason for visit: ataxia follow up     History of Present Illness: Mr. Lozada is a 68 year old  male with a history of ataxia, palatal tremor with olivary hypertrophy, negative genetic testing (autosomal dom repeat-mediated ataxias, SCA1,2,3,6,7, next gen seq, normal frataxin) who presents for follow up.Patient was last seen on 2019 by Dr. Fernandez and Dr. Pena at this clinic.    No significant issues noted since the last visit. Does say that walking/balance has gotten worse in the past year. He remains predominantly confined to a wheel chair, able to stand up with assistance and help with transfers. No falls for 16+months and says that the last one was related to leaning too far over in his wheelchair and just fell out. Working with [facility] therapy, every two weeks where among other things work w/ harnesses/assisted walking. With a walker and stand-by assist, typically walks ~8000+steps a week. Also does recumbent biking stationary and outside with his PCA, who comes in and helps with exercises and daily activities MWF. Wife and he are interested in lighter options for wheelchairs since they are trying to be more active and get out more often but the weight of the motorized wheelchair and their bulkier manual wheelchair is limiting.    Continued worsening dysarthria in the last year, cannot use talk to text due to the amount of errors. Does speech exercises. Does not choke or aspirate when he eats but does seem to cough more often. Has been cautious and is educated on aspiration precautions and which type of food to avoid in the past.     He continues to be on Amphetamine, which helps him significantly and lets him stay awake during the day and into the evening before going to bed a little after ~10pm. No issues  "with sleeping (had been diagnosed w/ GUILLERMINA in 2018, wearing mouth guard, did not re-visit reduring this visit).     Other issues discussed - does not notice his palatal tremor, at times wife notices similar frequency contraction of his abdomen, not painful or persistent. Some increased difficulty writing and typing - uses one finger on each hand to type out everything. maybe once a day, patient will have an isolated \"hiccup\"-like episode; singular \"gasp\" and does not recur; not painful. Of note, wife reports that patient's father had similar mannerisms and required a wheelchair later in life.     Review of Systems:  Other than that mentioned above, the remainder of 12 systems reviewed were negative.    Medications:  Current Outpatient Medications   Medication Sig Dispense Refill     amLODIPine (NORVASC) 5 MG tablet TAKE 1 TABLET BY MOUTH ONE TIME DAILY 90 tablet 3     amphetamine-dextroamphetamine (ADDERALL XR) 30 MG 24 hr capsule Take 1 capsule (30 mg) by mouth daily 90 capsule 0     aspirin 325 MG tablet Take 325 mg by mouth daily.       Cholecalciferol (D3 MAXIMUM STRENGTH PO) Take 400 Units by mouth daily       fluticasone (FLONASE) 50 MCG/ACT spray Spray 1 spray into both nostrils daily       folic acid 800 MCG TABS Take 1 tablet by mouth daily Reported on 5/10/2017       metoprolol succinate ER (TOPROL-XL) 50 MG 24 hr tablet TAKE 1 TABLET BY MOUTH ONE TIME DAILY 90 tablet 3     MULTI-VITAMIN PO TABS 1 TABLET DAILY       Omega 3-6-9 Fatty Acids (OMEGA 3-6-9 COMPLEX) CAPS Take 1 capsule by mouth daily       order for DME Knee high compression stockings (15-20 mmHg) 1 each 1     POTASSIUM PO Take 595 mcg by mouth daily       sertraline (ZOLOFT) 50 MG tablet TAKE 1 TABLET BY MOUTH ONE TIME DAILY 90 tablet 3     simvastatin (ZOCOR) 20 MG tablet TAKE ONE TABLET BY MOUTH EVERY EVENING 90 tablet 3     sulfaSALAzine (AZULFIDINE) 500 MG tablet Take 2 tablets (1,000 mg) by mouth 2 times daily Take 2 tablets by mouth twice " daily 360 tablet 3     tamsulosin (FLOMAX) 0.4 MG capsule Take 2 capsules by mouth once daily 180 capsule 3     traZODone (DESYREL) 50 MG tablet TAKE ONE TABLET BY MOUTH AT BEDTIME AS NEEDED FOR SLEEP 90 tablet 3     triamcinolone (KENALOG) 0.1 % cream Apply sparingly to affected area three times daily for 14 days. 80 g 0     amphetamine-dextroamphetamine (ADDERALL XR) 30 MG 24 hr capsule Take 1 capsule (30 mg) by mouth daily 30 capsule 0     amphetamine-dextroamphetamine (ADDERALL XR) 30 MG 24 hr capsule Take 1 capsule (30 mg) by mouth daily 30 capsule 0     amphetamine-dextroamphetamine (ADDERALL XR) 30 MG 24 hr capsule Take 1 capsule (30 mg) by mouth daily 30 capsule 0     ORDER FOR [facility] Respironics REMSTAR 60 Series Auto CPAP 5-18cm H2O, Nuance pillow nasal mask large           Physical Exam:  The patient's  weight is 80.7 kg (178 lb). His blood pressure is 150/88 (abnormal) and his pulse is 91. His oxygen saturation is 94%.        Neurologic Exam: performed with Dr. Pena    Mental status: Arnold is alert and oriented. Provides coherent history. Speech is nonfluent, broken due to dysarthria but no spontaneous word-finding issues. Follows commands w/o issue.    Cranial nerves: EOMI, face is symmetric, palatal tremor ~1hz, mod dysarthric speech but still discernable speech, tongue protrudes to midline.    Motor:No abnormal movements at rest. Antigravity in all extremities, moves all limbs equally + symmetrically.     Coord: no dysmetria w/ FNF.    Gait: Able to stand up with assist of 1-2. Maintains posture w/ wide base for 10s with apparent axial ataxia, can take 1 step with lots of support.    Data Reviewed:   Previous MRI brain reviewed: prominent symmetric hyperintensity of the olivary bodies     Impression:  Mr. Lozada is a 68 year old  male with a history of ataxia, palatal tremor with olivary hypertrophy, negative genetic testing (autosomal dom repeat-mediated ataxias, SCA1,2,3,6,7, next gen seq,  normal frataxin). Slow progression of symptoms with appropriate conservative therapies, environmental modifications and medications.    Plan:   - patients will be continued on amphetamines  - continue PT + exercises  - Due to difficulty and limitations due to weight, we will place a DME referral for a lighter motorized wheelchair scooter     RTC: 1yr    Patient was seen and discussed with my attending, Dr. Pena, who agrees with the above assessment and plan.    Jani Lara MD  PGY-4 Neurology Resident  [facility] Neurology      Summary of orders placed this encounter:  Orders Placed This Encounter   Procedures     Wheelchair Scooter Order for DME - ONLY FOR DME

## 2021-08-06 NOTE — NURSING NOTE
zSunny is here for an ataxia follow up visit. He is in a wheel chair. His wife said his medicines were just renewed. Pt denies headaches. He said he is not choking when he eats or drinks. He coughs more than he did before. His last eye exam was in the sprintg of 2021. He is in a wheel chair almost all the time but he can transfer, usually by himself. Pt has a PCa who comes 3 times per week. He has not fallen in the past year. He said he sleep well at night, his wife said he does not hit or kick when he is sleeping, she said he does startle more than he did before. He said he hiccups often, he didn't do that before. Pt goes to physical therapy at [facility] every two weeks.

## 2021-09-14 ENCOUNTER — TELEPHONE (OUTPATIENT)
Dept: AUDIOLOGY | Facility: CLINIC | Age: 71
End: 2021-09-14

## 2021-09-14 DIAGNOSIS — H90.3 SENSORINEURAL HEARING LOSS, BILATERAL: Primary | ICD-10-CM

## 2021-09-14 PROCEDURE — 99207 PR NO CHARGE LOS: CPT | Performed by: AUDIOLOGIST

## 2021-09-14 PROCEDURE — V5267 HEARING AID SUP/ACCESS/DEV: HCPCS | Performed by: AUDIOLOGIST

## 2021-09-14 NOTE — TELEPHONE ENCOUNTER
Reason for call:  Order   Order or referral being requested:  waxfritz he would like 4 of them  Sensorineural hearing loss, bilateral [H90.3]   Reason for request: is out  Date needed: as soon as possible  Has the patient been seen by the PCP for this problem? YES    Additional comments: Last received on 10/2020 by Audiology      Phone number to reach patient:  Home number on file [phone] (home)    Best Time:      Can we leave a detailed message on this number?  YES    Travel screening: Not Applicable

## 2021-09-15 NOTE — TELEPHONE ENCOUNTER
I returned patient's call.  He will  4 packages [employer] Wax traps at the 2nd floor reception desk in [place].    Charges:  $20.00 self pay

## 2021-09-17 DIAGNOSIS — G47.10 EXCESSIVE SLEEPINESS: ICD-10-CM

## 2021-09-17 RX ORDER — DEXTROAMPHETAMINE SACCHARATE, AMPHETAMINE ASPARTATE MONOHYDRATE, DEXTROAMPHETAMINE SULFATE AND AMPHETAMINE SULFATE 7.5; 7.5; 7.5; 7.5 MG/1; MG/1; MG/1; MG/1
30 CAPSULE, EXTENDED RELEASE ORAL DAILY
Qty: 30 CAPSULE | Refills: 0 | Status: SHIPPED | OUTPATIENT
Start: 2021-09-17 | End: 2021-11-26

## 2021-09-26 ENCOUNTER — HEALTH MAINTENANCE LETTER (OUTPATIENT)
Age: 71
End: 2021-09-26

## 2021-09-30 NOTE — TELEPHONE ENCOUNTER
I informed Arnold that the waxguards were ready for  at the 2nd floor .     -Josué Stout CCC-A    
Reason for Call:  Other call back    Detailed comments: Patient requesting wax guards for his hearing aids. 6-12 packs requested. Please call when ready.     Phone Number Patient can be reached at: Home number on file [phone] (home)    Best Time: any     Can we leave a detailed message on this number? YES    Call taken on 4/15/2019 at 11:24 AM by Papo Vergara    
- - -

## 2021-10-05 RX ORDER — DEXTROAMPHETAMINE SACCHARATE, AMPHETAMINE ASPARTATE MONOHYDRATE, DEXTROAMPHETAMINE SULFATE AND AMPHETAMINE SULFATE 7.5; 7.5; 7.5; 7.5 MG/1; MG/1; MG/1; MG/1
30 CAPSULE, EXTENDED RELEASE ORAL DAILY
Qty: 90 CAPSULE | Refills: 0 | Status: CANCELLED | OUTPATIENT
Start: 2021-10-05

## 2021-10-15 DIAGNOSIS — G47.10 EXCESSIVE SLEEPINESS: ICD-10-CM

## 2021-10-19 RX ORDER — DEXTROAMPHETAMINE SACCHARATE, AMPHETAMINE ASPARTATE MONOHYDRATE, DEXTROAMPHETAMINE SULFATE AND AMPHETAMINE SULFATE 7.5; 7.5; 7.5; 7.5 MG/1; MG/1; MG/1; MG/1
30 CAPSULE, EXTENDED RELEASE ORAL DAILY
Qty: 60 CAPSULE | Refills: 0 | Status: SHIPPED | OUTPATIENT
Start: 2021-10-19 | End: 2022-03-01

## 2021-11-26 DIAGNOSIS — G47.10 EXCESSIVE SLEEPINESS: ICD-10-CM

## 2021-11-26 RX ORDER — DEXTROAMPHETAMINE SACCHARATE, AMPHETAMINE ASPARTATE MONOHYDRATE, DEXTROAMPHETAMINE SULFATE AND AMPHETAMINE SULFATE 7.5; 7.5; 7.5; 7.5 MG/1; MG/1; MG/1; MG/1
30 CAPSULE, EXTENDED RELEASE ORAL DAILY
Qty: 30 CAPSULE | Refills: 0 | Status: SHIPPED | OUTPATIENT
Start: 2021-11-26 | End: 2021-12-03

## 2021-11-26 RX ORDER — DEXTROAMPHETAMINE SACCHARATE, AMPHETAMINE ASPARTATE MONOHYDRATE, DEXTROAMPHETAMINE SULFATE AND AMPHETAMINE SULFATE 7.5; 7.5; 7.5; 7.5 MG/1; MG/1; MG/1; MG/1
30 CAPSULE, EXTENDED RELEASE ORAL DAILY
Qty: 90 CAPSULE | Refills: 0 | Status: CANCELLED | OUTPATIENT
Start: 2021-12-18

## 2021-12-03 DIAGNOSIS — G47.10 EXCESSIVE SLEEPINESS: ICD-10-CM

## 2021-12-10 RX ORDER — DEXTROAMPHETAMINE SACCHARATE, AMPHETAMINE ASPARTATE MONOHYDRATE, DEXTROAMPHETAMINE SULFATE AND AMPHETAMINE SULFATE 7.5; 7.5; 7.5; 7.5 MG/1; MG/1; MG/1; MG/1
30 CAPSULE, EXTENDED RELEASE ORAL DAILY
Qty: 90 CAPSULE | Refills: 0 | Status: SHIPPED | OUTPATIENT
Start: 2021-12-18 | End: 2022-10-14

## 2021-12-10 RX ORDER — DEXTROAMPHETAMINE SACCHARATE, AMPHETAMINE ASPARTATE MONOHYDRATE, DEXTROAMPHETAMINE SULFATE AND AMPHETAMINE SULFATE 7.5; 7.5; 7.5; 7.5 MG/1; MG/1; MG/1; MG/1
30 CAPSULE, EXTENDED RELEASE ORAL DAILY
Qty: 90 CAPSULE | Refills: 0 | Status: SHIPPED | OUTPATIENT
Start: 2021-12-10 | End: 2024-06-14

## 2022-01-12 NOTE — TELEPHONE ENCOUNTER
Left message for patient to return call please help patient schedule follow up appointment with Dr. Carpenter.       
Spoke to patient and informed him of Dr. Carpenter's message.    Appointment scheduled for Thursday, September 19th at 10:50 a.m. Amanda Lakhani,     
 used

## 2022-03-01 DIAGNOSIS — G47.10 EXCESSIVE SLEEPINESS: ICD-10-CM

## 2022-03-04 RX ORDER — DEXTROAMPHETAMINE SACCHARATE, AMPHETAMINE ASPARTATE MONOHYDRATE, DEXTROAMPHETAMINE SULFATE AND AMPHETAMINE SULFATE 7.5; 7.5; 7.5; 7.5 MG/1; MG/1; MG/1; MG/1
30 CAPSULE, EXTENDED RELEASE ORAL DAILY
Qty: 90 CAPSULE | Refills: 0 | Status: SHIPPED | OUTPATIENT
Start: 2022-03-15 | End: 2022-06-04

## 2022-03-07 ENCOUNTER — TELEPHONE (OUTPATIENT)
Dept: NEUROLOGY | Facility: CLINIC | Age: 72
End: 2022-03-07
Payer: MEDICARE

## 2022-03-07 NOTE — TELEPHONE ENCOUNTER
Contacted pt's wife, she said [facility] has not paid for this prescription for a long time and they always use [facility]. She will contact [employer] and inform them that they are paying with [facility] coupon.          Central Prior Authorization Team   Phone: [phone]      PRIOR AUTHORIZATION DENIED    Medication: amphetamine-dextroamphetamine (ADDERALL XR) 30 MG 24 hr capsule    Denial Date: 3/7/2022    Denial Rational: the diagnoses of excessive sleepiness and obstructive sleep apnea are considered an  experimental or investigational use for this drug or product. Experimental or investigational  indications are those which further studies or clinical trials are necessary to determine the  maximum tolerated dose, toxicity, safety, or efficacy as compared with the standard means of  treatment. For more information, please refer to the Blue Cross and [employer] Shield Service  Benefit Plan brochure ([place] ). Details regarding experimental or  investigational services are listed in section 10.        Appeal Information: What the member can do if they disagree with this decision  A letter of explanation containing rights for reconsideration will be mailed to the patient. In order for  the Plan to reconsider its denial, a written request must be received FROM THE MEMBER or their  authorized representative within six (6) months of the date of this letter.  The member is the only person who has a right to file a disputed claim. However, the member may  designate an authorized representative of their choice, including you as their prescriber, to act on  their behalf to appeal denial decisions. Parties acting as a member representative, such as medical  providers, must include a copy of their specific written consent with the review request. The  Authorized Representative Form can be found online at [email or web address]. To prevent any delay  in the review process, please ensure the form is filled out completely,  signed and dated, and included  with the dispute request.  Requests should be sent to:  Service Benefit Plan  [address] [address]  [place] [address]  Attention: Prior Approval Clinical Services  Requests can also be faxed to [phone].  The member s request must include an explanation regarding the reason(s) why they believe our  initial decision was wrong based upon specific benefit provisions listed in the Service Benefit Plan  brochure and/or the reason(s) that the member s request is medically necessary for their condition.  Please remember, the member has the right to submit written comments, documents (such as  physicians  letters, operative reports, bills, medical records, and EOBs) and other information  relevant to their appeal. If the member feels we were missing relevant clinical information during the  original prior authorization review, then the member or their physician can send or fax this  information with their request for reconsideration.  For regular reconsideration requests, we will respond within 30 days of receiving the request for  reconsideration FROM THE MEMBER or their authorized representative. Following our review, we  may either:    Uphold our denial decision in writing to THE MEMBER    Pay the claim    Provide the service    Request any additional information we need in order to complete our review. You, as the  prescriber, will receive a fax requesting additional information if this occurs. If the  additional information is not supplied within 60 days, the Plan will base its decision on

## 2022-04-06 ENCOUNTER — TELEPHONE (OUTPATIENT)
Dept: AUDIOLOGY | Facility: CLINIC | Age: 72
End: 2022-04-06
Payer: MEDICARE

## 2022-04-06 NOTE — TELEPHONE ENCOUNTER
.Reason for Call:   H A supplies    Detailed comments: Patient is calling to request (4) packs of wax guards; call when ready for  please, Thank you    Phone Number Patient can be reached at: Home number on file [phone] (home)    Best Time: anytime    Can we leave a detailed message on this number? YES    Call taken on 4/6/2022 at 2:58 PM by Indira Coburn

## 2022-04-07 NOTE — TELEPHONE ENCOUNTER
Called Arnold to let them know that wax guards are ready for pickup at the [place] location. Patient asked to cancel the order, said that he did not need them anymore.    No charge.    Mecca Tong, Audiology Clinic Assistant

## 2022-06-04 DIAGNOSIS — G47.10 EXCESSIVE SLEEPINESS: ICD-10-CM

## 2022-06-04 RX ORDER — DEXTROAMPHETAMINE SACCHARATE, AMPHETAMINE ASPARTATE MONOHYDRATE, DEXTROAMPHETAMINE SULFATE AND AMPHETAMINE SULFATE 7.5; 7.5; 7.5; 7.5 MG/1; MG/1; MG/1; MG/1
30 CAPSULE, EXTENDED RELEASE ORAL DAILY
Qty: 90 CAPSULE | Refills: 0 | Status: SHIPPED | OUTPATIENT
Start: 2022-06-04 | End: 2024-06-14

## 2022-06-07 ENCOUNTER — TELEPHONE (OUTPATIENT)
Dept: NEUROLOGY | Facility: CLINIC | Age: 72
End: 2022-06-07

## 2022-06-07 NOTE — TELEPHONE ENCOUNTER
PRIOR AUTHORIZATION DENIED    Medication: amphetamine-dextroamphetamine (ADDERALL XR) 30 MG 24 hr capsule - EPA DENIED    Denial Date:      Denial Rational: NOT COVERED FOR DX      Appeal Information:

## 2022-06-08 DIAGNOSIS — R35.0 BENIGN PROSTATIC HYPERPLASIA WITH URINARY FREQUENCY: ICD-10-CM

## 2022-06-08 DIAGNOSIS — N40.1 BENIGN PROSTATIC HYPERPLASIA WITH URINARY FREQUENCY: ICD-10-CM

## 2022-06-09 ENCOUNTER — OFFICE VISIT (OUTPATIENT)
Dept: AUDIOLOGY | Facility: CLINIC | Age: 72
End: 2022-06-09
Payer: COMMERCIAL

## 2022-06-09 DIAGNOSIS — H90.3 SENSORINEURAL HEARING LOSS, BILATERAL: Primary | ICD-10-CM

## 2022-06-09 PROCEDURE — V5299 HEARING SERVICE: HCPCS | Performed by: AUDIOLOGIST

## 2022-06-09 RX ORDER — TAMSULOSIN HYDROCHLORIDE 0.4 MG/1
CAPSULE ORAL
Qty: 180 CAPSULE | Refills: 0 | Status: SHIPPED | OUTPATIENT
Start: 2022-06-09

## 2022-06-09 NOTE — PROGRESS NOTES
HEARING AID RECHECK    Patient Name:  Arnold Lozada    Patient Age:   71 year old    :  1950    Background:   Patient has 6 year old hearing aids and the right aid seems to have high drain    SIDE: Right    : Phonak    TYPE: V50-13    S/N: [id number]    WARRANTY:     Procedures:   Listening check  confirmed  aid seems a bit weak.  I changed receivers and this did not change.   Based on complaint of 2 day battery life, I recommended either new aids or factory repair.  Patient and his wife  want to pursue new aids.    Plan:   Hearing eval+ HAC will be scheduled.    NO CHARGE VISIT    Jas Murcia MA, CCC-A  [place] Licensed Audiologist #[id number]  2022

## 2022-06-10 NOTE — TELEPHONE ENCOUNTER
Medication is being filled for 1 time refill only due to:  Patient needs to be seen because due for annual in July.   Monse Isabel RN

## 2022-06-23 NOTE — PROGRESS NOTES
Department of Neurology  Movement Disorders Division  [facility]  Follow-up Appointment    Patient: Arnold Lozada   MRN: [id number]   : 1950   Date of Visit: 2022    Reason for visit: ataxia follow up     History of Present Illness: Mr. Lozada is a 71 year old  male with a history of ataxia, palatal tremor with olivary hypertrophy, negative genetic testing (autosomal dom repeat-mediated ataxias, SCA1,2,3,6,7, next gen seq, normal frataxin) who presents for follow up. Patient was last seen on 2021, at which time he was given a script for a motorized scooter. Today he is accompanied by his wife, Willa.    Interim history:  He reports he has been doing okay in the last year. He can tell that his speech is worsening. Balance has also worsened. He denies any falls. He uses a wheelchair to get around and does practice walking and lifting weights with a PCA. He does not walk unsupervised. He is able to transfer independently unless he is very tired.    He does notice more coughing while eating, specifically crackers or smaller bits. No pneumonia or choking episodes.     He uses Adderall daily for sleepiness/energy.    Review of Systems:  Other than that mentioned above, the remainder of 12 systems reviewed were negative.    Medications:  Current Outpatient Medications   Medication Sig Dispense Refill     amLODIPine (NORVASC) 5 MG tablet TAKE 1 TABLET BY MOUTH ONE TIME DAILY 90 tablet 3     amphetamine-dextroamphetamine (ADDERALL XR) 30 MG 24 hr capsule Take 1 capsule (30 mg) by mouth daily 90 capsule 0     amphetamine-dextroamphetamine (ADDERALL XR) 30 MG 24 hr capsule Take 1 capsule (30 mg) by mouth daily 90 capsule 0     amphetamine-dextroamphetamine (ADDERALL XR) 30 MG 24 hr capsule Take 1 capsule (30 mg) by mouth daily 90 capsule 0     aspirin 325 MG tablet Take 325 mg by mouth daily.       Cholecalciferol (D3 MAXIMUM STRENGTH PO) Take 400 Units by mouth daily       fluticasone (FLONASE)  50 MCG/ACT spray Spray 1 spray into both nostrils daily       folic acid 800 MCG TABS Take 1 tablet by mouth daily Reported on 5/10/2017       metoprolol succinate ER (TOPROL-XL) 50 MG 24 hr tablet TAKE 1 TABLET BY MOUTH ONE TIME DAILY 90 tablet 3     MULTI-VITAMIN PO TABS 1 TABLET DAILY       neomycin-polymyxin-hydrocortisone (CORTISPORIN) 3.5-81351-2 otic suspension as needed       Omega 3-6-9 Fatty Acids (OMEGA 3-6-9 COMPLEX) CAPS Take 1 capsule by mouth daily       order for DME Knee high compression stockings (15-20 mmHg) 1 each 1     ORDER FOR DME Respironics REMSTAR 60 Series Auto CPAP 5-18cm H2O, Nuance pillow nasal mask large       POTASSIUM PO Take 595 mcg by mouth daily       sertraline (ZOLOFT) 50 MG tablet TAKE 1 TABLET BY MOUTH ONE TIME DAILY 90 tablet 3     simvastatin (ZOCOR) 20 MG tablet TAKE ONE TABLET BY MOUTH EVERY EVENING 90 tablet 3     sulfaSALAzine (AZULFIDINE) 500 MG tablet Take 2 tablets (1,000 mg) by mouth 2 times daily Take 2 tablets by mouth twice daily 360 tablet 3     tamsulosin (FLOMAX) 0.4 MG capsule +++NEED APPT IN July+++TAKE TWO CAPSULES BY MOUTH DAILY 180 capsule 0     traZODone (DESYREL) 50 MG tablet TAKE ONE TABLET BY MOUTH AT BEDTIME AS NEEDED FOR SLEEP 90 tablet 3     triamcinolone (KENALOG) 0.1 % cream Apply sparingly to affected area three times daily for 14 days. 80 g 0     amphetamine-dextroamphetamine (ADDERALL XR) 30 MG 24 hr capsule Take 1 capsule (30 mg) by mouth daily 30 capsule 0         Physical Exam:  The patient's  blood pressure is 113/72 and his pulse is 93. His oxygen saturation is 95%.         Neurologic Exam:      Mental status: Arnold is alert and oriented. Provides coherent history. Speech is nonfluent, broken due to dysarthria but no spontaneous word-finding issues. Follows commands w/o issue.    Cranial nerves: EOMI, face is symmetric, palatal tremor ~1hz, mod dysarthric speech but still discernable speech, tongue protrudes to midline.    Motor: No  abnormal movements at rest. Antigravity in all extremities, moves all limbs equally + symmetrically. Negative pronator drift.    Coord: no dysmetria w/ FNF.    Gait: Able to stand up with assist of 1. Maintains posture w/ wide base for 10s with apparent axial ataxia, can take 1 step with lots of support.    Data Reviewed:   No new data    Impression:  Mr. Lozada is a 71 year old  male with a history of ataxia, palatal tremor with olivary hypertrophy, negative genetic testing (autosomal dom repeat-mediated ataxias, SCA1,2,3,6,7, next gen seq, normal frataxin). Slow progression of symptoms with appropriate conservative therapies, environmental modifications and medications. He is interested in evaluating for gluten sensitivity in relation to his ataxia, which is reasonable so will complete these labs today. Otherwise, will continue PT and re-establish care with SLP & repeat swallow evaluation.     Plan:    - continue Adderall  - labs: anti-gliadin antibodies  - continue PT + exercises  - not interested in OT referral  - SLP referral + swallow study  - Renew handicapped parking    RTC: 1yr      Patient and plan was examined & discussed with attending physician, Dr. Pena.    Smita Pearson MD  Movement Disorder Fellow

## 2022-06-24 ENCOUNTER — LAB (OUTPATIENT)
Dept: LAB | Facility: CLINIC | Age: 72
End: 2022-06-24

## 2022-06-24 ENCOUNTER — OFFICE VISIT (OUTPATIENT)
Dept: NEUROLOGY | Facility: CLINIC | Age: 72
End: 2022-06-24
Payer: MEDICARE

## 2022-06-24 VITALS — DIASTOLIC BLOOD PRESSURE: 72 MMHG | HEART RATE: 93 BPM | SYSTOLIC BLOOD PRESSURE: 113 MMHG | OXYGEN SATURATION: 95 %

## 2022-06-24 DIAGNOSIS — R27.0 ATAXIA: ICD-10-CM

## 2022-06-24 DIAGNOSIS — R13.10 DYSPHAGIA, UNSPECIFIED TYPE: ICD-10-CM

## 2022-06-24 DIAGNOSIS — R27.0 ATAXIA: Primary | ICD-10-CM

## 2022-06-24 PROCEDURE — 99000 SPECIMEN HANDLING OFFICE-LAB: CPT | Performed by: PATHOLOGY

## 2022-06-24 PROCEDURE — 86258 DGP ANTIBODY EACH IG CLASS: CPT | Performed by: PATHOLOGY

## 2022-06-24 PROCEDURE — 99214 OFFICE O/P EST MOD 30 MIN: CPT | Mod: GC | Performed by: PSYCHIATRY & NEUROLOGY

## 2022-06-24 PROCEDURE — 36415 COLL VENOUS BLD VENIPUNCTURE: CPT | Performed by: PATHOLOGY

## 2022-06-24 RX ORDER — NEOMYCIN SULFATE, POLYMYXIN B SULFATE AND HYDROCORTISONE 10; 3.5; 1 MG/ML; MG/ML; [USP'U]/ML
SUSPENSION/ DROPS AURICULAR (OTIC) PRN
COMMUNITY
Start: 2021-11-19 | End: 2023-10-18

## 2022-06-24 ASSESSMENT — PAIN SCALES - GENERAL: PAINLEVEL: NO PAIN (0)

## 2022-06-24 NOTE — LETTER
2022       RE: Arnold Lozada  [address]  [facility] [address]     Dear Colleague,    Thank you for referring your patient, Arnold Lozada, to the [facility] [place] at [facility]. Please see a copy of my visit note below.    Department of Neurology  Movement Disorders Division  Ataxia Clinic  Follow-up Appointment    Patient: Arnold Lozada   MRN: [id number]   : 1950   Date of Visit: 2022    Reason for visit: ataxia follow up     History of Present Illness: Mr. Lozada is a 71 year old  male with a history of ataxia, palatal tremor with olivary hypertrophy, negative genetic testing (autosomal dom repeat-mediated ataxias, SCA1,2,3,6,7, next gen seq, normal frataxin) who presents for follow up. Patient was last seen on 2021, at which time he was given a script for a motorized scooter. Today he is accompanied by his wife, Willa.    Interim history:  He reports he has been doing okay in the last year. He can tell that his speech is worsening. Balance has also worsened. He denies any falls. He uses a wheelchair to get around and does practice walking and lifting weights with a PCA. He does not walk unsupervised. He is able to transfer independently unless he is very tired.    He does notice more coughing while eating, specifically crackers or smaller bits. No pneumonia or choking episodes.     He uses Adderall daily for sleepiness/energy.    Review of Systems:  Other than that mentioned above, the remainder of 12 systems reviewed were negative.    Medications:  Current Outpatient Medications   Medication Sig Dispense Refill     amLODIPine (NORVASC) 5 MG tablet TAKE 1 TABLET BY MOUTH ONE TIME DAILY 90 tablet 3     amphetamine-dextroamphetamine (ADDERALL XR) 30 MG 24 hr capsule Take 1 capsule (30 mg) by mouth daily 90 capsule 0     amphetamine-dextroamphetamine (ADDERALL XR) 30 MG 24 hr  capsule Take 1 capsule (30 mg) by mouth daily 90 capsule 0     amphetamine-dextroamphetamine (ADDERALL XR) 30 MG 24 hr capsule Take 1 capsule (30 mg) by mouth daily 90 capsule 0     aspirin 325 MG tablet Take 325 mg by mouth daily.       Cholecalciferol (D3 MAXIMUM STRENGTH PO) Take 400 Units by mouth daily       fluticasone (FLONASE) 50 MCG/ACT spray Spray 1 spray into both nostrils daily       folic acid 800 MCG TABS Take 1 tablet by mouth daily Reported on 5/10/2017       metoprolol succinate ER (TOPROL-XL) 50 MG 24 hr tablet TAKE 1 TABLET BY MOUTH ONE TIME DAILY 90 tablet 3     MULTI-VITAMIN PO TABS 1 TABLET DAILY       neomycin-polymyxin-hydrocortisone (CORTISPORIN) 3.5-67941-1 otic suspension as needed       Omega 3-6-9 Fatty Acids (OMEGA 3-6-9 COMPLEX) CAPS Take 1 capsule by mouth daily       order for DME Knee high compression stockings (15-20 mmHg) 1 each 1     ORDER FOR DME Respironics REMSTAR 60 Series Auto CPAP 5-18cm H2O, Nuance pillow nasal mask large       POTASSIUM PO Take 595 mcg by mouth daily       sertraline (ZOLOFT) 50 MG tablet TAKE 1 TABLET BY MOUTH ONE TIME DAILY 90 tablet 3     simvastatin (ZOCOR) 20 MG tablet TAKE ONE TABLET BY MOUTH EVERY EVENING 90 tablet 3     sulfaSALAzine (AZULFIDINE) 500 MG tablet Take 2 tablets (1,000 mg) by mouth 2 times daily Take 2 tablets by mouth twice daily 360 tablet 3     tamsulosin (FLOMAX) 0.4 MG capsule +++NEED APPT IN July+++TAKE TWO CAPSULES BY MOUTH DAILY 180 capsule 0     traZODone (DESYREL) 50 MG tablet TAKE ONE TABLET BY MOUTH AT BEDTIME AS NEEDED FOR SLEEP 90 tablet 3     triamcinolone (KENALOG) 0.1 % cream Apply sparingly to affected area three times daily for 14 days. 80 g 0     amphetamine-dextroamphetamine (ADDERALL XR) 30 MG 24 hr capsule Take 1 capsule (30 mg) by mouth daily 30 capsule 0         Physical Exam:  The patient's  blood pressure is 113/72 and his pulse is 93. His oxygen saturation is 95%.         Neurologic Exam:      Mental  status: Arnold is alert and oriented. Provides coherent history. Speech is nonfluent, broken due to dysarthria but no spontaneous word-finding issues. Follows commands w/o issue.    Cranial nerves: EOMI, face is symmetric, palatal tremor ~1hz, mod dysarthric speech but still discernable speech, tongue protrudes to midline.    Motor: No abnormal movements at rest. Antigravity in all extremities, moves all limbs equally + symmetrically. Negative pronator drift.    Coord: no dysmetria w/ FNF.    Gait: Able to stand up with assist of 1. Maintains posture w/ wide base for 10s with apparent axial ataxia, can take 1 step with lots of support.    Data Reviewed:   No new data    Impression:  Mr. Lozada is a 71 year old  male with a history of ataxia, palatal tremor with olivary hypertrophy, negative genetic testing (autosomal dom repeat-mediated ataxias, SCA1,2,3,6,7, next gen seq, normal frataxin). Slow progression of symptoms with appropriate conservative therapies, environmental modifications and medications. He is interested in evaluating for gluten sensitivity in relation to his ataxia, which is reasonable so will complete these labs today. Otherwise, will continue PT and re-establish care with SLP & repeat swallow evaluation.     Plan:    - continue Adderall  - labs: anti-gliadin antibodies  - continue PT + exercises  - not interested in OT referral  - SLP referral + swallow study  - Renew handicapped parking    RTC: 1yr      Patient and plan was examined & discussed with attending physician, Dr. Pena.    Smita Pearson MD  Movement Disorder Fellow       Attestation signed by Giorgio Pena MD at 6/24/2022 12:03 PM:  I have personally interviewed and examined Mr. Arnold Lozada and agree with diagnosis and management. The total time spent with the patient was 30 minutes, over 50% of the time spent in counseling and coordinating care.      Sincerely,    Giorgio Pena MD

## 2022-06-24 NOTE — PATIENT INSTRUCTIONS
Labs today: check for gluten antibodies.    Keep staying active and working with PT!    I will renew your handicapped parking today.        I recommend you talk to your GI doctor about your diarrhea in case they would like to do a further work up or change your colitis medications.

## 2022-06-27 LAB
GLIADIN IGA SER-ACNC: 2.7 U/ML
GLIADIN IGG SER-ACNC: <0.6 U/ML

## 2022-06-30 ENCOUNTER — OFFICE VISIT (OUTPATIENT)
Dept: AUDIOLOGY | Facility: CLINIC | Age: 72
End: 2022-06-30
Payer: MEDICARE

## 2022-06-30 DIAGNOSIS — H90.3 SENSORINEURAL HEARING LOSS, BILATERAL: Primary | ICD-10-CM

## 2022-06-30 PROCEDURE — 92591 PR HEARING AID EXAM BINAURAL: CPT | Performed by: AUDIOLOGIST

## 2022-06-30 PROCEDURE — V5275 EAR IMPRESSION: HCPCS | Performed by: AUDIOLOGIST

## 2022-06-30 PROCEDURE — 92550 TYMPANOMETRY & REFLEX THRESH: CPT | Performed by: AUDIOLOGIST

## 2022-06-30 PROCEDURE — 92557 COMPREHENSIVE HEARING TEST: CPT | Performed by: AUDIOLOGIST

## 2022-06-30 NOTE — PROGRESS NOTES
AUDIOLOGY REPORT    SUBJECTIVE:  Arnold Lozada is a 71 year old male who was seen in the Audiology Clinic [facility] on 6/30/22 for audiologic evaluation, referred by No ref. provider found.  The patient has been seen previously in this clinic on 7/18/19  for assessment and results indicated bilateral sensorineural hearing loss. The patient would like to upgrade his hearing aids. The patient denies  bilateral tinnitus, bilateral otalgia, bilateral drainage and bilateral aural fullness. The patient notes difficulty with communication in a variety of listening situations. They were accompanied today by their wife.    OBJECTIVE:      Otoscopic exam indicates partial obstruction with cerumen bilaterally     Pure Tone Thresholds assessed using standard techniques  audiometry with good  reliability from 250-8000 Hz bilaterally using insert earphones and circumaural headphones     RIGHT:  normal and mild-moderate sloping severe to profound  sensorineural hearing loss    LEFT:   normal and mild-moderate sloping severe to profound  sensorineural hearing loss    Tympanogram:    RIGHT: normal eardrum mobility    LEFT:   normal eardrum mobility    Reflexes (reported by stimulus ear):  RIGHT: Ipsilateral is present at normal levels  RIGHT: Contralateral is present at normal levels  LEFT:   Ipsilateral is present at normal levels  LEFT:   Contralateral is present at normal levels      Speech Reception Threshold:    RIGHT: 40 dB HL    LEFT:   40 dB HL    Word Recognition Score:     RIGHT: 100% at 80 dB HL using NU-6 recorded word list.    LEFT:   92% at 80 dB HL using NU-6 recorded word list.      ASSESSMENT:   No diagnosis found.    Compared to patient's previous audiogram dated 7/18/2019, hearing has declined slightly. Today s results were discussed with the patient in detail.     Patient is a hearing aid candidate. Patient would like to move forward with a hearing aid evaluation today. Therefore,  the patient was presented with different options for amplification to help aid in communication. Discussed styles, levels of technology and monaural vs. binaural fitting.     The hearing aid(s) mutually chosen were:  Binaural: Phonak Audeo Fit P70R  COLOR: P6  BATTERY SIZE: rechargeable  EARMOLD/TIPS: canal lock c shell  CANAL/ LENGTH: 2    Otoscopy revealed partial obstruction with cerumen bilaterally. Bilateral earmolds were taken without incident.      Reviewed purchase information and warranty information with patient. The 45 day trial period was explained to patient. The patient was given a copy of the [place] [employer] consumer brochure on purchasing hearing instruments. Patient risk factors have been provided to the patient in writing prior to the sale of the hearing aid per FDA regulation. The risk factors are also available in the User Instructional Booklet to be presented on the day of the hearing aid fitting. Hearing aid(s) ordered. Hearing aid evaluation completed.      PLAN:  Patient was counseled regarding hearing loss and impact on communication.  Handout on good communication strategies, and hearing aid use was given to patient.  Patient is scheduled to return in 2-3 weeks for a hearing aid fitting and programming.  He will see ENT for bilateral cerumen removal prior to fitting appointment. Purchase agreement will be completed on that date. Please contact this clinic with any questions or concerns.      Jas Murcia MA, CCC-A  [place] Licensed Audiologist #[id number]  6/30/2022

## 2022-07-20 ENCOUNTER — TRANSFERRED RECORDS (OUTPATIENT)
Dept: HEALTH INFORMATION MANAGEMENT | Facility: CLINIC | Age: 72
End: 2022-07-20

## 2022-07-26 ENCOUNTER — TRANSFERRED RECORDS (OUTPATIENT)
Dept: HEALTH INFORMATION MANAGEMENT | Facility: CLINIC | Age: 72
End: 2022-07-26

## 2022-07-26 LAB
ALT SERPL-CCNC: 12 IU/L (ref 0–44)
AST SERPL-CCNC: 15 IU/L (ref 0–40)
CREATININE (EXTERNAL): 1.05 MG/DL (ref 0.76–1.27)
GFR ESTIMATED (EXTERNAL): 76 ML/MIN/1.73
GLUCOSE (EXTERNAL): 141 MG/DL (ref 65–99)
POTASSIUM (EXTERNAL): 5 MMOL/L (ref 3.5–5.2)

## 2022-08-04 ENCOUNTER — OFFICE VISIT (OUTPATIENT)
Dept: AUDIOLOGY | Facility: CLINIC | Age: 72
End: 2022-08-04
Payer: COMMERCIAL

## 2022-08-04 DIAGNOSIS — H90.3 SENSORINEURAL HEARING LOSS, BILATERAL: Primary | ICD-10-CM

## 2022-08-04 PROCEDURE — V5011 HEARING AID FITTING/CHECKING: HCPCS | Mod: RT | Performed by: AUDIOLOGIST

## 2022-08-04 PROCEDURE — V5261 HEARING AID, DIGIT, BIN, BTE: HCPCS | Mod: GK | Performed by: AUDIOLOGIST

## 2022-08-04 PROCEDURE — V5261 HEARING AID, DIGIT, BIN, BTE: HCPCS | Mod: GA | Performed by: AUDIOLOGIST

## 2022-08-04 PROCEDURE — V5264 EAR MOLD/INSERT: HCPCS | Mod: RT | Performed by: AUDIOLOGIST

## 2022-08-04 PROCEDURE — 92593 PR HEARING AID CHECK, BINAURAL: CPT | Performed by: AUDIOLOGIST

## 2022-08-04 PROCEDURE — 99207 PR NO CHARGE LOS: CPT | Performed by: AUDIOLOGIST

## 2022-08-04 PROCEDURE — V5160 DISPENSING FEE BINAURAL: HCPCS | Performed by: AUDIOLOGIST

## 2022-08-04 PROCEDURE — V5020 CONFORMITY EVALUATION: HCPCS | Mod: RT | Performed by: AUDIOLOGIST

## 2022-08-04 NOTE — PATIENT INSTRUCTIONS

## 2022-08-04 NOTE — PROGRESS NOTES
AUDIOLOGY REPORT    SUBJECTIVE: Arnold Lozada, a 71 year old male, was seen in the Audiology Clinic at [facility] today for a Binaural hearing aid fitting. Previous results have revealed a bilateral  sensorineural hearing loss.     OBJECTIVE:  Prior to fitting, a hearing aid check was performed to ensure device functionality. The hearing aid conformity evaluation was completed.The hearing aids were placed and they provided a good fit. Real-ear-probe-microphone measurements were completed on the [employer] system and were a good match to NAL-NL2 target with soft sounds audible, moderate sounds comfortable, and loud sounds below discomfort. UCLs are verified through maximum power output measures and demonstrate appropriate limiting of loud inputs. Mr. Lozada was oriented to proper hearing aid use, care, cleaning (no water, dry brush), batteries (rechargeable,  insertion/removal, low-battery signal), aid insertion/removal, user booklet, warranty information, storage cases, and other hearing aid details. The patient confirmed understanding of hearing aid use and care, and showed proper insertion of hearing aid and batteries while in the office today. Mr. Lozada reported good volume and sound quality today.    EAR(S) FIT: Binaural  MA HEARING AID MAKE: Right: Phonak; Left: Phonak  MA HEARING AID MODEL #: Right: Audeo P70-R Fit; Left: Audeo P70-R Fit  HEARING AID STYLE: Right: LIZ; Left: LIZ  DOME SIZE: Right:   ; Left::      LENGTH: Right:  2M Sensor; Left:  2M Sensor  EARMOLDS: Right: C Shell Sensor [id number]; Left:  C Shell Sensor [id number]  SERIAL NUMBERS: Right: [id number]; Left: [id number]  WARRANTY END DATE: Right: 10/4/2025; Left:: 10/4/2025      CHARGES:   Earmold(s): , Qty 2, $160.00, NU (New), RT (Right) and LT (Left)  Hearing Aid Check: Binaural, 82668, $81.00  Dispensing Fee: Binaural, , $500.00, ,   Fit/Orientation: Binaural, , $416.00  Hearing Aid  Conformity Evaluation: 2, , $174.00  Hearing Aid Digital: Binaural, BTE, .,NU (Binaural)GK, $2429.00  Total: $5760.00  The patient signed a waiver agreeing to the upgrade charge, per their insurance contract.  .001 - BTE DIGITAL BINAURAL UPGRADE , $2000.00       ASSESSMENT: Binaural hearing aid fitting completed today. Verification measures were performed. The 45 day trial period was explained to patient, and they expressed understanding. Mr. Lozada signed the Hearing Aid Purchase Agreement and was given a copy, as well as details on his hearing aids. Patient was counseled that exact out of pocket amounts cannot be determined for hearing aid claims being sent to insurance. Any insurance coverage information presented to the patient is an estimate only, and is not a guarantee of payment. Patient has been advised to check with their own insurance.    PLAN: Mr. Lozada will return for follow-up in 2-3 weeks for a hearing aid review appointment. Please call this clinic with questions regarding today s appointment.    Jas Murcia MA, CCC-A  [place] Licensed Audiologist #[id number]  [facility] Audiology        8/4/2022

## 2022-08-23 ENCOUNTER — THERAPY VISIT (OUTPATIENT)
Dept: SPEECH THERAPY | Facility: CLINIC | Age: 72
End: 2022-08-23
Attending: PSYCHIATRY & NEUROLOGY
Payer: MEDICARE

## 2022-08-23 ENCOUNTER — ANCILLARY PROCEDURE (OUTPATIENT)
Dept: GENERAL RADIOLOGY | Facility: CLINIC | Age: 72
End: 2022-08-23
Attending: PSYCHIATRY & NEUROLOGY
Payer: MEDICARE

## 2022-08-23 DIAGNOSIS — R13.10 DYSPHAGIA, UNSPECIFIED TYPE: ICD-10-CM

## 2022-08-23 PROCEDURE — 92611 MOTION FLUOROSCOPY/SWALLOW: CPT | Mod: GN | Performed by: SPEECH-LANGUAGE PATHOLOGIST

## 2022-08-23 PROCEDURE — 74230 X-RAY XM SWLNG FUNCJ C+: CPT | Mod: GC | Performed by: RADIOLOGY

## 2022-08-23 PROCEDURE — 92610 EVALUATE SWALLOWING FUNCTION: CPT | Mod: GN | Performed by: SPEECH-LANGUAGE PATHOLOGIST

## 2022-08-23 RX ORDER — BARIUM SULFATE 400 MG/ML
30 SUSPENSION ORAL ONCE
Status: COMPLETED | OUTPATIENT
Start: 2022-08-23 | End: 2022-08-23

## 2022-08-23 RX ADMIN — BARIUM SULFATE 30 ML: 400 SUSPENSION ORAL at 14:44

## 2022-08-23 NOTE — PROGRESS NOTES
Speech-Language Pathology Department   EVALUATION  [facility]  Video swallow study      08/23/22 1400   General Information   Type Of Visit Initial   Start Of Care Date 08/23/22   Referring Physician Dr. Smita Navarro   Orders Evaluate And Treat   Orders Comment Video Swallow Study   Medical Diagnosis Dysphagia, ataxia   Precautions/limitations Fall Precautions   Hearing Bilateral hearing aids   Pertinent History of Current Problem/OT: Additional Occupational Profile Info Arnold Lozada is a 71-year-old man who has ataxia. He reports having seen SLP recently and reported increased trouble with coughing when eating and drinking. He also notes increased trouble with rice, couscous, quinoa and cracker/cookies. He denies recent pneumonia or URI. He notes the coughing is intermittent and does not increase as the day progresses.   Respiratory Status Room air   Prior Level Of Function Swallowing   Prior Level Of Function Comment Regular solids and thin liquids   Patient Role/employment History Retired   Home/community Accessibility Comments (flowsheet Row) Pt uses wheelchair for getting around. He is currently in physical therapy   General Observations Pt highly pleasant and cooperative throughout evaluation   Patient/family Goals Pt would like to determine if his swallow function has changed.   Clinical Swallow Evaluation   Oral Musculature generally intact   Dentition present and adequate   Mucosal Quality good   Mandibular Strength and Mobility intact   Oral Labial Strength and Mobility impaired coordination   Lingual Strength and Mobility impaired coordination   Velar Elevation impaired   Buccal Strength and Mobility intact   Laryngeal Function Cough;Throat clear;Swallow;Voicing initiated   Additional Documentation Yes   Additional evaluation(s) completed today Yes   Rationale for completing additional evaluation Video swallow study to objectively qualify  pharyngeal function.   VFSS Eval: Radiology   Radiologist Resident   Views Taken left lateral;A/P   VFSS Eval: Thin Liquid Texture Trial   Mode of Presentation, Thin Liquid cup;self-fed   Order of Presentation Series 1, 2, 3, 14 (A/P)   Preparatory Phase WFL   Oral Phase, Thin Liquid WFL   Pharyngeal Phase, Thin Liquid WFL   Rosenbek's Penetration Aspiration Scale: Thin Liquid Trial Results 2 - contrast enters airway, remains above the vocal cords, no residue remains (penetration)   Diagnostic Statement Patient demonstrates penetration on thin liquid trials which spontaneously clears the laryngeal vestibule.  No aspiration noted.  Timely swallow.   VFSS Eval: Slightly Thick Liquids   Mode of Presentation cup;self-fed   Order of Presentation Series 4, 5   Preparatory Phase WFL   Oral Phase WFL   Pharyngeal Phase WFL   Rosenbek's Penetration Aspiration Scale 1 - no aspiration, contrast does not enter airway   Diagnostic Statement No aspiration/penetration noted on slightly thick liquid trials.   VFSS Eval: Mildly Thick Liquids    Mode of Presentation cup;self-fed   Order of Presentation Series 6, 7   Preparatory Phase WFL   Oral Phase WFL   Pharyngeal Phase WFL   Rosenbek's Penetration Aspiration Scale 1 - no aspiration, contrast does not enter airway   Diagnostic Statement No aspiration/penetration noted on mildly thick liquid trials.   VFSS Eval: Puree Solid Texture Trial   Mode of Presentation, Puree self-fed;spoon   Order of Presentation Series 8, 9, 13 (A/P)   Preparatory Phase WFL   Oral Phase, Puree WFL   Pharyngeal Phase, Puree WFL   Rosenbek's Penetration Aspiration Scale: Puree Food Trial Results 1 - no aspiration, contrast does not enter airway   Diagnostic Statement No aspiration/penetration noted on purée consistency trials.  No pharyngeal residue after the completed swallow.   VFSS Eval: Regular Texture Trial (Solid)   Mode of Presentation self-fed   Order of Presentation Cookie Series 10, 11; Barium  pill series 12   Preparatory Phase WFL   Oral Phase WFL   Pharyngeal Phase WFL   Rosenbek's Penetration Aspiration Scale 1 - no aspiration, contrast does not enter airway   Diagnostic Statement Adequate oral manipulation of solid consistencies.  No aspiration/penetration noted on cookie trials.  No pharyngeal residue.  Barium tablet passed through oral and pharyngeal phases without difficulty.   Swallow Compensations   Swallow Compensations No compensations were used   Educational Assessment   Barriers to Learning No barriers   Esophageal Phase of Swallow   Patient reports or presents with symptoms of esophageal dysphagia No   Swallow Eval: Clinical Impressions   Skilled Criteria for Therapy Intervention No problems identified which require skilled intervention   Functional Assessment Scale (FAS) 6   Dysphagia Outcome Severity Scale (FRANCISCO) Level 6 - FRANCISCO   Diet texture recommendations Regular diet;Thin liquids (level 0)   Predicted Duration of Therapy Intervention (days/wks) Evaluation only   Risks and Benefits of Treatment have been explained. Yes   Patient, family and/or staff in agreement with Plan of Care Yes   Clinical Impression Comments Arnold Lozada demonstrates minimal pharyngeal dysphagia as characterized by penetration on thin liquid trials.  No pharyngeal residue on any consistencies presented.  No aspiration noted on any consistency.  Symmetrical swallow on AP view.  Patient demonstrates adequate oral cohesion and coordination of bolus.  No nasal reflux noted on any trials.  Adequate hyolaryngeal excursion demonstrated with complete epiglottic inversion noted.  Adequate UES opening noted.  Patient does demonstrate movement of the arytenoids between swallows.  Adequate base of tongue retraction and pharyngeal constriction demonstrated.      Recommend he continue with regular consistency solids and thin liquids.  Sit upright for all p.o. intake.  Minimize distraction during p.o. intake.  He is at  higher risk for intermittent coughing/choking episodes due to his ataxia and decreased volitional coordination.  He has been working with outpatient speech pathology at another location.  Encouraged him to follow-up with that team.  No further speech pathology services indicated at the [facility].   Total Session Time   SLP Eval: oral/pharyngeal swallow function, clinical minutes (50611) 13   SLP Eval: VideoFluoroscopic Swallow function Minutes (35011) 25   Total Evaluation Time 38     (Chart documentation was completed in part with Dragon voice-recognition software. Even though reviewed, some grammatical, spelling, and word errors may remain.)      Thank you for the referral of Arnold Lozada. If you have any questions about this report, please contact me using the information below.      Hina Rushing MS, CCC-SLP  Speech-Language Pathology  [facility]  Department of Otolaryngology/D&T - 4th floor  Pager: [phone]  Phone: [phone]  Email: [email or web address]

## 2022-08-25 ENCOUNTER — OFFICE VISIT (OUTPATIENT)
Dept: AUDIOLOGY | Facility: CLINIC | Age: 72
End: 2022-08-25
Payer: COMMERCIAL

## 2022-08-25 DIAGNOSIS — H90.3 SENSORINEURAL HEARING LOSS, BILATERAL: Primary | ICD-10-CM

## 2022-08-25 PROCEDURE — V5299 HEARING SERVICE: HCPCS | Performed by: AUDIOLOGIST

## 2022-08-25 NOTE — PROGRESS NOTES
HEARING AID RECHECK    Patient Name:  Arnold Lozada    Patient Age:   71 year old    :  1950    Background:   Patient was fit with [email or web address] P70R fit hearing aids on 22 and is here today for his first follow up visit.    He feels he is hearing well with the aids but the earmolds are making his ears sore.  He feels they protrude and cause pain when opening his jaw.    Procedures:   We decided to have the c shells remade.  I took new open jaw impressions of both ears and sent the hearing aids and c shells to [employer] for repair with the request of making them smaller, and more recessed.    Plan:   Call patient for pickup when hearing aids are returned.    NO CHARGE VISIT    Jas Murcia MA, CCC-A  [place] Licensed Audiologist #[id number]  2022

## 2022-08-27 ENCOUNTER — HEALTH MAINTENANCE LETTER (OUTPATIENT)
Age: 72
End: 2022-08-27

## 2022-09-21 ENCOUNTER — TELEPHONE (OUTPATIENT)
Dept: AUDIOLOGY | Facility: CLINIC | Age: 72
End: 2022-09-21

## 2022-09-21 NOTE — TELEPHONE ENCOUNTER
Both hearing aids and cshells returned from repair with proper programming restored. A listening check revealed crisp and clear sound output. New SN noted in episode of care.     Arnold was called, voicemail left, for pick-up at the first floor  (M-F 7AM-4PM).    Follow-up appointment not necessary unless patient initiated.     Yashira Encarnacion, CCC-A  Licensed Audiologist  [place] #[id number]      09/21/22

## 2022-10-14 DIAGNOSIS — G47.10 EXCESSIVE SLEEPINESS: ICD-10-CM

## 2022-10-14 RX ORDER — DEXTROAMPHETAMINE SACCHARATE, AMPHETAMINE ASPARTATE MONOHYDRATE, DEXTROAMPHETAMINE SULFATE AND AMPHETAMINE SULFATE 7.5; 7.5; 7.5; 7.5 MG/1; MG/1; MG/1; MG/1
30 CAPSULE, EXTENDED RELEASE ORAL DAILY
Qty: 90 CAPSULE | Refills: 0 | Status: SHIPPED | OUTPATIENT
Start: 2022-10-14 | End: 2023-01-19

## 2022-10-17 ENCOUNTER — TELEPHONE (OUTPATIENT)
Dept: FAMILY MEDICINE | Facility: CLINIC | Age: 72
End: 2022-10-17

## 2022-10-17 NOTE — TELEPHONE ENCOUNTER
Rachelle is calling from [facility] looking to start services for patient today.  Patient was recently discharged from the hospital.    They need a verbal ok,that provider is willing to follow for home care via, verbal, and faxed orders.    Need verbal orders for SN, PT, and OT, to eval and treat.    They will go see patient today, but will need above orders to keep seeing patient after this.  Routed to PCP, as change of condition, as patient was in the hospital.    Ok to leave a message/orders, on home care voicemail, as it is confidential.    Anny RN,BSN  Triage Nurse  [facility]  Ph: [phone]

## 2022-10-17 NOTE — TELEPHONE ENCOUNTER
Called Rachelle with [facility] home care back and updated her on provider's message.  Advised that Nikhil Nina is currently on leave for another month. Explained that patient has not been seen for over 1 year so would need to schedule a visit with primary care.  In the interim, covering providers can assist if needed.  Rachelle verbalized understanding    Silverio Murphy RN  [facility]

## 2022-10-18 NOTE — PROGRESS NOTES
COLONOSCOPY Procedure Report    Patient Name:  Dave Aguilar  YOB: 1957    Date of Surgery:  10/18/2022     Pre-Op Diagnosis:  Personal history of rectal cancer [Z85.048]       Post-Op Diagnosis Codes:     * Personal history of rectal cancer [Z85.048]  8 mm congested fold in the descending colon was biopsied to rule out sessile polyp  Evidence of prior rectal and sigmoid resection with ostomy    Procedure/CPT® Codes:      Procedure(s):  COLONOSCOPY with biopsy x1 area.    Staff:  Surgeon(s):  Janes Treadwell MD      Anesthesia: Monitored Anesthesia Care    Description of Procedure:  A description of the procedure as well as risks, benefits and alternative methods were explained to the patient who voiced understanding and signed the corresponding consent form. A physical exam was performed and vital signs were monitored throughout the procedure.    And ostomy exam was performed which was normal. An Olympus colonoscope was placed into the ostomy and proceeded under direct visualization through the colon until the cecum and appendiceal orifice were identified. Careful visualization occurred upon slow withdraw of the scope. . The quality of the prep was good. The procedure was not difficult and there were no immediate complications.    Specimen:        See Below    Estimated blood loss: none    Complications:  None    Findings:  8 mm congested fold in the descending colon was biopsied to rule out sessile polyp  Evidence of prior rectal and sigmoid resection with ostomy  Previous attempt at flex sig showed only 2 cm remaining rectal pouch so this was not examined today    Impression:  Personal history of rectal cancer with 1 possible polyp versus congested fold biopsied today    Recommendations:  Follow-up biopsy results  If polyp is adenomatous repeat colonoscopy in 1 year otherwise repeat colonoscopy in 5 years      Janes Treadwell MD     Date: 10/18/2022    Time: 10:50 EDT     Subjective:    Arnold Lozada is a 66 year old male with a lumbar condition.  Condition occurred with:  Insidious onset.  Condition occurred: for unknown reasons.  This is a new condition  Patient reports onset of right glut and posterior thigh pain in March 2017. Patient was in the hospital overnight for 1 night until his pain was controlled. .    Patient reports pain:  Lumbar spine right.  Radiates to:  Gluteals right, knee right and lower leg right.  Pain is described as stabbing  and reported as 6/10.  Associated symptoms:  Loss of motion/stiffness. Pain is worse during the day.  Symptoms are exacerbated by bending and sitting and relieved by muscle relaxants (standing).  Since onset symptoms are unchanged.  Special tests:  MRI and x-ray (see epic).      General health as reported by patient is good.                      Red flags:  None as reported by the patient.                      Objective:    Standing Alignment:      Shoulder/UE:  Rounded shoulders  Lumbar:  Lateral shift R            Gait:  Ataxic                     Lumbar/SI Evaluation  ROM:    AROM Lumbar:   Flexion:            Hands to floor, painful  Ext:                    WNL,painfree   Side Bend:        Left:  Hand to knee    Right:  Hand to knee  Rotation:           Left:  WNL    Right:  WNL  Side Glide:        Left:     Right:           Lumbar Myotomes:  Lumbar myotomes: grossly 4+/5.                  Neural Tension/Mobility:      Right side:   Slump and SLR positive.  Lumbar Palpation:      Tenderness present at Right: Piriformis and Gluteus Medius    Lumbar Provocation:  normal      Spinal Segmental Conclusions:     Level: Hypo noted at L4, L5 and L3      SI joint/Sacrum:    unremarkable                                                       General     ROS    Assessment/Plan:      Patient is a 66 year old male with lumbar complaints.    Patient has the following significant findings with corresponding treatment plan.                 Diagnosis 1:  Low back pain  Pain -  hot/cold therapy, manual therapy, self management, education, directional preference exercise and home program  Decreased joint mobility - manual therapy, therapeutic exercise, therapeutic activity and home program  Decreased strength - therapeutic exercise, therapeutic activities and home program  Impaired gait - home program  Decreased function - therapeutic activities and home program  Impaired posture - neuro re-education, therapeutic activities and home program    Therapy Evaluation Codes:   1) History comprised of:   Personal factors that impact the plan of care:      None.    Comorbidity factors that impact the plan of care are:      cerebellar ataxia.     Medications impacting care: None.  2) Examination of Body Systems comprised of:   Body structures and functions that impact the plan of care:      Lumbar spine.   Activity limitations that impact the plan of care are:      Bathing, Bending, Cooking, Dressing, Lifting, Reading/Computer work, Sitting, Squatting/kneeling and Stairs.  3) Clinical presentation characteristics are:   Stable/Uncomplicated.  4) Decision-Making    Low complexity using standardized patient assessment instrument and/or measureable assessment of functional outcome.  Cumulative Therapy Evaluation is: Low complexity.    Previous and current functional limitations:  (See Goal Flow Sheet for this information)    Short term and Long term goals: (See Goal Flow Sheet for this information)     Communication ability:  Patient appears to be able to clearly communicate and understand verbal and written communication and follow directions correctly.  Treatment Explanation - The following has been discussed with the patient:   RX ordered/plan of care  Anticipated outcomes  Possible risks and side effects  This patient would benefit from PT intervention to resume normal activities.   Rehab potential is good.    Frequency:  1 X week, once daily  Duration:  for 4  weeks  Discharge Plan:  Achieve all LTG.  Independent in home treatment program.  Reach maximal therapeutic benefit.    Please refer to the daily flowsheet for treatment today, total treatment time and time spent performing 1:1 timed codes.

## 2022-10-24 ENCOUNTER — TELEPHONE (OUTPATIENT)
Dept: INTERNAL MEDICINE | Facility: CLINIC | Age: 72
End: 2022-10-24

## 2022-10-24 NOTE — TELEPHONE ENCOUNTER
Reason for Call:  Form, our goal is to have forms completed with 72 hours, however, some forms may require a visit or additional information.    Type of letter, form or note:  Home Health Certification    Who is the form from?: Home care    Where did the form come from: form was faxed in    What clinic location was the form placed at?: [facility]    Where the form was placed: Given to physician    What number is listed as a contact on the form?: [phone]       Call taken on 10/24/2022 at 8:46 AM by Amanda Lakhani

## 2022-10-24 NOTE — TELEPHONE ENCOUNTER
[facility] received and given to Dr. Husain to sign for Dr. Carpenter who is out of the office until 11-.  Amanda Lakhani,

## 2022-10-25 DIAGNOSIS — Z53.9 DIAGNOSIS NOT YET DEFINED: Primary | ICD-10-CM

## 2022-10-25 PROCEDURE — 99207 PR MD CERTIFICATION HHA PATIENT: CPT | Performed by: INTERNAL MEDICINE

## 2022-10-26 ENCOUNTER — TELEPHONE (OUTPATIENT)
Dept: FAMILY MEDICINE | Facility: CLINIC | Age: 72
End: 2022-10-26

## 2022-10-26 NOTE — TELEPHONE ENCOUNTER
ROB [place] calling from [facility] requesting home care orders:    Physical therapy  2x a week for 1 week, effective yesterday.   1x a week for 5 weeks  For functional activity and tolerance training    PCP not in clinic, routing to covering providers to please address.    Nurse - OK to leave confidential VM at [phone].    Kimmy Phan RN  [facility]

## 2022-10-26 NOTE — TELEPHONE ENCOUNTER
Left detailed message on answering machine for [facility] on a confidential line.     Yessenia Draper RN  [facility]

## 2022-10-28 ENCOUNTER — TELEPHONE (OUTPATIENT)
Dept: FAMILY MEDICINE | Facility: CLINIC | Age: 72
End: 2022-10-28

## 2022-10-28 NOTE — TELEPHONE ENCOUNTER
Called and left detailed message with provider's message.     Lissette Vieira RN  [facility]

## 2022-10-28 NOTE — TELEPHONE ENCOUNTER
Patient has switched PCP from [place] and is being followed by Dr Randhawa at [facility] [place]. PT did follow up with that provider regarding recent concerns for elevated heart rate. PT did relay message to pt spouse to monitor heart rate and blood pressure twice daily. If Questions please call [facility] at [phone]

## 2022-12-08 DIAGNOSIS — R35.0 BENIGN PROSTATIC HYPERPLASIA WITH URINARY FREQUENCY: ICD-10-CM

## 2022-12-08 DIAGNOSIS — N40.1 BENIGN PROSTATIC HYPERPLASIA WITH URINARY FREQUENCY: ICD-10-CM

## 2022-12-09 NOTE — TELEPHONE ENCOUNTER
Patient is now seeing a different primary health care provider     Reroute to current primary health care provider for refill     Nikhil Carpenter MD

## 2022-12-12 RX ORDER — TAMSULOSIN HYDROCHLORIDE 0.4 MG/1
CAPSULE ORAL
Qty: 180 CAPSULE | Refills: 0 | OUTPATIENT
Start: 2022-12-12

## 2022-12-20 ENCOUNTER — MYC REFILL (OUTPATIENT)
Dept: INTERNAL MEDICINE | Facility: CLINIC | Age: 72
End: 2022-12-20

## 2022-12-20 DIAGNOSIS — R35.0 BENIGN PROSTATIC HYPERPLASIA WITH URINARY FREQUENCY: ICD-10-CM

## 2022-12-20 DIAGNOSIS — N40.1 BENIGN PROSTATIC HYPERPLASIA WITH URINARY FREQUENCY: ICD-10-CM

## 2022-12-20 RX ORDER — TAMSULOSIN HYDROCHLORIDE 0.4 MG/1
CAPSULE ORAL
Qty: 180 CAPSULE | Refills: 0 | OUTPATIENT
Start: 2022-12-20

## 2023-01-08 ENCOUNTER — HEALTH MAINTENANCE LETTER (OUTPATIENT)
Age: 73
End: 2023-01-08

## 2023-01-15 ENCOUNTER — MYC MEDICAL ADVICE (OUTPATIENT)
Dept: NEUROLOGY | Facility: CLINIC | Age: 73
End: 2023-01-15

## 2023-01-19 DIAGNOSIS — G47.10 EXCESSIVE SLEEPINESS: ICD-10-CM

## 2023-01-19 RX ORDER — DEXTROAMPHETAMINE SACCHARATE, AMPHETAMINE ASPARTATE MONOHYDRATE, DEXTROAMPHETAMINE SULFATE AND AMPHETAMINE SULFATE 7.5; 7.5; 7.5; 7.5 MG/1; MG/1; MG/1; MG/1
30 CAPSULE, EXTENDED RELEASE ORAL DAILY
Qty: 90 CAPSULE | Refills: 0 | Status: SHIPPED | OUTPATIENT
Start: 2023-01-19 | End: 2024-06-14

## 2023-06-30 ENCOUNTER — LAB (OUTPATIENT)
Dept: LAB | Facility: CLINIC | Age: 73
End: 2023-06-30
Payer: MEDICARE

## 2023-06-30 ENCOUNTER — OFFICE VISIT (OUTPATIENT)
Dept: NEUROLOGY | Facility: CLINIC | Age: 73
End: 2023-06-30
Payer: MEDICARE

## 2023-06-30 VITALS
HEART RATE: 98 BPM | OXYGEN SATURATION: 95 % | SYSTOLIC BLOOD PRESSURE: 134 MMHG | DIASTOLIC BLOOD PRESSURE: 79 MMHG | RESPIRATION RATE: 16 BRPM

## 2023-06-30 DIAGNOSIS — R27.0 ATAXIA: Primary | ICD-10-CM

## 2023-06-30 DIAGNOSIS — R27.0 ATAXIA: ICD-10-CM

## 2023-06-30 DIAGNOSIS — G47.10 EXCESSIVE SLEEPINESS: ICD-10-CM

## 2023-06-30 PROCEDURE — 36415 COLL VENOUS BLD VENIPUNCTURE: CPT | Performed by: PATHOLOGY

## 2023-06-30 PROCEDURE — 99214 OFFICE O/P EST MOD 30 MIN: CPT | Performed by: PSYCHIATRY & NEUROLOGY

## 2023-06-30 PROCEDURE — 99207 PR STATISTIC GENETIC COUNSELING, <16 MIN: CPT | Performed by: GENETIC COUNSELOR, MS

## 2023-06-30 RX ORDER — QUETIAPINE FUMARATE 50 MG/1
1 TABLET, FILM COATED ORAL AT BEDTIME
COMMUNITY
Start: 2022-10-14

## 2023-06-30 RX ORDER — FUROSEMIDE 40 MG
1 TABLET ORAL EVERY MORNING
COMMUNITY
Start: 2022-10-15

## 2023-06-30 RX ORDER — GABAPENTIN 300 MG/1
CAPSULE ORAL
COMMUNITY
Start: 2022-10-05

## 2023-06-30 RX ORDER — DEXTROAMPHETAMINE SACCHARATE, AMPHETAMINE ASPARTATE MONOHYDRATE, DEXTROAMPHETAMINE SULFATE AND AMPHETAMINE SULFATE 7.5; 7.5; 7.5; 7.5 MG/1; MG/1; MG/1; MG/1
30 CAPSULE, EXTENDED RELEASE ORAL DAILY
Qty: 90 CAPSULE | Refills: 0 | Status: SHIPPED | OUTPATIENT
Start: 2023-06-30 | End: 2023-07-19

## 2023-06-30 RX ORDER — LOSARTAN POTASSIUM 25 MG/1
TABLET ORAL
COMMUNITY
Start: 2023-05-30

## 2023-06-30 RX ORDER — ATORVASTATIN CALCIUM 40 MG/1
40 TABLET, FILM COATED ORAL DAILY
COMMUNITY
Start: 2022-12-23 | End: 2023-12-23

## 2023-06-30 RX ORDER — CARVEDILOL 12.5 MG/1
12.5 TABLET ORAL
COMMUNITY
Start: 2022-10-14

## 2023-06-30 RX ORDER — CLOPIDOGREL BISULFATE 75 MG/1
1 TABLET ORAL DAILY
COMMUNITY
Start: 2022-10-19 | End: 2024-06-20

## 2023-06-30 ASSESSMENT — PAIN SCALES - GENERAL: PAINLEVEL: NO PAIN (0)

## 2023-06-30 NOTE — PROGRESS NOTES
"GENETIC COUNSELING-ataxia clinic  I spoke with Arnold Lozada and his wife Willa. He has a history of late-onset ataxia and prior negative genetic testing for repeat expansion disorders and sequence-based ataxias. 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  In addition, this ataxia is associated with a relatively late age of onset, which would fit well in the present case.  I think that Arnold would be an excellent candidate for this testing. We reviewed that this is a dominant condition and we establish this diagnosis, we would expect that each of his children would have an independent 50% risk. Following our discussion, Arnold consented to this testing and we will send it to the [facility]. I will contact him with results.  I spent 15 minutes with the patient and his wife reviewing these issues.      Marcelino Whaley MS, [facility] [place] [facility]  Certified Genetic Counselor  "

## 2023-06-30 NOTE — PROGRESS NOTES
returns for follow-up today.  In August 2022 he had shortness of breath and while in the hospital he apparently had a cardiac arrest and was found to have a massive heart attack.  He is currently using a pacemaker/defibrillator.  He has been using Ritalin/Adderall with no problems he uses 30 mg in the morning.    His main complaints today was about sleep/CPAP as well as audiology with regard to ataxia no change.    Current Outpatient Medications   Medication     amLODIPine (NORVASC) 5 MG tablet     amphetamine-dextroamphetamine (ADDERALL XR) 30 MG 24 hr capsule     amphetamine-dextroamphetamine (ADDERALL XR) 30 MG 24 hr capsule     amphetamine-dextroamphetamine (ADDERALL XR) 30 MG 24 hr capsule     amphetamine-dextroamphetamine (ADDERALL XR) 30 MG 24 hr capsule     amphetamine-dextroamphetamine (ADDERALL XR) 30 MG 24 hr capsule     atorvastatin (LIPITOR) 40 MG tablet     carvedilol (COREG) 12.5 MG tablet     Cholecalciferol (D3 MAXIMUM STRENGTH PO)     clopidogrel (PLAVIX) 75 MG tablet     folic acid 800 MCG TABS     furosemide (LASIX) 40 MG tablet     gabapentin (NEURONTIN) 300 MG capsule     losartan (COZAAR) 25 MG tablet     MULTI-VITAMIN PO TABS     Omega 3-6-9 Fatty Acids (OMEGA 3-6-9 COMPLEX) CAPS     POTASSIUM PO     QUEtiapine (SEROQUEL) 50 MG tablet     sertraline (ZOLOFT) 50 MG tablet     simvastatin (ZOCOR) 20 MG tablet     sulfaSALAzine (AZULFIDINE) 500 MG tablet     tamsulosin (FLOMAX) 0.4 MG capsule     traZODone (DESYREL) 50 MG tablet     aspirin 325 MG tablet     fluticasone (FLONASE) 50 MCG/ACT spray     metoprolol succinate ER (TOPROL-XL) 50 MG 24 hr tablet     neomycin-polymyxin-hydrocortisone (CORTISPORIN) 3.5-69899-2 otic suspension     order for DME     ORDER FOR DME     triamcinolone (KENALOG) 0.1 % cream     No current facility-administered medications for this visit.     Past Medical History:   Diagnosis Date     Abnormal gait 10/5/2014     Acute renal failure (H) 10/5/2014      Elevated troponin I level 10/5/2014     Family history of colon cancer 12/3/2013     Head injury 1965     History of colonoscopy nov, 2008     Hypertension      Low back pain      Nocturia 10/5/2014     Other nervous system complications 2014    Diagnosed in 2016 with Cerebellar Ataxia.     Temporary cerebral vascular dysfunction 10/5/2014     Vasovagal syncope 10/5/2014     Physical examination  /79   Pulse 98   Resp 16   SpO2 95%   Speech was quite dysarthric.  Palatal tremor was still present.  Cranial nerves were otherwise unremarkable he could  neutral position with help.  His gait is severely his stance and gait are severely ataxic.    Assessment cerebellar ataxia with palatal tremor.  Given the late onset of his ataxia he may need to be tested for spinocerebellar ataxia type 27B.  We will continue Adderall unchanged.  Another concern today was excessive drooling.  This does not seem to be quite severe and I would not recommend taking any oral drops or botulinum toxin at this stage.  We will follow-up with him as planned.

## 2023-06-30 NOTE — LETTER
"6/30/2023       RE: Arnold Lozada  [address]  [place] [address]     Dear Colleague,    Thank you for referring your patient, Arnold Lozada, to the [facility] [place] [facility]. Please see a copy of my visit note below.    GENETIC COUNSELING-ataxia clinic  I spoke with Arnold Lozada and his wife Willa. He has a history of late-onset ataxia and prior negative genetic testing for repeat expansion disorders and sequence-based ataxias. 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  In addition, this ataxia is associated with a relatively late age of onset, which would fit well in the present case.  I think that Arnold would be an excellent candidate for this testing. We reviewed that this is a dominant condition and we establish this diagnosis, we would expect that each of his children would have an independent 50% risk. Following our discussion, Arnold consented to this testing and we will send it to the [facility]. I will contact him with results.  I spent 15 minutes with the patient and his wife reviewing these issues.            Again, thank you for allowing me to participate in the care of your patient.      Sincerely,    Josué Whaley GC      "

## 2023-06-30 NOTE — LETTER
6/30/2023       RE: Arnold Lozada  [address]  [facility] [address]     Dear Colleague,    Thank you for referring your patient, Arnold Lozada, to the [facility] [place] at [facility]. Please see a copy of my visit note below.     returns for follow-up today.  In August 2022 he had shortness of breath and while in the hospital he apparently had a cardiac arrest and was found to have a massive heart attack.  He is currently using a pacemaker/defibrillator.  He has been using Ritalin/Adderall with no problems he uses 30 mg in the morning.    His main complaints today was about sleep/CPAP as well as audiology with regard to ataxia no change.    Current Outpatient Medications   Medication    amLODIPine (NORVASC) 5 MG tablet    amphetamine-dextroamphetamine (ADDERALL XR) 30 MG 24 hr capsule    amphetamine-dextroamphetamine (ADDERALL XR) 30 MG 24 hr capsule    amphetamine-dextroamphetamine (ADDERALL XR) 30 MG 24 hr capsule    amphetamine-dextroamphetamine (ADDERALL XR) 30 MG 24 hr capsule    amphetamine-dextroamphetamine (ADDERALL XR) 30 MG 24 hr capsule    atorvastatin (LIPITOR) 40 MG tablet    carvedilol (COREG) 12.5 MG tablet    Cholecalciferol (D3 MAXIMUM STRENGTH PO)    clopidogrel (PLAVIX) 75 MG tablet    folic acid 800 MCG TABS    furosemide (LASIX) 40 MG tablet    gabapentin (NEURONTIN) 300 MG capsule    losartan (COZAAR) 25 MG tablet    MULTI-VITAMIN PO TABS    Omega 3-6-9 Fatty Acids (OMEGA 3-6-9 COMPLEX) CAPS    POTASSIUM PO    QUEtiapine (SEROQUEL) 50 MG tablet    sertraline (ZOLOFT) 50 MG tablet    simvastatin (ZOCOR) 20 MG tablet    sulfaSALAzine (AZULFIDINE) 500 MG tablet    tamsulosin (FLOMAX) 0.4 MG capsule    traZODone (DESYREL) 50 MG tablet    aspirin 325 MG tablet    fluticasone (FLONASE) 50 MCG/ACT spray    metoprolol succinate ER (TOPROL-XL) 50 MG 24 hr tablet     neomycin-polymyxin-hydrocortisone (CORTISPORIN) 3.5-05597-5 otic suspension    order for DME    ORDER FOR DME    triamcinolone (KENALOG) 0.1 % cream     No current facility-administered medications for this visit.     Past Medical History:   Diagnosis Date    Abnormal gait 10/5/2014    Acute renal failure (H) 10/5/2014    Elevated troponin I level 10/5/2014    Family history of colon cancer 12/3/2013    Head injury 1965    History of colonoscopy nov, 2008    Hypertension     Low back pain     Nocturia 10/5/2014    Other nervous system complications 2014    Diagnosed in 2016 with Cerebellar Ataxia.    Temporary cerebral vascular dysfunction 10/5/2014    Vasovagal syncope 10/5/2014     Physical examination  /79   Pulse 98   Resp 16   SpO2 95%   Speech was quite dysarthric.  Palatal tremor was still present.  Cranial nerves were otherwise unremarkable he could  neutral position with help.  His gait is severely his stance and gait are severely ataxic.    Assessment cerebellar ataxia with palatal tremor.  Given the late onset of his ataxia he may need to be tested for spinocerebellar ataxia type 27B.  We will continue Adderall unchanged.  Another concern today was excessive drooling.  This does not seem to be quite severe and I would not recommend taking any oral drops or botulinum toxin at this stage.  We will follow-up with him as planned.    /79   Pulse 98   Resp 16   SpO2 95%       Again, thank you for allowing me to participate in the care of your patient.      Sincerely,    Giorgio Pena MD

## 2023-07-10 ENCOUNTER — OFFICE VISIT (OUTPATIENT)
Dept: AUDIOLOGY | Facility: CLINIC | Age: 73
End: 2023-07-10
Payer: MEDICARE

## 2023-07-10 DIAGNOSIS — R27.0 ATAXIA: ICD-10-CM

## 2023-07-10 DIAGNOSIS — H90.3 SENSORINEURAL HEARING LOSS (SNHL) OF BOTH EARS: Primary | ICD-10-CM

## 2023-07-10 PROCEDURE — V5010 ASSESSMENT FOR HEARING AID: HCPCS | Performed by: AUDIOLOGIST

## 2023-07-10 NOTE — PROGRESS NOTES
AUDIOLOGY REPORT    BACKGROUND INFORMATION: Arnold Lozada was seen in the Audiology Clinic at [facility] on 7/10/2023 for follow-up.  The patient has been seen previously on 6/30/2022 and results revealed normal sloping to profound sensorineural hearing loss bilaterally.  The patient was fit with Phonak [employer] P70-R hearing aids on 8/04/2022.  The patient reports that he turns his hearing aids up. .    TEST RESULTS AND PROCEDURES: An electroacoustic hearing aid check was performed.  Adjustments were made including slightly increasing gain at several frequencies and the patient reported good audibility and comfort. The hearing aid conformity evaluation was completed. This includes initially evaluating the devices electroacoustically and later matching NAL-NL2 target with soft sounds audible, moderate sounds comfortable and clear, and loud sounds below discomfort.     SUMMARY AND RECOMMENDATIONS: A hearing aid check was performed today and the patient reports good sound and comfort.  Adjustments were made as noted above and the patient will return as needed or at least every 4-6 months for cleaning and assessment of hearing aid.  Call this clinic with questions regarding today s visit.    Girish Marlow.  Doctor of Audiology  [place] License # [id number]

## 2023-07-19 ENCOUNTER — MYC REFILL (OUTPATIENT)
Dept: NEUROLOGY | Facility: CLINIC | Age: 73
End: 2023-07-19

## 2023-07-19 DIAGNOSIS — G47.10 EXCESSIVE SLEEPINESS: ICD-10-CM

## 2023-07-20 LAB — SCANNED LAB RESULT: NORMAL

## 2023-07-20 NOTE — TELEPHONE ENCOUNTER
[facility] pharmacy, they have the prescription and they can fill the medication today. They will call the pt to inform him that prescription will be ready today.

## 2023-08-20 NOTE — TELEPHONE ENCOUNTER
This is being filled by patients neurologist Giorgio Pena MD . Please reroute prescription     Last appointment with me was over 2 years ago    Nikhil Carpenter MD

## 2023-08-25 RX ORDER — DEXTROAMPHETAMINE SACCHARATE, AMPHETAMINE ASPARTATE MONOHYDRATE, DEXTROAMPHETAMINE SULFATE AND AMPHETAMINE SULFATE 7.5; 7.5; 7.5; 7.5 MG/1; MG/1; MG/1; MG/1
30 CAPSULE, EXTENDED RELEASE ORAL DAILY
Qty: 90 CAPSULE | Refills: 0 | Status: SHIPPED | OUTPATIENT
Start: 2023-08-25 | End: 2023-10-17

## 2023-09-23 ENCOUNTER — HEALTH MAINTENANCE LETTER (OUTPATIENT)
Age: 73
End: 2023-09-23

## 2023-09-25 ENCOUNTER — OFFICE VISIT (OUTPATIENT)
Dept: AUDIOLOGY | Facility: CLINIC | Age: 73
End: 2023-09-25
Payer: MEDICARE

## 2023-09-25 DIAGNOSIS — H90.3 SENSORINEURAL HEARING LOSS (SNHL) OF BOTH EARS: Primary | ICD-10-CM

## 2023-09-25 PROCEDURE — 92567 TYMPANOMETRY: CPT | Performed by: AUDIOLOGIST

## 2023-09-25 PROCEDURE — 92557 COMPREHENSIVE HEARING TEST: CPT | Performed by: AUDIOLOGIST

## 2023-09-25 NOTE — PROGRESS NOTES
AUDIOLOGY REPORT    SUBJECTIVE:  Arnold Lozada is a 72 year old male who was seen in the Audiology Clinic at the [facility] for audiologic evaluation, referred by Rj Pena M.D. .The patient has been seen previously on 6/30/2022 for assessment and results indicated normal sloping to profound sensorineural hearing loss bilaterally. The patient was fit with [employer] P70R hearing aids on 8/04/2022. The patient denies otalgia, aural fullness, otorrhea, tinnitus, and dizziness.  The patient notes difficulty with communication in a variety of listening situations.     OBJECTIVE:    Otoscopic exam indicates ears are clear of cerumen bilaterally     Pure Tone Thresholds assessed using conventional audiometry with good  reliability from 250-8000 Hz bilaterally using insert earphones and circumaural headphones     RIGHT:  normal sloping to profound sensorineural hearing loss    LEFT:    normal sloping to profound sensorineural hearing loss    Tympanogram:    RIGHT: negative pressure     LEFT:   negative pressure       Speech Reception Threshold:    RIGHT: 45 dB HL    LEFT:   45 dB HL  Word Recognition Score:     RIGHT: 100% at 80 dB HL using NU-6 recorded word list.    LEFT:   96% at 80 dB HL using NU-6 recorded word list.      ASSESSMENT:     ICD-10-CM    1. Sensorineural hearing loss (SNHL) of both ears  H90.3 Cmprhn Audiometry Thrshld Eval & Speech Recog (13485)     Tympanometry (impedance - testing) (05869)          Compared to patient's previous audiogram dated 8/30/2022, hearing has remained stable overall. Today s results were discussed with the patient in detail.     PLAN: It is recommended that the patient follow-up in 4-6 months for a hearing aid check.  Please call this clinic with questions regarding these results or recommendations.        Girish Marlow.  Doctor of Audiology  [place] License # [id number]

## 2023-10-17 ENCOUNTER — MYC REFILL (OUTPATIENT)
Dept: NEUROLOGY | Facility: CLINIC | Age: 73
End: 2023-10-17
Payer: MEDICARE

## 2023-10-17 DIAGNOSIS — G47.10 EXCESSIVE SLEEPINESS: ICD-10-CM

## 2023-10-17 NOTE — PROGRESS NOTES
Outpatient Sleep Medicine Consultation:      Name: Arnold Lozada MRN# [id number]   Age: 73 year old YOB: 1950     Date of Consultation: October 17, 2023  Consultation is requested by: Giorgio Pena MD  [address] [facility] [id number]  [place] [address] Giorgio Pena  Primary care provider: Nikhil Carpenter       Reason for Sleep Consult:     Arnold Lozada is sent by Giorgio Pena for a sleep consultation regarding ataxia.    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 He has had moderate, positional GUILLERMINA treated with a dental appliance since 2010. AHI 16.2, RDI 17.6, O2, non-supine AHI 5, supine AHI 53, charisse 88%. He did not tolerate CPAP due to the mask being loud and leaking but thinks he might tolerate a smaller mask. He has not seen a dentist regarding his mandibular advancement device for many years. His wife thinks the dental appliance is not helping and she sleeps in another room. He denies being aware of sleep disruption from apnea but takes quetiapine at bedtime. He denies daytime sleepiness but takes 30 mg Adderall XR. He used to go to bed by 6 PM before being on Adderall. He seems to have shallow breathing and breath stacking inhalations. His SpO2 is 94% and CO2 on metabolic panel is 30. His weight is up 9# since his diagnostic test 10 years ago, BMI only 25 though.   Jas also has a history of ischemic cardiomyopathy with an LVEF of 29% on cardiac MR in  4/2023. Therefore, he has risk for central apnea and Cheyne Stoke breathing as well.  Plan:  Comprehensive Sleep Study, General PFT Lab (Please always keep checked), Pulmonary         Function Test, Blood gas arterial (PH, [place], SH, RH, [facility], HI, UR, [facility], [facility])        Split PSG with TCM. Wife will stay to help with ADLs. ABG and PFTs to see if he qualifies for RAD.         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 He recalls nightmares of running or defending himself and has kicked his wife. He may yell. This is rare, only a couple of times per year. He is not sure, but thinks this has been going on for decades. He denies anosmia. He has Crohn's so constipation is difficult to assess. He denies hallucinations. I did not specifically ask about stridor, but there were no reports consistent with that. However, he sleeps apart from his wife due to loud snoring. He was negative for stridor based on a neurology visit on 9/6/2019.    Plan: Comprehensive Sleep Study        4-limb parasomnia protocol.           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  Bennett Goltz, PA-C      Total time spent reviewing medical records, history and physical examination, review of previous testing and interpretation as well as documentation on this date: 90 min    CC: Giorgio Pena          History of Present Illness:   He presents with his wife.   Jas presents to re-establish care for previously diagnosed mild to moderate GUILLERMINA, treated with a mandibular advancement device. He was previously followed by my  colleagues, Charly Verdin PA-C and Nikita Guzmán PA-C, last seen in 2018. He initially opted to pursue CPAP.  He reported that his ultimate goal was to use a mandibular advancement device and positional therapy. He started CPAP 5-18 cm/H20 on 12/9/2013. The mask would leak and make noise. He only tried a full face mask. He would get tangled in the hose. He has to get up for the restroom, but has a urinal at the bedside.  He did get a dental appliance. His dentist retired and he has not had any follow up with dentistry in years. His wife does not think his snoring is helping at all. She can't sleep in the same room. They have a neighbor that mentioned a different/smaller mask, so he was interested in looking into that.  He is not aware of any sleep disruption from apnea. He denies daytime sleepiness now, but is on Adderall. Prior to that, he was ready for bed at 6 PM and had low energy most of the day.     His wife notices a breathing pattern where he has a catch on the inhale, so he takes a short breath, has a brief pause, second breath in and then he exhales. That happens when awake. That may be consistent with breath stacking.     Cardiac MRI 4/2023: Normal left ventricular chamber size. Mild concentric wall thickening consistent with left ventricular hypertrophy is present.   Calculated left ventricular ejection fraction 29.6%. There is akinesis of the anteroseptal, distal anterior, apical wall.   2.   Normal right ventricular chamber size and systolic function.    3.   All 4 valves are structurally normal.       Past Sleep Evaluations:  polysomnogram on 11/20/2013. Wt: 161#.  Mild to moderate GUILLERMINA, principally in the supine position (AHI 16.2, RDI 17.6, O2, non-supine AHI 5, supine AHI 53, charisse 88%). CPAP titration was attempted during the second portion of the study.  CPAP treatment was not well-tolerated. No sleep was attained and attempt was terminated.         Subsequent home sleep tests to evaluate  effectiveness of oral appliance:  Home sleep test 5/18/2016: With oral appliance. 161 lbs 0 oz: AHI  12.9.  Supine AHI 31.9. Oxygen Vivek of 86% Baseline 92.5%.  Sp02 <88% for 19 minutes  Home Sleep Apnea Testing with oral appliance.- 11/10/16: 161 lbs 0 oz: AHI 1.5/hr. Supine AHI 0.0/hr. Left 0.2. Right 2.8. Oxygen Vivek of 89%.  Baseline 93.5%.  Sp02 =< 88% for 0 minutes  Home Sleep Apnea Testing  with oral appliance 5/24/2018- AHI 6.7/hr; Supine AHI 7.7/hr; SpO2 <= 88% for 19.8 minutes.     SLEEP-WAKE SCHEDULE:     Work/School Days: Patient goes to school/work: No   Usually gets into bed at 10  Takes patient about 15 min to fall asleep  Has trouble falling asleep 0 to 1 nights per week  Wakes up in the middle of the night 2 to 4 times.  Wakes up due to Use the bathroom;Nightmares, RRx2-3  He has trouble falling back asleep 0  times a week.   It usually takes  5-10 min to get back to sleep  Patient is usually up at 8 am PCA 3 times per week that comes at 9 AM.  Uses alarm: No    Weekends/Non-work Days/All Other Days:  Usually gets into bed at 10   Takes patient about 15 to fall asleep  Patient is usually up at 9 am  Uses alarm: No    Sleep Need  Patient gets  8-9 sleep on average   Patient thinks he needs about 9 sleep    Arnold Lozada prefers to sleep in this position(s): Side infrequently may end up on his back unintentionally  Patient states they do the following activities in bed: Read    Naps  Patient takes a purposeful nap none times a week and naps are usually 0 mln in duration  He feels better after a nap:    He dozes off unintentionally 0 days per week  Patient has had a driving accident or near-miss due to sleepiness/drowsiness: No      SLEEP DISRUPTIONS:    Breathing/Snoring  Patient snores:Yes  Other people complain about his snoring: Yes wife sleeps in separate room now  Patient has been told he stops breathing in his sleep:Yes in the past  He has issues with the following: Getting up to  urinate more than once. No AM headaches. On treatment for reflux, no nocturnal symptoms. No difficulty with nasal breathing but seems to mouth breathe.    Movement:  Patient gets pain, discomfort, with an urge to move:    no restlessness in legs in the evening  It happens when he is resting:     It happens more at night:     Patient has been told he kicks his legs at night:        Behaviors in Sleep:  Arnold Lozada has experienced the following behaviors while sleeping: Sleep-talking;Kicking or punching  He occasionally has a nightmare that he is running and has hit or kicked, movements can also be only a twitch. He does not remember the last time he had. It may happen 2 times per year and may have been going on for decades, he is not certain. His sense of smell is good. No constipation- on sulfasalazine for Crohns. No hallucinations or inappropriate emotional outbursts. No cognitive concerns.   Pt denies bruxism, sleep talking, sleep walking, and dream enactment behavior. Pt denies sleep paralysis, hypnagogue and cataplexy.       Is there anything else you would like your sleep provider to know:        CAFFEINE AND OTHER SUBSTANCES:    Patient consumes caffeinated beverages per day:  0  Last caffeine use is usually:    List of any prescribed or over the counter stimulants that patient takes:   Adderall XR 30 mg  List of any prescribed or over the counter sleep medication patient takes: seroquelquetiapine 50 mg  List of previous sleep medications that patient has tried: tylenol pmtrazodone 50 mg (was switched when he was in the hospital after MI)  Patient drinks alcohol to help them sleep: No  Patient drinks alcohol near bedtime: No    Family History:  Patient has a family member been diagnosed with a sleep disorder: No    Father and brother snored loudly but not diagnosed with GUILLERMINA        SCALES:    EPWORTH SLEEPINESS SCALE         10/18/2023     1:22 PM    [place] Sleepiness Scale ( M.WJeannie Johns   [id number]<br>ESS - [place]/English - Final version - 21 Nov 07 - [facility] Research [place].)   Sitting and reading Would never doze   Watching TV Would never doze   Sitting, inactive in a public place (e.g. a theatre or a meeting) Would never doze   As a passenger in a car for an hour without a break Would never doze   Lying down to rest in the afternoon when circumstances permit Would never doze   Sitting and talking to someone Would never doze   Sitting quietly after a lunch without alcohol Would never doze   In a car, while stopped for a few minutes in traffic Would never doze   [place] Score (MC) 0   [place] Score (Sleep) 0         INSOMNIA SEVERITY INDEX (GALLITO)          10/18/2023    12:56 PM   Insomnia Severity Index (GALLITO)   Difficulty falling asleep 0   Difficulty staying asleep 0   Problems waking up too early 0   How SATISFIED/DISSATISFIED are you with your CURRENT sleep pattern? 1   How NOTICEABLE to others do you think your sleep problem is in terms of impairing the quality of your life? 4   How WORRIED/DISTRESSED are you about your current sleep problem? 3   To what extent do you consider your sleep problem to INTERFERE with your daily functioning (e.g. daytime fatigue, mood, ability to function at work/daily chores, concentration, memory, mood, etc.) CURRENTLY? 2   GALLITO Total Score 10       Guidelines for Scoring/Interpretation:  Total score categories:  0-7 = No clinically significant insomnia   8-14 = Subthreshold insomnia   15-21 = Clinical insomnia (moderate severity)  22-28 = Clinical insomnia (severe)  Used via courtesy of www.Aviaryth.va.gov with permission from Win Ingram PhD., [facility]      STOP BANG         10/18/2023     1:23 PM   STOP BANG Questionnaire (  2008, the American Society of Anesthesiologists, Inc. Since[employer]   1. Snoring - Do you snore loudly (louder than talking or loud enough to be heard through closed doors)? Yes   2. Tired - Do you often feel  "tired, fatigued, or sleepy during daytime? Yes   3. Observed - Has anyone observed you stop breathing during your sleep? Yes   4. Blood pressure - Do you have or are you being treated for high blood pressure? Yes   5. BMI - BMI more than 35 kg/m2? No   6. Age - Age over 50 yr old? Yes   7. Neck circumference - Neck circumference greater than 40 cm? Yes   8. Gender - Gender male? Yes   STOP BANG Score (MC): 6 (High risk of GUILLERMINA)         GAD7        7/16/2018    12:20 PM   BRETT-7    1. Feeling nervous, anxious, or on edge 0   2. Not being able to stop or control worrying 0   3. Worrying too much about different things 1   4. Trouble relaxing 0   5. Being so restless that it is hard to sit still 0   6. Becoming easily annoyed or irritable 0   7. Feeling afraid, as if something awful might happen 0   BRETT-7 Total Score 1   If you checked any problems, how difficult have they made it for you to do your work, take care of things at home, or get along with other people? Somewhat difficult         CAGE-AID         No data to display                CAGE-AID reprinted with permission from the [place] Medical Journal, VICENTE Denson. and MONICA Zimmerman, \"Conjoint screening questionnaires for alcohol and drug abuse\" [place] Medical Journal 94: 135-140, 1995.      PATIENT HEALTH QUESTIONNAIRE-9 (PHQ - 9)        6/10/2020    10:01 AM   PHQ-9 ([employer])   1.  Little interest or pleasure in doing things 0   2.  Feeling down, depressed, or hopeless 0   3.  Trouble falling or staying asleep, or sleeping too much 0   4.  Feeling tired or having little energy 0   5.  Poor appetite or overeating 0   6.  Feeling bad about yourself - or that you are a failure or have let yourself or your family down 0   7.  Trouble concentrating on things, such as reading the newspaper or watching television 0   8.  Moving or speaking so slowly that other people could have noticed. Or the opposite - being so fidgety or restless that you have been moving around a " lot more than usual 0   9.  Thoughts that you would be better off dead, or of hurting yourself in some way 0   PHQ-9 Total Score 0   6.  Feeling bad about yourself 0   7.  Trouble concentrating 0   8.  Moving slowly or restless 0   9.  Suicidal or self-harm thoughts 0   1.  Little interest or pleasure in doing things Not at all   2.  Feeling down, depressed, or hopeless Not at all   3.  Trouble falling or staying asleep, or sleeping too much Not at all   4.  Feeling tired or having little energy Not at all   5.  Poor appetite or overeating Not at all   6.  Feeling bad about yourself Not at all   7.  Trouble concentrating Not at all   8.  Moving slowly or restless Not at all   9.  Suicidal or self-harm thoughts Not at all   PHQ-9 via MyChart TOTAL SCORE-----> 0   Difficulty at work, home, or with people Not difficult at all       Developed by Satish Ocasio, Willa Larsen, Mundo Crowell and colleagues, with an educational janey from [employer] No permission required to reproduce, translate, display or distribute.        Allergies:    Allergies   Allergen Reactions    Lisinopril Swelling     Angioedema       Medications:    Current Outpatient Medications   Medication Sig Dispense Refill    amLODIPine (NORVASC) 5 MG tablet TAKE 1 TABLET BY MOUTH ONE TIME DAILY 90 tablet 3    amphetamine-dextroamphetamine (ADDERALL XR) 30 MG 24 hr capsule Take 1 capsule (30 mg) by mouth daily 90 capsule 0    amphetamine-dextroamphetamine (ADDERALL XR) 30 MG 24 hr capsule Take 1 capsule (30 mg) by mouth daily 90 capsule 0    amphetamine-dextroamphetamine (ADDERALL XR) 30 MG 24 hr capsule Take 1 capsule (30 mg) by mouth daily 90 capsule 0    amphetamine-dextroamphetamine (ADDERALL XR) 30 MG 24 hr capsule Take 1 capsule (30 mg) by mouth daily 90 capsule 0    amphetamine-dextroamphetamine (ADDERALL XR) 30 MG 24 hr capsule Take 1 capsule (30 mg) by mouth daily 30 capsule 0    atorvastatin (LIPITOR) 40 MG tablet Take 40 mg by  mouth daily      carvedilol (COREG) 12.5 MG tablet Take 12.5 mg by mouth      Cholecalciferol (D3 MAXIMUM STRENGTH PO) Take 400 Units by mouth daily      clopidogrel (PLAVIX) 75 MG tablet Take 1 tablet by mouth daily      folic acid 800 MCG TABS Take 1 tablet by mouth daily Reported on 5/10/2017      furosemide (LASIX) 40 MG tablet Take 1 tablet by mouth every morning      gabapentin (NEURONTIN) 300 MG capsule       losartan (COZAAR) 25 MG tablet       MULTI-VITAMIN PO TABS 1 TABLET DAILY      Omega 3-6-9 Fatty Acids (OMEGA 3-6-9 COMPLEX) CAPS Take 1 capsule by mouth daily      POTASSIUM PO Take 595 mcg by mouth daily      QUEtiapine (SEROQUEL) 50 MG tablet Take 1 tablet by mouth At Bedtime      sertraline (ZOLOFT) 50 MG tablet TAKE 1 TABLET BY MOUTH ONE TIME DAILY 90 tablet 3    simvastatin (ZOCOR) 20 MG tablet TAKE ONE TABLET BY MOUTH EVERY EVENING 90 tablet 3    sulfaSALAzine (AZULFIDINE) 500 MG tablet Take 2 tablets (1,000 mg) by mouth 2 times daily Take 2 tablets by mouth twice daily 360 tablet 3    tamsulosin (FLOMAX) 0.4 MG capsule +++NEED APPT IN July+++TAKE TWO CAPSULES BY MOUTH DAILY 180 capsule 0    aspirin 325 MG tablet Take 325 mg by mouth daily. (Patient not taking: Reported on 6/30/2023)      fluticasone (FLONASE) 50 MCG/ACT spray Spray 1 spray into both nostrils daily (Patient not taking: Reported on 6/30/2023)      metoprolol succinate ER (TOPROL-XL) 50 MG 24 hr tablet TAKE 1 TABLET BY MOUTH ONE TIME DAILY (Patient not taking: Reported on 6/30/2023) 90 tablet 3    neomycin-polymyxin-hydrocortisone (CORTISPORIN) 3.5-19973-7 otic suspension as needed (Patient not taking: Reported on 6/30/2023)      order for DME Knee high compression stockings (15-20 mmHg) (Patient not taking: Reported on 6/30/2023) 1 each 1    ORDER FOR DME Respironics REMSTAR 60 Series Auto CPAP 5-18cm H2O, Nuance pillow nasal mask large (Patient not taking: Reported on 6/30/2023)      triamcinolone (KENALOG) 0.1 % cream Apply  sparingly to affected area three times daily for 14 days. (Patient not taking: Reported on 6/30/2023) 80 g 0       Problem List:  Patient Active Problem List    Diagnosis Date Noted    Dyslipidemia 03/10/2017     Priority: Medium    Sciatica of right side 03/10/2017     Priority: Medium    Cerebral ataxia (H) 12/26/2016     Priority: Medium    Adjustment disorder with mixed anxiety and depressed mood 08/24/2016     Priority: Medium    Essential hypertension 08/08/2016     Priority: Medium    Cerebellar ataxia (H) 06/23/2016     Priority: Medium    Elevated troponin I level 10/05/2014     Priority: Medium    ARF (acute renal failure) (H24) 10/05/2014     Priority: Medium    Nocturia 10/05/2014     Priority: Medium    Temporary cerebral vascular dysfunction 10/05/2014     Priority: Medium    Gait abnormality 10/05/2014     Priority: Medium    Vasovagal syncope 10/05/2014     Priority: Medium    Advanced directives, counseling/discussion 12/04/2013     Priority: Medium     Advance Care Planning 12/18/2015: Receipt of ACP document:  Received: Health Care Directive which was witnessed or notarized on 12/12/14.  Document not previously scanned.  Validation form completed and sent with document to be scanned.  Code Status reflects choices in most recent ACP document.  Confirmed/documented designated decision maker(s).  Added by Yessenia Hill    Advance Care Planning:   ACP Review and Resources Provided:  Reviewed chart for advance care plan.  Arnold Lozada has no plan or code status on file. Discussed available resources and provided with information. Confirmed code status reflects current choices pending further ACP discussions.  Confirmed/documented designated decision maker(s). See permanent comments section of demographics in clinical tab. Added by Melissa Behl on 12/4/2013           Family history of colon cancer 12/03/2013     Priority: Medium    GUILLERMINA (obstructive sleep apnea)- Mild/moderate ( AHI 16.2)  11/24/2013     Priority: Medium     Study Date: 11/20/2013- 161.0 lbs.  The lowest oxygen saturation was 88.0%. Apnea/Hypopnea Index was 16.2 events per hour (mostly supine). RDI was 17.6    Home sleep test 5/18/2016: With oral appliance. 161 lbs 0 oz: AHI  12.9.  Supine AHI 31.9. Oxygen Vivek of 86% Baseline 92.5%.  Sp02 <88% for 19 minutes    Home Sleep Apnea Testing with oral appliance.- 11/10/16: 161 lbs 0 oz: AHI 1.5/hr. Supine AHI 0.0/hr. Left 0.2. Right 2.8. Oxygen Vivek of 89%.  Baseline 93.5%.  Sp02 =< 88% for 0 minutes    5/24/2018 [facility] Sleep Apnea Testing with oral appliance - AHI 6.7/hr; Supine AHI 7.7/hr; SpO2 <= 88% for 19.8 minutes.       Low back strain 07/16/2012     Priority: Medium    Adenomatous colon polyp 11/11/2011     Priority: Medium     Due for colonoscopy in November 2012      Crohn's disease of colon (H)      Priority: Medium     sees Dr. Giordano about once a year and had a colonoscopy in 2010      Hyperlipidemia LDL goal <130 10/31/2010     Priority: Medium        Past Medical/Surgical History:  Past Medical History:   Diagnosis Date    Abnormal gait 10/5/2014    Acute renal failure (H24) 10/5/2014    Elevated troponin I level 10/5/2014    Family history of colon cancer 12/3/2013    Head injury 1965    History of colonoscopy nov, 2008    Hypertension     Low back pain     Nocturia 10/5/2014    Other nervous system complications 2014    Diagnosed in 2016 with Cerebellar Ataxia.    Temporary cerebral vascular dysfunction 10/5/2014    Vasovagal syncope 10/5/2014     Past Surgical History:   Procedure Laterality Date    COLONOSCOPY  Fall 2014    OK    HC TOOTH EXTRACTION W/FORCEP         Social History:  Social History     Socioeconomic History    Marital status:      Spouse name: Not on file    Number of children: Not on file    Years of education: Not on file    Highest education level: Not on file   Occupational History    Not on file   Tobacco Use    Smoking status:  "Never    Smokeless tobacco: Never    Tobacco comments:     Never smoked.   Substance and Sexual Activity    Alcohol use: Yes     Comment: 2-4 drinks per week    Drug use: No    Sexual activity: Yes     Partners: Female     Birth control/protection: Other   Other Topics Concern    Parent/sibling w/ CABG, MI or angioplasty before 65F 55M? No   Social History Narrative    Not on file     Social Determinants of Health     Financial Resource Strain: Not on file   Food Insecurity: Not on file   Transportation Needs: Not on file   Physical Activity: Not on file   Stress: Not on file   Social Connections: Not on file   Interpersonal Safety: Not on file   Housing Stability: Not on file       Family History:  Family History   Problem Relation Age of Onset    Cancer Mother         brain tumor, age 79    Other Cancer Mother         Brain tumor    Depression Mother     Asthma Mother     Cancer Father         colon    Pneumonia Father          age 90 from pneumonia and sepsis    C.A.D. Father         Age 80 have MI and triple bypass    Coronary Artery Disease Father         Heart Attack    Hypertension Father         ???    Hyperlipidemia Father         ???    Colon Cancer Father         Cured    Diabetes Maternal Grandmother     Migraines Daughter        Review of Systems:  A complete review of systems reviewed by me is negative with the exeption of what has been mentioned in the history of present illness.        Physical Examination:  Vitals: /80   Pulse 88   Ht 1.727 m (5' 7.99\")   Wt 77.1 kg (170 lb)   SpO2 94%   BMI 25.85 kg/m    BMI= Body mass index is 25.85 kg/m .    Neck Cir (cm): 42 cm      GENERAL APPEARANCE: healthy, alert, no distress, and cooperative  EYES: Eyes grossly normal to inspection, PERRL, conjunctivae and sclerae normal, lids and lashes normal, and wearing glasses  HENT: oropharynx crowded, tongue base enlarged, and palatal tremor  NECK: no adenopathy, no asymmetry, masses. Scar from " "prior trach. Thyroid normal to palpation, and trachea midline and normal to palpation  RESP: catches on inhalation, no cough or wheeze  CV: regular rates and rhythm and no murmur, click or rub  LYMPHATICS: no cervical adenopathy  MS: extremities normal- no gross deformities noted  NEURO: dysarthria and presents in wheelchair  Mallampati Class: II.  Tonsillar Stage: 1  hidden by pillars.         Data: All pertinent previous laboratory data reviewed     Recent Labs   Lab Test 07/08/21  1723 03/13/20  1048    138   POTASSIUM 4.3 4.4   CHLORIDE 102 101   CO2 30 32   ANIONGAP 3 5   GLC 98 72   BUN 17 13   CR 0.99 0.95   KIANNA 9.2 9.5       Recent Labs   Lab Test 07/08/21  1723   WBC 5.3   RBC 4.64   HGB 15.0   HCT 42.5   MCV 92   MCH 32.3   MCHC 35.3   RDW 12.6          Recent Labs   Lab Test 03/13/20  1048   PROTTOTAL 7.1   ALBUMIN 3.9   BILITOTAL 0.3   ALKPHOS 72   AST 18   ALT 28       TSH   Date Value   10/28/2016 1.10 mU/L   06/21/2016 1.3 mcU/mL       No results found for: \"UAMP\", \"UBARB\", \"BENZODIAZEUR\", \"UCANN\", \"UCOC\", \"OPIT\", \"UPCP\"    Ferritin   Date/Time Value Ref Range Status   06/23/2016 12:12 PM 90 26 - 388 ng/mL Final       No results found for: \"PH\", \"PHARTERIAL\", \"PO2\", \"EQ7WDRCYHRC\", \"SAT\", \"PCO2\", \"HCO3\", \"BASEEXCESS\", \"CHASE\", \"BEB\"    @LABRCNTIPR(phv:4,pco2v:4,po2v:4,hco3v:4,raegan:4,o2per:4)@    Echocardiology: No results found for this or any previous visit (from the past 4320 hour(s)).    Chest x-ray: No results found for this or any previous visit from the past 365 days.      Chest CT: No results found for this or any previous visit from the past 365 days.      PFT: Most Recent [facility] Pulmonary Function Testing    No results found for: \"20001\"        Bennett Ezra Goltz, PA-C, YASIR 10/17/2023          "

## 2023-10-18 ENCOUNTER — OFFICE VISIT (OUTPATIENT)
Dept: SLEEP MEDICINE | Facility: CLINIC | Age: 73
End: 2023-10-18
Attending: PSYCHIATRY & NEUROLOGY
Payer: MEDICARE

## 2023-10-18 VITALS
BODY MASS INDEX: 25.76 KG/M2 | HEART RATE: 88 BPM | HEIGHT: 68 IN | SYSTOLIC BLOOD PRESSURE: 128 MMHG | OXYGEN SATURATION: 94 % | DIASTOLIC BLOOD PRESSURE: 80 MMHG | WEIGHT: 170 LBS

## 2023-10-18 DIAGNOSIS — G47.52 DREAM ENACTMENT BEHAVIOR: ICD-10-CM

## 2023-10-18 DIAGNOSIS — I25.5 ISCHEMIC CARDIOMYOPATHY: ICD-10-CM

## 2023-10-18 DIAGNOSIS — G11.9 CEREBELLAR ATAXIA (H): ICD-10-CM

## 2023-10-18 DIAGNOSIS — G47.33 OSA (OBSTRUCTIVE SLEEP APNEA): Primary | Chronic | ICD-10-CM

## 2023-10-18 PROBLEM — I50.23 ACUTE ON CHRONIC SYSTOLIC HEART FAILURE (H): Status: ACTIVE | Noted: 2022-08-29

## 2023-10-18 PROBLEM — I25.10 CORONARY ATHEROSCLEROSIS: Status: ACTIVE | Noted: 2022-10-05

## 2023-10-18 PROBLEM — Z95.810 IMPLANTABLE CARDIOVERTER-DEFIBRILLATOR (ICD) IN SITU: Status: ACTIVE | Noted: 2023-05-01

## 2023-10-18 PROCEDURE — 99417 PROLNG OP E/M EACH 15 MIN: CPT | Performed by: PHYSICIAN ASSISTANT

## 2023-10-18 PROCEDURE — 99205 OFFICE O/P NEW HI 60 MIN: CPT | Performed by: PHYSICIAN ASSISTANT

## 2023-10-18 ASSESSMENT — SLEEP AND FATIGUE QUESTIONNAIRES
HOW LIKELY ARE YOU TO NOD OFF OR FALL ASLEEP WHILE SITTING AND READING: WOULD NEVER DOZE
HOW LIKELY ARE YOU TO NOD OFF OR FALL ASLEEP WHILE LYING DOWN TO REST IN THE AFTERNOON WHEN CIRCUMSTANCES PERMIT: WOULD NEVER DOZE
HOW LIKELY ARE YOU TO NOD OFF OR FALL ASLEEP WHILE SITTING AND TALKING TO SOMEONE: WOULD NEVER DOZE
HOW LIKELY ARE YOU TO NOD OFF OR FALL ASLEEP WHILE WATCHING TV: WOULD NEVER DOZE
HOW LIKELY ARE YOU TO NOD OFF OR FALL ASLEEP IN A CAR, WHILE STOPPED FOR A FEW MINUTES IN TRAFFIC: WOULD NEVER DOZE
HOW LIKELY ARE YOU TO NOD OFF OR FALL ASLEEP WHILE SITTING QUIETLY AFTER LUNCH WITHOUT ALCOHOL: WOULD NEVER DOZE
HOW LIKELY ARE YOU TO NOD OFF OR FALL ASLEEP WHILE SITTING INACTIVE IN A PUBLIC PLACE: WOULD NEVER DOZE
HOW LIKELY ARE YOU TO NOD OFF OR FALL ASLEEP WHEN YOU ARE A PASSENGER IN A CAR FOR AN HOUR WITHOUT A BREAK: WOULD NEVER DOZE

## 2023-10-18 NOTE — NURSING NOTE
"Chief Complaint   Patient presents with    Sleep Problem     Establish care, has mouth guard and not work, snoring, and stops breathing       Initial /80   Pulse 88   Ht 1.727 m (5' 7.99\")   Wt 77.1 kg (170 lb)   SpO2 94%   BMI 25.85 kg/m   Estimated body mass index is 25.85 kg/m  as calculated from the following:    Height as of this encounter: 1.727 m (5' 7.99\").    Weight as of this encounter: 77.1 kg (170 lb).    Medication Reconciliation: complete  ESS 2  Neck circumference:  42 centimeters.  Bon Larsen MA       "

## 2023-10-18 NOTE — PATIENT INSTRUCTIONS
"          MY TREATMENT INFORMATION FOR SLEEP APNEA-  Arnold ANTHONY Kierra    DOCTOR : Bennett Ezra Goltz, PA-C, YASIR    Am I having a sleep study at a sleep center?  --->Due to normal delays, you will be contacted within 2-4 weeks to schedule    Am I having a home sleep study?  --->Watch the video for the device you are using:    -/drop off device-   https://www.Saavn.com/watch?v=yGGFBdELGhk    -Disposable device sent out require phone/computer application-   https://www.Saavn.com/watch?v=BCce_vbiwxE      Frequently asked questions:  1. What is Obstructive Sleep Apnea (GUILLERMINA)? GUILLERMINA is the most common type of sleep apnea. Apnea means, \"without breath.\"  Apnea is most often caused by narrowing or collapse of the upper airway as muscles relax during sleep.   Almost everyone has occasional apneas. Most people with sleep apnea have had brief interruptions at night frequently for many years.  The severity of sleep apnea is related to how frequent and severe the events are.   2. What are the consequences of GUILLERMINA? Symptoms include: feeling sleepy during the day, snoring loudly, gasping or stopping of breathing, trouble sleeping, and occasionally morning headaches or heartburn at night.  Sleepiness can be serious and even increase the risk of falling asleep while driving. Other health consequences may include development of high blood pressure and other cardiovascular disease in persons who are susceptible. Untreated GUILLERMINA  can contribute to heart disease, stroke and diabetes.   3. What are the treatment options? In most situations, sleep apnea is a lifelong disease that must be managed with daily therapy. Medications are not effective for sleep apnea and surgery is generally not considered until other therapies have been tried. Your treatment is your choice . Continuous Positive Airway (CPAP) works right away and is the therapy that is effective in nearly everyone. An oral device to hold your jaw forward is usually the " next most reliable option. Other options include postioning devices (to keep you off your back), weight loss, and surgery including a tongue pacing device. There is more detail about some of these options below.  4. Are my sleep studies covered by insurance? Although we will request verification of coverage, we advise you also check in advance of the study to ensure there is coverage.    Important tips for those choosing CPAP and similar devices  For new devices, sign up for device YOANA to monitor your device for your followup visits  We encourage you to utilize the [employer] yoana or website (myAir web (resmed.com) ) to monitor your therapy progress and share the data with your healthcare team when you discuss your sleep apnea.                                                    Know your equipment:  CPAP is continuous positive airway pressure that prevents obstructive sleep apnea by keeping the throat from collapsing while you are sleeping. In most cases, the device is  smart  and can slowly self-adjusts if your throat collapses and keeps a record every day of how well you are treated-this information is available to you and your care team.  BPAP is bilevel positive airway pressure that keeps your throat open and also assists each breath with a pressure boost to maintain adequate breathing.  Special kinds of BPAP are used in patients who have inadequate breathing from lung or heart disease. In most cases, the device is  smart  and can slowly self-adjusts to assist breathing. Like CPAP, the device keeps a record of how well you are treated.  Your mask is your connection to the device. You get to choose what feels most comfortable and the staff will help to make sure if fits. Here: are some examples of the different masks that are available:       Key points to remember on your journey with sleep apnea:  Sleep study.  PAP devices often need to be adjusted during a sleep study to show that they are effective and  adjusted right.  Good tips to remember: Try wearing just the mask during a quiet time during the day so your body adapts to wearing it. A humidifier is recommended for comfort in most cases to prevent drying of your nose and throat. Allergy medication from your provider may help you if you are having nasal congestion.  Getting settled-in. It takes more than one night for most of us to get used to wearing a mask. Try wearing just the mask during a quiet time during the day so your body adapts to wearing it. A humidifier is recommended for comfort in most cases. Our team will work with you carefully on the first day and will be in contact within 4 days and again at 2 and 4 weeks for advice and remote device adjustments. Your therapy is evaluated by the device each day.   Use it every night. The more you are able to sleep naturally for 7-8 hours, the more likely you will have good sleep and to prevent health risks or symptoms from sleep apnea. Even if you use it 4 hours it helps. Occasionally all of us are unable to use a medical therapy, in sleep apnea, it is not dangerous to miss one night.   Communicate. Call our skilled team on the number provided on the first day if your visit for problems that make it difficult to wear the device. Over 2 out of 3 patients can learn to wear the device long-term with help from our team. Remember to call our team or your sleep providers if you are unable to wear the device as we may have other solutions for those who cannot adapt to mask CPAP therapy. It is recommended that you sleep your sleep provider within the first 3 months and yearly after that if you are not having problems.   Use it for your health. We encourage use of CPAP masks during daytime quiet periods to allow your face and brain to adapt to the sensation of CPAP so that it will be a more natural sensation to awaken to at night or during naps. This can be very useful during the first few weeks or months of adapting to  CPAP though it does not help medically to wear CPAP during wakefulness and  should not be used as a strategy just to meet guidelines.  Take care of your equipment. Make sure you clean your mask and tubing using directions every day and that your filter and mask are replaced as recommended or if they are not working.     BESIDES CPAP, WHAT OTHER THERAPIES ARE THERE?    Positioning Device  Positioning devices are generally used when sleep apnea is mild and only occurs on your back.This example shows a pillow that straps around the waist. It may be appropriate for those whose sleep study shows milder sleep apnea that occurs primarily when lying flat on one's back. Preliminary studies have shown benefit but effectiveness at home may need to be verified by a home sleep test. These devices are generally not covered by medical insurance.  Examples of devices that maintain sleeping on the back to prevent snoring and mild sleep apnea.    Belt type body positioner  http://[employer]/    Electronic reminder  http://nightshifttherapy.[employer]/            Oral Appliance  What is oral appliance therapy?  An oral appliance device fits on your teeth at night like a retainer used after having braces. The device is made by a specialized dentist and requires several visits over 1-2 months before a manufactured device is made to fit your teeth and is adjusted to prevent your sleep apnea. Once an oral device is working properly, snoring should be improved. A home sleep test may be recommended at that time if to determine whether the sleep apnea is adequately treated.       Some things to remember:  -Oral devices are often, but not always, covered by your medical insurance. Be sure to check with your insurance provider.   -If you are referred for oral therapy, you will be given a list of specialized dentists to consider or you may choose to visit the Web site of the American Academy of Dental Sleep Medicine  -Oral devices are less likely to  work if you have severe sleep apnea or are extremely overweight.     More detailed information  An oral appliance is a small acrylic device that fits over the upper and lower teeth  (similar to a retainer or a mouth guard). This device slightly moves jaw forward, which moves the base of the tongue forward, opens the airway, improves breathing for effective treat snoring and obstructive sleep apnea in perhaps 7 out of 10 people .  The best working devices are custom-made by a dental device  after a mold is made of the teeth 1, 2, 3.  When is an oral appliance indicated?  Oral appliance therapy is recommended as a first-line treatment for patients with primary snoring, mild sleep apnea, and for patients with moderate sleep apnea who prefer appliance therapy to use of CPAP4, 5. Severity of sleep apnea is determined by sleep testing and is based on the number of respiratory events per hour of sleep.   How successful is oral appliance therapy?  The success rate of oral appliance therapy in patients with mild sleep apnea is 75-80% while in patients with moderate sleep apnea it is 50-70%. The chance of success in patients with severe sleep apnea is 40-50%. The research also shows that oral appliances have a beneficial effect on the cardiovascular health of GUILLERMINA patients at the same magnitude as CPAP therapy7.  Oral appliances should be a second-line treatment in cases of severe sleep apnea, but if not completely successful then a combination therapy utilizing CPAP plus oral appliance therapy may be effective. Oral appliances tend to be effective in a broad range of patients although studies show that the patients who have the highest success are females, younger patients, those with milder disease, and less severe obesity. 3, 6.   Finding a dentist that practices dental sleep medicine  Specific training is available through the American Academy of Dental Sleep Medicine for dentists interested in working in the  field of sleep. To find a dentist who is educated in the field of sleep and the use of oral appliances, near you, visit the Web site of the American Academy of Dental Sleep Medicine.    References  1. Ant et al. Objectively measured vs self-reported compliance during oral appliance therapy for sleep-disordered breathing. Chest 2013; 144(5): 0987-5816.  2. Marina, et al. Objective measurement of compliance during oral appliance therapy for sleep-disordered breathing. Thorax 2013; 68(1): 91-96.  3. Farzana et al. Mandibular advancement devices in 620 men and women with GUILLERMINA and snoring: tolerability and predictors of treatment success. Chest 2004; 125: 5046-0835.  4. Myron et al. Oral appliances for snoring and GUILLERMINA: a review. Sleep 2006; 29: 244-262.  5. George et al. Oral appliance treatment for GUILLERMINA: an update. J Clin Sleep Med 2014; 10(2): 215-227.  6. Erin et al. Predictors of OSAH treatment outcome. J Dent Res 2007; 86: 4146-0227.      Weight Loss:    Weight loss is a long-term strategy that may improve sleep apnea in some patients.    Weight management is a personal decision and the decision should be based on your interest and the potential benefits.  If you are interested in exploring weight loss strategies, the following discussion covers the impact on weight loss on sleep apnea and the approaches that may be successful.    Being overweight does not necessarily mean you will have health consequences.  Those who have BMI over 35 or over 27 with existing medical conditions carries greater risk.   Weight loss decreases severity of sleep apnea in most people with obesity. For those with mild obesity who have developed snoring with weight gain, even 15-30 pound weight loss can improve and occasionally eliminate sleep apnea.  Structured and life-long dietary and health habits are necessary to lose weight and keep healthier weight levels.     Though there may be significant health benefits  from weight loss, long-term weight loss is very difficult to achieve- studies show success with dietary management in less than 10% of people. In addition, substantial weight loss may require years of dietary control and may be difficult if patients have severe obesity. In these cases, surgical management may be considered.  Finally, older individuals who have tolerated obesity without health complications may be less likely to benefit from weight loss strategies.      [unfilled]    Surgery:    Surgery for obstructive sleep apnea is considered generally only when other therapies fail to work. Surgery may be discussed with you if you are having a difficult time tolerating CPAP and or when there is an abnormal structure that requires surgical correction.  Nose and throat surgeries often enlarge the airway to prevent collapse.  Most of these surgeries create pain for 1-2 weeks and up to half of the most common surgeries are not effective throughout life.  You should carefully discuss the benefits and drawbacks to surgery with your sleep provider and surgeon to determine if it is the best solution for you.   More information  Surgery for GUILLERMINA is directed at areas that are responsible for narrowing or complete obstruction of the airway during sleep.  There are a wide range of procedures available to enlarge and/or stabilize the airway to prevent blockage of breathing in the three major areas where it can occur: the palate, tongue, and nasal regions.  Successful surgical treatment depends on the accurate identification of the factors responsible for obstructive sleep apnea in each person.  A personalized approach is required because there is no single treatment that works well for everyone.  Because of anatomic variation, consultation with an examination by a sleep surgeon is a critical first step in determining what surgical options are best for each patient.  In some cases, examination during sedation may be recommended  in order to guide the selection of procedures.  Patients will be counseled about risks and benefits as well as the typical recovery course after surgery. Surgery is typically not a cure for a person s GUILLERMINA.  However, surgery will often significantly improve one s GUILLERMINA severity (termed  success rate ).  Even in the absence of a cure, surgery will decrease the cardiovascular risk associated with OSA7; improve overall quality of life8 (sleepiness, functionality, sleep quality, etc).      Palate Procedures:  Patients with GUILLERMINA often have narrowing of their airway in the region of their tonsils and uvula.  The goals of palate procedures are to widen the airway in this region as well as to help the tissues resist collapse.  Modern palate procedure techniques focus on tissue conservation and soft tissue rearrangement, rather than tissue removal.  Often the uvula is preserved in this procedure. Residual sleep apnea is common in patient after pharyngoplasty with an average reduction in sleep apnea events of 33%2.      Tongue Procedures:  ExamWhile patients are awake, the muscles that surround the throat are active and keep this region open for breathing. These muscles relax during sleep, allowing the tongue and other structures to collapse and block breathing.  There are several different tongue procedures available.  Selection of a tongue base procedure depends on characteristics seen on physical exam.  Generally, procedures are aimed at removing bulky tissues in this area or preventing the back of the tongue from falling back during sleep.  Success rates for tongue surgery range from 50-62%3.    Hypoglossal Nerve Stimulation:  Hypoglossal nerve stimulation has recently received approval from the [place] Food and Drug Administration for the treatment of obstructive sleep apnea.  This is based on research showing that the system was safe and effective in treating sleep apnea6.  Results showed that the median AHI score  decreased 68%, from 29.3 to 9.0. This therapy uses an implant system that senses breathing patterns and delivers mild stimulation to airway muscles, which keeps the airway open during sleep.  The system consists of three fully implanted components: a small generator (similar in size to a pacemaker), a breathing sensor, and a stimulation lead.  Using a small handheld remote, a patient turns the therapy on before bed and off upon awakening.    Candidates for this device must be greater than 18 years of age, have moderate to severe GUILLERMINA (AHI between 15-65), BMI less than 35, have tried CPAP/oral appliance for at least 8 weeks without success, and have appropriate upper airway anatomy (determined by a sleep endoscopy performed by Dr. Vargas Woo).    Hypoglossal Nerve Stimulation Pathway:    The sleep surgeon s office will work with the patient through the insurance prior-authorization process (including communications and appeals).    Nasal Procedures:  Nasal obstruction can interfere with nasal breathing during the day and night.  Studies have shown that relief of nasal obstruction can improve the ability of some patients to tolerate positive airway pressure therapy for obstructive sleep apnea1.  Treatment options include medications such as nasal saline, topical corticosteroid and antihistamine sprays, and oral medications such as antihistamines or decongestants. Non-surgical treatments can include external nasal dilators for selected patients. If these are not successful by themselves, surgery can improve the nasal airway either alone or in combination with these other options.      Combination Procedures:  Combination of surgical procedures and other treatments may be recommended, particularly if patients have more than one area of narrowing or persistent positional disease.  The success rate of combination surgery ranges from 66-80%2,3.    References  Robert NEAL. The Role of the Nose in Snoring and Obstructive  Sleep Apnoea: An Update.  Eur Arch Otorhinolaryngol. 2011; 268: 1365-73.   Vannessa SM; Rashi JA; Amanuel JR; Pallanch JF; Odin MB; Jaime SG; Silvia LUCAS. Surgical modifications of the upper airway for obstructive sleep apnea in adults: a systematic review and meta-analysis. SLEEP 2010;33(10):2695-7630. Luiz VAUGHN. Hypopharyngeal surgery in obstructive sleep apnea: an evidence-based medicine review.  Arch Otolaryngol Head Neck Surg. 2006 Feb;132(2):206-13.  Georgi YH1, Julia Y, Nickolas GRANT. The efficacy of anatomically based multilevel surgery for obstructive sleep apnea. Otolaryngol Head Neck Surg. 2003 Oct;129(4):327-35.  Kezirian E, Goldberg A. Hypopharyngeal Surgery in Obstructive Sleep Apnea: An Evidence-Based Medicine Review. Arch Otolaryngol Head Neck Surg. 2006 Feb;132(2):206-13.  Mu ZAMAN et al. Upper-Airway Stimulation for Obstructive Sleep Apnea.  N Engl J Med. 2014 Jan 9;370(2):139-49.  Peker Y et al. Increased Incidence of Cardiovascular Disease in Middle-aged Men with Obstructive Sleep Apnea. Am J Respir Crit Care Med; 2002 166: 159-165  Elise EM et al. Studying Life Effects and Effectiveness of Palatopharyngoplasty (SLEEP) study: Subjective Outcomes of Isolated Uvulopalatopharyngoplasty. Otolaryngol Head Neck Surg. 2011; 144: 623-631.  WHAT IF I ONLY HAVE SNORING?    Mandibular advancement devices, lateral sleep positioning, long-term weight loss and treatment of nasal allergies have been shown to improve snoring.  Exercising tongue muscles with a game (https://apps.[employer]/us/yoana/soundly-reduce-snoring/ha8207032719) or stimulating the tongue during the day with a device (https://doi.org/10.3390/xxp93443188) have improved snoring in some individuals.    Remember to Drive Safe... Drive Alive     Sleep health profoundly affects your health, mood, and your safety.  Thirty three percent of the population (one in three of us) is not getting enough sleep and many have a sleep disorder. Not getting enough  sleep or having an untreated / undertreated sleep condition may make us sleepy without even knowing it. In fact, our driving could be dramatically impaired due to our sleep health. As your provider, here are some things I would like you to know about driving:     Here are some warning signs for impairment and dangerous drowsy driving:              -Having been awake more than 16 hours               -Looking tired               -Eyelid drooping              -Head nodding (it could be too late at this point)              -Driving for more than 30 minutes     Some things you could do to make the driving safer if you are experiencing some drowsiness:              -Stop driving and rest              -Call for transportation              -Make sure your sleep disorder is adequately treated     Some things that have been shown NOT to work when experiencing drowsiness while driving:              -Turning on the radio              -Opening windows              -Eating any  distracting  /  entertaining  foods (e.g., sunflower seeds, candy, or any other)              -Talking on the phone      Your decision may not only impact your life, but also the life of others. Please, remember to drive safe for yourself and all of us.

## 2023-10-27 RX ORDER — DEXTROAMPHETAMINE SACCHARATE, AMPHETAMINE ASPARTATE MONOHYDRATE, DEXTROAMPHETAMINE SULFATE AND AMPHETAMINE SULFATE 7.5; 7.5; 7.5; 7.5 MG/1; MG/1; MG/1; MG/1
30 CAPSULE, EXTENDED RELEASE ORAL DAILY
Qty: 90 CAPSULE | Refills: 0 | Status: SHIPPED | OUTPATIENT
Start: 2023-10-27 | End: 2024-01-19

## 2023-11-07 ENCOUNTER — OFFICE VISIT (OUTPATIENT)
Dept: NURSING | Facility: CLINIC | Age: 73
End: 2023-11-07
Attending: PHYSICIAN ASSISTANT
Payer: MEDICARE

## 2023-11-07 VITALS — BODY MASS INDEX: 27.04 KG/M2 | WEIGHT: 177.8 LBS

## 2023-11-07 DIAGNOSIS — G47.33 OSA (OBSTRUCTIVE SLEEP APNEA): Chronic | ICD-10-CM

## 2023-11-07 DIAGNOSIS — G11.9 CEREBELLAR ATAXIA (H): ICD-10-CM

## 2023-11-07 DIAGNOSIS — G11.9 CEREBELLAR ATAXIA (H): Primary | ICD-10-CM

## 2023-11-07 LAB
COHGB MFR BLD: 96 % (ref 92–100)
CPB POCT: NO
HCO3 BLDA-SCNC: 28 MMOL/L (ref 21–28)
HCT VFR BLD CALC: 40 % (ref 40–53)
HGB BLD-MCNC: 13.6 G/DL (ref 13.3–17.7)
PCO2 BLDA: 43 MM HG (ref 35–45)
PH BLDA: 7.41 [PH] (ref 7.35–7.45)
PO2 BLDA: 79 MM HG (ref 80–105)
POTASSIUM BLD-SCNC: 3.9 MMOL/L (ref 3.4–5.3)
SODIUM BLD-SCNC: 134 MMOL/L (ref 133–144)

## 2023-11-07 PROCEDURE — 84295 ASSAY OF SERUM SODIUM: CPT

## 2023-11-07 PROCEDURE — 84132 ASSAY OF SERUM POTASSIUM: CPT

## 2023-11-07 PROCEDURE — 94726 PLETHYSMOGRAPHY LUNG VOLUMES: CPT | Performed by: INTERNAL MEDICINE

## 2023-11-07 PROCEDURE — 94729 DIFFUSING CAPACITY: CPT | Performed by: INTERNAL MEDICINE

## 2023-11-07 PROCEDURE — 94375 RESPIRATORY FLOW VOLUME LOOP: CPT | Performed by: INTERNAL MEDICINE

## 2023-11-07 PROCEDURE — 82803 BLOOD GASES ANY COMBINATION: CPT

## 2023-11-07 PROCEDURE — 36600 WITHDRAWAL OF ARTERIAL BLOOD: CPT | Performed by: INTERNAL MEDICINE

## 2023-11-07 NOTE — PROGRESS NOTES
PFT Lab Note: ABG drawn from right radial after successful Allens's test. Skin prepped and bandaged per policy.

## 2023-11-10 LAB
DLCOCOR-%PRED-PRE: 87 %
DLCOCOR-PRE: 21.8 ML/MIN/MMHG
DLCOUNC-%PRED-PRE: 85 %
DLCOUNC-PRE: 21.16 ML/MIN/MMHG
DLCOUNC-PRED: 24.82 ML/MIN/MMHG
ERV-%PRED-PRE: 24 %
ERV-PRE: 0.34 L
ERV-PRED: 1.38 L
EXPTIME-PRE: 4.51 SEC
FEF2575-%PRED-PRE: 114 %
FEF2575-PRE: 2.5 L/SEC
FEF2575-PRED: 2.18 L/SEC
FEFMAX-%PRED-PRE: 99 %
FEFMAX-PRE: 7.85 L/SEC
FEFMAX-PRED: 7.92 L/SEC
FEV1-%PRED-PRE: 95 %
FEV1-PRE: 2.71 L
FEV1FEV6-PRE: 82 %
FEV1FEV6-PRED: 77 %
FEV1FVC-PRE: 82 %
FEV1FVC-PRED: 77 %
FEV1SVC-PRE: 83 %
FEV1SVC-PRED: 70 %
FIFMAX-PRE: 5.71 L/SEC
FIO2-PRE: 21 %
FRCPLETH-%PRED-PRE: 91 %
FRCPLETH-PRE: 3.36 L
FRCPLETH-PRED: 3.66 L
FVC-%PRED-PRE: 89 %
FVC-PRE: 3.33 L
FVC-PRED: 3.71 L
IC-%PRED-PRE: 105 %
IC-PRE: 2.92 L
IC-PRED: 2.76 L
MEP-PRE: 102 CMH2O
MIP-PRE: -123 CMH2O
RVPLETH-%PRED-PRE: 113 %
RVPLETH-PRE: 3.02 L
RVPLETH-PRED: 2.65 L
TLCPLETH-%PRED-PRE: 90 %
TLCPLETH-PRE: 6.29 L
TLCPLETH-PRED: 6.92 L
VA-%PRED-PRE: 92 %
VA-PRE: 5.75 L
VC-%PRED-PRE: 80 %
VC-PRE: 3.26 L
VC-PRED: 4.04 L

## 2023-12-11 ENCOUNTER — OFFICE VISIT (OUTPATIENT)
Dept: AUDIOLOGY | Facility: CLINIC | Age: 73
End: 2023-12-11
Payer: MEDICARE

## 2023-12-11 DIAGNOSIS — H90.3 SENSORINEURAL HEARING LOSS (SNHL) OF BOTH EARS: Primary | ICD-10-CM

## 2023-12-11 PROCEDURE — V5010 ASSESSMENT FOR HEARING AID: HCPCS | Performed by: AUDIOLOGIST

## 2023-12-11 NOTE — PROGRESS NOTES
AUDIOLOGY REPORT    BACKGROUND INFORMATION: Arnold Lozada was seen in the Audiology Clinic at [facility] on 12/11/2023 for follow-up.  The patient has been seen previously in this clinic on 9/25/2023 and results revealed normal sloping to profound sensorineural hearing loss bilaterally.  The patient was fit with Phonak Audeo P70R hearing aids on 8/04/2022.  The patient reports that his hearing aids constantly get clogged by dry skin or wax recently. He reports that he had his ears cleaned approximately 3 weeks ago.     TEST RESULTS AND PROCEDURES: An electroacoustic hearing aid check was performed.  Adjustments were made including replacing wax guards and cleaning and the patient reported good audibility. The hearing aid conformity evaluation was completed. This includes initially evaluating the devices electroacoustically and later matching NAL-NL1 target with soft sounds audible, moderate sounds comfortable and clear, and loud sounds below discomfort.     Otoscopy revealed partial obstruction with wax in the right ear and clear canal in the left.     SUMMARY AND RECOMMENDATIONS: A hearing aid check was performed today and the patient reports good sound when his hearing aids are clean.  It is recommended that the patient follow-up with ENT  Call this clinic with questions regarding today s visit.     Girish Marlow.  Doctor of Audiology  [place] License # [id number]

## 2023-12-15 ENCOUNTER — TRANSFERRED RECORDS (OUTPATIENT)
Dept: NEUROLOGY | Facility: CLINIC | Age: 73
End: 2023-12-15
Payer: MEDICARE

## 2024-01-15 ENCOUNTER — TRANSFERRED RECORDS (OUTPATIENT)
Dept: HEALTH INFORMATION MANAGEMENT | Facility: CLINIC | Age: 74
End: 2024-01-15
Payer: MEDICARE

## 2024-01-16 ENCOUNTER — TRANSFERRED RECORDS (OUTPATIENT)
Dept: HEALTH INFORMATION MANAGEMENT | Facility: CLINIC | Age: 74
End: 2024-01-16
Payer: MEDICARE

## 2024-01-16 LAB
ALT SERPL-CCNC: 23 IU/L (ref 0–44)
AST SERPL-CCNC: 30 IU/L (ref 0–40)
CREATININE (EXTERNAL): 0.81 MG/DL (ref 0.76–1.27)
GFR ESTIMATED (EXTERNAL): 94 ML/MIN/1.73

## 2024-01-19 ENCOUNTER — MYC REFILL (OUTPATIENT)
Dept: NEUROLOGY | Facility: CLINIC | Age: 74
End: 2024-01-19
Payer: MEDICARE

## 2024-01-19 DIAGNOSIS — G47.10 EXCESSIVE SLEEPINESS: ICD-10-CM

## 2024-01-24 ENCOUNTER — MYC REFILL (OUTPATIENT)
Dept: NEUROLOGY | Facility: CLINIC | Age: 74
End: 2024-01-24

## 2024-01-24 DIAGNOSIS — G47.10 EXCESSIVE SLEEPINESS: ICD-10-CM

## 2024-01-24 RX ORDER — DEXTROAMPHETAMINE SACCHARATE, AMPHETAMINE ASPARTATE MONOHYDRATE, DEXTROAMPHETAMINE SULFATE AND AMPHETAMINE SULFATE 7.5; 7.5; 7.5; 7.5 MG/1; MG/1; MG/1; MG/1
30 CAPSULE, EXTENDED RELEASE ORAL DAILY
Qty: 90 CAPSULE | Refills: 0 | Status: CANCELLED | OUTPATIENT
Start: 2024-01-24

## 2024-01-26 ENCOUNTER — TRANSFERRED RECORDS (OUTPATIENT)
Dept: HEALTH INFORMATION MANAGEMENT | Facility: CLINIC | Age: 74
End: 2024-01-26
Payer: MEDICARE

## 2024-01-31 RX ORDER — DEXTROAMPHETAMINE SACCHARATE, AMPHETAMINE ASPARTATE MONOHYDRATE, DEXTROAMPHETAMINE SULFATE AND AMPHETAMINE SULFATE 7.5; 7.5; 7.5; 7.5 MG/1; MG/1; MG/1; MG/1
30 CAPSULE, EXTENDED RELEASE ORAL DAILY
Qty: 90 CAPSULE | Refills: 0 | Status: SHIPPED | OUTPATIENT
Start: 2024-01-31 | End: 2024-04-24

## 2024-02-18 ENCOUNTER — MYC MEDICAL ADVICE (OUTPATIENT)
Dept: AUDIOLOGY | Facility: CLINIC | Age: 74
End: 2024-02-18
Payer: MEDICARE

## 2024-03-13 ENCOUNTER — OFFICE VISIT (OUTPATIENT)
Dept: AUDIOLOGY | Facility: CLINIC | Age: 74
End: 2024-03-13
Payer: MEDICARE

## 2024-03-13 DIAGNOSIS — H90.3 SENSORINEURAL HEARING LOSS (SNHL) OF BOTH EARS: Primary | ICD-10-CM

## 2024-03-13 PROCEDURE — V5264 EAR MOLD/INSERT: HCPCS | Mod: RT | Performed by: AUDIOLOGIST

## 2024-03-13 NOTE — PROGRESS NOTES
AUDIOLOGY REPORT    BACKGROUND INFORMATION: Arnold Lozada was seen in the Audiology Clinic at [facility] on 3/13/2024 for follow-up.  The patient has been seen previously in this clinic on 8/25/2023 and results revealed normal sloping to profound sensorineural hearing loss bilaterally.  The patient was fit with Phonak Audeo P70-R hearing aids on 8/04/2022.  The wax trap retention portion of the patients right c shell earmold recently broke and he returns today for fitting of a new mold..    TEST RESULTS AND PROCEDURES: A hearing aid check was performed.  Adjustments were made including placing the new earmold and the patient reported good sound and fit. The hearing aid conformity evaluation was completed. This includes initially evaluating the devices electroacoustically and later matching NAL-NL2 target with soft sounds audible, moderate sounds comfortable and clear, and loud sounds below discomfort. The patient is aware of the 90-day remake warranty on his new earmold.     SUMMARY AND RECOMMENDATIONS: A hearing aid check was performed today and the patient reports good sound and fit with his new earmold.  Adjustments were made as noted above and the patient will return as needed or at least every 4-6 months for cleaning and assessment of hearing aid.  Call this clinic with questions regarding today s visit.     Girish Marlow.  Doctor of Audiology  [place] License # [id number]

## 2024-03-26 ASSESSMENT — ENCOUNTER SYMPTOMS
CONSTITUTIONAL NEGATIVE: 1
GASTROINTESTINAL NEGATIVE: 1
EYES NEGATIVE: 1

## 2024-03-26 NOTE — PROGRESS NOTES
Chief Complaint   Patient presents with    Consult     Decreased hearing. Hx of wax build up. Had outer ear infection a few months ago. PND and increased saliva.      PCP: Brett Pineda     Referring Provider: Referred Self    There were no vitals taken for this visit.    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    Essential hypertension I10    Adjustment disorder with mixed anxiety and depressed mood F43.23    Cerebral ataxia (H) G11.9    ARF (acute renal failure) (H24) N17.9    Dyslipidemia E78.5    Nocturia R35.1    Sciatica of right side M54.31    Temporary cerebral vascular dysfunction I67.82    Gait abnormality R26.9    Vasovagal syncope R55    Acute on chronic systolic heart failure (H) I50.23    Coronary atherosclerosis I25.10    Implantable cardioverter-defibrillator (ICD) in situ Z95.810    Ischemic cardiomyopathy I25.5      Current Medications:  Current Outpatient Medications   Medication    amLODIPine (NORVASC) 5 MG tablet    amphetamine-dextroamphetamine (ADDERALL XR) 30 MG 24 hr capsule    aspirin 325 MG tablet    carvedilol (COREG) 12.5 MG tablet    Cholecalciferol (D3 MAXIMUM STRENGTH PO)    folic acid 800 MCG TABS    furosemide (LASIX) 40 MG tablet    gabapentin (NEURONTIN) 300 MG capsule    losartan (COZAAR) 25 MG tablet    MULTI-VITAMIN PO TABS    Omega 3-6-9 Fatty Acids (OMEGA 3-6-9 COMPLEX) CAPS    order for DME    POTASSIUM PO    QUEtiapine (SEROQUEL) 50 MG tablet    sertraline (ZOLOFT) 50 MG tablet    simvastatin (ZOCOR) 20 MG tablet    sulfaSALAzine (AZULFIDINE) 500 MG tablet    tamsulosin (FLOMAX)  0.4 MG capsule    triamcinolone (KENALOG) 0.1 % cream    amphetamine-dextroamphetamine (ADDERALL XR) 30 MG 24 hr capsule    amphetamine-dextroamphetamine (ADDERALL XR) 30 MG 24 hr capsule    amphetamine-dextroamphetamine (ADDERALL XR) 30 MG 24 hr capsule    amphetamine-dextroamphetamine (ADDERALL XR) 30 MG 24 hr capsule    clopidogrel (PLAVIX) 75 MG tablet     No current facility-administered medications for this visit.     CT Head w/o Contrast 9/4/22    COMPARISON:  None.    TECHNIQUE:  Images of the head were obtained without contrast.    FINDINGS: No hemorrhage, hydrocephalus, midline shift, or mass effect. Gray-white matter differentiation remains preserved. Age appropriate parenchymal volume loss. Non-specific periventricular/subcortical white matter hypodensities, favor sequelae of chronic small vessel ischemic disease given the patient's age. Mucosal thickening and frothy debris in the ethmoid air cells, sphenoid sinus, and partially visualized left maxillary sinus. Bilateral partial mastoid effusions. No acute bony abnormality. 1.7 cm ovoid dense subcutaneous nodule in the posterior scalp.    IMPRESSION:  1. No suspected acute intracranial pathology. Given the provided history, consider brain MRI to exclude an occult stroke.  2. Paranasal sinus disease and bilateral partial mastoid effusions.    HPI  Pleasant 73 year old male with a female presents today as a(n) new patient for impacted cerumen, decreased hearing, increased saliva, and a lot of post nasal drip. He had an outer ear infection several months ago and a sleep study completed that showed sleep apnea. He has an oral device that he wears in his mouth that has not been adjusted for years, therefore they are unsure how effective it is anymore. They are waiting to hear from the sleep clinic if a CPAP machine is needed. He has a thick tongue and palatal tremor. He has a hx of a lot of speech and physical therapy. He has a gravely voice and is coughing  more. His post nasal drip causes phlegm buildup in his throat and onset several years ago. He has had a couple of swallow tests that showed no issues. He had a serious myocardial infarction several years ago. He has a healthy diet.    He denies difficulty swallowing, clicking sounds in ears.    Review of Systems   Constitutional: Negative.    HENT:  Positive for hearing loss.    Eyes: Negative.    Respiratory:  Positive for cough.    Gastrointestinal: Negative.    Skin: Negative.    Endo/Heme/Allergies: Negative.        Physical Exam  Vitals and nursing note reviewed.   Constitutional:       Appearance: Normal appearance.   HENT:      Head: Normocephalic and atraumatic.      Jaw: There is normal jaw occlusion.      Right Ear: Hearing, tympanic membrane and ear canal normal. There is impacted cerumen.      Left Ear: Hearing, tympanic membrane and ear canal normal. There is impacted cerumen.      Nose: No mucosal edema, congestion or rhinorrhea.      Right Nostril: No occlusion.      Left Nostril: No occlusion.      Right Turbinates: Not enlarged or swollen.      Left Turbinates: Not enlarged or swollen.      Right Sinus: No maxillary sinus tenderness or frontal sinus tenderness.      Left Sinus: No maxillary sinus tenderness or frontal sinus tenderness.      Mouth/Throat:      Mouth: Mucous membranes are moist.      Pharynx: Oropharynx is clear. Uvula midline.   Eyes:      Extraocular Movements: Extraocular movements intact.      Pupils: Pupils are equal, round, and reactive to light.   Neurological:      Mental Status: He is alert.     Soft palate tremor.  Voice: articulation problems due to cerebral ataxia  Ear wax removal: The patient was seen in the room. An informed consent was obtained from the patient. His ears were evaluated under the microscope. Right ear canal was blocked 60% with ear wax that was suctioned and removed with a curette. The left ear canal was blocked 60% with ear wax that was suctioned with a  7 tip. He tolerated the procedure well and left the room no complications.    AUDIOGRAM: The patient underwent an audiogram 9/25/23.  Results: WNL sloping to profound SNHL bilaterally. 100% word rec. right, 96% left. Tymps negative pressure.  Right: Speech reception threshold is 45 dB with 100% word recognition. Tympanogram C type.  Left: Speech reception threshold is 45 dB with 96% word recognition. Tympanogram C type.    A/P  Audiogram and imaging was independently reviewed and discussed in detail with the patient. This pleasant patient has bilateral SNHL and laryngo-pharyngeal reflux that is causing irritation with coughing and phlegm buildup. There are no ear or sinus infections present. Pantoprazole (PROTONIX) 40 MG EC tablet, take 1 tablet (40 mg) by mouth daily, is prescribed today for laryngo-pharyngeal. The following are recommended as part of an acid reflux diet:  No eating 2 hours before bed  Elevate the upper part of the body when laying down  Small meals  Good hydration- take a sip of water when feeling the need to cough or clear the throat  Avoid acidic, spicy, fried foods    If the problem continues, further investigation measures such as a diagnostic nasal endoscopy are discussed.    For SNHL, he is advised to continue use of hearing aids.    Follow up in clinic in 3 months.    Scribe/Staff:    Scribe Disclosure:   I, Yamile Sorto, am serving as a scribe; to document services personally performed by Oh More MD based on data collection and the provider's statements to me.     Provider Disclosure:  I agree with above History, Review of Systems, Physical exam and Plan.  I have reviewed the content of the documentation and have edited it as needed. I have personally performed the services documented here and the documentation accurately represents those services and the decisions I have made.      Electronically signed by:  Oh More MD

## 2024-03-27 ENCOUNTER — MYC MEDICAL ADVICE (OUTPATIENT)
Dept: SLEEP MEDICINE | Facility: CLINIC | Age: 74
End: 2024-03-27

## 2024-03-27 ENCOUNTER — OFFICE VISIT (OUTPATIENT)
Dept: OTOLARYNGOLOGY | Facility: CLINIC | Age: 74
End: 2024-03-27
Payer: MEDICARE

## 2024-03-27 DIAGNOSIS — K21.9 LPRD (LARYNGOPHARYNGEAL REFLUX DISEASE): Primary | ICD-10-CM

## 2024-03-27 DIAGNOSIS — G47.33 OSA (OBSTRUCTIVE SLEEP APNEA): Primary | ICD-10-CM

## 2024-03-27 PROCEDURE — 99203 OFFICE O/P NEW LOW 30 MIN: CPT | Mod: 25 | Performed by: OTOLARYNGOLOGY

## 2024-03-27 PROCEDURE — 69210 REMOVE IMPACTED EAR WAX UNI: CPT | Performed by: OTOLARYNGOLOGY

## 2024-03-27 RX ORDER — PANTOPRAZOLE SODIUM 40 MG/1
40 TABLET, DELAYED RELEASE ORAL DAILY
Qty: 90 TABLET | Refills: 0 | Status: SHIPPED | OUTPATIENT
Start: 2024-03-27

## 2024-03-27 ASSESSMENT — ENCOUNTER SYMPTOMS: COUGH: 1

## 2024-03-27 NOTE — NURSING NOTE
Arnold Lozada's chief complaint for this visit includes:  Chief Complaint   Patient presents with    Consult     Decreased hearing. Hx of wax build up. Had outer ear infection a few months ago. PND and increased saliva.     PCP: Brett Pineda    Referring Provider:  Referred Self, MD  No address on file    There were no vitals taken for this visit.

## 2024-03-27 NOTE — LETTER
3/27/2024         RE: Arnold Lozada  [address]  [place] [address]        Dear Colleague,    Thank you for referring your patient, Arnold Lozada, to the [facility]. Please see a copy of my visit note below.    Chief Complaint   Patient presents with     Consult     Decreased hearing. Hx of wax build up. Had outer ear infection a few months ago. PND and increased saliva.      PCP: Brett Pineda     Referring Provider: Referred Self    There were no vitals taken for this visit.    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     Essential hypertension I10     Adjustment disorder with mixed anxiety and depressed mood F43.23     Cerebral ataxia (H) G11.9     ARF (acute renal failure) (H24) N17.9     Dyslipidemia E78.5     Nocturia R35.1     Sciatica of right side M54.31     Temporary cerebral vascular dysfunction I67.82     Gait abnormality R26.9     Vasovagal syncope R55     Acute on chronic systolic heart failure (H) I50.23     Coronary atherosclerosis I25.10     Implantable cardioverter-defibrillator (ICD) in situ Z95.810     Ischemic cardiomyopathy I25.5      Current Medications:  Current Outpatient Medications   Medication     amLODIPine (NORVASC) 5 MG tablet     amphetamine-dextroamphetamine (ADDERALL XR) 30 MG 24 hr capsule     aspirin 325 MG tablet     carvedilol (COREG) 12.5 MG tablet     Cholecalciferol (D3 MAXIMUM STRENGTH PO)     folic acid 800 MCG TABS     furosemide (LASIX) 40 MG tablet     gabapentin (NEURONTIN) 300 MG capsule     losartan  (COZAAR) 25 MG tablet     MULTI-VITAMIN PO TABS     Omega 3-6-9 Fatty Acids (OMEGA 3-6-9 COMPLEX) CAPS     order for DME     POTASSIUM PO     QUEtiapine (SEROQUEL) 50 MG tablet     sertraline (ZOLOFT) 50 MG tablet     simvastatin (ZOCOR) 20 MG tablet     sulfaSALAzine (AZULFIDINE) 500 MG tablet     tamsulosin (FLOMAX) 0.4 MG capsule     triamcinolone (KENALOG) 0.1 % cream     amphetamine-dextroamphetamine (ADDERALL XR) 30 MG 24 hr capsule     amphetamine-dextroamphetamine (ADDERALL XR) 30 MG 24 hr capsule     amphetamine-dextroamphetamine (ADDERALL XR) 30 MG 24 hr capsule     amphetamine-dextroamphetamine (ADDERALL XR) 30 MG 24 hr capsule     clopidogrel (PLAVIX) 75 MG tablet     No current facility-administered medications for this visit.     CT Head w/o Contrast 9/4/22    COMPARISON:  None.    TECHNIQUE:  Images of the head were obtained without contrast.    FINDINGS: No hemorrhage, hydrocephalus, midline shift, or mass effect. Gray-white matter differentiation remains preserved. Age appropriate parenchymal volume loss. Non-specific periventricular/subcortical white matter hypodensities, favor sequelae of chronic small vessel ischemic disease given the patient's age. Mucosal thickening and frothy debris in the ethmoid air cells, sphenoid sinus, and partially visualized left maxillary sinus. Bilateral partial mastoid effusions. No acute bony abnormality. 1.7 cm ovoid dense subcutaneous nodule in the posterior scalp.    IMPRESSION:  1. No suspected acute intracranial pathology. Given the provided history, consider brain MRI to exclude an occult stroke.  2. Paranasal sinus disease and bilateral partial mastoid effusions.    HPI  Pleasant 73 year old male with a female presents today as a(n) new patient for impacted cerumen, decreased hearing, increased saliva, and a lot of post nasal drip. He had an outer ear infection several months ago and a sleep study completed that showed sleep apnea. He has an oral device that  he wears in his mouth that has not been adjusted for years, therefore they are unsure how effective it is anymore. They are waiting to hear from the sleep clinic if a CPAP machine is needed. He has a thick tongue and palatal tremor. He has a hx of a lot of speech and physical therapy. He has a gravely voice and is coughing more. His post nasal drip causes phlegm buildup in his throat and onset several years ago. He has had a couple of swallow tests that showed no issues. He had a serious myocardial infarction several years ago. He has a healthy diet.    He denies difficulty swallowing, clicking sounds in ears.    Review of Systems   Constitutional: Negative.    HENT:  Positive for hearing loss.    Eyes: Negative.    Respiratory:  Positive for cough.    Gastrointestinal: Negative.    Skin: Negative.    Endo/Heme/Allergies: Negative.        Physical Exam  Vitals and nursing note reviewed.   Constitutional:       Appearance: Normal appearance.   HENT:      Head: Normocephalic and atraumatic.      Jaw: There is normal jaw occlusion.      Right Ear: Hearing, tympanic membrane and ear canal normal. There is impacted cerumen.      Left Ear: Hearing, tympanic membrane and ear canal normal. There is impacted cerumen.      Nose: No mucosal edema, congestion or rhinorrhea.      Right Nostril: No occlusion.      Left Nostril: No occlusion.      Right Turbinates: Not enlarged or swollen.      Left Turbinates: Not enlarged or swollen.      Right Sinus: No maxillary sinus tenderness or frontal sinus tenderness.      Left Sinus: No maxillary sinus tenderness or frontal sinus tenderness.      Mouth/Throat:      Mouth: Mucous membranes are moist.      Pharynx: Oropharynx is clear. Uvula midline.   Eyes:      Extraocular Movements: Extraocular movements intact.      Pupils: Pupils are equal, round, and reactive to light.   Neurological:      Mental Status: He is alert.     Soft palate tremor.  Voice: articulation problems due to  cerebral ataxia  Ear wax removal: The patient was seen in the room. An informed consent was obtained from the patient. His ears were evaluated under the microscope. Right ear canal was blocked 60% with ear wax that was suctioned and removed with a curette. The left ear canal was blocked 60% with ear wax that was suctioned with a 7 tip. He tolerated the procedure well and left the room no complications.    AUDIOGRAM: The patient underwent an audiogram 9/25/23.  Results: WNL sloping to profound SNHL bilaterally. 100% word rec. right, 96% left. Tymps negative pressure.  Right: Speech reception threshold is 45 dB with 100% word recognition. Tympanogram C type.  Left: Speech reception threshold is 45 dB with 96% word recognition. Tympanogram C type.    A/P  Audiogram and imaging was independently reviewed and discussed in detail with the patient. This pleasant patient has bilateral SNHL and laryngo-pharyngeal reflux that is causing irritation with coughing and phlegm buildup. There are no ear or sinus infections present. Pantoprazole (PROTONIX) 40 MG EC tablet, take 1 tablet (40 mg) by mouth daily, is prescribed today for laryngo-pharyngeal. The following are recommended as part of an acid reflux diet:  No eating 2 hours before bed  Elevate the upper part of the body when laying down  Small meals  Good hydration- take a sip of water when feeling the need to cough or clear the throat  Avoid acidic, spicy, fried foods    If the problem continues, further investigation measures such as a diagnostic nasal endoscopy are discussed.    For SNHL, he is advised to continue use of hearing aids.    Follow up in clinic in 3 months.    Scribe/Staff:    Scribe Disclosure:   I, Yamile Sorto, am serving as a scribe; to document services personally performed by Oh More MD based on data collection and the provider's statements to me.     Provider Disclosure:  I agree with above History, Review of Systems, Physical exam and  Plan.  I have reviewed the content of the documentation and have edited it as needed. I have personally performed the services documented here and the documentation accurately represents those services and the decisions I have made.      Electronically signed by:  Oh More MD       Again, thank you for allowing me to participate in the care of your patient.        Sincerely,        Oh More MD

## 2024-03-27 NOTE — TELEPHONE ENCOUNTER
PSG performed at [facility] 3/7/24. He had 354.5 min of sleep with a sleep efficiency of 83.2%. He had 23.8% in stage N1, 65.9% in N2, 6.5% in N3 and 3.8% in REM sleep.  He was on his right side all night.   AHI: 14.6/hr, RDI 27.9/hr. REM AHI 4.4/hr. All events were hypopneas, some transition centrals, mostly obstructive.  O2 charisse 83%. 13.5% of the night was spent <89%. His baseline SpO2 was 89% during sleep.     I sent him a Oree message to see if he is following with a provider at [facility], if anyone has reviewed his results with him, or if he has obtained a CPAP yet.  Bennett Goltz, PA-C

## 2024-04-24 ENCOUNTER — MYC REFILL (OUTPATIENT)
Dept: NEUROLOGY | Facility: CLINIC | Age: 74
End: 2024-04-24
Payer: MEDICARE

## 2024-04-24 DIAGNOSIS — G47.10 EXCESSIVE SLEEPINESS: ICD-10-CM

## 2024-04-25 ENCOUNTER — DOCUMENTATION ONLY (OUTPATIENT)
Dept: SLEEP MEDICINE | Facility: CLINIC | Age: 74
End: 2024-04-25
Payer: MEDICARE

## 2024-04-25 DIAGNOSIS — G47.33 OBSTRUCTIVE SLEEP APNEA (ADULT) (PEDIATRIC): Primary | ICD-10-CM

## 2024-04-25 RX ORDER — DEXTROAMPHETAMINE SACCHARATE, AMPHETAMINE ASPARTATE MONOHYDRATE, DEXTROAMPHETAMINE SULFATE AND AMPHETAMINE SULFATE 7.5; 7.5; 7.5; 7.5 MG/1; MG/1; MG/1; MG/1
30 CAPSULE, EXTENDED RELEASE ORAL DAILY
Qty: 90 CAPSULE | Refills: 0 | Status: SHIPPED | OUTPATIENT
Start: 2024-04-25 | End: 2024-07-12

## 2024-04-25 NOTE — PROGRESS NOTES
Patient was offered choice of vendor and chose [facility].  Patient Arnold Lozada was set up at [facility]  on April 25, 2024. Patient received a Resmed Airsense 10 Pressures were set at  5-15 cm H2O.   Patient s ramp is 4 cm H2O for Auto and FLEX/EPR is EPR 2.  Patient received a [employer] Mask name: DREAMWEAR NASAL  Nasal mask size Standard, heated tubing and heated humidifier.  Patient has the following compliance requirements: using and visit requirements  Patient has a follow up on TBD.  Dany Avila

## 2024-04-30 ENCOUNTER — DOCUMENTATION ONLY (OUTPATIENT)
Dept: SLEEP MEDICINE | Facility: CLINIC | Age: 74
End: 2024-04-30
Payer: MEDICARE

## 2024-04-30 NOTE — PROGRESS NOTES
3 day Sleep therapy management telephone visit    Diagnostic AHI:    HST: 16.2    Confirmed with patient at time of call- Yes Patient is still interested in STM service     Subjective measures:  Pt reports doing ok with CPAP other than the mask fit is not going well. Will send pt information on mask fitting via [employer]. Pt was given my contact information and instructed to reach out with any questions or concerns.       Order settings:  CPAP MIN CPAP MAX   5 cm H2O 15 cm H2O       Device settings:  CPAP MIN CPAP MAX EPR RESMED SOFT RESPONSE SETTING   5.0 cm  H20 15.0 cm  H20 TWO OFF       Compliance 100 %    Assessment: Nightly usage over four hours     Action plan: Patient to have 14 day STM visit.     Patient has the following upcoming sleep appts:      Replacement device: No  STM ordered by provider: Yes     Total time spent on accessing and  interpreting remote patient PAP therapy data  10 minutes    Total time spent counseling, coaching  and reviewing PAP therapy data with patient  2 minutes    [id number] no

## 2024-05-09 ENCOUNTER — TELEPHONE (OUTPATIENT)
Dept: NEUROLOGY | Facility: CLINIC | Age: 74
End: 2024-05-09
Payer: MEDICARE

## 2024-05-09 NOTE — TELEPHONE ENCOUNTER
Health Call Center    Phone Message    May a detailed message be left on voicemail: yes     Reason for Call: Umu Pts Physical Therapist says she faxed a physical therapy re certification on the 24th of April, and she is waiting on a signature.    Please call Umu at  # [phone], to discuss further.    Action Taken: Message routed to:  [facility] ([facility] Neurology    Travel Screening: Not Applicable

## 2024-05-10 ENCOUNTER — DOCUMENTATION ONLY (OUTPATIENT)
Dept: SLEEP MEDICINE | Facility: CLINIC | Age: 74
End: 2024-05-10
Payer: MEDICARE

## 2024-05-10 NOTE — PROGRESS NOTES
14  DAY STM VISIT    Diagnostic AHI:  HST: 16.2        Subjective measures: Pt reports doing ok with CPAP. His scores have been excellent except mask leak. He denies any discomfort with the mask or noticeable leak, so I told him not to worry about it since its still an effective therapy. Pt was given my contact info.       Assessment: Pt not meeting objective benchmarks for leak Patient meeting subjective benchmarks.     Action plan: pt to have 30 day STM visit.      Device type: Auto-CPAP    PAP settings:  DEVICE TYPE CPAP MIN CPAP MAX 95TH % PRESSURE EPR MASK DISPENSED   Auto-CPAP    5.0 cm  H20 15.0 cm  H20 13.1 cm  H20  TWO Nasal Mask     Mask type:  Nasal Mask    Objective measures: 14 day rolling measures   COMPLIANCE LEAK AHI AVERAGE USE IN MINUTES   100 % 39.09 4.16 535   GOAL >70% GOAL < 24 LPM GOAL <5 GOAL >240      Patient has the following upcoming sleep appts:  Future Sleep Appointments         Provider Department    6/20/2024 10:00 AM (Arrive by 9:45 AM) Curt Mc, JOSSY [facility] [place]            Total time spent on accessing and interpreting remote patient PAP therapy data  10 minutes    Total time spent counseling, coaching  and reviewing PAP therapy data with patient  3 minutes    [id number]  [id number]  no (3 day STM)

## 2024-05-10 NOTE — TELEPHONE ENCOUNTER
Called [facility] back, no answer. Fax was not received. Asked her to fax it again and we will have Dr. Pena sign it.

## 2024-05-16 ENCOUNTER — DOCUMENTATION ONLY (OUTPATIENT)
Dept: NEUROLOGY | Facility: CLINIC | Age: 74
End: 2024-05-16
Payer: MEDICARE

## 2024-05-16 NOTE — PROGRESS NOTES
Received office note from [facility]  Records Date of service: 5/14/24  Copy has been sent to scanning and emailed to provider

## 2024-06-20 ENCOUNTER — OFFICE VISIT (OUTPATIENT)
Dept: SLEEP MEDICINE | Facility: CLINIC | Age: 74
End: 2024-06-20
Payer: MEDICARE

## 2024-06-20 VITALS
HEART RATE: 86 BPM | BODY MASS INDEX: 25.91 KG/M2 | WEIGHT: 171 LBS | DIASTOLIC BLOOD PRESSURE: 77 MMHG | OXYGEN SATURATION: 95 % | HEIGHT: 68 IN | SYSTOLIC BLOOD PRESSURE: 116 MMHG

## 2024-06-20 DIAGNOSIS — G47.33 OSA (OBSTRUCTIVE SLEEP APNEA): Primary | ICD-10-CM

## 2024-06-20 PROCEDURE — 99213 OFFICE O/P EST LOW 20 MIN: CPT | Performed by: NURSE PRACTITIONER

## 2024-06-20 RX ORDER — CALCIUM CARBONATE 500(1250)
1250 TABLET,CHEWABLE ORAL
COMMUNITY
Start: 2022-10-05

## 2024-06-20 RX ORDER — ATORVASTATIN CALCIUM 40 MG/1
1 TABLET, FILM COATED ORAL DAILY
COMMUNITY
Start: 2023-12-17

## 2024-06-20 ASSESSMENT — SLEEP AND FATIGUE QUESTIONNAIRES
HOW LIKELY ARE YOU TO NOD OFF OR FALL ASLEEP WHILE LYING DOWN TO REST IN THE AFTERNOON WHEN CIRCUMSTANCES PERMIT: SLIGHT CHANCE OF DOZING
HOW LIKELY ARE YOU TO NOD OFF OR FALL ASLEEP WHILE SITTING INACTIVE IN A PUBLIC PLACE: SLIGHT CHANCE OF DOZING
HOW LIKELY ARE YOU TO NOD OFF OR FALL ASLEEP IN A CAR, WHILE STOPPED FOR A FEW MINUTES IN TRAFFIC: WOULD NEVER DOZE
HOW LIKELY ARE YOU TO NOD OFF OR FALL ASLEEP WHILE SITTING QUIETLY AFTER LUNCH WITHOUT ALCOHOL: SLIGHT CHANCE OF DOZING
HOW LIKELY ARE YOU TO NOD OFF OR FALL ASLEEP WHILE SITTING AND TALKING TO SOMEONE: WOULD NEVER DOZE
HOW LIKELY ARE YOU TO NOD OFF OR FALL ASLEEP WHILE WATCHING TV: WOULD NEVER DOZE
HOW LIKELY ARE YOU TO NOD OFF OR FALL ASLEEP WHILE SITTING AND READING: WOULD NEVER DOZE
HOW LIKELY ARE YOU TO NOD OFF OR FALL ASLEEP WHEN YOU ARE A PASSENGER IN A CAR FOR AN HOUR WITHOUT A BREAK: WOULD NEVER DOZE

## 2024-06-20 NOTE — PATIENT INSTRUCTIONS
"MY INFORMATION ON SLEEP APNEA-  Arnold ANTHONY Kierra    SLEEP CENTER :      MY CONTACT NUMBER:   [facility]  [phone]  [facility]   [phone]  [facility]   [phone]      [facility]  [phone]  [facility]   [phone]    DME:  [facility] [place] [address], [address]  [place] [address]  Phone: [phone]    Hours:  Mon - Fri: 8:00 a.m. - 4:30 p.m.  Sat: Closed  Sun: Closed      Key Points:  1. What is Obstructive Sleep Apnea (GUILLERMINA)? GUILLERMINA is the most common type of sleep apnea. Apnea literally means, \"without breath.\" It is characterized by repetitive pauses in breathing, despite continued effort to breathe, and is usually associated with a reduction in blood oxygen saturation. Apneas can last 10 to over 60 seconds. It is caused by narrowing or collapse of the upper airway as muscles relax during sleep.   2. What are the consequences of GUILLERMINA? Symptoms include: daytime sleepiness- possibly increasing the risk of falling asleep while driving, unrefreshing/restless sleep, snoring, insomnia, waking frequently to urinate, waking with heartburn or reflux, reduced concentration and memory, and morning headaches. Other health consequences may include development of high blood pressure. Untreated GUILLERMINA also can contribute to heart disease, stroke and diabetes.   3. What are the treatment options? In most situations, sleep apnea is a lifelong disease that must be managed with daily therapy. Continuous Positive Airway (CPAP) is the most reliable treatment. A mouthguard to hold your jaw forward is usually the next most reliable option. Other options include postioning devices (to keep you off your back), nasal valves, tongue retaining device, weight loss, surgery. There is more detail about these options toward the end of this document.  4. What are the most " important things to remember about using CPAP?     WHERE CAN I FIND MORE INFORMATION?    American Academy of Sleep Medicine Patient information on sleep disorders:  http://yoursleep.aasmnet.org    CPAP -  WHY AND HOW?                 Continuous positive airway pressure, or CPAP, is the most effective treatment for obstructive sleep apnea. It works by blowing room air, through a mask, to hold your throat open. A decision to use CPAP is a major step forward in the pursuit of a healthier life. The successful use of CPAP will help you breathe easier, sleep better and live healthier. Using CPAP can be a positive experience if you keep these schmitz points in mind:  Commitment  CPAP is not a quick fix for your problem. It involves a long-term commitment to improve your sleep and your health.    Communication  Stay in close communication with both your sleep doctor and your CPAP supplier. Ask lots of questions and seek help when you need it.    Consistency  Use CPAP all night, every night and for every nap. You will receive the maximum health benefits from CPAP when you use it every time that you sleep. This will also make it easier for your body to adjust to the treatment.    Correction  The first machine and mask that you try may not be the best ones for you. Work with your sleep doctor and your CPAP supplier to make corrections to your equipment selection. Ask about trying a different type of machine or mask if you have ongoing problems. Make sure that your mask is a good fit and learn to use your equipment properly.    Challenge  Tell a family member or close friend to ask you each morning if you used your CPAP the previous night. Have someone to challenge you to give it your best effort.    Connection   Your adjustment to CPAP will be easier if you are able to connect with others who use the same treatment. Ask your sleep doctor if there is a support group in your area for people who have sleep apnea, or look for one on  "the Internet.  Comfort   Increase your level of comfort by using a saline spray, decongestant or heated humidifier if CPAP irritates your nose, mouth or throat. Use your unit's \"ramp\" setting to slowly get used to the air pressure level. There may be soft pads you can buy that will fit over your mask straps. Look on www.CPAP.com for accessories that can help make CPAP use more comfortable.  Cleaning   Clean your mask, tubing and headgear on a regular basis. Put this time in your schedule so that you don't forget to do it. Check and replace the filters for your CPAP unit and humidifier.    Completion   Although you are never finished with CPAP therapy, you should reward yourself by celebrating the completion of your first month of treatment. Expect this first month to be your hardest period of adjustment. It will involve some trial and error as you find the machine, mask and pressure settings that are right for you.    Continuation  After your first month of treatment, continue to make a daily commitment to use your CPAP all night, every night and for every nap.    CPAP-Tips to starting with success:  Begin using your CPAP for short periods of time during the day while you watch TV or read.    Use CPAP every night and for every nap. Using it less often reduces the health benefits and makes it harder for your body to get used to it.    Newer CPAP models are virtually silent; however, if you find the sound of your CPAP machine to be bothersome, place the unit under your bed to dampen the sound.     Make small adjustments to your mask, tubing, straps and headgear until you get the right fit. Tightening the mask may actually worsen the leak.  If it leaves significant marks on your face or irritates the bridge of your nose, it may not be the best mask for you.  Speak with the person who supplied the mask and consider trying other masks. Insurances will allow you to try different masks during the first month of starting " CPAP.  Insurance also covers a new mask, hose and filter about every 6 months.    Use a saline nasal spray to ease mild nasal congestion. Neti-Pot or saline nasal rinses may also help. Nasal gel sprays can help reduce nasal dryness.  Biotene mouthwash can be helpful to protect your teeth if you experience frequent dry mouth.  Dry mouth may be a sign of air escaping out of your mouth or out of the mask in the case of a full face mask.  Speak with your provider if you expect that is the case.     Take a nasal decongestant to relieve more severe nasal or sinus congestion.  Do not use Afrin (oxymetazoline) nasal spray more than 3 days in a row.  Speak with your sleep doctor if your nasal congestion is chronic.    Use a heated humidifier that fits your CPAP model to enhance your breathing comfort. Adjust the heat setting up if you get a dry nose or throat, down if you get condensation in the hose or mask.  Position the CPAP lower than you so that any condensation in the hose drains back into the machine rather than towards the mask.    Try a system that uses nasal pillows if traditional masks give you problems.    Clean your mask, tubing and headgear once a week. Make sure the equipment dries fully.    Regularly check and replace the filters for your CPAP unit and humidifier.    Work closely with your sleep provider and your CPAP supplier to make sure that you have the machine, mask and air pressure setting that works best for you. It is better to stop using it and call your provider to solve problems than to lay awake all night frustrated with the device.    Weight Loss:    Weight loss decreases severity of sleep apnea in most people with obesity. For those with mild obesity who have developed snoring with weight gain, even 15-30 pound weight loss can improve and occasionally eliminate sleep apnea.  Structured and life-long dietary and health habits are necessary to lose weight and keep healthier weight levels.      Though there are significant health benefits from weight loss, long-term weight loss is very difficult to achieve- studies show success with dietary management in less than 10% of people. In addition, substantial weight loss may require years of dietary control and may be difficult if patients have severe obesity. In these cases, surgical management may be considered.    If you are interested in methods for weight loss, you should review the options discussed at the National Institutes of Health patient information sites:     http://win.niddk.nih.gov/publications/index.htm  http:/www.health.nih.gov/topic/WeightLossDieting    Bariatric programs offer counseling in all methods of weight loss:    Http:/www.[facility].org/Specialties/WeightLossSurgeryandMedicalMgmt/htm    Your BMI is Body mass index is 26.01 kg/m .    Body mass index (BMI) is one way to tell whether you are at a healthy weight, overweight, or obese. It measures your weight in relation to your height.  A BMI of 18.5 to 24.9 is in the healthy range. A person with a BMI of 25 to 29.9 is considered overweight, and someone with a BMI of 30 or greater is considered obese.  Another way to find out if you are at risk for health problems caused by overweight and obesity is to measure your waist. If you are a woman and your waist is more than 35 inches, or if you are a man and your waist is more than 40 inches, your risk of disease may be higher.  More than two-thirds of American adults are considered overweight or obese. Being overweight or obese increases the risk for further weight gain.  Excess weight may lead to heart disease and diabetes. Creating and following plans for healthy eating and physical activity may help you improve your health.    Methods for maintaining or losing weight.    Weight control is part of healthy lifestyle and includes exercise, emotional health, and healthy eating habits.  Careful eating habits lifelong is the mainstay  of weight control.  Though there are significant health benefits from weight loss, long-term weight loss with diet alone may be very difficult to achieve- studies show long-term success with dietary management in less than 10% of people. Attaining a healthy weight may be especially difficult to achieve in those with severe obesity. In some cases, medications, devices and surgical management might be considered.    What can you do?    If you are overweight or obese and are interested in methods for weight loss, you should discuss this with your provider. In addition, we recommend that you review healthy life styles and methods for weight loss available through the National Institutes of Health patient information sites:     http://win.niddk.nih.gov/publications/index.htm

## 2024-06-20 NOTE — NURSING NOTE
Changed cpap pressure to 8-15 cmh20 manually and fitted pt mask to his face, there is leakage on the top of his dreamwear nasal cushion mask, looks like the silicone broke down and he needs oit replaced told pt with medicare its six months and to see if its possible to replace just frame since pt has cushions. Gave him number to schedule for appointment with [facility] and scheduled him for 6 months with PA Goltz.    Stephen Coronado, Visit facilitator.

## 2024-06-20 NOTE — PROGRESS NOTES
Sleep Apnea - Follow-up Visit:    Impression/Plan:  1. GUILLERMINA (obstructive sleep apnea)  - Comprehensive DME     Moderate Sleep apnea. Tolerating PAP well, but has had some difficulties with mask headgear fit leading to elevated air leak. Daytime symptoms are improved.  The patient has met compliance requirements at this time.  He continues to use and benefit from PAP therapy.  His wife does report breakthrough symptoms of snoring or gasping at times on CPAP and also significant air leakage possibly coming from his mouth.  Patient denies significant mouth dryness upon waking.    A review of his APAP download data over the last 30 days shows excellent use and compliance and moderate GUILLERMINA that is adequately controlled on current pressure settings.  Of note, there is elevated air leak seen on download data.    Continue current plan of care.  We will adjust his pressure setting to 8-15 cm H2O to help with breakthrough symptoms of snoring or gasping at times on CPAP.  Additionally, he would look into use of a chinstrap to help with mouth opening at night and will follow-up with [facility] for this.    Arnold Lozada will follow up with Bennett Goltz, PA-C, primary sleep provider, in about 6 month(s) to review APAP download data and use/compliance as well as efficacy of treatments discussed above.     28 minutes spent with patient, all of which were spent face-to-face counseling, consulting, chart review/documentation, and coordinating plan of care on the date of the encounter.      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  He was last seen in an office visit on 10/18/2023 in sleep consultation with Bennett Goltz, PA-C, to establish care for history of moderate GUILLERMINA, previous use of MAD.  The patient had subsequently undergone PSG testing through [facility] [place] and a subsequent order for PAP therapy was placed at that time.  Patient has the following compliance requirements: using and visit requirements     Previous Study Results: PSG - [facility]  Date: 3/07/2024.  Weight 170.0 pounds.  AHI: 14.6/hr. RDI 27.9/hr. O2 charisse 83.0%.    A polysomnogram completed on 11/20/2013.  During the first portion of the study night the patient was found to have mild to moderate GUILLERMINA, principally in the supine position (AHI 16.2, RDI 17.6, O2, non-supine AHI 5, supine AHI 53, charisse 88%) and therefore a CPAP titration was attempted during the second portion of the study night.  Patient was placed on CPAP treatment but it was not well-tolerated. No sleep was attained and attempt was terminated.    He initially opted to pursue CPAP.  He reported that his ultimate goal was to use a mandibular advancement device and positional therapy but he was leaving for [place] and could not get in to a dental specialist before. He started CPAP 5-18 cm/H20 on 12/9/2013.  He arrived back from [place] and ultimately returned CPAP and pursued a mandibular advancement device.    Subsequent home sleep test to evaluate effectiveness of oral appliance:  Home Sleep Apnea Testing with oral appliance.- 11/10/16: 161 lbs 0 oz: AHI 1.5/hr. Supine AHI 0.0/hr. Left 0.2. Right 2.8. Oxygen Charisse of 89%.  Baseline 93.5%.  Sp02 =< 88% for 0 minutes    Home sleep test 5/18/2016: With oral appliance. 161 lbs 0 oz: AHI  12.9.  Supine AHI 31.9. Oxygen Charisse of 86% Baseline 92.5%.  Sp02 <88% for 19 minutes    Do you use a CPAP Machine at home:  Yes  Overall, on a scale of 0-10 how would you rate your CPAP (0 poor, 10 great): 5    What type of mask do you use: Nasal Pillow/cushion  Is your mask comfortable: Yes  If not, why:      Is your mask leaking: Yes  If yes, where do you feel it: nose and top of head  How many night per week does the mask leak (0-7): 7    Do you notice snoring with mask on: Yes  Do you notice gasping arousals with mask on: Yes  Are you having significant oral or nasal dryness: No  Is the pressure setting comfortable: Yes  If not, why:      What is your typical bedtime: 11  How long does it take you to go to sleep on PAP therapy: 15 mlnutes  What time do you typically get out of bed for the day: 8:30  How many hours on average per night are you using PAP therapy: 8:30  How many hours are you sleeping per night:    Do you feel well rested in the morning: No      ResMed AirSense 10 (set up on 4/25/2024 at [facility])  Auto-PAP 5.0 - 15.0 cmH2O 30 day usage data:  5/21/24 - 6/19/24  86% of days with > 4 hours of use. 2/30 days with no use.   Average use 474 minutes per day.   95%ile Leak 34.97 L/min.   CPAP 95% pressure 13.2 cm.   AHI 4.13 events per hour.        EPWORTH SLEEPINESS SCALE         6/20/2024     9:58 AM    [place] Sleepiness Scale ( TERESA Balderas  [id number]<br>ESS - [place]/English - Final version - 21 Nov 07 - [facility] [place].)   Sitting and reading Would never doze   Watching TV Would never doze   Sitting, inactive in a public place (e.g. a theatre or a meeting) Slight chance of dozing   As a passenger in a car for an hour without a break Would never doze   Lying down to rest in the afternoon when circumstances permit Slight chance of dozing   Sitting and talking to someone Would never doze   Sitting quietly after a lunch without alcohol Slight chance of dozing   In a car, while stopped for a few minutes in traffic Would never doze   [place] Score (MC) 3   [place] Score (Sleep) 3       INSOMNIA SEVERITY INDEX (GALLITO)           6/20/2024     9:43 AM   Insomnia Severity Index (GALLITO)   Difficulty falling asleep 1   Difficulty staying asleep 1   Problems waking up too early 1   How SATISFIED/DISSATISFIED are you with your CURRENT sleep pattern? 2   How NOTICEABLE to others do you think your sleep problem is in terms of impairing the quality of your life? 3   How WORRIED/DISTRESSED are you about your current sleep problem? 2   To what extent do you consider your sleep problem to INTERFERE with your daily functioning (e.g. daytime fatigue, mood, ability to function at work/daily chores, concentration, memory, mood, etc.) CURRENTLY? 2   GALLITO Total Score 12       Guidelines for Scoring/Interpretation:  Total score categories:  0-7 = No clinically significant insomnia   8-14 = Subthreshold insomnia   15-21 = Clinical insomnia (moderate severity)  22-28 = Clinical insomnia (severe)  Used via courtesy of www.myhealth.va.gov with permission from Win Ingram PhD., [facility]      Past medical/surgical history, family history, social history, medications and allergies were reviewed.        Problem List:  Patient Active Problem List    Diagnosis Date Noted    Ischemic cardiomyopathy 10/18/2023     Priority: Medium    Implantable cardioverter-defibrillator (ICD) in situ 05/01/2023     Priority: Medium     Formatting of this note might be different from the original. Dual Chamber Medtronic [place] XT DR  MRI SureScan cardiac defibrillator - MRI conditional      Coronary atherosclerosis 10/05/2022     Priority: Medium    Acute on chronic systolic heart failure (H) 08/29/2022     Priority: Medium    Dyslipidemia 03/10/2017     Priority: Medium    Sciatica of right side 03/10/2017     Priority: Medium    Cerebral ataxia (H) 12/26/2016     Priority: Medium    Adjustment disorder with mixed anxiety and depressed mood 08/24/2016     Priority: Medium    Essential hypertension 08/08/2016     Priority: Medium    Cerebellar ataxia (H) 06/23/2016      Priority: Medium    Elevated troponin I level 10/05/2014     Priority: Medium    ARF (acute renal failure) (H24) 10/05/2014     Priority: Medium    Nocturia 10/05/2014     Priority: Medium    Temporary cerebral vascular dysfunction 10/05/2014     Priority: Medium    Gait abnormality 10/05/2014     Priority: Medium    Vasovagal syncope 10/05/2014     Priority: Medium    Family history of colon cancer 12/03/2013     Priority: Medium    GUILLERMINA (obstructive sleep apnea)- Mild/moderate ( AHI 16.2) 11/24/2013     Priority: Medium     Study Date: 11/20/2013- 161.0 lbs.  The lowest oxygen saturation was 88.0%. Apnea/Hypopnea Index was 16.2 events per hour (mostly supine). RDI was 17.6    Home sleep test 5/18/2016: With oral appliance. 161 lbs 0 oz: AHI  12.9.  Supine AHI 31.9. Oxygen Vivek of 86% Baseline 92.5%.  Sp02 <88% for 19 minutes    Home Sleep Apnea Testing with oral appliance.- 11/10/16: 161 lbs 0 oz: AHI 1.5/hr. Supine AHI 0.0/hr. Left 0.2. Right 2.8. Oxygen Vivek of 89%.  Baseline 93.5%.  Sp02 =< 88% for 0 minutes    5/24/2018 [place] [facility] Sleep Apnea Testing with oral appliance - AHI 6.7/hr; Supine AHI 7.7/hr; SpO2 <= 88% for 19.8 minutes.       Low back strain 07/16/2012     Priority: Medium    Adenomatous colon polyp 11/11/2011     Priority: Medium     Due for colonoscopy in November 2012      Crohn's disease of colon (H)      Priority: Medium     sees Dr. Giordano about once a year and had a colonoscopy in 2010      Hyperlipidemia LDL goal <130 10/31/2010     Priority: Medium      Current Outpatient Medications   Medication Sig Dispense Refill    amphetamine-dextroamphetamine (ADDERALL XR) 30 MG 24 hr capsule Take 1 capsule (30 mg) by mouth daily 90 capsule 0    aspirin 325 MG tablet Take 325 mg by mouth daily      atorvastatin (LIPITOR) 40 MG tablet Take 1 tablet by mouth daily      calcium carbonate 500 mg, elemental, 1250 (500 Ca) MG tablet chewable Take 1,250 mg by mouth      carvedilol (COREG) 12.5 MG  "tablet Take 12.5 mg by mouth      Cholecalciferol (D3 MAXIMUM STRENGTH PO) Take 400 Units by mouth daily      clopidogrel (PLAVIX) 75 MG tablet Take 1 tablet by mouth daily      folic acid 800 MCG TABS Take 1 tablet by mouth daily Reported on 5/10/2017      furosemide (LASIX) 40 MG tablet Take 1 tablet by mouth every morning      gabapentin (NEURONTIN) 300 MG capsule       losartan (COZAAR) 25 MG tablet       MULTI-VITAMIN PO TABS 1 TABLET DAILY      Omega 3-6-9 Fatty Acids (OMEGA 3-6-9 COMPLEX) CAPS Take 1 capsule by mouth daily      order for DME Knee high compression stockings (15-20 mmHg) 1 each 1    pantoprazole (PROTONIX) 40 MG EC tablet Take 1 tablet (40 mg) by mouth daily 90 tablet 0    POTASSIUM PO Take 595 mcg by mouth daily      QUEtiapine (SEROQUEL) 50 MG tablet Take 1 tablet by mouth At Bedtime      sertraline (ZOLOFT) 50 MG tablet TAKE 1 TABLET BY MOUTH ONE TIME DAILY 90 tablet 3    sulfaSALAzine (AZULFIDINE) 500 MG tablet Take 2 tablets (1,000 mg) by mouth 2 times daily Take 2 tablets by mouth twice daily 360 tablet 3    tamsulosin (FLOMAX) 0.4 MG capsule +++NEED APPT IN July+++TAKE TWO CAPSULES BY MOUTH DAILY 180 capsule 0    amLODIPine (NORVASC) 5 MG tablet TAKE 1 TABLET BY MOUTH ONE TIME DAILY 90 tablet 3    simvastatin (ZOCOR) 20 MG tablet TAKE ONE TABLET BY MOUTH EVERY EVENING 90 tablet 3    triamcinolone (KENALOG) 0.1 % cream Apply sparingly to affected area three times daily for 14 days. 80 g 0     No current facility-administered medications for this visit.     /77   Pulse 86   Ht 1.727 m (5' 7.99\")   Wt 77.6 kg (171 lb)   SpO2 95%   BMI 26.01 kg/m        This note was written with the assistance of the Dragon voice-dictation technology software. The final document, although reviewed, may contain errors. For corrections, please contact the office.    "

## 2024-06-24 NOTE — TELEPHONE ENCOUNTER
BP Readings from Last 3 Encounters:   06/03/19 150/88   12/18/18 130/82   08/23/18 138/72     I has misgivings about continuing to crank up more and more of his anti-hypertensive medication when we know the cause of this is methylphenidate (RITALIN)      I think we should either     A. Accept sub-optimal blood pressure control and leave medications alone  B. Try reducing methylphenidate (RITALIN) dose  C. Further medication increases I don't really want to keep going with this     Nikhil Carpenter MD     right lower back, hip, leg

## 2024-06-28 ENCOUNTER — DOCUMENTATION ONLY (OUTPATIENT)
Dept: SLEEP MEDICINE | Facility: CLINIC | Age: 74
End: 2024-06-28
Payer: MEDICARE

## 2024-07-12 ENCOUNTER — OFFICE VISIT (OUTPATIENT)
Dept: NEUROLOGY | Facility: CLINIC | Age: 74
End: 2024-07-12
Payer: MEDICARE

## 2024-07-12 VITALS
DIASTOLIC BLOOD PRESSURE: 86 MMHG | BODY MASS INDEX: 27.09 KG/M2 | SYSTOLIC BLOOD PRESSURE: 129 MMHG | HEART RATE: 85 BPM | OXYGEN SATURATION: 93 % | WEIGHT: 178.1 LBS

## 2024-07-12 DIAGNOSIS — R41.3 MEMORY CHANGE: ICD-10-CM

## 2024-07-12 DIAGNOSIS — G47.10 EXCESSIVE SLEEPINESS: ICD-10-CM

## 2024-07-12 DIAGNOSIS — G11.9 CEREBELLAR ATAXIA (H): ICD-10-CM

## 2024-07-12 DIAGNOSIS — R27.0 ATAXIA: Primary | ICD-10-CM

## 2024-07-12 DIAGNOSIS — R39.15 URINARY URGENCY: ICD-10-CM

## 2024-07-12 PROCEDURE — 99214 OFFICE O/P EST MOD 30 MIN: CPT | Mod: GC | Performed by: PSYCHIATRY & NEUROLOGY

## 2024-07-12 RX ORDER — DEXTROAMPHETAMINE SACCHARATE, AMPHETAMINE ASPARTATE MONOHYDRATE, DEXTROAMPHETAMINE SULFATE AND AMPHETAMINE SULFATE 7.5; 7.5; 7.5; 7.5 MG/1; MG/1; MG/1; MG/1
30 CAPSULE, EXTENDED RELEASE ORAL DAILY
Qty: 90 CAPSULE | Refills: 0 | Status: SHIPPED | OUTPATIENT
Start: 2024-07-12

## 2024-07-12 ASSESSMENT — PAIN SCALES - GENERAL: PAINLEVEL: NO PAIN (0)

## 2024-07-12 NOTE — PATIENT INSTRUCTIONS
- Continue PT  - Speak with your OT at [facility] about needing further assessment for a new powered wheelchair. Let us know if an assessment is needed or if we can just place a prescription  - Speak with your Speech Therapist at [facility] about if further swallow evaluation is needed and if you should be making any changes to your diet  - I have placed a referral for Urology to determine the source of your urinary urgency  - I have placed a referral for Neuropsychology testing to obtain a baseline cognitive screen  - Follow-up in 1yr

## 2024-07-12 NOTE — LETTER
2024       RE: Arnold Lozada  [address]  [place] [address]     Dear Colleague,    Thank you for referring your patient, Arnold Lozada, to the [facility] [place] at [facility]. Please see a copy of my visit note below.    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 He was last seen on 2023, at which time the patient elected to undergo genetic testing for SCA27B, but this returned normal. His prior testing for repeat expansion disorders and sequence-based ataxias was also negative. He is accompanied by his wife, Willa    The patient was seen by Dr Holcomb at [facility] (2024). He had been seen by Dr Holcomb previously for his ataxia and that day presented hoping for a new wheelchair. He is able to take a few steps to exercise, but is largely wheelchair-dependent    The patient remains on Adderall XR 30mg daily. He has also recently started using a CPAP. Has a long-term history of GUILLERMINA and was previously using an oral appliance. He has not yet noticed much benefit, but the CPAP is quite new. His sleeping is going well    The patient has previously complained of excessive drooling, but this previously was not so severe as to warrant therapy. The patient feels this is getting worse and he must now consciously swallow to get rid of his spit; this is no longer automatic. He is also coughing more than usual. He is currently working with an SLP at [facility] for his dysarthria and control of saliva, but they have made no mention of needing medications. He underwent a  Swallow Study in 2022 which showed no lillian aspiration. He is currently on a regular diet, but has noticed increased choking with dry or small-particle foods (ie quinoa, crackers, etc) and will occasionally cough with water    He has a PCA on M/W/F 12hrs to help with hygiene and exercise. He is currently engaged in PT every 2wks. He tries to exercise everyday and will walk using an upright walker/go to the gym ~3x/wk. Otherwise he will use his wheelchair. He is interested in getting a new powered wheelchair - already has one - more focused on outdoor/irregular ground. He has had a couple of falls, but these were largely in the context of trying out new assist devices (ex 3-wheel scooter). He overall feels his balance is stable    The patient also mentions that he's had some issues with urinary frequency/urgency and due to his mobility issues this is more problematic    The patient has hearing aids. He feels that he can still hear, but he's having increased difficulty understanding them. He and his wife wonder if this is more related to slowed cognitive processing rather than a hearing issue. He saw Audiology in 3/2024 who made only slight adjustments to his hearing aids    MEDICATIONS:    Current Outpatient Medications:     amphetamine-dextroamphetamine (ADDERALL XR) 30 MG 24 hr capsule, Take 1 capsule (30 mg) by mouth daily, Disp: 90 capsule, Rfl: 0    aspirin 325 MG tablet, Take 325 mg by mouth daily, Disp: , Rfl:     atorvastatin (LIPITOR) 40 MG tablet, Take 1 tablet by mouth daily, Disp: , Rfl:     calcium carbonate 500 mg, elemental, 1250 (500 Ca) MG tablet chewable, Take 1,250 mg by mouth, Disp: , Rfl:     carvedilol (COREG) 12.5 MG tablet, Take 12.5 mg by mouth, Disp: , Rfl:     Cholecalciferol (D3 MAXIMUM STRENGTH PO), Take 400 Units by mouth daily, Disp: , Rfl:     folic acid 800 MCG TABS, Take 1 tablet by mouth daily Reported on 5/10/2017, Disp: , Rfl:     furosemide (LASIX) 40 MG tablet, Take 1 tablet by  mouth every morning, Disp: , Rfl:     gabapentin (NEURONTIN) 300 MG capsule, , Disp: , Rfl:     losartan (COZAAR) 25 MG tablet, , Disp: , Rfl:     MULTI-VITAMIN PO TABS, 1 TABLET DAILY, Disp: , Rfl:     Omega 3-6-9 Fatty Acids (OMEGA 3-6-9 COMPLEX) CAPS, Take 1 capsule by mouth daily, Disp: , Rfl:     order for DME, Knee high compression stockings (15-20 mmHg), Disp: 1 each, Rfl: 1    pantoprazole (PROTONIX) 40 MG EC tablet, Take 1 tablet (40 mg) by mouth daily, Disp: 90 tablet, Rfl: 0    POTASSIUM PO, Take 595 mcg by mouth daily, Disp: , Rfl:     QUEtiapine (SEROQUEL) 50 MG tablet, Take 1 tablet by mouth At Bedtime, Disp: , Rfl:     sertraline (ZOLOFT) 50 MG tablet, TAKE 1 TABLET BY MOUTH ONE TIME DAILY, Disp: 90 tablet, Rfl: 3    sulfaSALAzine (AZULFIDINE) 500 MG tablet, Take 2 tablets (1,000 mg) by mouth 2 times daily Take 2 tablets by mouth twice daily, Disp: 360 tablet, Rfl: 3    tamsulosin (FLOMAX) 0.4 MG capsule, +++NEED APPT IN July+++TAKE TWO CAPSULES BY MOUTH DAILY, Disp: 180 capsule, Rfl: 0    clopidogrel (PLAVIX) 75 MG tablet, Take 1 tablet by mouth daily, Disp: , Rfl:        Allergies   Allergen Reactions    Lisinopril Swelling     Angioedema     Family History   Problem Relation Age of Onset    Cancer Mother         brain tumor, age 79    Other Cancer Mother         Brain tumor    Depression Mother     Asthma Mother     Cancer Father         colon    Pneumonia Father          age 90 from pneumonia and sepsis    C.A.D. Father         Age 80 have MI and triple bypass    Coronary Artery Disease Father         Heart Attack    Hypertension Father         ???    Hyperlipidemia Father         ???    Colon Cancer Father         Cured    Diabetes Maternal Grandmother     Migraines Daughter      Social History     Tobacco Use    Smoking status: Never    Smokeless tobacco: Never    Tobacco comments:     Never smoked.   Substance Use Topics    Alcohol use: Yes     Comment: 1-2 times per week, 1 drink max     Drug use: No     PHYSICAL EXAM:  /86   Pulse 85   Wt 80.8 kg (178 lb 1.6 oz)   SpO2 93%   BMI 27.09 kg/m    Wt Readings from Last 4 Encounters:   07/12/24 80.8 kg (178 lb 1.6 oz)   06/20/24 77.6 kg (171 lb)   11/07/23 80.6 kg (177 lb 12.8 oz)   10/18/23 77.1 kg (170 lb)   Speech is moderate dysarthric but still mostly understandable  Palatal tremor is present  No nystagmus and other cranial nerves appear intact  FNF exhibits ataxia (L>R)  Gait is severely ataxic with marked imbalance even with neutral stance    DATA:  - SCA27B Repeat Expansion Genetic Testing (7/6/2023): Normal    ASSESSMENT:  Jas Lozada is a 73yr old male who presents for 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     His balance is relatively stable and he has been engaging in good physical activity on his own and with PT. He is interested in getting a new powered wheelchair to travel on unpaved road. He has been seeing OT at [facility] for his wheelchair needs and so recommended he contact them to see if further assessments are needed. He also reports some worsening in his dysphagia. He follows with SLP also at [facility] and so again recommended he follow-up with them to determine if changes need to be made to his diet or a repeat swallow study is warranted. Did discuss that dry and particulated foods should be avoided for now    The patient also reported urinary urgency/frequency. It is unclear at this point if this is due to prostate issues vs neurogenic bladder. Given the elevated risk of anti-muscarinic medications in this patient population, would want to get a better understanding as to the etiology of his issues before prescribing anything. As such, will refer to Urology for further assessment    There is also concern for potential cognitive slowing. Will obtain baseline cognitive testing via Neuropsych assessment    PLAN:  - Continue Adderall 30mg daily  - Continue PT  - Speak with your  OT at [facility] about needing further assessment for a new powered wheelchair. Let us know if an assessment is needed or if we can just place a prescription  - Speak with your Speech Therapist at [facility] about if further swallow evaluation is needed and if you should be making any changes to your diet  - I have placed a referral for Urology to determine the source of your urinary urgency  - I have placed a referral for Neuropsychology testing to obtain a baseline cognitive screen  - Follow-up in 1yr    This patient was seen with Movement Disorder Specialist, Dr Pena, who agrees with the above plan    Magaly Denise MD  Movement Disorder Fellow    Attestation signed by Giorgio Pena MD at 7/19/2024 11:53 AM:  I have personally interviewed and examined Mr. Arnold Lozada and agree with diagnosis and management. The total time spent with the patient was 30 minutes, over 50% of the time spent in counseling and coordinating care.      Again, thank you for allowing me to participate in the care of your patient.      Sincerely,    Giorgio Pena MD

## 2024-07-12 NOTE — PROGRESS NOTES
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 He was last seen on 2023, at which time the patient elected to undergo genetic testing for SCA27B, but this returned normal. His prior testing for repeat expansion disorders and sequence-based ataxias was also negative. He is accompanied by his wife, Willa    The patient was seen by Dr Holcomb at [facility] (2024). He had been seen by Dr Holcomb previously for his ataxia and that day presented hoping for a new wheelchair. He is able to take a few steps to exercise, but is largely wheelchair-dependent    The patient remains on Adderall XR 30mg daily. He has also recently started using a CPAP. Has a long-term history of GUILLERMINA and was previously using an oral appliance. He has not yet noticed much benefit, but the CPAP is quite new. His sleeping is going well    The patient has previously complained of excessive drooling, but this previously was not so severe as to warrant therapy. The patient feels this is getting worse and he must now consciously swallow to get rid of his spit; this is no longer automatic. He is also coughing more than usual. He is currently working with an SLP at [facility] for his dysarthria and control of saliva, but they have made no mention of needing medications. He underwent a Swallow Study in  which showed no lillian aspiration. He is currently on a regular diet, but has noticed increased choking with dry or small-particle foods (ie quinoa, crackers, etc) and will occasionally cough with water    He has a PCA on // 12hrs to help with hygiene and exercise. He is currently engaged in PT every 2wks.  He tries to exercise everyday and will walk using an upright walker/go to the gym ~3x/wk. Otherwise he will use his wheelchair. He is interested in getting a new powered wheelchair - already has one - more focused on outdoor/irregular ground. He has had a couple of falls, but these were largely in the context of trying out new assist devices (ex 3-wheel scooter). He overall feels his balance is stable    The patient also mentions that he's had some issues with urinary frequency/urgency and due to his mobility issues this is more problematic    The patient has hearing aids. He feels that he can still hear, but he's having increased difficulty understanding them. He and his wife wonder if this is more related to slowed cognitive processing rather than a hearing issue. He saw Audiology in 3/2024 who made only slight adjustments to his hearing aids    MEDICATIONS:    Current Outpatient Medications:     amphetamine-dextroamphetamine (ADDERALL XR) 30 MG 24 hr capsule, Take 1 capsule (30 mg) by mouth daily, Disp: 90 capsule, Rfl: 0    aspirin 325 MG tablet, Take 325 mg by mouth daily, Disp: , Rfl:     atorvastatin (LIPITOR) 40 MG tablet, Take 1 tablet by mouth daily, Disp: , Rfl:     calcium carbonate 500 mg, elemental, 1250 (500 Ca) MG tablet chewable, Take 1,250 mg by mouth, Disp: , Rfl:     carvedilol (COREG) 12.5 MG tablet, Take 12.5 mg by mouth, Disp: , Rfl:     Cholecalciferol (D3 MAXIMUM STRENGTH PO), Take 400 Units by mouth daily, Disp: , Rfl:     folic acid 800 MCG TABS, Take 1 tablet by mouth daily Reported on 5/10/2017, Disp: , Rfl:     furosemide (LASIX) 40 MG tablet, Take 1 tablet by mouth every morning, Disp: , Rfl:     gabapentin (NEURONTIN) 300 MG capsule, , Disp: , Rfl:     losartan (COZAAR) 25 MG tablet, , Disp: , Rfl:     MULTI-VITAMIN PO TABS, 1 TABLET DAILY, Disp: , Rfl:     Omega 3-6-9 Fatty Acids (OMEGA 3-6-9 COMPLEX) CAPS, Take 1 capsule by mouth daily, Disp: , Rfl:     order for DME, Knee high  compression stockings (15-20 mmHg), Disp: 1 each, Rfl: 1    pantoprazole (PROTONIX) 40 MG EC tablet, Take 1 tablet (40 mg) by mouth daily, Disp: 90 tablet, Rfl: 0    POTASSIUM PO, Take 595 mcg by mouth daily, Disp: , Rfl:     QUEtiapine (SEROQUEL) 50 MG tablet, Take 1 tablet by mouth At Bedtime, Disp: , Rfl:     sertraline (ZOLOFT) 50 MG tablet, TAKE 1 TABLET BY MOUTH ONE TIME DAILY, Disp: 90 tablet, Rfl: 3    sulfaSALAzine (AZULFIDINE) 500 MG tablet, Take 2 tablets (1,000 mg) by mouth 2 times daily Take 2 tablets by mouth twice daily, Disp: 360 tablet, Rfl: 3    tamsulosin (FLOMAX) 0.4 MG capsule, +++NEED APPT IN July+++TAKE TWO CAPSULES BY MOUTH DAILY, Disp: 180 capsule, Rfl: 0    clopidogrel (PLAVIX) 75 MG tablet, Take 1 tablet by mouth daily, Disp: , Rfl:        Allergies   Allergen Reactions    Lisinopril Swelling     Angioedema     Family History   Problem Relation Age of Onset    Cancer Mother         brain tumor, age 79    Other Cancer Mother         Brain tumor    Depression Mother     Asthma Mother     Cancer Father         colon    Pneumonia Father          age 90 from pneumonia and sepsis    C.A.D. Father         Age 80 have MI and triple bypass    Coronary Artery Disease Father         Heart Attack    Hypertension Father         ???    Hyperlipidemia Father         ???    Colon Cancer Father         Cured    Diabetes Maternal Grandmother     Migraines Daughter      Social History     Tobacco Use    Smoking status: Never    Smokeless tobacco: Never    Tobacco comments:     Never smoked.   Substance Use Topics    Alcohol use: Yes     Comment: 1-2 times per week, 1 drink max    Drug use: No     PHYSICAL EXAM:  /86   Pulse 85   Wt 80.8 kg (178 lb 1.6 oz)   SpO2 93%   BMI 27.09 kg/m    Wt Readings from Last 4 Encounters:   24 80.8 kg (178 lb 1.6 oz)   24 77.6 kg (171 lb)   23 80.6 kg (177 lb 12.8 oz)   10/18/23 77.1 kg (170 lb)   Speech is moderate dysarthric but still  mostly understandable  Palatal tremor is present  No nystagmus and other cranial nerves appear intact  FNF exhibits ataxia (L>R)  Gait is severely ataxic with marked imbalance even with neutral stance    DATA:  - SCA27B Repeat Expansion Genetic Testing (7/6/2023): Normal    ASSESSMENT:  Jas Lozada is a 73yr old male who presents for 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     His balance is relatively stable and he has been engaging in good physical activity on his own and with PT. He is interested in getting a new powered wheelchair to travel on unpaved road. He has been seeing OT at [facility] for his wheelchair needs and so recommended he contact them to see if further assessments are needed. He also reports some worsening in his dysphagia. He follows with SLP also at [facility] and so again recommended he follow-up with them to determine if changes need to be made to his diet or a repeat swallow study is warranted. Did discuss that dry and particulated foods should be avoided for now    The patient also reported urinary urgency/frequency. It is unclear at this point if this is due to prostate issues vs neurogenic bladder. Given the elevated risk of anti-muscarinic medications in this patient population, would want to get a better understanding as to the etiology of his issues before prescribing anything. As such, will refer to Urology for further assessment    There is also concern for potential cognitive slowing. Will obtain baseline cognitive testing via Neuropsych assessment    PLAN:  - Continue Adderall 30mg daily  - Continue PT  - Speak with your OT at [facility] about needing further assessment for a new powered wheelchair. Let us know if an assessment is needed or if we can just place a prescription  - Speak with your Speech Therapist at [facility] about if further swallow evaluation is needed and if you should be making any changes to your diet  - I have placed a  referral for Urology to determine the source of your urinary urgency  - I have placed a referral for Neuropsychology testing to obtain a baseline cognitive screen  - Follow-up in 1yr    This patient was seen with Movement Disorder Specialist, Dr Pena, who agrees with the above plan    aMgaly Denise MD  Movement Disorder Fellow

## 2024-07-13 ENCOUNTER — TELEPHONE (OUTPATIENT)
Dept: NEUROLOGY | Facility: CLINIC | Age: 74
End: 2024-07-13
Payer: MEDICARE

## 2024-07-15 NOTE — TELEPHONE ENCOUNTER
Retail Pharmacy Prior Authorization Team   Phone: [phone]    PRIOR AUTHORIZATION DENIED    Medication: AMPHETAMINE-DEXTROAMPHET ER 30 MG PO CP24  Insurance Company: [employer] [place] - Phone [phone] Fax [phone]  Denial Date: 7/13/2024  Denial Reason(s): MUST HAVE A MEDICALLY-ACCEPTED INDICATION      Appeal Information: IF THE PROVIDER WOULD LIKE TO APPEAL THIS DECISION PLEASE PROVIDE THE PA TEAM WITH A LETTER OF MEDICAL NECESSITY      Patient Notified: NO

## 2024-07-16 ENCOUNTER — TELEPHONE (OUTPATIENT)
Dept: UROLOGY | Facility: CLINIC | Age: 74
End: 2024-07-16
Payer: MEDICARE

## 2024-07-16 NOTE — TELEPHONE ENCOUNTER
Called pt's wife, Willa. They use the [employer] coupon to buy this med because their insurance won't cover it. I called [facility] to let them know and they said the prior auth is automatic from the [employer] and they know the pt always uses [facility] or the [facility] price.

## 2024-07-17 ENCOUNTER — TELEPHONE (OUTPATIENT)
Dept: NEUROLOGY | Facility: CLINIC | Age: 74
End: 2024-07-17
Payer: COMMERCIAL

## 2024-07-17 NOTE — TELEPHONE ENCOUNTER
[employer] (1st Attempt) for the patient to call back and schedule the following:    Appointment type: Return Subspecialty Ataxia  Provider: Jean  Return date: On or near 7/17/25  Specialty phone number: [phone]  Additional appointment(s) needed: Neuropsych Fritz  Additonal Notes: 1 yr follow up    Pastora Taylor on 7/17/2024 at 5:27 PM

## 2024-08-27 ENCOUNTER — OFFICE VISIT (OUTPATIENT)
Dept: UROLOGY | Facility: CLINIC | Age: 74
End: 2024-08-27
Payer: MEDICARE

## 2024-08-27 VITALS
HEART RATE: 84 BPM | SYSTOLIC BLOOD PRESSURE: 134 MMHG | DIASTOLIC BLOOD PRESSURE: 89 MMHG | WEIGHT: 175 LBS | BODY MASS INDEX: 26.61 KG/M2 | OXYGEN SATURATION: 94 %

## 2024-08-27 DIAGNOSIS — N40.1 BENIGN PROSTATIC HYPERPLASIA WITH LOWER URINARY TRACT SYMPTOMS, SYMPTOM DETAILS UNSPECIFIED: Primary | ICD-10-CM

## 2024-08-27 DIAGNOSIS — R39.15 URINARY URGENCY: ICD-10-CM

## 2024-08-27 PROCEDURE — 99204 OFFICE O/P NEW MOD 45 MIN: CPT | Mod: 25 | Performed by: UROLOGY

## 2024-08-27 PROCEDURE — 51798 US URINE CAPACITY MEASURE: CPT | Performed by: UROLOGY

## 2024-08-27 RX ORDER — MIRABEGRON 50 MG/1
50 TABLET, EXTENDED RELEASE ORAL DAILY
Qty: 30 TABLET | Refills: 1 | Status: SHIPPED | OUTPATIENT
Start: 2024-08-27

## 2024-08-27 NOTE — PROGRESS NOTES
3 WATER  1 JUICE  1 TEA  1 Lemonade/Gatorade  Bladder scan performed 57ml detected in bladder. Lissette Sifuentes, CMA

## 2024-08-27 NOTE — PROGRESS NOTES
S: Anrold Lozada is a pleasant  73 year old male who was requested to be seen by Dr. Pena for a consult with regard to patient's urinary complaints.  Patient complains of Urinary incontinence, Nocturia x 2, Urgency, and Frequency.  He has no history of elevated PSA.  Symptoms have been on going for   many years(s).  Seems to be worsened over time.  His recent PSA was found to be   PSA   Date Value Ref Range Status   07/08/2021 1.30 0 - 4 ug/L Final     Comment:     Assay Method:  Chemiluminescence using Siemens Vista analyzer   .  His AUA Symptom Score:  13.  His QOL score:  4.  He has been on flomax for many years.  He has no obstructive voiding symptoms.    Current Outpatient Medications   Medication Sig Dispense Refill    amphetamine-dextroamphetamine (ADDERALL XR) 30 MG 24 hr capsule Take 1 capsule (30 mg) by mouth daily 90 capsule 0    aspirin 325 MG tablet Take 325 mg by mouth daily      atorvastatin (LIPITOR) 40 MG tablet Take 1 tablet by mouth daily      calcium carbonate 500 mg, elemental, 1250 (500 Ca) MG tablet chewable Take 1,250 mg by mouth      carvedilol (COREG) 12.5 MG tablet Take 12.5 mg by mouth      Cholecalciferol (D3 MAXIMUM STRENGTH PO) Take 400 Units by mouth daily      folic acid 800 MCG TABS Take 1 tablet by mouth daily Reported on 5/10/2017      furosemide (LASIX) 40 MG tablet Take 1 tablet by mouth every morning      gabapentin (NEURONTIN) 300 MG capsule       losartan (COZAAR) 25 MG tablet       mirabegron (MYRBETRIQ) 50 MG 24 hr tablet Take 1 tablet (50 mg) by mouth daily. 30 tablet 1    MULTI-VITAMIN PO TABS 1 TABLET DAILY      Omega 3-6-9 Fatty Acids (OMEGA 3-6-9 COMPLEX) CAPS Take 1 capsule by mouth daily      pantoprazole (PROTONIX) 40 MG EC tablet Take 1 tablet (40 mg) by mouth daily 90 tablet 0    POTASSIUM PO Take 595 mcg by mouth daily      QUEtiapine (SEROQUEL) 50 MG tablet Take 1 tablet by mouth At Bedtime      sertraline (ZOLOFT) 50 MG tablet TAKE 1 TABLET BY MOUTH  ONE TIME DAILY 90 tablet 3    sulfaSALAzine (AZULFIDINE) 500 MG tablet Take 2 tablets (1,000 mg) by mouth 2 times daily Take 2 tablets by mouth twice daily 360 tablet 3    tamsulosin (FLOMAX) 0.4 MG capsule +++NEED APPT IN July+++TAKE TWO CAPSULES BY MOUTH DAILY 180 capsule 0    clopidogrel (PLAVIX) 75 MG tablet Take 1 tablet by mouth daily      order for DME Knee high compression stockings (15-20 mmHg) 1 each 1     Allergies   Allergen Reactions    Lisinopril Swelling     Angioedema     Past Medical History:   Diagnosis Date    Abnormal gait 10/5/2014    Acute renal failure (H24) 10/5/2014    Coronary atherosclerosis 10/5/2022    Elevated troponin I level 10/5/2014    Family history of colon cancer 12/3/2013    Head injury 1965    History of colonoscopy 2008    Hypertension     Low back pain     Nocturia 10/5/2014    Other nervous system complications 2014    Diagnosed in  with Cerebellar Ataxia.    Temporary cerebral vascular dysfunction 10/5/2014    Vasovagal syncope 10/5/2014     Past Surgical History:   Procedure Laterality Date    COLONOSCOPY  2014    OK    HC TOOTH EXTRACTION W/FORCEP      IR PICC PLACEMENT > 5 YRS OF AGE  2022      Family History   Problem Relation Age of Onset    Cancer Mother         brain tumor, age 79    Other Cancer Mother         Brain tumor    Depression Mother     Asthma Mother     Cancer Father         colon    Pneumonia Father          age 90 from pneumonia and sepsis    C.A.D. Father         Age 80 have MI and triple bypass    Coronary Artery Disease Father         Heart Attack    Hypertension Father         ???    Hyperlipidemia Father         ???    Colon Cancer Father         Cured    Diabetes Maternal Grandmother     Migraines Daughter      He does not have a family history of prostate cancer.  Social History     Socioeconomic History    Marital status:      Spouse name: None    Number of children: None    Years of education: None    Highest  education level: None   Tobacco Use    Smoking status: Never    Smokeless tobacco: Never    Tobacco comments:     Never smoked.   Substance and Sexual Activity    Alcohol use: Yes     Comment: 1-2 times per week, 1 drink max    Drug use: No    Sexual activity: Yes     Partners: Female     Birth control/protection: Other   Other Topics Concern    Parent/sibling w/ CABG, MI or angioplasty before 65F 55M? No     Social Determinants of Health      Received from [facility], [facility]    Social Connections        REVIEW OF SYSTEMS  =================  C: NEGATIVE for fever, chills, change in weight  I: NEGATIVE for worrisome rashes, moles or lesions  E/M: NEGATIVE for ear, mouth and throat problems  R: NEGATIVE for significant cough or SHORTNESS OF BREATH  CV:  NEGATIVE for chest pain, palpitations or peripheral edema  GI: NEGATIVE for nausea, abdominal pain, heartburn, or change in bowel habits  NEURO: NEGATIVE numbness/weakness  : see HPI  PSYCH: NEGATIVE depression/anxiety  LYmph: no new enlarged lymph nodes  Ortho: no new trauma/movements           O: Exam:/89   Pulse 84   Wt 79.4 kg (175 lb)   SpO2 94%   BMI 26.61 kg/m     Constitutional: healthy, alert and no distress  Cardiovascular: negative, PMI normal.   Respiratory: negative, no evidence of respiratory distress  Gastrointestinal: Abdomen soft, non-tender. BS normal. No masses, organomegaly  : penis normal.  Prostate 40 gm smooth.  PVR < 50 ml.  No bladder distension  Musculoskeletal: extremities normal- no gross deformities noted, gait normal and normal muscle tone  Skin: no suspicious lesions or rashes  Neurologic: Alert and oriented  Psychiatric: mentation appears normal. and affect normal/bright  Hematologic/Lymphatic/Immunologic: normal ant/post cervical, axillary, supraclavicular and inguinal nodes    Assessment/Plan:   (N40.1) Benign prostatic hyperplasia with lower urinary  tract symptoms, symptom details unspecified  (primary encounter diagnosis)  Comment:    Plan: MEASURE POST-VOID RESIDUAL URINE/BLADDER         CAPACITY, US NON-IMAGING (45040)        Cont with flomax    (R39.15) Urinary urgency  Comment:    Plan: MEASURE POST-VOID RESIDUAL URINE/BLADDER         CAPACITY, US NON-IMAGING (93123), mirabegron         (MYRBETRIQ) 50 MG 24 hr tablet        Add myrbetriq        Patient to contact me in one month for response.

## 2024-10-12 ENCOUNTER — MYC REFILL (OUTPATIENT)
Dept: NEUROLOGY | Facility: CLINIC | Age: 74
End: 2024-10-12
Payer: MEDICARE

## 2024-10-12 DIAGNOSIS — G47.10 EXCESSIVE SLEEPINESS: ICD-10-CM

## 2024-10-18 RX ORDER — DEXTROAMPHETAMINE SACCHARATE, AMPHETAMINE ASPARTATE MONOHYDRATE, DEXTROAMPHETAMINE SULFATE AND AMPHETAMINE SULFATE 7.5; 7.5; 7.5; 7.5 MG/1; MG/1; MG/1; MG/1
30 CAPSULE, EXTENDED RELEASE ORAL DAILY
Qty: 90 CAPSULE | Refills: 0 | Status: SHIPPED | OUTPATIENT
Start: 2024-10-18

## 2024-11-16 ENCOUNTER — HEALTH MAINTENANCE LETTER (OUTPATIENT)
Age: 74
End: 2024-11-16

## 2024-11-16 NOTE — TELEPHONE ENCOUNTER
Health Call Center    Phone Message    May a detailed message be left on voicemail: yes    Reason for Call: Other: Anny from [facility] calling to relay information to Dr Pena. Anny saw the pt today 5/30 for physical therapy and wanted to relay information about the visit prior to his upcoming appointment with Dr Pena on 5/8. Anny is faxing the information to the clinic. Is requesting that Adelaida Wallace give her a call with any questions.     Action Taken: Message routed to:  [facility] Neurology   Medication: 10/25/2023 last refilled    rosuvastatin (CRESTOR) 5 MG tablet     Sig: Take 1 tablet by mouth daily.    Original sig: TAKE 1 TABLET BY MOUTH DAILY    Disp: 90 tablet    Refills: 3 (Pharmacy requested: Not specified)    Start: 11/14/2024    Class: Eprescribe     Last office visit date: 9/6/2024  Upcoming appointment: 1/21/2025   Medication Refill Protocol Failed.    Hmg CoA Reductase Inhibitors (Statin) Refill Protocol - 12 Month Protocol [id number]/2024 10:37 AM   Protocol Details Patient is NOT on Gemfibrozil       Medication: 5/23/2024 last refilled   Ozempic, 2 MG/DOSE, 8 MG/3ML Solution Pen-injector     Sig: INJECT 2 MG UNDER THE SKIN EVERY 7 DAYS FOR TYPE 2 DIABETES    Disp: 9 mL    Refills: 1 (Pharmacy requested: Not specified)    Start: 11/14/2024    Class: Eprescribe    passed protocol.   Last office visit date: 9/6/2024   Next appointment scheduled?: Yes 1/21/2025   Number of refills given: 1

## 2024-11-19 ENCOUNTER — TRANSFERRED RECORDS (OUTPATIENT)
Dept: HEALTH INFORMATION MANAGEMENT | Facility: CLINIC | Age: 74
End: 2024-11-19
Payer: MEDICARE

## 2024-12-30 ENCOUNTER — CARE COORDINATION (OUTPATIENT)
Dept: SLEEP MEDICINE | Facility: CLINIC | Age: 74
End: 2024-12-30
Payer: MEDICARE

## 2024-12-30 NOTE — PROGRESS NOTES
DONALD orders called into [facility], patient will be contacted by the Respiratory team to schedule.   Alea Stewart MA

## 2025-01-08 DIAGNOSIS — G11.9 CEREBELLAR ATAXIA (H): ICD-10-CM

## 2025-01-08 DIAGNOSIS — G47.33 OSA (OBSTRUCTIVE SLEEP APNEA): ICD-10-CM

## 2025-01-14 ENCOUNTER — MYC REFILL (OUTPATIENT)
Dept: NEUROLOGY | Facility: CLINIC | Age: 75
End: 2025-01-14
Payer: MEDICARE

## 2025-01-14 DIAGNOSIS — G47.10 EXCESSIVE SLEEPINESS: ICD-10-CM

## 2025-01-17 NOTE — PROGRESS NOTES
Oximetry results were obtained for the date of 1/6/2025.  The patient was on a treatment of auto CPAP 8-15 cm.  The study showed a valid recording time of 9:03 with a 4% desaturation index of 4/hr.  The mean oxygen saturation was 92.1% and the lowest SpO2 was 80% (there was a period where the sensor was not reading correctly between shortly after 4 AM and roughly 5:20 AM. The low oxygen reading occurred then. His O2 does not appear to have dropped below 88%).  The patient spent 23.6 minutes below 89% SpO2 (again this appears secondary to the probe getting dislodged. His oxygen appeared well-maintained in the low 90's).   This test appears to show well-controlled apnea and no hypoxemia.  Bennett Goltz, PA-C

## 2025-01-23 RX ORDER — DEXTROAMPHETAMINE SACCHARATE, AMPHETAMINE ASPARTATE MONOHYDRATE, DEXTROAMPHETAMINE SULFATE AND AMPHETAMINE SULFATE 7.5; 7.5; 7.5; 7.5 MG/1; MG/1; MG/1; MG/1
30 CAPSULE, EXTENDED RELEASE ORAL DAILY
Qty: 90 CAPSULE | Refills: 0 | Status: SHIPPED | OUTPATIENT
Start: 2025-01-23

## 2025-02-04 ENCOUNTER — MYC MEDICAL ADVICE (OUTPATIENT)
Dept: SLEEP MEDICINE | Facility: CLINIC | Age: 75
End: 2025-02-04

## 2025-04-17 ENCOUNTER — MYC REFILL (OUTPATIENT)
Dept: NEUROLOGY | Facility: CLINIC | Age: 75
End: 2025-04-17
Payer: MEDICARE

## 2025-04-17 DIAGNOSIS — G47.10 EXCESSIVE SLEEPINESS: ICD-10-CM

## 2025-04-17 RX ORDER — DEXTROAMPHETAMINE SACCHARATE, AMPHETAMINE ASPARTATE MONOHYDRATE, DEXTROAMPHETAMINE SULFATE AND AMPHETAMINE SULFATE 7.5; 7.5; 7.5; 7.5 MG/1; MG/1; MG/1; MG/1
30 CAPSULE, EXTENDED RELEASE ORAL DAILY
Qty: 90 CAPSULE | Refills: 0 | Status: SHIPPED | OUTPATIENT
Start: 2025-04-17

## 2025-07-11 ENCOUNTER — TRANSFERRED RECORDS (OUTPATIENT)
Dept: MULTI SPECIALTY CLINIC | Facility: CLINIC | Age: 75
End: 2025-07-11
Payer: MEDICARE

## 2025-07-11 LAB
ALT SERPL-CCNC: 20 IU/L (ref 0–44)
AST SERPL-CCNC: 26 IU/L (ref 0–40)
CREATININE (EXTERNAL): 1.18 MG/DL (ref 0.76–1.27)
GFR ESTIMATED (EXTERNAL): 65 ML/MIN/1.73M2

## 2025-07-14 ENCOUNTER — TRANSFERRED RECORDS (OUTPATIENT)
Dept: ADMINISTRATIVE | Facility: CLINIC | Age: 75
End: 2025-07-14
Payer: MEDICARE

## 2025-07-15 NOTE — PROCEDURES
2025        Arnold Lozada (Steve)   [address]  [place] [address]      Arnold LynchJess Lozada,  :  1950    I'm writing to let you know about the tests that were taken recently.   Thank you for allowing [facility] the opportunity to take part in your healthcare.  At [facility] we strive to provide each patient with the finest gastroenterology care available.  We hope your experience was pleasant and informative.    Normal labs except for slightly elevated MCV, not clinically significant.  Creatinine 2025 14:40   Description Result Units Flags Range   Creatinine 1.18 mg/dL  0.76-1.27   eGFR 65 mL/min/1.73  >59   Comments   Performed At: [facility], [facility] [address] [place], [place], [id number]  Leida Vázquez MD, Phone: [phone]   BUN 2025 14:40   Description Result Units Flags Range   BUN 23 mg/dL  8-27   Comments   Performed At: [facility], [facility] [address] [place], [place], [id number]  Leida Vázquez MD, Phone: [phone]   Hepatic Function Panel (7) 2025 14:40   Description Result Units Flags Range   Bilirubin, Total 0.3 mg/dL  0.0-1.2   Bilirubin, Direct 0.15 mg/dL  0.00-0.40   AST (SGOT) 26 IU/L  0-40   ALT (SGPT) 20 IU/L  0-44   Albumin 4.4 g/dL  3.8-4.8   Alkaline Phosphatase 81 IU/L     Protein, Total 6.3 g/dL  6.0-8.5   Comments   Performed At: [facility], [facility] [address] [place], [place], [id number]  Leida Vázquez MD, Phone: [phone]   CBC With Differential/Platelet 2025 14:40   Description Result Units Flags Range   Hemoglobin 14.2 g/dL  13.0-17.7   Hematocrit 43.2 %  37.5-51.0    fL H 79-97   MCHC 32.9 g/dL  31.5-35.7   MCH 32.9 pg  26.6-33.0   RDW 12.6 %  11.6-15.4   Platelets 160 x10E3/uL  150-450   Neutrophils 74 %  Not Estab.   Lymphs 13 %  Not Estab.   Monocytes 8 %  Not Estab.   Eos 5 %  Not Estab.   Basos 0 %  Not Estab.   Neutrophils (Absolute) 5.4 x10E3/uL  1.4-7.0   Lymphs (Absolute) 0.9 x10E3/uL   0.7-3.1   Monocytes(Absolute) 0.6 x10E3/uL  0.1-0.9   Eos (Absolute) 0.4 x10E3/uL  0.0-0.4   Baso (Absolute) 0.0 x10E3/uL  0.0-0.2   Immature Grans (Abs) 0.0 x10E3/uL  0.0-0.1   Immature Granulocytes 0 %  Not Estab.   RBC 4.32 x10E6/uL  4.14-5.80   WBC 7.4 x10E3/uL  3.4-10.8   Comments   First Available              Performed At: , [facility] [address], [place], [place], [id number]  Leida Vázquez MD, Phone: [phone]           Thank you.    Electronically signed by:  Charis Asher NP 07/14/2025 10:39 AM  Document generated by:  Charis Asher NP  07/14/2025  If your provider ordered multiple tests; the results may not become available at the same time.  If multiple test results are received within 14 days of one another, you may receive a duplicate.  cc:  Brett Bernabe MD  cc:  Nikhil Carpenter MD